# Patient Record
Sex: FEMALE | Race: WHITE | NOT HISPANIC OR LATINO | Employment: OTHER | URBAN - METROPOLITAN AREA
[De-identification: names, ages, dates, MRNs, and addresses within clinical notes are randomized per-mention and may not be internally consistent; named-entity substitution may affect disease eponyms.]

---

## 2017-03-07 ENCOUNTER — HOSPITAL ENCOUNTER (OUTPATIENT)
Dept: PHYSICAL MEDICINE AND REHAB | Facility: CLINIC | Age: 57
Discharge: HOME | End: 2017-03-07
Attending: PHYSICAL MEDICINE & REHABILITATION

## 2017-03-07 DIAGNOSIS — Q79.60 EHLERS-DANLOS DISEASE: ICD-10-CM

## 2017-03-07 DIAGNOSIS — M79.18 MYOFASCIAL MUSCLE PAIN: ICD-10-CM

## 2017-03-07 DIAGNOSIS — M43.10 SPONDYLOLISTHESIS: ICD-10-CM

## 2017-03-07 DIAGNOSIS — M54.2 CERVICALGIA: ICD-10-CM

## 2017-03-07 DIAGNOSIS — M54.50 LUMBAR SPINE PAIN: ICD-10-CM

## 2017-03-07 ASSESSMENT — MIFFLIN-ST. JEOR: SCORE: 1321.11

## 2017-03-15 ENCOUNTER — AMBULATORY - HEALTHEAST (OUTPATIENT)
Dept: PHYSICAL MEDICINE AND REHAB | Facility: CLINIC | Age: 57
End: 2017-03-15

## 2017-03-17 ENCOUNTER — COMMUNICATION - HEALTHEAST (OUTPATIENT)
Dept: PHYSICAL MEDICINE AND REHAB | Facility: CLINIC | Age: 57
End: 2017-03-17

## 2017-03-20 ENCOUNTER — HOSPITAL ENCOUNTER (OUTPATIENT)
Dept: PHYSICAL MEDICINE AND REHAB | Facility: CLINIC | Age: 57
Discharge: HOME | End: 2017-03-20
Attending: PHYSICAL MEDICINE & REHABILITATION

## 2017-03-20 DIAGNOSIS — M53.3 SACROILIAC JOINT PAIN: ICD-10-CM

## 2017-03-20 DIAGNOSIS — M79.18 MYOFASCIAL MUSCLE PAIN: ICD-10-CM

## 2017-03-20 DIAGNOSIS — Q79.60 EHLERS-DANLOS DISEASE: ICD-10-CM

## 2017-03-20 DIAGNOSIS — M54.50 LUMBAR SPINE PAIN: ICD-10-CM

## 2017-03-20 DIAGNOSIS — M43.10 SPONDYLOLISTHESIS: ICD-10-CM

## 2017-03-20 ASSESSMENT — MIFFLIN-ST. JEOR: SCORE: 1321.11

## 2017-06-03 ENCOUNTER — HEALTH MAINTENANCE LETTER (OUTPATIENT)
Age: 57
End: 2017-06-03

## 2017-11-27 ENCOUNTER — TRANSFERRED RECORDS (OUTPATIENT)
Dept: HEALTH INFORMATION MANAGEMENT | Facility: CLINIC | Age: 57
End: 2017-11-27

## 2017-12-19 DIAGNOSIS — R55 SYNCOPE, UNSPECIFIED SYNCOPE TYPE: ICD-10-CM

## 2017-12-19 DIAGNOSIS — L50.1 CHRONIC IDIOPATHIC URTICARIA: Primary | ICD-10-CM

## 2017-12-22 ENCOUNTER — HOSPITAL ENCOUNTER (OUTPATIENT)
Dept: PHYSICAL MEDICINE AND REHAB | Facility: CLINIC | Age: 57
Discharge: HOME | End: 2017-12-22
Attending: PHYSICAL MEDICINE & REHABILITATION

## 2017-12-22 DIAGNOSIS — L50.1 CHRONIC IDIOPATHIC URTICARIA: ICD-10-CM

## 2017-12-22 DIAGNOSIS — M79.18 MYOFASCIAL MUSCLE PAIN: ICD-10-CM

## 2017-12-22 DIAGNOSIS — Q79.60 EHLERS-DANLOS DISEASE: ICD-10-CM

## 2017-12-22 DIAGNOSIS — M54.50 LUMBAR SPINE PAIN: ICD-10-CM

## 2017-12-22 DIAGNOSIS — M53.3 SACROILIAC JOINT PAIN: ICD-10-CM

## 2017-12-22 DIAGNOSIS — M43.10 SPONDYLOLISTHESIS: ICD-10-CM

## 2017-12-22 DIAGNOSIS — R55 SYNCOPE, UNSPECIFIED SYNCOPE TYPE: ICD-10-CM

## 2017-12-22 ASSESSMENT — MIFFLIN-ST. JEOR: SCORE: 1298.43

## 2017-12-24 LAB — TRYPTASE SERPL-MCNC: 4.9 UG/L

## 2017-12-26 LAB — LEUKOTRIENE E4 URINE: <31 PG/MG CR

## 2017-12-27 ENCOUNTER — COMMUNICATION - HEALTHEAST (OUTPATIENT)
Dept: PHYSICAL MEDICINE AND REHAB | Facility: CLINIC | Age: 57
End: 2017-12-27

## 2017-12-27 LAB
2,3 11B PROSTAGLANDIN F2A UR: 1317 PG/MG CR
COLLECT DURATION TIME UR: NORMAL H
CREAT UR-MCNC: 65 MG/DL
HISTAMINE BLD-SCNC: 1665 NMOL/L (ref 180–1800)
ME-HISTAMINE/CREAT 24H UR: 94 MCG/G CR (ref 30–200)
SPECIMEN VOL 24H UR: NORMAL ML

## 2017-12-28 ENCOUNTER — COMMUNICATION - HEALTHEAST (OUTPATIENT)
Dept: SCHEDULING | Facility: CLINIC | Age: 57
End: 2017-12-28

## 2017-12-28 ENCOUNTER — RECORDS - HEALTHEAST (OUTPATIENT)
Dept: ADMINISTRATIVE | Facility: OTHER | Age: 57
End: 2017-12-28

## 2017-12-28 LAB — ANTI IGE ANTIBODY: NORMAL

## 2018-01-04 LAB — CIU ASSOCIATED BASOPHIL ACTIVATION: NORMAL

## 2018-01-19 ENCOUNTER — TRANSFERRED RECORDS (OUTPATIENT)
Dept: HEALTH INFORMATION MANAGEMENT | Facility: CLINIC | Age: 58
End: 2018-01-19

## 2018-02-23 ENCOUNTER — TRANSFERRED RECORDS (OUTPATIENT)
Dept: HEALTH INFORMATION MANAGEMENT | Facility: CLINIC | Age: 58
End: 2018-02-23

## 2018-03-02 ENCOUNTER — TRANSFERRED RECORDS (OUTPATIENT)
Dept: HEALTH INFORMATION MANAGEMENT | Facility: CLINIC | Age: 58
End: 2018-03-02

## 2018-03-06 ENCOUNTER — OFFICE VISIT - HEALTHEAST (OUTPATIENT)
Dept: INTERNAL MEDICINE | Facility: CLINIC | Age: 58
End: 2018-03-06

## 2018-03-06 ENCOUNTER — COMMUNICATION - HEALTHEAST (OUTPATIENT)
Dept: INTERNAL MEDICINE | Facility: CLINIC | Age: 58
End: 2018-03-06

## 2018-03-06 ENCOUNTER — COMMUNICATION - HEALTHEAST (OUTPATIENT)
Dept: TELEHEALTH | Facility: CLINIC | Age: 58
End: 2018-03-06

## 2018-03-06 DIAGNOSIS — Q79.62 EHLERS-DANLOS SYNDROME TYPE III: ICD-10-CM

## 2018-03-06 DIAGNOSIS — G90.522 COMPLEX REGIONAL PAIN SYNDROME TYPE 1 OF LEFT LOWER EXTREMITY: ICD-10-CM

## 2018-03-06 DIAGNOSIS — E03.9 HYPOTHYROIDISM: ICD-10-CM

## 2018-03-16 ENCOUNTER — OFFICE VISIT - HEALTHEAST (OUTPATIENT)
Dept: INTERNAL MEDICINE | Facility: CLINIC | Age: 58
End: 2018-03-16

## 2018-03-16 DIAGNOSIS — Z76.89 ENCOUNTER TO ESTABLISH CARE: ICD-10-CM

## 2018-04-05 ENCOUNTER — TELEPHONE (OUTPATIENT)
Dept: CARDIOLOGY | Facility: CLINIC | Age: 58
End: 2018-04-05

## 2018-04-05 NOTE — TELEPHONE ENCOUNTER
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  She is uncertain of the type.  However, she also reports that her niece has EDS and has a genetic mutation.  Kayla is going to send me a copy of her nieces report for review.  Assuming the genetic variant is informative, She would like to proceed with testing to confirm her diagnosis.      I will review results and notify Kayla.  She will then schedule an appointment to come in if needed.     Reviewed records of Kayla's niece.  Her niece Rell had chromosomal microarray analysis (CMA) due to indication of Asperger Syndrome not EDS.  Testing revealed that Rell's carries a gain in 5q11.2, which was reported as a non-disease associated region.  This test would not be helpful in confirming Kayla's EDS diagnosis.    Kayla also forwarded a copy of her genetic evaluation with Yesi Vo from 2013.  Dr. 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  Explained that testing is still not available for this form of EDS.    Kayla may call back in the future to see if additional information is known about EDS.    Jayde Zelaya MS  Licensed Genetic Counselor  [facility]

## 2018-05-07 ENCOUNTER — RECORDS - HEALTHEAST (OUTPATIENT)
Dept: ADMINISTRATIVE | Facility: OTHER | Age: 58
End: 2018-05-07

## 2018-06-01 ENCOUNTER — OFFICE VISIT - HEALTHEAST (OUTPATIENT)
Dept: INTERNAL MEDICINE | Facility: CLINIC | Age: 58
End: 2018-06-01

## 2018-06-01 DIAGNOSIS — R68.89 SUSPECTED LYME DISEASE: ICD-10-CM

## 2018-06-01 LAB
ALBUMIN SERPL-MCNC: 4.3 G/DL (ref 3.5–5)
ALP SERPL-CCNC: 76 U/L (ref 45–120)
ALT SERPL W P-5'-P-CCNC: 20 U/L (ref 0–45)
ANION GAP SERPL CALCULATED.3IONS-SCNC: 10 MMOL/L (ref 5–18)
AST SERPL W P-5'-P-CCNC: 28 U/L (ref 0–40)
BASOPHILS # BLD AUTO: 0 THOU/UL (ref 0–0.2)
BASOPHILS NFR BLD AUTO: 1 % (ref 0–2)
BILIRUB SERPL-MCNC: 0.3 MG/DL (ref 0–1)
BUN SERPL-MCNC: 24 MG/DL (ref 8–22)
C REACTIVE PROTEIN LHE: <0.1 MG/DL (ref 0–0.8)
CALCIUM SERPL-MCNC: 9.6 MG/DL (ref 8.5–10.5)
CHLORIDE BLD-SCNC: 110 MMOL/L (ref 98–107)
CO2 SERPL-SCNC: 25 MMOL/L (ref 22–31)
CREAT SERPL-MCNC: 0.89 MG/DL (ref 0.6–1.1)
EOSINOPHIL # BLD AUTO: 0.1 THOU/UL (ref 0–0.4)
EOSINOPHIL NFR BLD AUTO: 2 % (ref 0–6)
ERYTHROCYTE [DISTWIDTH] IN BLOOD BY AUTOMATED COUNT: 12.2 % (ref 11–14.5)
ERYTHROCYTE [SEDIMENTATION RATE] IN BLOOD BY WESTERGREN METHOD: 2 MM/HR (ref 0–20)
GFR SERPL CREATININE-BSD FRML MDRD: >60 ML/MIN/1.73M2
GLUCOSE BLD-MCNC: 79 MG/DL (ref 70–125)
HCT VFR BLD AUTO: 40.2 % (ref 35–47)
HGB BLD-MCNC: 13.5 G/DL (ref 12–16)
LYMPHOCYTES # BLD AUTO: 1.6 THOU/UL (ref 0.8–4.4)
LYMPHOCYTES NFR BLD AUTO: 36 % (ref 20–40)
MCH RBC QN AUTO: 30.7 PG (ref 27–34)
MCHC RBC AUTO-ENTMCNC: 33.5 G/DL (ref 32–36)
MCV RBC AUTO: 91 FL (ref 80–100)
MONOCYTES # BLD AUTO: 0.3 THOU/UL (ref 0–0.9)
MONOCYTES NFR BLD AUTO: 7 % (ref 2–10)
NEUTROPHILS # BLD AUTO: 2.3 THOU/UL (ref 2–7.7)
NEUTROPHILS NFR BLD AUTO: 54 % (ref 50–70)
PLATELET # BLD AUTO: 165 THOU/UL (ref 140–440)
PMV BLD AUTO: 8.1 FL (ref 7–10)
POTASSIUM BLD-SCNC: 4.3 MMOL/L (ref 3.5–5)
PROT SERPL-MCNC: 6.7 G/DL (ref 6–8)
RBC # BLD AUTO: 4.39 MILL/UL (ref 3.8–5.4)
SODIUM SERPL-SCNC: 145 MMOL/L (ref 136–145)
WBC: 4.3 THOU/UL (ref 4–11)

## 2018-06-02 LAB — A PHAG+EHRL SP DNA PNL BLD NAA+NON-PROBE: NORMAL

## 2018-06-03 LAB
E CHAFFEENSIS IGG TITR SER IF: NORMAL {TITER}
E CHAFFEENSIS IGM TITR SER IF: NORMAL {TITER}
R RICKETTSI IGG TITR SER IF: NORMAL {TITER}
R RICKETTSI IGM TITR SER IF: NORMAL {TITER}

## 2018-06-04 LAB — B BURGDOR IGG+IGM SER QL: <0.01 INDEX VALUE

## 2018-06-05 ENCOUNTER — OFFICE VISIT - HEALTHEAST (OUTPATIENT)
Dept: INFECTIOUS DISEASES | Facility: CLINIC | Age: 58
End: 2018-06-05

## 2018-06-05 DIAGNOSIS — R51.9 NONINTRACTABLE EPISODIC HEADACHE, UNSPECIFIED HEADACHE TYPE: ICD-10-CM

## 2018-06-05 DIAGNOSIS — R21 SKIN RASH: ICD-10-CM

## 2018-06-05 ASSESSMENT — MIFFLIN-ST. JEOR: SCORE: 1211.8

## 2018-06-06 ENCOUNTER — COMMUNICATION - HEALTHEAST (OUTPATIENT)
Dept: INFECTIOUS DISEASES | Facility: CLINIC | Age: 58
End: 2018-06-06

## 2018-06-07 ENCOUNTER — RECORDS - HEALTHEAST (OUTPATIENT)
Dept: ADMINISTRATIVE | Facility: OTHER | Age: 58
End: 2018-06-07

## 2018-06-12 ENCOUNTER — COMMUNICATION - HEALTHEAST (OUTPATIENT)
Dept: INFECTIOUS DISEASES | Facility: CLINIC | Age: 58
End: 2018-06-12

## 2018-06-22 ENCOUNTER — RECORDS - HEALTHEAST (OUTPATIENT)
Dept: ADMINISTRATIVE | Facility: OTHER | Age: 58
End: 2018-06-22

## 2018-07-27 ENCOUNTER — TRANSFERRED RECORDS (OUTPATIENT)
Dept: HEALTH INFORMATION MANAGEMENT | Facility: CLINIC | Age: 58
End: 2018-07-27

## 2018-07-31 ENCOUNTER — COMMUNICATION - HEALTHEAST (OUTPATIENT)
Dept: INTERNAL MEDICINE | Facility: CLINIC | Age: 58
End: 2018-07-31

## 2018-08-06 ENCOUNTER — MEDICAL CORRESPONDENCE (OUTPATIENT)
Dept: HEALTH INFORMATION MANAGEMENT | Facility: CLINIC | Age: 58
End: 2018-08-06

## 2018-08-07 ENCOUNTER — TELEPHONE (OUTPATIENT)
Dept: OPHTHALMOLOGY | Facility: CLINIC | Age: 58
End: 2018-08-07

## 2018-08-07 NOTE — TELEPHONE ENCOUNTER
[facility] Call Center    Phone Message    May a detailed message be left on voicemail: no    Reason for Call: Other: PT would like a call back regarding questions about the provider and his background with certain specialties.   Please follow up      Action Taken: Message routed to:  [facility] clinic

## 2018-08-07 NOTE — TELEPHONE ENCOUNTER
Pt. Wanted to make sure that Dr. 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 Verified with patient that he does see patients with this syndrome.     Jackelyn Fleming COT 1:48 PM August 7, 2018

## 2018-08-09 ENCOUNTER — COMMUNICATION - HEALTHEAST (OUTPATIENT)
Dept: SCHEDULING | Facility: CLINIC | Age: 58
End: 2018-08-09

## 2018-08-09 ENCOUNTER — OFFICE VISIT - HEALTHEAST (OUTPATIENT)
Dept: INTERNAL MEDICINE | Facility: CLINIC | Age: 58
End: 2018-08-09

## 2018-08-09 DIAGNOSIS — R07.81 RIB PAIN ON LEFT SIDE: ICD-10-CM

## 2018-08-09 DIAGNOSIS — Q79.62 EHLERS-DANLOS SYNDROME TYPE III: ICD-10-CM

## 2018-08-14 ENCOUNTER — OFFICE VISIT (OUTPATIENT)
Dept: OPHTHALMOLOGY | Facility: CLINIC | Age: 58
End: 2018-08-14
Attending: OPHTHALMOLOGY
Payer: COMMERCIAL

## 2018-08-14 DIAGNOSIS — Q79.60 EHLERS-DANLOS DISEASE: Primary | ICD-10-CM

## 2018-08-14 DIAGNOSIS — H25.10 NUCLEAR SENILE CATARACT, UNSPECIFIED LATERALITY: Primary | ICD-10-CM

## 2018-08-14 DIAGNOSIS — H25.10 NUCLEAR SENILE CATARACT, UNSPECIFIED LATERALITY: ICD-10-CM

## 2018-08-14 PROCEDURE — G0463 HOSPITAL OUTPT CLINIC VISIT: HCPCS | Mod: ZF

## 2018-08-14 PROCEDURE — 76519 ECHO EXAM OF EYE: CPT | Mod: ZF | Performed by: OPHTHALMOLOGY

## 2018-08-14 PROCEDURE — 92025 CPTRIZED CORNEAL TOPOGRAPHY: CPT | Mod: ZF | Performed by: OPHTHALMOLOGY

## 2018-08-14 RX ORDER — FEXOFENADINE HCL 60 MG/1
180 TABLET, FILM COATED ORAL EVERY MORNING
COMMUNITY

## 2018-08-14 RX ORDER — BUPROPION HYDROCHLORIDE 150 MG/1
150 TABLET ORAL
Status: ON HOLD | COMMUNITY
Start: 2018-01-08 | End: 2019-11-26

## 2018-08-14 RX ORDER — MULTIVIT WITH MINERALS/LUTEIN
1000 TABLET ORAL
COMMUNITY
End: 2022-07-28

## 2018-08-14 ASSESSMENT — VISUAL ACUITY
OS_SC+: -2
OD_SC+: -1
METHOD: SNELLEN - LINEAR
OS_SC: 20/30
OD_SC: 20/25

## 2018-08-14 ASSESSMENT — EXTERNAL EXAM - RIGHT EYE: OD_EXAM: NORMAL

## 2018-08-14 ASSESSMENT — TONOMETRY
OD_IOP_MMHG: 17
OS_IOP_MMHG: 18
IOP_METHOD: TONOPEN

## 2018-08-14 ASSESSMENT — CUP TO DISC RATIO
OS_RATIO: 0.2
OD_RATIO: 0.2

## 2018-08-14 ASSESSMENT — CONF VISUAL FIELD
OS_INFERIOR_NASAL_RESTRICTION: 3
METHOD: COUNTING FINGERS
OD_NORMAL: 1

## 2018-08-14 ASSESSMENT — EXTERNAL EXAM - LEFT EYE: OS_EXAM: NORMAL

## 2018-08-14 NOTE — MR AVS SNAPSHOT
After Visit Summary   8/14/2018    Kelsy Morley    MRN: [id number]           Patient Information     Date Of Birth          1960        Visit Information        Provider Department      8/14/2018 12:45 PM Pj Miranda MD [facility]        Today's Diagnoses     Omayra-Danlos disease    -  1    Nuclear senile cataract, unspecified laterality - Both Eyes           Follow-ups after your visit        Your next 10 appointments already scheduled     Oct 04, 2018  1:15 PM CDT   Post-Op with Pj Miranda MD   Eye Clinic ([facility])    [address] [address]   [phone]            Oct 11, 2018  1:15 PM CDT   Post-Op with Pj Miranda MD   Eye Clinic ([facility])    [address] [address]   [phone]            Oct 18, 2018  1:00 PM CDT   Post-Op with Pj Miranda MD   Eye Clinic ([facility])    [address] [address]   [phone]            Oct 25, 2018  9:15 AM CDT   Post-Op with Pj Miranda MD   Eye Clinic ([facility])    [address] [address]   [phone]            Nov 20, 2018  1:15 PM CST   Post-Op with Pj Miranda MD   Eye Clinic ([facility])    [address] [address]   [phone]              Who to contact     Please call your clinic at [phone] to:    Ask questions about your health    Make or cancel appointments    Discuss your medicines    Learn about your test results    Speak to your doctor            Additional Information About Your Visit        MyChart Information     MyChart gives you secure access to your electronic health record. If you see a primary  care provider, you can also send messages to your care team and make appointments. If you have questions, please call your primary care clinic.  If you do not have a primary care provider, please call [phone] and they will assist you.      Zuki is an electronic gateway that provides easy, online access to your medical records. With Zuki, you can request a clinic appointment, read your test results, renew a prescription or communicate with your care team.     To access your existing account, please contact your [facility] or call [phone] for assistance.        Care EveryWhere ID     This is your Care EveryWhere ID. This could be used by other organizations to access your [facility] medical records  [id number]         Blood Pressure from Last 3 Encounters:   03/30/15 109/66    Weight from Last 3 Encounters:   03/30/15 77.1 kg (169 lb 14.4 oz)              We Performed the Following     Corneal Topography OU (both eyes)     IOL Biometry w/ IOL calc OU (both eye)     Ana-Operative Worksheet        Primary Care Provider Office Phone # Fax #    Anne MANN Teddy [phone]       [facility] [address] [address]        Equal Access to Services     SANYA ROSS AH: Hadii aad ku hadasho Somark, waaxda luqadaha, qaybta kaalmada adealfredayada, fracisco wiseman. So [facility] [phone].    ATENCIÓN: Si habla español, tiene a jaramillo disposición servicios gratuitos de asistencia lingüística. Jill al [phone].    We comply with applicable federal civil rights laws and [place] laws. We do not discriminate on the basis of race, color, national origin, age, disability, sex, sexual orientation, or gender identity.            Thank you!     Thank you for choosing EYE CLINIC  for your care. Our goal is always to provide you with excellent care. Hearing back from our patients is one way we can continue to improve our  services. Please take a few minutes to complete the written survey that you may receive in the mail after your visit with us. Thank you!             Your Updated Medication List - Protect others around you: Learn how to safely use, store and throw away your medicines at www.disposemymeds.org.          This list is accurate as of 8/14/18 11:59 PM.  Always use your most recent med list.                   Brand Name Dispense Instructions for use Diagnosis    ascorbic acid 1000 MG Tabs    vitamin C     Take 1,000 mg by mouth        Beclomethasone Dipropionate 80 MCG/ACT Aers Nasal Spray      Spray 2 puffs in nostril        buPROPion 150 MG 24 hr tablet    WELLBUTRIN XL     Take 150 mg by mouth        calcium carbonate 500 MG tablet    OS-HALLIE 500 mg [place]. Ca     Take 500 mg by mouth 2 times daily        CELEBREX PO      Take 100 mg by mouth 2 times daily        cetirizine HCl 10 MG Caps           CLONIDINE HCL PO      Take 10 mg by mouth Patch        CYMBALTA PO      Take 60 mg by mouth daily        fexofenadine 60 MG tablet    ALLEGRA          IMITREX PO      Take 100 mg by mouth every 8 hours as needed for migraine        LEVOTHYROXINE SODIUM PO      Take 75 mcg by mouth daily        lidocaine visc 2% 2.5mL/5mL & maalox/mylanta w/ simeth 2.5mL/5mL & diphenhydrAMINE 5mg/5mL Susp suspension    [facility] Mouthwash [facility]     Swish and swallow 10 mLs in mouth every 6 hours as needed for mouth sores        LIPITOR PO      Take 10 mg by mouth daily        MAGNESIUM OXIDE PO      Take 400 mg by mouth daily        OMEGA-3 FISH OIL PO      Take 1 g by mouth 2 times daily (with meals)        ranitidine 150 MG tablet    ZANTAC     Take 150 mg by mouth        saccharomyces boulardii 250 MG capsule    FLORASTOR     Take 250 mg by mouth 2 times daily        TIZANIDINE HCL PO      Take 2 mg by mouth every 6 hours as needed for muscle spasms        TOPAMAX PO      Take 75 mg by mouth daily        TRAZODONE HCL PO      Take 75 mg by  mouth At Bedtime        VITAMIN B-2 PO      Take 100 mg by mouth 2 times daily        VITAMIN D (CHOLECALCIFEROL) PO      Take 1,000 Units by mouth daily        zinc sulfate 220 (50 Zn) MG capsule    ZINCATE     Take 220 mg by mouth daily

## 2018-08-14 NOTE — NURSING NOTE
Chief Complaints and History of Present Illnesses   Patient presents with     Consult For     Cataract evaluation per Dr. Nikolai HITCHCOCK    Affected eye(s):  Both   Symptoms:     No floaters   No flashes   Foreign body sensation   Tearing   Itching         Do you have eye pain now?:  No      Comments:  Pt here for a cataract evaluation BE. With in the last year pt has noticed a huge change in vision. Everything is so blurry Dist and Near with and without glasses.     Lise Marshall, [facility] 12:34 PM August 14, 2018

## 2018-08-15 ENCOUNTER — TELEPHONE (OUTPATIENT)
Dept: OPHTHALMOLOGY | Facility: CLINIC | Age: 58
End: 2018-08-15

## 2018-08-15 NOTE — TELEPHONE ENCOUNTER
Patient is scheduled for surgery with Dr. Miranda      Spoke or left message with: patient    Date of Surgery: 10/3 and 10/17 MITZI - pt preference date    Pre-op with surgeon (if applicable): GALINA    H&P: Scheduled with [facility] See [phone] pt will call to schedule appt    Post op: 1day, 1 week, 1 month    Special Equipment: NA    Additional imaging/appointments: GALIAN    Surgery packet sent: mailed 8/15/18    Additional comments: Advsd need adult surgery day and 1 day post op for each surgery

## 2018-08-16 ENCOUNTER — OFFICE VISIT - HEALTHEAST (OUTPATIENT)
Dept: INTERNAL MEDICINE | Facility: CLINIC | Age: 58
End: 2018-08-16

## 2018-08-16 DIAGNOSIS — Z53.21 PATIENT LEFT WITHOUT BEING SEEN: ICD-10-CM

## 2018-08-24 ENCOUNTER — OFFICE VISIT - HEALTHEAST (OUTPATIENT)
Dept: INTERNAL MEDICINE | Facility: CLINIC | Age: 58
End: 2018-08-24

## 2018-08-24 DIAGNOSIS — M85.89 OSTEOPENIA OF MULTIPLE SITES: ICD-10-CM

## 2018-09-14 NOTE — TELEPHONE ENCOUNTER
[facility] Call Center    Phone Message    May a detailed message be left on voicemail: yes    Reason for Call: Other: Questions for Dr Miranda: Pt saw someone at another clinic yest and other provider said her Cataracts are really small and she wouldn't do surgery at this time - so Pt is wondering if she should proceed with Surgery at this time? or should she wait longer?, or due to her Omayra-Danlos syndrome condition or for any other reason should she do it now? - Please have Dr Miranda return her call to discuss - Thanks      Action Taken: Message routed to:  [facility]

## 2018-09-18 ENCOUNTER — TELEPHONE (OUTPATIENT)
Dept: OPHTHALMOLOGY | Facility: CLINIC | Age: 58
End: 2018-09-18

## 2018-09-18 NOTE — TELEPHONE ENCOUNTER
Health Call Center    Phone Message    May a detailed message be left on voicemail: yes    Reason for Call: Other: Pt reported that she called our clinic on Fri 9/14/18, but there is no documented telephone encouter?  Pt called requesting Dr. Pj Miranda to call her back. Pt had gone after a 2nd opionion from outside our clinic recently regarding the upcoming cataract surgeries with our eye clinic on 10/3 and 10/17/18.  Pt was sorta shocked at what she heard during the 2nd opionion and now has a number of questions for Dr. Miranda. Please have Dr. Miranda call pt on her cell ph#. Thank you.     Action Taken: Message routed to:  [facility]

## 2018-09-19 ENCOUNTER — TELEPHONE (OUTPATIENT)
Dept: OPHTHALMOLOGY | Facility: CLINIC | Age: 58
End: 2018-09-19

## 2018-09-19 NOTE — TELEPHONE ENCOUNTER
Reviewed with pt message sent to cornea fellow to review cataract surgery concerns    Pt wants to review increase risk with her history jameson graham. Visual acuity better than 20/40 and would like to review further before going ahead with cataract surgery  Had second opinion since last visit    Reviewed fellow been in cliinc and surgery and will f/u with pt concerns    Pt's pre-op tomorrow at 2 Pm and would like to review before that appt  Mikey Hugo RN 4:30 PM 09/19/18

## 2018-09-19 NOTE — TELEPHONE ENCOUNTER
H/o jameson graham     Questions on going forth with cataract surgery after second opinion    Previous call last week-- note was attached to a previous encounter in august    Note to fellow for call back to review questions about upcoming cataract surgery  Mikey Hugo RN 7:22 AM 09/19/18

## 2018-09-19 NOTE — TELEPHONE ENCOUNTER
[facility] Call Center    Phone Message    May a detailed message be left on voicemail: yes    Reason for Call: Other: A message was sent over on 09/14 and then again on 09/18 in regards to the same situation. Pt still has not heard back yet. Pt states that she saw a different doctor for a second opinion in regards to her cataract surgery and they told her that she should not proceed with the surgery. The doctor said that they were too small and that there is a one in a thousand chance that Pt would become blind and that she shouldn't bother with them. Pt has a physical tomorrow at 2pm and has questions she would like to discuss with PAGE asap. Please f/u.     Action Taken: Message routed to:  [facility]

## 2018-09-20 NOTE — TELEPHONE ENCOUNTER
Dr. Caal spoke to pt   Pt will hold on cataract surgery at this time and try glasses  Will f/u with local eye MD until interested in cataract surgery in future  Mikey Hugo RN 9:43 AM 09/20/18

## 2018-09-24 ENCOUNTER — RECORDS - HEALTHEAST (OUTPATIENT)
Dept: ADMINISTRATIVE | Facility: OTHER | Age: 58
End: 2018-09-24

## 2018-10-12 ENCOUNTER — COMMUNICATION - HEALTHEAST (OUTPATIENT)
Dept: INTERNAL MEDICINE | Facility: CLINIC | Age: 58
End: 2018-10-12

## 2018-10-12 DIAGNOSIS — E78.2 MIXED HYPERLIPIDEMIA: ICD-10-CM

## 2018-11-01 ENCOUNTER — AMBULATORY - HEALTHEAST (OUTPATIENT)
Dept: LAB | Facility: CLINIC | Age: 58
End: 2018-11-01

## 2018-11-01 DIAGNOSIS — E78.2 MIXED HYPERLIPIDEMIA: ICD-10-CM

## 2018-11-01 LAB
CHOLEST SERPL-MCNC: 193 MG/DL
FASTING STATUS PATIENT QL REPORTED: YES
HDLC SERPL-MCNC: 96 MG/DL
LDLC SERPL CALC-MCNC: 89 MG/DL
TRIGL SERPL-MCNC: 40 MG/DL
TSH SERPL DL<=0.005 MIU/L-ACNC: 1.44 UIU/ML (ref 0.3–5)

## 2018-11-02 ENCOUNTER — RECORDS - HEALTHEAST (OUTPATIENT)
Dept: ADMINISTRATIVE | Facility: OTHER | Age: 58
End: 2018-11-02

## 2018-11-03 ENCOUNTER — RECORDS - HEALTHEAST (OUTPATIENT)
Dept: ADMINISTRATIVE | Facility: OTHER | Age: 58
End: 2018-11-03

## 2018-11-14 ENCOUNTER — HOSPITAL ENCOUNTER (OUTPATIENT)
Dept: PHYSICAL MEDICINE AND REHAB | Facility: CLINIC | Age: 58
Discharge: HOME | End: 2018-11-14
Attending: PHYSICAL MEDICINE & REHABILITATION

## 2018-11-14 DIAGNOSIS — M79.18 MYOFASCIAL MUSCLE PAIN: ICD-10-CM

## 2018-11-14 DIAGNOSIS — M43.16 SPONDYLOLISTHESIS OF LUMBAR REGION: ICD-10-CM

## 2018-11-14 DIAGNOSIS — M53.3 SACROILIAC JOINT PAIN: ICD-10-CM

## 2018-11-14 DIAGNOSIS — M54.50 LUMBAR SPINE PAIN: ICD-10-CM

## 2018-11-14 DIAGNOSIS — Q79.60 EHLERS-DANLOS DISEASE: ICD-10-CM

## 2018-11-14 ASSESSMENT — MIFFLIN-ST. JEOR: SCORE: 1207.71

## 2018-11-30 ENCOUNTER — RECORDS - HEALTHEAST (OUTPATIENT)
Dept: ADMINISTRATIVE | Facility: OTHER | Age: 58
End: 2018-11-30

## 2018-12-05 ENCOUNTER — COMMUNICATION - HEALTHEAST (OUTPATIENT)
Dept: ADMINISTRATIVE | Facility: HOSPITAL | Age: 58
End: 2018-12-05

## 2018-12-05 ENCOUNTER — AMBULATORY - HEALTHEAST (OUTPATIENT)
Dept: PHARMACY | Facility: CLINIC | Age: 58
End: 2018-12-05

## 2018-12-05 DIAGNOSIS — L50.8 IMMUNOLOGIC URTICARIA: ICD-10-CM

## 2018-12-14 ENCOUNTER — INFUSION - HEALTHEAST (OUTPATIENT)
Dept: INFUSION THERAPY | Facility: CLINIC | Age: 58
End: 2018-12-14

## 2018-12-14 DIAGNOSIS — L50.8 IMMUNOLOGIC URTICARIA: ICD-10-CM

## 2019-01-11 ENCOUNTER — INFUSION - HEALTHEAST (OUTPATIENT)
Dept: INFUSION THERAPY | Facility: CLINIC | Age: 59
End: 2019-01-11

## 2019-01-11 DIAGNOSIS — L50.8 IMMUNOLOGIC URTICARIA: ICD-10-CM

## 2019-01-17 ENCOUNTER — MEDICAL CORRESPONDENCE (OUTPATIENT)
Dept: HEALTH INFORMATION MANAGEMENT | Facility: CLINIC | Age: 59
End: 2019-01-17

## 2019-01-17 DIAGNOSIS — F43.12 CHRONIC POST-TRAUMATIC STRESS DISORDER (PTSD): ICD-10-CM

## 2019-01-17 DIAGNOSIS — L50.3 DERMOGRAPHIA: ICD-10-CM

## 2019-01-17 DIAGNOSIS — M53.3 SACROILIAC JOINT PAIN: ICD-10-CM

## 2019-01-17 DIAGNOSIS — R23.3 EASY BRUISING: ICD-10-CM

## 2019-01-17 DIAGNOSIS — I95.1 ORTHOSTATIC HYPOTENSION DYSAUTONOMIC SYNDROME: ICD-10-CM

## 2019-01-17 DIAGNOSIS — R53.83 FATIGUE: ICD-10-CM

## 2019-01-17 DIAGNOSIS — I73.00 RAYNAUD'S SYNDROME: ICD-10-CM

## 2019-01-17 DIAGNOSIS — R20.1: ICD-10-CM

## 2019-01-17 DIAGNOSIS — Q79.60 EHLERS-DANLOS SYNDROME: Primary | ICD-10-CM

## 2019-01-17 DIAGNOSIS — G43.909 MIGRAINE: ICD-10-CM

## 2019-01-17 DIAGNOSIS — K59.09 CHRONIC CONSTIPATION: ICD-10-CM

## 2019-02-06 DIAGNOSIS — R20.1: ICD-10-CM

## 2019-02-06 DIAGNOSIS — Q79.60 EHLERS-DANLOS SYNDROME: ICD-10-CM

## 2019-02-06 DIAGNOSIS — G43.909 MIGRAINE: ICD-10-CM

## 2019-02-06 DIAGNOSIS — M53.3 SACROILIAC JOINT PAIN: ICD-10-CM

## 2019-02-06 DIAGNOSIS — F43.12 CHRONIC POST-TRAUMATIC STRESS DISORDER (PTSD): ICD-10-CM

## 2019-02-06 DIAGNOSIS — R23.3 EASY BRUISING: ICD-10-CM

## 2019-02-06 DIAGNOSIS — I95.1 ORTHOSTATIC HYPOTENSION DYSAUTONOMIC SYNDROME: ICD-10-CM

## 2019-02-06 DIAGNOSIS — I73.00 RAYNAUD'S SYNDROME: ICD-10-CM

## 2019-02-06 DIAGNOSIS — L50.3 DERMOGRAPHIA: ICD-10-CM

## 2019-02-06 DIAGNOSIS — R53.83 FATIGUE: ICD-10-CM

## 2019-02-06 DIAGNOSIS — K59.09 CHRONIC CONSTIPATION: ICD-10-CM

## 2019-02-06 LAB
APTT PPP: 27 SEC (ref 22–37)
CLOSURE TME COLL+EPINEP BLD: 101 SEC
COLLECT DURATION TIME UR: 24 H
INR PPP: 0.94 (ref 0.86–1.14)
MISCELLANEOUS TEST: NORMAL
SPECIMEN VOL UR: 2090 ML

## 2019-02-07 LAB
RESULT: NORMAL
SEND OUTS MISC TEST CODE: NORMAL
SEND OUTS MISC TEST SPECIMEN: NORMAL
TEST NAME: NORMAL

## 2019-02-08 ENCOUNTER — INFUSION - HEALTHEAST (OUTPATIENT)
Dept: INFUSION THERAPY | Facility: CLINIC | Age: 59
End: 2019-02-08

## 2019-02-08 DIAGNOSIS — L50.8 IMMUNOLOGIC URTICARIA: ICD-10-CM

## 2019-02-08 LAB
2,3 11B PROSTAGLANDIN F2A UR: 1684 PG/MG CR
COLLECT DURATION TIME UR: 24 H
CREAT UR-MCNC: 56 MG/DL
FACT VIII ACT/NOR PPP: 147 % (ref 55–200)
ME-HISTAMINE/CREAT 24H UR: 129 MCG/G CR (ref 30–200)
SPECIMEN VOL 24H UR: 2090 ML
VWF CBA/VWF AG PPP IA-RTO: 123 % (ref 50–200)
VWF:AC ACT/NOR PPP IA: 129 % (ref 50–180)

## 2019-02-09 LAB
CGA SERPL-MCNC: 124 NG/ML (ref 0–95)
COLLECT DURATION TIME SPEC: 24 H
COLLECT DURATION TIME SPEC: NORMAL H
CREAT UR-MCNC: 11 MG/DL
CREAT UR-MCNC: 36 MG/DL
HISTAMINE 24H UR-MRATE: 28 UG/D (ref 0–60)
HISTAMINE 24H UR-MRATE: NORMAL UG/D (ref 0–60)
HISTAMINE BLD-SCNC: 1251 NMOL/L (ref 180–1800)
HISTAMINE UR-SCNC: 121 NMOL/L
HISTAMINE UR-SCNC: 37 NMOL/L
HISTAMINE/CREAT 24H UR-SRTO: 336 NMOL/G CRT (ref 0–450)
HISTAMINE/CREAT 24H UR-SRTO: 336 NMOL/G CRT (ref 0–450)
LEUKOTRIENE E4 URINE: <36 PG/MG CR
SPECIMEN VOL ?TM UR: 2090 ML
SPECIMEN VOL ?TM UR: NORMAL ML

## 2019-02-11 LAB — TRYPTASE SERPL-MCNC: 5.1 UG/L

## 2019-02-12 LAB
2,3 11B PROSTAGLANDIN F2A UR: NORMAL PG/MG CR
ANTI IGE ANTIBODY: ABNORMAL
IGE SERPL-ACNC: 135 KIU/L (ref 0–114)
URTICARIA INDUCED BASOPHIL ACTIVATION: 0 %

## 2019-02-13 LAB
COLLECT DURATION TIME UR: NORMAL H
CREAT UR-MCNC: 17 MG/DL
ME-HISTAMINE/CREAT 24H UR: 119 MCG/G CR (ref 30–200)
SPECIMEN VOL 24H UR: NORMAL ML

## 2019-02-14 ENCOUNTER — RECORDS - HEALTHEAST (OUTPATIENT)
Dept: ADMINISTRATIVE | Facility: OTHER | Age: 59
End: 2019-02-14

## 2019-02-20 ENCOUNTER — RECORDS - HEALTHEAST (OUTPATIENT)
Dept: ADMINISTRATIVE | Facility: OTHER | Age: 59
End: 2019-02-20

## 2019-02-21 LAB
PROSTAGLANDIN D2 URINE: NORMAL
PROSTAGLANDIN D2: 18 PG/ML

## 2019-03-08 ENCOUNTER — INFUSION - HEALTHEAST (OUTPATIENT)
Dept: INFUSION THERAPY | Facility: CLINIC | Age: 59
End: 2019-03-08

## 2019-03-08 DIAGNOSIS — L50.8 IMMUNOLOGIC URTICARIA: ICD-10-CM

## 2019-03-11 ENCOUNTER — TRANSFERRED RECORDS (OUTPATIENT)
Dept: HEALTH INFORMATION MANAGEMENT | Facility: CLINIC | Age: 59
End: 2019-03-11

## 2019-04-05 ENCOUNTER — INFUSION - HEALTHEAST (OUTPATIENT)
Dept: INFUSION THERAPY | Facility: CLINIC | Age: 59
End: 2019-04-05

## 2019-04-05 DIAGNOSIS — L50.8 IMMUNOLOGIC URTICARIA: ICD-10-CM

## 2019-05-03 ENCOUNTER — INFUSION - HEALTHEAST (OUTPATIENT)
Dept: INFUSION THERAPY | Facility: CLINIC | Age: 59
End: 2019-05-03

## 2019-05-03 DIAGNOSIS — L50.8 IMMUNOLOGIC URTICARIA: ICD-10-CM

## 2019-05-30 ENCOUNTER — INFUSION - HEALTHEAST (OUTPATIENT)
Dept: INFUSION THERAPY | Facility: CLINIC | Age: 59
End: 2019-05-30

## 2019-05-30 DIAGNOSIS — L50.8 IMMUNOLOGIC URTICARIA: ICD-10-CM

## 2019-06-28 ENCOUNTER — INFUSION - HEALTHEAST (OUTPATIENT)
Dept: INFUSION THERAPY | Facility: CLINIC | Age: 59
End: 2019-06-28

## 2019-06-28 DIAGNOSIS — L50.8 IMMUNOLOGIC URTICARIA: ICD-10-CM

## 2019-07-26 ENCOUNTER — INFUSION - HEALTHEAST (OUTPATIENT)
Dept: INFUSION THERAPY | Facility: CLINIC | Age: 59
End: 2019-07-26

## 2019-07-26 DIAGNOSIS — L50.8 IMMUNOLOGIC URTICARIA: ICD-10-CM

## 2019-08-23 ENCOUNTER — INFUSION - HEALTHEAST (OUTPATIENT)
Dept: INFUSION THERAPY | Facility: CLINIC | Age: 59
End: 2019-08-23

## 2019-08-23 ENCOUNTER — COMMUNICATION - HEALTHEAST (OUTPATIENT)
Dept: INTERNAL MEDICINE | Facility: CLINIC | Age: 59
End: 2019-08-23

## 2019-08-23 DIAGNOSIS — L50.8 IMMUNOLOGIC URTICARIA: ICD-10-CM

## 2019-08-25 ENCOUNTER — COMMUNICATION - HEALTHEAST (OUTPATIENT)
Dept: INTERNAL MEDICINE | Facility: CLINIC | Age: 59
End: 2019-08-25

## 2019-08-25 DIAGNOSIS — E03.9 HYPOTHYROIDISM, UNSPECIFIED TYPE: ICD-10-CM

## 2019-08-28 ENCOUNTER — TRANSFERRED RECORDS (OUTPATIENT)
Dept: HEALTH INFORMATION MANAGEMENT | Facility: CLINIC | Age: 59
End: 2019-08-28

## 2019-09-19 ENCOUNTER — INFUSION - HEALTHEAST (OUTPATIENT)
Dept: INFUSION THERAPY | Facility: CLINIC | Age: 59
End: 2019-09-19

## 2019-09-19 DIAGNOSIS — L50.8 IMMUNOLOGIC URTICARIA: ICD-10-CM

## 2019-09-26 ENCOUNTER — RECORDS - HEALTHEAST (OUTPATIENT)
Dept: ADMINISTRATIVE | Facility: OTHER | Age: 59
End: 2019-09-26

## 2019-09-28 ENCOUNTER — HEALTH MAINTENANCE LETTER (OUTPATIENT)
Age: 59
End: 2019-09-28

## 2019-10-25 ASSESSMENT — ENCOUNTER SYMPTOMS
NAUSEA: 1
MYALGIAS: 1
TASTE DISTURBANCE: 0
POOR WOUND HEALING: 1
MUSCLE WEAKNESS: 1
ORTHOPNEA: 0
RECTAL PAIN: 0
DIZZINESS: 1
SINUS CONGESTION: 1
ARTHRALGIAS: 1
SPEECH CHANGE: 0
DECREASED CONCENTRATION: 1
BLOOD IN STOOL: 0
EYE WATERING: 0
WEIGHT LOSS: 0
SLEEP DISTURBANCES DUE TO BREATHING: 0
SORE THROAT: 0
SINUS PAIN: 1
DECREASED APPETITE: 0
HOARSE VOICE: 1
DOUBLE VISION: 1
NAIL CHANGES: 0
LOSS OF CONSCIOUSNESS: 0
NECK MASS: 0
TINGLING: 1
NIGHT SWEATS: 0
JOINT SWELLING: 1
WEIGHT GAIN: 0
HYPOTENSION: 1
POLYPHAGIA: 0
BRUISES/BLEEDS EASILY: 1
EXERCISE INTOLERANCE: 1
TREMORS: 0
MUSCLE CRAMPS: 1
CONSTIPATION: 0
SKIN CHANGES: 0
HYPERTENSION: 0
LIGHT-HEADEDNESS: 1
FEVER: 0
NECK PAIN: 1
ALTERED TEMPERATURE REGULATION: 1
VOMITING: 0
PALPITATIONS: 1
HEMATURIA: 0
DEPRESSION: 0
EYE IRRITATION: 1
TROUBLE SWALLOWING: 1
DISTURBANCES IN COORDINATION: 1
JAUNDICE: 0
SWOLLEN GLANDS: 0
STIFFNESS: 1
EYE REDNESS: 0
BOWEL INCONTINENCE: 0
EYE PAIN: 0
SYNCOPE: 0
INSOMNIA: 1
ABDOMINAL PAIN: 1
SMELL DISTURBANCE: 0
INCREASED ENERGY: 1
BLOATING: 1
CHILLS: 1
PARALYSIS: 0
SEIZURES: 0
WEAKNESS: 1
MEMORY LOSS: 0
NERVOUS/ANXIOUS: 1
HALLUCINATIONS: 0
FATIGUE: 1
FLANK PAIN: 0
DIARRHEA: 1
BACK PAIN: 1
DIFFICULTY URINATING: 1
POLYDIPSIA: 1
NUMBNESS: 1
LEG PAIN: 0
HEADACHES: 1
DYSURIA: 0
PANIC: 0
HEARTBURN: 0

## 2019-10-28 ENCOUNTER — DOCUMENTATION ONLY (OUTPATIENT)
Dept: CARE COORDINATION | Facility: CLINIC | Age: 59
End: 2019-10-28

## 2019-10-28 ENCOUNTER — OFFICE VISIT (OUTPATIENT)
Dept: CARDIOLOGY | Facility: CLINIC | Age: 59
End: 2019-10-28
Attending: INTERNAL MEDICINE
Payer: COMMERCIAL

## 2019-10-28 ENCOUNTER — RECORDS - HEALTHEAST (OUTPATIENT)
Dept: ADMINISTRATIVE | Facility: OTHER | Age: 59
End: 2019-10-28

## 2019-10-28 ENCOUNTER — PRE VISIT (OUTPATIENT)
Dept: CARDIOLOGY | Facility: CLINIC | Age: 59
End: 2019-10-28

## 2019-10-28 VITALS
DIASTOLIC BLOOD PRESSURE: 81 MMHG | HEIGHT: 66 IN | BODY MASS INDEX: 21.89 KG/M2 | SYSTOLIC BLOOD PRESSURE: 117 MMHG | HEART RATE: 74 BPM | WEIGHT: 136.2 LBS | OXYGEN SATURATION: 97 %

## 2019-10-28 DIAGNOSIS — D89.40 MAST CELL ACTIVATION SYNDROME (H): ICD-10-CM

## 2019-10-28 DIAGNOSIS — R42 DIZZINESS: Primary | ICD-10-CM

## 2019-10-28 PROCEDURE — G0463 HOSPITAL OUTPT CLINIC VISIT: HCPCS | Mod: 25,ZF

## 2019-10-28 PROCEDURE — 93010 ELECTROCARDIOGRAM REPORT: CPT | Mod: ZP | Performed by: INTERNAL MEDICINE

## 2019-10-28 PROCEDURE — 93005 ELECTROCARDIOGRAM TRACING: CPT | Mod: ZF

## 2019-10-28 PROCEDURE — 99203 OFFICE O/P NEW LOW 30 MIN: CPT | Mod: ZP | Performed by: INTERNAL MEDICINE

## 2019-10-28 RX ORDER — TRAMADOL HYDROCHLORIDE 50 MG/1
TABLET ORAL
COMMUNITY
Start: 2019-06-27 | End: 2020-03-05

## 2019-10-28 RX ORDER — FENTANYL CITRATE 0.05 MG/ML
25-400 INJECTION, SOLUTION INTRAMUSCULAR; INTRAVENOUS
Status: ON HOLD | COMMUNITY
Start: 2019-05-08 | End: 2019-11-26

## 2019-10-28 RX ORDER — PREDNISONE 20 MG/1
TABLET ORAL
Refills: 6 | COMMUNITY
Start: 2019-08-30 | End: 2022-07-28

## 2019-10-28 RX ORDER — LISDEXAMFETAMINE DIMESYLATE 40 MG/1
40 CAPSULE ORAL
COMMUNITY
Start: 2018-11-15 | End: 2023-10-04 | Stop reason: DRUGHIGH

## 2019-10-28 RX ORDER — VALACYCLOVIR HYDROCHLORIDE 500 MG/1
TABLET, FILM COATED ORAL
COMMUNITY
Start: 2019-10-21 | End: 2021-12-08

## 2019-10-28 RX ORDER — CROMOLYN SODIUM 100 MG/5ML
200 SOLUTION, CONCENTRATE ORAL 4 TIMES DAILY
COMMUNITY
Start: 2019-08-23 | End: 2020-03-05

## 2019-10-28 RX ORDER — CHOLESTYRAMINE 4 G/9G
POWDER, FOR SUSPENSION ORAL
Refills: 4 | COMMUNITY
Start: 2019-09-18 | End: 2020-03-05

## 2019-10-28 RX ORDER — SODIUM CHLORIDE 1 G/1
2 TABLET ORAL
COMMUNITY
Start: 2018-03-15 | End: 2023-10-04

## 2019-10-28 RX ORDER — FAMOTIDINE 20 MG/1
TABLET, FILM COATED ORAL
Refills: 3 | COMMUNITY
Start: 2019-10-23 | End: 2020-03-05

## 2019-10-28 RX ORDER — ZINC GLUCONATE 50 MG
TABLET ORAL
COMMUNITY
Start: 2017-01-01 | End: 2022-07-28

## 2019-10-28 RX ORDER — PRAZOSIN HYDROCHLORIDE 2 MG/1
5 CAPSULE ORAL
COMMUNITY
End: 2020-03-05

## 2019-10-28 RX ORDER — LIDOCAINE 50 MG/G
PATCH TOPICAL DAILY PRN
COMMUNITY

## 2019-10-28 RX ORDER — MONTELUKAST SODIUM 10 MG/1
10 TABLET ORAL EVERY MORNING
COMMUNITY
Start: 2014-01-01

## 2019-10-28 RX ORDER — MUPIROCIN 20 MG/G
1 OINTMENT TOPICAL DAILY PRN
COMMUNITY
Start: 2011-03-04

## 2019-10-28 RX ORDER — ZINC OXIDE 13 %
1 CREAM (GRAM) TOPICAL
COMMUNITY
Start: 2013-12-27 | End: 2022-07-28

## 2019-10-28 RX ORDER — CROMOLYN SODIUM 40 MG/ML
1 SOLUTION/ DROPS OPHTHALMIC
COMMUNITY
Start: 2019-06-17 | End: 2021-04-26

## 2019-10-28 RX ORDER — PREDNISONE 10 MG/1
30 TABLET ORAL
COMMUNITY
Start: 2018-03-28 | End: 2022-07-28

## 2019-10-28 RX ORDER — MULTIVIT-MIN/IRON FUM/FOLIC AC 7.5 MG-4
1 TABLET ORAL
COMMUNITY
Start: 2019-07-19 | End: 2020-05-21

## 2019-10-28 RX ORDER — VERAPAMIL HYDROCHLORIDE 40 MG/1
TABLET ORAL
Refills: 3 | COMMUNITY
Start: 2019-10-08 | End: 2020-03-05

## 2019-10-28 RX ORDER — IPRATROPIUM BROMIDE 42 UG/1
1 SPRAY, METERED NASAL EVERY MORNING
COMMUNITY
Start: 2019-02-01

## 2019-10-28 ASSESSMENT — MIFFLIN-ST. JEOR: SCORE: 1209.55

## 2019-10-28 ASSESSMENT — PAIN SCALES - GENERAL: PAINLEVEL: NO PAIN (0)

## 2019-10-28 NOTE — PROGRESS NOTES
Referring provider:self referred    Chief complaint: headache, whole bodyache, dizziness    HPI: Ms. Kelsy Morley is a 59 year old  female with PMH significant for history of Omayra Danlos Syndrome, mast cell activation syndrome, dysautonomia, Raynaud's syndrome, chronic lyme, chronic migrane, sleep disturbance who presents to discuss dizziness.    Patient reports dizziness symptoms since June 2017.  In October 2017 she had 6 or 7 episodes of severe dizziness associated with low blood pressure (65 /44 mmHg).  She denies loss of consciousness.  The patient was started on sodium chloride tablet with significant improvement.    During visit today the patient has been mostly complaining about her history of chronic headaches, migraine and worsening headaches lately since she came off Cymbalta. Cymbalta was discontinued due to abnormal kidney function.  She is going through sympathetic nerve blocks (occipital and cervical) through [facility] which helps her  few days but she still has recurrent headaches every day.  The migraine headaches has been bothering her significantly since May of this year.  She has a history of chronic headaches for 8 to 10 years.    In review of the systems she reports significant sleep problems that she uses prazosin and trazodone for.    No prior history of cardiac disease.  She walks her dog every day 3 to 4 miles.  Denies chest pain, dyspnea on exertion, PND, orthopnea, palpitations or syncope.    I reviewed patient's EKG in clinic today which shows normal sinus rhythm, normal MI/QRS and QTc intervals.  No ST-T wave changes.  Normal axis.      The patient had an echocardiogram in 2017 with "[facility] [facility] [employer]" which showed a structurally normal heart.  The patient had an implantable loop recorder on 11/23/2018 which was recently explanted since no arrhythmias was detected.    No history of hypertension, diabetes, hyperlipidemia or family history of premature CAD.    Medications, personal,  family, and social history reviewed with patient and revised.    PAST MEDICAL HISTORY:  Past Medical History:   Diagnosis Date     Dysautonomia      Omayra-Danlos syndrome      Mast cell activation syndrome (H)      Nonsenile cataract      Postural orthostatic tachycardia syndrome        CURRENT MEDICATIONS:  Current Outpatient Medications   Medication Sig Dispense Refill     ascorbic acid (VITAMIN C) 1000 MG TABS Take 1,000 mg by mouth       Atorvastatin Calcium (LIPITOR PO) Take 10 mg by mouth daily       Beclomethasone Dipropionate 80 MCG/ACT AERS Nasal Spray Spray 2 puffs in nostril       buPROPion (WELLBUTRIN XL) 150 MG 24 hr tablet Take 150 mg by mouth       calcium carbonate (OS-HALLIE 500 MG [place]. [place]) 500 MG tablet Take 500 mg by mouth 2 times daily       Celecoxib (CELEBREX PO) Take 100 mg by mouth 2 times daily       cetirizine HCl 10 MG CAPS        CLONIDINE HCL PO Take 10 mg by mouth Patch       DULoxetine HCl (CYMBALTA PO) Take 60 mg by mouth daily       fexofenadine (ALLEGRA) 60 MG tablet        LEVOTHYROXINE SODIUM PO Take 75 mcg by mouth daily       Magic Mouthwash (FV std formula) lidocaine visc 2% 2.5mL/5mL & maalox/mylanta w/ simeth 2.5mL/5mL & diphenhydrAMINE 5mg/5mL Swish and swallow 10 mLs in mouth every 6 hours as needed for mouth sores       MAGNESIUM OXIDE PO Take 400 mg by mouth daily       Omega-3 Fatty Acids (OMEGA-3 FISH OIL PO) Take 1 g by mouth 2 times daily (with meals)       ranitidine (ZANTAC) 150 MG tablet Take 150 mg by mouth       Riboflavin (VITAMIN B-2 PO) Take 100 mg by mouth 2 times daily       saccharomyces boulardii (FLORASTOR) 250 MG capsule Take 250 mg by mouth 2 times daily       SUMAtriptan Succinate (IMITREX PO) Take 100 mg by mouth every 8 hours as needed for migraine       TIZANIDINE HCL PO Take 2 mg by mouth every 6 hours as needed for muscle spasms       Topiramate (TOPAMAX PO) Take 75 mg by mouth daily       TRAZODONE HCL PO Take 75 mg by mouth At Bedtime        "VITAMIN D, CHOLECALCIFEROL, PO Take 1,000 Units by mouth daily       zinc sulfate (ZINCATE) 220 MG capsule Take 220 mg by mouth daily         PAST SURGICAL HISTORY:  No past surgical history on file.    ALLERGIES:     Allergies   Allergen Reactions     Bupivacaine      Clonidine      Other reaction(s): Irritation At Patch Site     Diflucan [Fluconazole]      Epinephrine Other (See Comments)     Other reaction(s): Gastrointestinal, Headache  Allergy Provider has recommended no more than 0.15 mg/ml of epinephrine if it needs to be given.      Ropivacaine      Chlorhexidine Swelling and Rash     Liquid Adhesive Rash     EKG electrodes      No Clinical Screening - See Comments Rash     Gel from ECG electrodes     Sulfa Drugs Hives and Rash       FAMILY HISTORY:  Family History   Problem Relation Age of Onset     Cataracts Mother          SOCIAL HISTORY:  Social History     Tobacco Use     Smoking status: Never Smoker     Smokeless tobacco: Never Used   Substance Use Topics     Alcohol use: Not on file     Drug use: Not on file       ROS:   Constitutional: No fever, chills, or sweats. Weight stable.   ENT: No visual disturbance, ear ache, epistaxis, sore throat.   Cardiovascular: As per HPI.   Respiratory: No cough, hemoptysis.    GI: No nausea, vomiting, hematemesis, melena, or hematochezia.   : No hematuria.   Integument: Negative.   Psychiatric: Negative.   Hematologic:  No easy bruising, no easy bleeding.  Neuro: Negative.   Endocrinology: No significant heat or cold intolerance   Musculoskeletal: No myalgia.    Exam:  /81 (BP Location: Left arm, Patient Position: Chair, Cuff Size: Adult Regular)   Pulse 74   Ht 1.676 m (5' 6\")   Wt 61.8 kg (136 lb 3.2 oz)   SpO2 97%   BMI 21.98 kg/m    GENERAL APPEARANCE: alert and no distress  HEENT: no icterus, no central cyanosis  LYMPH/NECK: no adenopathy, no asymmetry, JVP not elevated, no carotid bruits.  RESPIRATORY: lungs clear to auscultation - no rales, " rhonchi or wheezes, no use of accessory muscles, no retractions, respirations are unlabored, normal respiratory rate  CARDIOVASCULAR: regular rhythm, normal S1, S2, no S3 or S4 and no murmur, click or rub, precordium quiet with normal PMI.  GI: soft, non tender  EXTREMITIES: peripheral pulses normal, no edema  NEURO: alert, normal speech,and affect  VASC: Radial, dorsalis pedis and posterior tibialis pulses are normal in volumes and symmetric bilaterally.   SKIN: no ecchymoses, no rashes     I have reviewed the labs and personally reviewed the imaging below and made my comment in the assessment and plan.    Labs:  CBC RESULTS:   Lab Results   Component Value Date    WBC 5.2 12/01/2009    RBC 4.55 12/01/2009    HGB 13.7 12/01/2009    HCT 41.8 12/01/2009    MCV 92 12/01/2009    MCH 30.1 12/01/2009    MCHC 32.8 12/01/2009    RDW 12.1 12/01/2009     12/01/2009       BMP RESULTS:  Lab Results   Component Value Date    CR 0.80 12/01/2009    GFRESTIMATED 76 12/01/2009    GFRESTBLACK >90 12/01/2009        INR RESULTS:  Lab Results   Component Value Date    INR 0.94 02/06/2019     Echocardiogram 2017  1)  Normal global left ventricular systolic function without regional wall motion abnormality    2)  Trivial mitral, tricuspid, and aortic insufficiencies    EKG 10/28/2019, normal.    Assessment and Plan:   Ms. Kelsy Morley is a 59 year old  female with PMH significant for history of Omayra Danlos Syndrome, mast cell activation syndrome, dysautonomia, Raynaud's syndrome, chronic lyme, chronic migrane, sleep disturbance who presents to discuss dizziness.    1.  Dizziness: I think the patient's symptoms are related by concomitant medications like verapamil and prazosin that she uses for migraine.  She also takes 2 g of salt which has improved her symptoms since 2017.  Recommended salt tablet 1 g 3 times daily rather than only in the morning.  If she continues to feel dizzy she can increase the dose to 2 g 3 times  daily.  In the meantime if she can come off prazosin and verapamil with alternative replacements that would be ideal.  However I did not change her any other medication today.    2.  Chronic migraine with severe headaches: The patient undergoes nerve blocks which might be precipitating dizziness symptoms.      3.  Mast cell activation syndrome: The patient reports aggravated allergic symptoms.  Recommend to follow-up with allergy at [facility].    Return to clinic as needed.    A total of 40 minutes spent face-toface with greater than 50% of the time spent in counseling and coordinating cares of the issues above.     Please donot hesitate to contact me if you have any questions or concerns. Again, thank you for allowing me to participate in the care of your patient.    Lisa SUMMERS MD  [facility] Division of Cardiology  Pager [phone]    Answers for HPI/ROS submitted by the patient on 10/25/2019   General Symptoms: Yes  Skin Symptoms: Yes  HENT Symptoms: Yes  EYE SYMPTOMS: Yes  HEART SYMPTOMS: Yes  LUNG SYMPTOMS: No  INTESTINAL SYMPTOMS: Yes  URINARY SYMPTOMS: Yes  GYNECOLOGIC SYMPTOMS: No  BREAST SYMPTOMS: No  SKELETAL SYMPTOMS: Yes  BLOOD SYMPTOMS: Yes  NERVOUS SYSTEM SYMPTOMS: Yes  MENTAL HEALTH SYMPTOMS: Yes  Fever: No  Loss of appetite: No  Weight loss: No  Weight gain: No  Fatigue: Yes  Night sweats: No  Chills: Yes  Increased stress: No  Excessive hunger: No  Excessive thirst: Yes  Feeling hot or cold when others believe the temperature is normal: Yes  Loss of height: Yes  Post-operative complications: Yes  Surgical site pain: No  Hallucinations: No  Change in or Loss of Energy: Yes  Hyperactivity: No  Confusion: No  Changes in hair: No  Changes in moles/birth marks: No  Itching: Yes  Rashes: Yes  Changes in nails: No  Acne: Yes  Hair in places you don't want it: No  Change in facial hair: No  Warts: No  Non-healing sores: Yes  Scarring: Yes  Flaking of skin: No  Color changes of  hands/feet in cold : Yes  Sun sensitivity: Yes  Skin thickening: No  Ear pain: No  Ear discharge: No  Hearing loss: Yes  Tinnitus: Yes  Nosebleeds: No  Congestion: Yes  Sinus pain: Yes  Trouble swallowing: Yes   Voice hoarseness: Yes  Mouth sores: No  Sore throat: No  Tooth pain: No  Gum tenderness: No  Bleeding gums: No  Change in taste: No  Change in sense of smell: No  Dry mouth: Yes  Hearing aid used: No  Neck lump: No  Eye pain: No  Vision loss: Yes  Dry eyes: Yes  Watery eyes: No  Eye bulging: No  Double vision: Yes  Flashing of lights: No  Spots: No  Floaters: Yes  Redness: No  Crossed eyes: No  Tunnel Vision: No  Yellowing of eyes: No  Eye irritation: Yes  Chest pain or pressure: No  Fast or irregular heartbeat: Yes  Pain in legs with walking: No  Trouble breathing while lying down: No  Fingers or toes appear blue: Yes  High blood pressure: No  Low blood pressure: Yes  Fainting: No  Murmurs: No  Pacemaker: No  Varicose veins: Yes  Edema or swelling: No  Wake up at night with shortness of breath: No  Light-headedness: Yes  Exercise intolerance: Yes  Heart burn or indigestion: No  Nausea: Yes  Vomiting: No  Abdominal pain: Yes  Bloating: Yes  Constipation: No  Diarrhea: Yes  Blood in stool: No  Black stools: No  Rectal or Anal pain: No  Fecal incontinence: No  Yellowing of skin or eyes: No  Vomit with blood: No  Change in stools: Yes  Trouble holding urine or incontinence: Yes  Pain or burning: No  Trouble starting or stopping: No  Increased frequency of urination: Yes  Blood in urine: No  Decreased frequency of urination: No  Frequent nighttime urination: No  Flank pain: No  Difficulty emptying bladder: Yes  Back pain: Yes  Muscle aches: Yes  Neck pain: Yes  Swollen joints: Yes  Joint pain: Yes  Bone pain: Yes  Muscle cramps: Yes  Muscle weakness: Yes  Joint stiffness: Yes  Bone fracture: No  Anemia: No  Swollen glands: No  Easy bleeding or bruising: Yes  Trouble with coordination: Yes  Dizziness or trouble  with balance: Yes  Fainting or black-out spells: No  Memory loss: No  Headache: Yes  Seizures: No  Speech problems: No  Tingling: Yes  Tremor: No  Weakness: Yes  Difficulty walking: No  Paralysis: No  Numbness: Yes  Nervous or Anxious: Yes  Depression: No  Trouble sleeping: Yes  Trouble thinking or concentrating: Yes  Mood changes: No  Panic attacks: No

## 2019-10-28 NOTE — LETTER
10/28/2019      RE: Kelsy Morley  [address]  [place] [address]       Dear Colleague,    Thank you for the opportunity to participate in the care of your patient, Kelsy Morley, at the [facility] HEART [facility]. Please see a copy of my visit note below.    Referring provider:self referred    Chief complaint: headache, whole bodyache, dizziness    HPI: Ms. Kelsy Morley is a 59 year old  female with PMH significant for history of Omayra Danlos Syndrome, mast cell activation syndrome, dysautonomia, Raynaud's syndrome, chronic lyme, chronic migrane, sleep disturbance who presents to discuss dizziness.    Patient reports dizziness symptoms since June 2017.  In October 2017 she had 6 or 7 episodes of severe dizziness associated with low blood pressure (65 /44 mmHg).  She denies loss of consciousness.  The patient was started on sodium chloride tablet with significant improvement.    During visit today the patient has been mostly complaining about her history of chronic headaches, migraine and worsening headaches lately since she came off Cymbalta. Cymbalta was discontinued due to abnormal kidney function.  She is going through sympathetic nerve blocks (occipital and cervical) through [facility] which helps her  few days but she still has recurrent headaches every day.  The migraine headaches has been bothering her significantly since May of this year.  She has a history of chronic headaches for 8 to 10 years.    In review of the systems she reports significant sleep problems that she uses prazosin and trazodone for.    No prior history of cardiac disease.  She walks her dog every day 3 to 4 miles.  Denies chest pain, dyspnea on exertion, PND, orthopnea, palpitations or syncope.    I reviewed patient's EKG in clinic today which shows normal sinus rhythm, normal PA/QRS and QTc intervals.  No ST-T wave changes.  Normal axis.      The patient had an  echocardiogram in 2017 with [facility] which showed a structurally normal heart.  The patient had an implantable loop recorder on 11/23/2018 which was recently explanted since no arrhythmias was detected.    No history of hypertension, diabetes, hyperlipidemia or family history of premature CAD.    Medications, personal, family, and social history reviewed with patient and revised.    PAST MEDICAL HISTORY:  Past Medical History:   Diagnosis Date     Dysautonomia      Omayra-Danlos syndrome      Mast cell activation syndrome (H)      Nonsenile cataract      Postural orthostatic tachycardia syndrome        CURRENT MEDICATIONS:  Current Outpatient Medications   Medication Sig Dispense Refill     ascorbic acid (VITAMIN C) 1000 MG TABS Take 1,000 mg by mouth       Atorvastatin Calcium (LIPITOR PO) Take 10 mg by mouth daily       Beclomethasone Dipropionate 80 MCG/ACT AERS Nasal Spray Spray 2 puffs in nostril       buPROPion (WELLBUTRIN XL) 150 MG 24 hr tablet Take 150 mg by mouth       calcium carbonate (OS-HALLIE 500 MG [place]. [place]) 500 MG tablet Take 500 mg by mouth 2 times daily       Celecoxib (CELEBREX PO) Take 100 mg by mouth 2 times daily       cetirizine HCl 10 MG CAPS        CLONIDINE HCL PO Take 10 mg by mouth Patch       DULoxetine HCl (CYMBALTA PO) Take 60 mg by mouth daily       fexofenadine (ALLEGRA) 60 MG tablet        LEVOTHYROXINE SODIUM PO Take 75 mcg by mouth daily       Magic Mouthwash (FV std formula) lidocaine visc 2% 2.5mL/5mL & maalox/mylanta w/ simeth 2.5mL/5mL & diphenhydrAMINE 5mg/5mL Swish and swallow 10 mLs in mouth every 6 hours as needed for mouth sores       MAGNESIUM OXIDE PO Take 400 mg by mouth daily       Omega-3 Fatty Acids (OMEGA-3 FISH OIL PO) Take 1 g by mouth 2 times daily (with meals)       ranitidine (ZANTAC) 150 MG tablet Take 150 mg by mouth       Riboflavin (VITAMIN B-2 PO) Take 100 mg by mouth 2 times daily       saccharomyces boulardii (FLORASTOR) 250 MG capsule Take 250 mg  by mouth 2 times daily       SUMAtriptan Succinate (IMITREX PO) Take 100 mg by mouth every 8 hours as needed for migraine       TIZANIDINE HCL PO Take 2 mg by mouth every 6 hours as needed for muscle spasms       Topiramate (TOPAMAX PO) Take 75 mg by mouth daily       TRAZODONE HCL PO Take 75 mg by mouth At Bedtime       VITAMIN D, CHOLECALCIFEROL, PO Take 1,000 Units by mouth daily       zinc sulfate (ZINCATE) 220 MG capsule Take 220 mg by mouth daily       PAST SURGICAL HISTORY:  No past surgical history on file.    ALLERGIES:  Allergies   Allergen Reactions     Bupivacaine      Clonidine      Other reaction(s): Irritation At Patch Site     Diflucan [Fluconazole]      Epinephrine Other (See Comments)     Other reaction(s): Gastrointestinal, Headache  Allergy Provider has recommended no more than 0.15 mg/ml of epinephrine if it needs to be given.      Ropivacaine      Chlorhexidine Swelling and Rash     Liquid Adhesive Rash     EKG electrodes      No Clinical Screening - See Comments Rash     Gel from ECG electrodes     Sulfa Drugs Hives and Rash     FAMILY HISTORY:  Family History   Problem Relation Age of Onset     Cataracts Mother      SOCIAL HISTORY:  Social History     Tobacco Use     Smoking status: Never Smoker     Smokeless tobacco: Never Used   Substance Use Topics     Alcohol use: Not on file     Drug use: Not on file       ROS:   Constitutional: No fever, chills, or sweats. Weight stable.   ENT: No visual disturbance, ear ache, epistaxis, sore throat.   Cardiovascular: As per HPI.   Respiratory: No cough, hemoptysis.    GI: No nausea, vomiting, hematemesis, melena, or hematochezia.   : No hematuria.   Integument: Negative.   Psychiatric: Negative.   Hematologic:  No easy bruising, no easy bleeding.  Neuro: Negative.   Endocrinology: No significant heat or cold intolerance   Musculoskeletal: No myalgia.    Exam:  /81 (BP Location: Left arm, Patient Position: Chair, Cuff Size: Adult Regular)    "Pulse 74   Ht 1.676 m (5' 6\")   Wt 61.8 kg (136 lb 3.2 oz)   SpO2 97%   BMI 21.98 kg/m     GENERAL APPEARANCE: alert and no distress  HEENT: no icterus, no central cyanosis  LYMPH/NECK: no adenopathy, no asymmetry, JVP not elevated, no carotid bruits.  RESPIRATORY: lungs clear to auscultation - no rales, rhonchi or wheezes, no use of accessory muscles, no retractions, respirations are unlabored, normal respiratory rate  CARDIOVASCULAR: regular rhythm, normal S1, S2, no S3 or S4 and no murmur, click or rub, precordium quiet with normal PMI.  GI: soft, non tender  EXTREMITIES: peripheral pulses normal, no edema  NEURO: alert, normal speech,and affect  VASC: Radial, dorsalis pedis and posterior tibialis pulses are normal in volumes and symmetric bilaterally.   SKIN: no ecchymoses, no rashes     I have reviewed the labs and personally reviewed the imaging below and made my comment in the assessment and plan.    Labs:  CBC RESULTS:   Lab Results   Component Value Date    WBC 5.2 12/01/2009    RBC 4.55 12/01/2009    HGB 13.7 12/01/2009    HCT 41.8 12/01/2009    MCV 92 12/01/2009    MCH 30.1 12/01/2009    MCHC 32.8 12/01/2009    RDW 12.1 12/01/2009     12/01/2009     BMP RESULTS:  Lab Results   Component Value Date    CR 0.80 12/01/2009    GFRESTIMATED 76 12/01/2009    GFRESTBLACK >90 12/01/2009     INR RESULTS:  Lab Results   Component Value Date    INR 0.94 02/06/2019     Echocardiogram 2017  1)  Normal global left ventricular systolic function without regional wall motion abnormality    2)  Trivial mitral, tricuspid, and aortic insufficiencies    EKG 10/28/2019, normal.    Assessment and Plan:   Ms. Kelsy Morley is a 59 year old  female with PMH significant for history of Omayra Danlos Syndrome, mast cell activation syndrome, dysautonomia, Raynaud's syndrome, chronic lyme, chronic migrane, sleep disturbance who presents to discuss dizziness.    1.  Dizziness: I think the patient's symptoms are related by " concomitant medications like verapamil and prazosin that she uses for migraine.  She also takes 2 g of salt which has improved her symptoms since 2017.  Recommended salt tablet 1 g 3 times daily rather than only in the morning.  If she continues to feel dizzy she can increase the dose to 2 g 3 times daily.  In the meantime if she can come off prazosin and verapamil with alternative replacements that would be ideal.  However I did not change her any other medication today.    2.  Chronic migraine with severe headaches: The patient undergoes nerve blocks which might be precipitating dizziness symptoms.      3.  Mast cell activation syndrome: The patient reports aggravated allergic symptoms.  Recommend to follow-up with allergy at [facility].    Return to clinic as needed.    A total of 40 minutes spent face-toface with greater than 50% of the time spent in counseling and coordinating cares of the issues above.     Please donot hesitate to contact me if you have any questions or concerns. Again, thank you for allowing me to participate in the care of your patient.    Lisa SUMMERS MD  [facility] Division of Cardiology  Pager [phone]

## 2019-10-28 NOTE — PATIENT INSTRUCTIONS
Patient Instructions:  If dizziness continues, ok to increase salt tablets to 2 grams three times daily.  Allergy referral will be placed by the end of today and they will call you within 1 week to offer appointment for consultation regarding mast cell issues.  Follow up with cardiology as needed.       It was a pleasure to see you in the cardiology clinic today.    We are encouraging the use of [employer] to communicate with your Healthcare Provider.  If you have any questions, call  Chaitanya Juarez LPN, at [phone].  Press Option #1 for the [facility], and then press Option #4 for nursing.  Cardiology Fax  : [phone]      If you have an urgent need after hours (8:00 am to 4:30 pm) please call [phone] and ask for the cardiology fellow on call.

## 2019-10-30 LAB — INTERPRETATION ECG - MUSE: NORMAL

## 2019-11-13 ASSESSMENT — ENCOUNTER SYMPTOMS
LEG PAIN: 0
INCREASED ENERGY: 1
ARTHRALGIAS: 1
DISTURBANCES IN COORDINATION: 1
INSOMNIA: 1
EYE PAIN: 0
HYPERTENSION: 0
NECK PAIN: 1
JOINT SWELLING: 1
SMELL DISTURBANCE: 0
WEAKNESS: 1
BOWEL INCONTINENCE: 0
VOMITING: 0
STIFFNESS: 1
HEMATURIA: 0
DIFFICULTY URINATING: 0
EYE WATERING: 0
FLANK PAIN: 0
DYSURIA: 0
NECK MASS: 0
WEIGHT LOSS: 0
HYPOTENSION: 1
NAIL CHANGES: 0
DECREASED APPETITE: 0
DECREASED CONCENTRATION: 1
JAUNDICE: 0
NAUSEA: 1
MEMORY LOSS: 1
CONSTIPATION: 0
SORE THROAT: 0
MUSCLE WEAKNESS: 1
ORTHOPNEA: 0
SWOLLEN GLANDS: 0
BRUISES/BLEEDS EASILY: 1
POLYDIPSIA: 0
NIGHT SWEATS: 0
RECTAL PAIN: 0
SPEECH CHANGE: 0
SINUS PAIN: 0
PALPITATIONS: 0
HEADACHES: 1
SINUS CONGESTION: 1
CHILLS: 1
ABDOMINAL PAIN: 1
HEARTBURN: 0
WEIGHT GAIN: 0
BLOOD IN STOOL: 0
TASTE DISTURBANCE: 0
PARALYSIS: 0
POOR WOUND HEALING: 0
HALLUCINATIONS: 0
LIGHT-HEADEDNESS: 1
DEPRESSION: 0
DIARRHEA: 1
EYE REDNESS: 0
BLOATING: 1
EXERCISE INTOLERANCE: 1
NUMBNESS: 1
TINGLING: 1
BACK PAIN: 1
SEIZURES: 0
SLEEP DISTURBANCES DUE TO BREATHING: 0
LOSS OF CONSCIOUSNESS: 0
NERVOUS/ANXIOUS: 0
MYALGIAS: 1
MUSCLE CRAMPS: 1
FATIGUE: 1
POLYPHAGIA: 0
SKIN CHANGES: 0
DIZZINESS: 1
HOARSE VOICE: 1
DOUBLE VISION: 1
FEVER: 0
ALTERED TEMPERATURE REGULATION: 1
TREMORS: 0
SYNCOPE: 0
EYE IRRITATION: 0
TROUBLE SWALLOWING: 1
PANIC: 0

## 2019-11-14 ENCOUNTER — RECORDS - HEALTHEAST (OUTPATIENT)
Dept: ADMINISTRATIVE | Facility: OTHER | Age: 59
End: 2019-11-14

## 2019-11-14 ENCOUNTER — PRE VISIT (OUTPATIENT)
Dept: CARDIOLOGY | Facility: CLINIC | Age: 59
End: 2019-11-14

## 2019-11-14 ENCOUNTER — OFFICE VISIT (OUTPATIENT)
Dept: CARDIOLOGY | Facility: CLINIC | Age: 59
End: 2019-11-14
Attending: INTERNAL MEDICINE
Payer: COMMERCIAL

## 2019-11-14 VITALS — WEIGHT: 133 LBS | OXYGEN SATURATION: 99 % | HEIGHT: 66 IN | BODY MASS INDEX: 21.38 KG/M2

## 2019-11-14 DIAGNOSIS — G90.9 AUTONOMIC DYSFUNCTION: Primary | ICD-10-CM

## 2019-11-14 PROCEDURE — G0463 HOSPITAL OUTPT CLINIC VISIT: HCPCS | Mod: ZF

## 2019-11-14 PROCEDURE — 99213 OFFICE O/P EST LOW 20 MIN: CPT | Mod: ZP | Performed by: INTERNAL MEDICINE

## 2019-11-14 RX ORDER — CROMOLYN SODIUM 100 MG/5ML
100 SOLUTION, CONCENTRATE ORAL
Status: ON HOLD | COMMUNITY
Start: 2019-08-23 | End: 2019-11-26

## 2019-11-14 RX ORDER — LIDOCAINE 40 MG/G
CREAM TOPICAL
Status: CANCELLED | OUTPATIENT
Start: 2019-11-14

## 2019-11-14 RX ORDER — SODIUM CHLORIDE 9 MG/ML
INJECTION, SOLUTION INTRAVENOUS CONTINUOUS
Status: CANCELLED | OUTPATIENT
Start: 2019-11-14

## 2019-11-14 ASSESSMENT — PAIN SCALES - GENERAL
PAINLEVEL: NO PAIN (0)
PAINLEVEL: NO PAIN (0)

## 2019-11-14 ASSESSMENT — MIFFLIN-ST. JEOR: SCORE: 1195.03

## 2019-11-14 NOTE — LETTER
11/14/2019      RE: Kelsy Morley  [address]  [facility] [address]       Dear Colleague,    Thank you for the opportunity to participate in the care of your patient, Kelsy Morley, at the [facility]. Please see a copy of my visit note below.    HPI:    Ms. Kelsy Morley is a 59 year old  female with PMH significant for history of Omayra Danlos Syndrome, mast cell activation syndrome, dysautonomia, Raynaud's syndrome, chronic lyme, chronic migrane, sleep disturbance who presents   with approximately 2 years of worsening lightheadedness and near syncope.    Mast cell evaluation has been undertaken and the laboratory findings were deemed abnormal.  These will be reviewed.    Patient. reports dizziness symptoms of increased frequency since June 2017.  In October 2017 she had 6 or 7 episodes of severe dizziness associated with low blood pressure (65 /44 mmHg).  She denies loss of consciousness.   Sodium chloride supplement has been associated with significant improvement.    Ms. Morley indicates that she has had symptoms of orthostatic lightheadedness and near syncope for many years.  She has worked as a school nurse and frequently went standing up abruptly or bending over and standing up she would feel quite lightheaded and near faint.  In the recent months she has had particularly severe episodes climbing stairs.  She does enjoy biking but has always noted that heels have caused her considerable problem with lightheadedness.    Patient has well-documented Omayra-Danlos syndrome with associated myalgias.  She is also had considerable problem with degenerative arthritis but feels that the myalgias are somewhat beyond that particular problem.  She does not appear to have fallen and caused any major injury.     Patient has a significant history of chronic headaches, migraine and worsening headaches lately since she came off Cymbalta. Cymbalta was  discontinued due to abnormal kidney function.  She is going through sympathetic nerve blocks (occipital and cervical) through [facility] which helps her  few days but she still has recurrent headaches every day.  The migraine headaches has been bothering her significantly since May of this year.  She has a history of chronic headaches for 8 to 10 years.      there is no prior history of cardiac disease.  She walks her dog every day 3 to 4 miles.  Denies chest pain, dyspnea on exertion, PND, orthopnea, palpitations or syncope.     ECG  clinic shows normal sinus rhythm, normal NE/QRS and QTc intervals.  No ST-T wave changes.  Normal axis.       The patient had an echocardiogram in 2017 with [facility] which showed a structurally normal heart.     Medications are summarized below    Given the complex nature of her symptoms the nature of the autonomic dysfunction is in need of further evaluation.  She has had basic studies at [facility] which we will obtain but in addition further autonomic testing would seem of value and she is agreeable.  PAST MEDICAL HISTORY:  Past Medical History:   Diagnosis Date     Dysautonomia (H)      Omayra-Danlos syndrome      Mast cell activation syndrome (H)      Nonsenile cataract      Postural orthostatic tachycardia syndrome        CURRENT MEDICATIONS:  Current Outpatient Medications   Medication Sig Dispense Refill     ascorbic acid (VITAMIN C) 1000 MG TABS Take 1,000 mg by mouth       Atorvastatin Calcium (LIPITOR PO) Take 10 mg by mouth daily       Beclomethasone Dipropionate 80 MCG/ACT AERS Nasal Spray Spray 2 puffs in nostril       calcium carbonate (OS-HALLIE 500 MG Ruby. CA) 500 MG tablet Take 500 mg by mouth 2 times daily       Celecoxib (CELEBREX PO) Take 100 mg by mouth 2 times daily       cetirizine HCl 10 MG CAPS        cholestyramine (QUESTRAN) 4 g packet TAKE 1 PACKET IN ONE CUP OF WATER 4 TIMES DAILY  4     CLONIDINE HCL PO Take 10 mg by mouth  Patch       cromolyn (GASTROCROM) 100 MG/5ML (HIGH CONC) solution 100 mg Pt taking 2 vials QID       cromolyn (GASTROCROM) 100 MG/5ML (HIGH CONC) solution        cromolyn (OPTICROM) 4 % ophthalmic solution 1 drop       diclofenac (VOLTAREN) 1 % topical gel Apply 3-4 times per day as needed       DULoxetine HCl (CYMBALTA PO) Take 60 mg by mouth daily       famotidine (PEPCID) 20 MG tablet TAKE 1 TABLET BY MOUTH TWICE A DAY  3     fentaNYL, PF, (SUBLIMAZE) 50 MCG/ML injection Inject  mcg into the vein       fexofenadine (ALLEGRA) 60 MG tablet        ipratropium (ATROVENT) 0.06 % nasal spray        LEVOTHYROXINE SODIUM PO Take 75 mcg by mouth daily       lidocaine (LIDODERM) 5 % patch        lisdexamfetamine (VYVANSE) 40 MG capsule Take 40 mg by mouth       Magic Mouthwash (FV std formula) lidocaine visc 2% 2.5mL/5mL & maalox/mylanta w/ simeth 2.5mL/5mL & diphenhydrAMINE 5mg/5mL Swish and swallow 10 mLs in mouth every 6 hours as needed for mouth sores       MAGNESIUM OXIDE PO Take 400 mg by mouth daily       montelukast (SINGULAIR) 10 MG tablet Take 10 mg by mouth       multivitamins w/minerals tablet Take 1 tablet by mouth       mupirocin (BACTROBAN) 2 % external ointment Apply 1 inch topically       NEW MED 1.5 mg Low Dose Naltrexone       Omega-3 Fatty Acids (OMEGA-3 FISH OIL PO) Take 1 g by mouth 2 times daily (with meals)       prazosin (MINIPRESS) 2 MG capsule Take 4 mg by mouth       predniSONE (DELTASONE) 10 MG tablet Take 30 mg by mouth       predniSONE (DELTASONE) 20 MG tablet Take ? to 1 tablet by mouth daily for 3 - 10 per flare.  6     Probiotic Product (PROBIOTIC DAILY) CAPS Take 1 capsule by mouth       Propofol 200 MG/20ML PRSY Inject  mg into the vein       ranitidine (ZANTAC) 150 MG tablet Take 150 mg by mouth       Riboflavin (VITAMIN B-2 PO) Take 100 mg by mouth 2 times daily       saccharomyces boulardii (FLORASTOR) 250 MG capsule Take 250 mg by mouth 2 times daily       sodium chloride  1 GM tablet Take 2 g by mouth       SUMAtriptan Succinate (IMITREX PO) Take 100 mg by mouth every 8 hours as needed for migraine       TIZANIDINE HCL PO        TIZANIDINE HCL PO Take 2 mg by mouth every 6 hours as needed for muscle spasms       Topiramate (TOPAMAX PO) Take 75 mg by mouth 2 times daily        traMADol (ULTRAM) 50 MG tablet        TRAZODONE HCL PO        TRAZODONE HCL PO Take 75 mg by mouth At Bedtime       valACYclovir (VALTREX) 500 MG tablet        verapamil (CALAN) 40 MG tablet TAKE HALF A TAB BY MOUTH TWICE DAILY  3     VITAMIN D, CHOLECALCIFEROL, PO Take 1,000 Units by mouth daily       zinc gluconate 50 MG tablet        zinc sulfate (ZINCATE) 220 MG capsule Take 220 mg by mouth daily       buPROPion (WELLBUTRIN XL) 150 MG 24 hr tablet Take 150 mg by mouth         PAST SURGICAL HISTORY:  No past surgical history on file.    ALLERGIES:     Allergies   Allergen Reactions     Bupivacaine      Clonidine      Other reaction(s): Irritation At Patch Site     Diflucan [Fluconazole]      Epinephrine Other (See Comments)     Other reaction(s): Gastrointestinal, Headache  Allergy Provider has recommended no more than 0.15 mg/ml of epinephrine if it needs to be given.      Ropivacaine      Chlorhexidine Swelling and Rash     Liquid Adhesive Rash     EKG electrodes      No Clinical Screening - See Comments Rash     Gel from ECG electrodes     Sulfa Drugs Hives and Rash       FAMILY HISTORY:  Family History   Problem Relation Age of Onset     Cataracts Mother      SOCIAL HISTORY:  Social History     Tobacco Use     Smoking status: Never Smoker     Smokeless tobacco: Never Used   Substance Use Topics     Alcohol use: None     Drug use: None       ROS:   Constitutional: No fever, chills, or sweats. Weight stable.   ENT: No visual disturbance, ear ache, epistaxis, sore throat.   Cardiovascular: As per HPI.   Respiratory: No cough, hemoptysis.    GI: No nausea, vomiting, hematemesis, melena, or hematochezia.   :  "No hematuria.   Integument: Negative.   Psychiatric: Negative.   Hematologic:  Easy bruising, no easy bleeding.  Neuro: Negative.   Endocrinology: No significant heat or cold intolerance   Musculoskeletal: No myalgia.    Exam:  Ht 1.676 m (5' 6\")   Wt 60.3 kg (133 lb)   BMI 21.47 kg/m     GENERAL APPEARANCE: healthy, alert and no distress  HEENT: no icterus, no xanthelasmas, normal pupil size and reaction, normal palate, mucosa moist, no central cyanosis  NECK: no adenopathy, no asymmetry, masses, or scars, thyroid normal to palpation and no bruits, JVP not elevated  RESPIRATORY: lungs clear to auscultation - no rales, rhonchi or wheezes, no use of accessory muscles, no retractions, respirations are unlabored, normal respiratory rate  CARDIOVASCULAR: regular rhythm, normal S1 with physiologic split S2, no S3 or S4 and no murmur, click or rub, precordium quiet with normal PMI.  ABDOMEN: soft, non tender, without hepatosplenomegaly, no masses palpable, bowel sounds normal, aorta not enlarged by palpation, no abdominal bruits  EXTREMITIES: peripheral pulses normal, no edema, no bruits  NEURO: alert and oriented to person/place/time, normal speech, gait and affect  VASC: Radial, femoral, dorsalis pedis and posterior tibialis pulses are normal in volumes and symmetric bilaterally. No bruits are heard.  SKIN: no ecchymoses, no rashes    Labs:  CBC RESULTS:   Lab Results   Component Value Date    WBC 5.2 12/01/2009    RBC 4.55 12/01/2009    HGB 13.7 12/01/2009    HCT 41.8 12/01/2009    MCV 92 12/01/2009    MCH 30.1 12/01/2009    MCHC 32.8 12/01/2009    RDW 12.1 12/01/2009     12/01/2009       BMP RESULTS:  Lab Results   Component Value Date    CR 0.80 12/01/2009    GFRESTIMATED 76 12/01/2009    GFRESTBLACK >90 12/01/2009        INR RESULTS:  Lab Results   Component Value Date    INR 0.94 02/06/2019       Procedures:  PULMONARY FUNCTION TESTS:   No flowsheet data found.      ECHOCARDIOGRAM:   No results found for " "this or any previous visit (from the past 8760 hour(s)).      Assessment and Plan:    1.  Persistent and  Intractable \"dizziness\" which is described as both a \"swimming\" sensation as well as at other times and orthostatic near syncopal event.  Patient has undergone limited autonomic studies which appear to be abnormal but we have not the full report at the present time.    2.  Positive diagnosis for Omayra-Danlos syndrome of the classic form    3.  Laboratory diagnoses supporting mast cell disorder.  Patient is on appropriate medications for this.    4.  Patient has had some improvement with increased salt intake but this will be held for a couple days prior to completing the autonomic studies here.    Today I spent approximately 40 minutes with Ms. Morley discussing her symptoms and reviewing the potential evaluation as well as some options for future symptomatic treatment.  I pointed out that her condition is not known to be curable per se but that depending on our findings with regard to the autonomic's, we may be able to provide some symptom relief.  Patient did understand the limitations and was agreeable to proceeding.    I very much appreciated the opportunity to see and assess Kelsy Morley in the clinic today. Please do not hesitate to contact my office if you have any questions or concerns.      Fausto Lanier MD  Cardiac Arrhythmia Service  [facility]  [phone]      CC  JUNI SUMMERS  "

## 2019-11-15 NOTE — NURSING NOTE
Chief Complaint   Patient presents with     New Patient     Vitals were taken, medications reconciled and Orthostatic blood pressure done.     Fernando Calderon CMA    6:23 PM

## 2019-11-15 NOTE — PATIENT INSTRUCTIONS
You were seen in the Electrophysiology Clinic today by: Dr. Lanier    Plan:  Tilt Table Study with Autonomic testing (Our EP , Pricilla, will reach out to you next week to schedule a good date and time for you for the procedure. If you do not hear from her, please call her at her number below).  Follow up visit: Pending Tilt Table Test    Your Care Team:  EP Cardiology   Telephone Number     Danni Gomez RN [phone]     For scheduling appts or procedures:    Pricilla Antonio   [phone]   For the Device Clinic (Pacemakers, ICDs, Loop Recorders)    During business hours: [phone]  After business hours:   [phone]- select option 4 and ask for job code [id number].       Cardiovascular Clinic:   [address] [place] [address]. [place] [address]      As always, Thank you for trusting us with your health care needs!           You have been scheduled for a Tilt Table/Autonomic study with Dr Lanier  on _______    Below are instructions, if you have questions please contact our office.     1. You should arrive at the [facility] at   ([address], [place]: [phone]).    2. Report to the GOLD waiting room. Your procedure will be done in the Catheterization Lab.     3. Wear comfortable clothing. (sweat/yoga pants, t-shirt). Please allow 3-4 hours for this procedure to be completed.     4. Do not eat or drink ANYTHING for 6 hours prior to arrival. PLEASE HOLD SALT SUPPLEMENTS 2 DAYS PRIOR.    5. The morning of your procedure, you may take any scheduled medications with a SIP of water- unless you were instructed differently by your physician.   Do not take any vitamins, minerals or herbal supplements.     6. You may drive yourself to and from this procedure.       If you have further questions, please utilize [facility] to contact us.   If your question concerns the above instructions, contact:  Danni Gomez RN   Electrophysiology Nurse Coordinator.  [phone]    If  your question concerns the schedule/appointment times, contact:  [facility] Procedure   [phone]    Patient Education     Tilt Table Testing    Tilt table testing is a simple test that helps the doctor identify the cause of your fainting. It checks how changes in body position can affect your blood pressure and heart rate. To do this, you are placed on a table that is tilted upward. The test tries to recreate fainting symptoms while your blood pressure and heart rate are monitored. The test can be done in a hospital or at your doctor s office.  Before your test  For best results, prepare for the test as directed. Keep in mind:    When you schedule the test, be sure to mention all the medicines you take. Ask if you take them the day of the test.    Arrange for a ride home after the test.    After the midnight before the test, don t eat or drink anything.    On the day of the test, dress for ease and comfort. Wear a two-piece outfit, top and bottoms. You will need to undress from the waist up and put on a short hospital gown.  During your test  Tilt table testing takes about 60 minutes. The testing room is kept quiet and dimly lit. During the test:    Small pads (electrodes) are put on your chest to monitor your heartbeat.    A blood pressure cuff is put on your arm.    An IV (intravenous) line may be placed in your other arm. The IV line delivers fluids and medicine if needed.    You ll be asked to lie flat on the table. Your upper body and thighs will be held in place with straps.    Baseline vital signs are recorded such as blood pressure, breathing rate, and pulse.    The table tilts until you are almost standing upright. Heart rate and blood pressure are continuously monitored. Any changes in these readings or any symptoms that develop are recorded.    You ll remain upright for up to 60 minutes. In most cases, the test is over in 30 to 45 minutes.    Sometimes the technician or healthcare  provider may rub the arteries in your neck to check for a fainting reflex.    Occasionally, people are given certain IV medicines to stimulate heart rate and heart pumping and then are retested. These medications may make you feel shaky or anxious.  After your test  Any medicines used during the test should leave your system within 15 minutes. If you were told to skip daily medicines before the test, ask if you should start taking them again. You re likely to be sent home right away. It s a good idea to have a friend or family member drive. If you fainted during the test, you may want to rest for a few hours once you re home.  Report any symptoms you have during the test  Let the doctor or technician know if you notice:    Feeling like you are going to faint    Overall weakness    Nausea    Dimmed vision    Sweating, dizziness,  or lightheadedness    A rapid heartbeat    Chest pain    Any other symptoms   Date Last Reviewed: 12/1/2016 2000-2018 [employer]. [address] [place] [address]. All rights reserved. This information is not intended as a substitute for professional medical care. Always follow your healthcare professional's instructions.

## 2019-11-15 NOTE — PROGRESS NOTES
HPI:    Ms. Kelsy Morley is a 59 year old  female with PMH significant for history of Omayra Danlos Syndrome, mast cell activation syndrome, dysautonomia, Raynaud's syndrome, chronic lyme, chronic migrane, sleep disturbance who presents  with approximately 2 years of worsening lightheadedness and near syncope.    Mast cell evaluation has been undertaken and the laboratory findings were deemed abnormal.  These will be reviewed.    Patient. reports dizziness symptoms of increased frequency since June 2017.  In October 2017 she had 6 or 7 episodes of severe dizziness associated with low blood pressure (65 /44 mmHg).  She denies loss of consciousness.  Sodium chloride supplement has been associated with significant improvement.    Ms. Morley indicates that she has had symptoms of orthostatic lightheadedness and near syncope for many years.  She has worked as a school nurse and frequently went standing up abruptly or bending over and standing up she would feel quite lightheaded and near faint.  In the recent months she has had particularly severe episodes climbing stairs.  She does enjoy biking but has always noted that heels have caused her considerable problem with lightheadedness.    Patient has well-documented Omayra-Danlos syndrome with associated myalgias.  She is also had considerable problem with degenerative arthritis but feels that the myalgias are somewhat beyond that particular problem.  She does not appear to have fallen and caused any major injury.     Patient has a significant history of chronic headaches, migraine and worsening headaches lately since she came off Cymbalta. Cymbalta was discontinued due to abnormal kidney function.  She is going through sympathetic nerve blocks (occipital and cervical) through [facility] which helps her  few days but she still has recurrent headaches every day.  The migraine headaches has been bothering her significantly since May of this year.  She has a history  of chronic headaches for 8 to 10 years.      there is no prior history of cardiac disease.  She walks her dog every day 3 to 4 miles.  Denies chest pain, dyspnea on exertion, PND, orthopnea, palpitations or syncope.     ECG  clinic shows normal sinus rhythm, normal NC/QRS and QTc intervals.  No ST-T wave changes.  Normal axis.       The patient had an echocardiogram in 2017 with [facility] which showed a structurally normal heart.     Medications are summarized below    Given the complex nature of her symptoms the nature of the autonomic dysfunction is in need of further evaluation.  She has had basic studies at [facility] which we will obtain but in addition further autonomic testing would seem of value and she is agreeable.  PAST MEDICAL HISTORY:  Past Medical History:   Diagnosis Date     Dysautonomia (H)      Omayra-Danlos syndrome      Mast cell activation syndrome (H)      Nonsenile cataract      Postural orthostatic tachycardia syndrome        CURRENT MEDICATIONS:  Current Outpatient Medications   Medication Sig Dispense Refill     ascorbic acid (VITAMIN C) 1000 MG TABS Take 1,000 mg by mouth       Atorvastatin Calcium (LIPITOR PO) Take 10 mg by mouth daily       Beclomethasone Dipropionate 80 MCG/ACT AERS Nasal Spray Spray 2 puffs in nostril       calcium carbonate (OS-HALLIE 500 MG Eek. CA) 500 MG tablet Take 500 mg by mouth 2 times daily       Celecoxib (CELEBREX PO) Take 100 mg by mouth 2 times daily       cetirizine HCl 10 MG CAPS        cholestyramine (QUESTRAN) 4 g packet TAKE 1 PACKET IN ONE CUP OF WATER 4 TIMES DAILY  4     CLONIDINE HCL PO Take 10 mg by mouth Patch       cromolyn (GASTROCROM) 100 MG/5ML (HIGH CONC) solution 100 mg Pt taking 2 vials QID       cromolyn (GASTROCROM) 100 MG/5ML (HIGH CONC) solution        cromolyn (OPTICROM) 4 % ophthalmic solution 1 drop       diclofenac (VOLTAREN) 1 % topical gel Apply 3-4 times per day as needed       DULoxetine HCl (CYMBALTA  PO) Take 60 mg by mouth daily       famotidine (PEPCID) 20 MG tablet TAKE 1 TABLET BY MOUTH TWICE A DAY  3     fentaNYL, PF, (SUBLIMAZE) 50 MCG/ML injection Inject  mcg into the vein       fexofenadine (ALLEGRA) 60 MG tablet        ipratropium (ATROVENT) 0.06 % nasal spray        LEVOTHYROXINE SODIUM PO Take 75 mcg by mouth daily       lidocaine (LIDODERM) 5 % patch        lisdexamfetamine (VYVANSE) 40 MG capsule Take 40 mg by mouth       Magic Mouthwash (FV std formula) lidocaine visc 2% 2.5mL/5mL & maalox/mylanta w/ simeth 2.5mL/5mL & diphenhydrAMINE 5mg/5mL Swish and swallow 10 mLs in mouth every 6 hours as needed for mouth sores       MAGNESIUM OXIDE PO Take 400 mg by mouth daily       montelukast (SINGULAIR) 10 MG tablet Take 10 mg by mouth       multivitamins w/minerals tablet Take 1 tablet by mouth       mupirocin (BACTROBAN) 2 % external ointment Apply 1 inch topically       NEW MED 1.5 mg Low Dose Naltrexone       Omega-3 Fatty Acids (OMEGA-3 FISH OIL PO) Take 1 g by mouth 2 times daily (with meals)       prazosin (MINIPRESS) 2 MG capsule Take 4 mg by mouth       predniSONE (DELTASONE) 10 MG tablet Take 30 mg by mouth       predniSONE (DELTASONE) 20 MG tablet Take ? to 1 tablet by mouth daily for 3 - 10 per flare.  6     Probiotic Product (PROBIOTIC DAILY) CAPS Take 1 capsule by mouth       Propofol 200 MG/20ML PRSY Inject  mg into the vein       ranitidine (ZANTAC) 150 MG tablet Take 150 mg by mouth       Riboflavin (VITAMIN B-2 PO) Take 100 mg by mouth 2 times daily       saccharomyces boulardii (FLORASTOR) 250 MG capsule Take 250 mg by mouth 2 times daily       sodium chloride 1 GM tablet Take 2 g by mouth       SUMAtriptan Succinate (IMITREX PO) Take 100 mg by mouth every 8 hours as needed for migraine       TIZANIDINE HCL PO        TIZANIDINE HCL PO Take 2 mg by mouth every 6 hours as needed for muscle spasms       Topiramate (TOPAMAX PO) Take 75 mg by mouth 2 times daily        traMADol  "(ULTRAM) 50 MG tablet        TRAZODONE HCL PO        TRAZODONE HCL PO Take 75 mg by mouth At Bedtime       valACYclovir (VALTREX) 500 MG tablet        verapamil (CALAN) 40 MG tablet TAKE HALF A TAB BY MOUTH TWICE DAILY  3     VITAMIN D, CHOLECALCIFEROL, PO Take 1,000 Units by mouth daily       zinc gluconate 50 MG tablet        zinc sulfate (ZINCATE) 220 MG capsule Take 220 mg by mouth daily       buPROPion (WELLBUTRIN XL) 150 MG 24 hr tablet Take 150 mg by mouth         PAST SURGICAL HISTORY:  No past surgical history on file.    ALLERGIES:     Allergies   Allergen Reactions     Bupivacaine      Clonidine      Other reaction(s): Irritation At Patch Site     Diflucan [Fluconazole]      Epinephrine Other (See Comments)     Other reaction(s): Gastrointestinal, Headache  Allergy Provider has recommended no more than 0.15 mg/ml of epinephrine if it needs to be given.      Ropivacaine      Chlorhexidine Swelling and Rash     Liquid Adhesive Rash     EKG electrodes      No Clinical Screening - See Comments Rash     Gel from ECG electrodes     Sulfa Drugs Hives and Rash       FAMILY HISTORY:  Family History   Problem Relation Age of Onset     Cataracts Mother      SOCIAL HISTORY:  Social History     Tobacco Use     Smoking status: Never Smoker     Smokeless tobacco: Never Used   Substance Use Topics     Alcohol use: None     Drug use: None       ROS:   Constitutional: No fever, chills, or sweats. Weight stable.   ENT: No visual disturbance, ear ache, epistaxis, sore throat.   Cardiovascular: As per HPI.   Respiratory: No cough, hemoptysis.    GI: No nausea, vomiting, hematemesis, melena, or hematochezia.   : No hematuria.   Integument: Negative.   Psychiatric: Negative.   Hematologic:  Easy bruising, no easy bleeding.  Neuro: Negative.   Endocrinology: No significant heat or cold intolerance   Musculoskeletal: No myalgia.    Exam:  Ht 1.676 m (5' 6\")   Wt 60.3 kg (133 lb)   BMI 21.47 kg/m    GENERAL APPEARANCE: " "healthy, alert and no distress  HEENT: no icterus, no xanthelasmas, normal pupil size and reaction, normal palate, mucosa moist, no central cyanosis  NECK: no adenopathy, no asymmetry, masses, or scars, thyroid normal to palpation and no bruits, JVP not elevated  RESPIRATORY: lungs clear to auscultation - no rales, rhonchi or wheezes, no use of accessory muscles, no retractions, respirations are unlabored, normal respiratory rate  CARDIOVASCULAR: regular rhythm, normal S1 with physiologic split S2, no S3 or S4 and no murmur, click or rub, precordium quiet with normal PMI.  ABDOMEN: soft, non tender, without hepatosplenomegaly, no masses palpable, bowel sounds normal, aorta not enlarged by palpation, no abdominal bruits  EXTREMITIES: peripheral pulses normal, no edema, no bruits  NEURO: alert and oriented to person/place/time, normal speech, gait and affect  VASC: Radial, femoral, dorsalis pedis and posterior tibialis pulses are normal in volumes and symmetric bilaterally. No bruits are heard.  SKIN: no ecchymoses, no rashes    Labs:  CBC RESULTS:   Lab Results   Component Value Date    WBC 5.2 12/01/2009    RBC 4.55 12/01/2009    HGB 13.7 12/01/2009    HCT 41.8 12/01/2009    MCV 92 12/01/2009    MCH 30.1 12/01/2009    MCHC 32.8 12/01/2009    RDW 12.1 12/01/2009     12/01/2009       BMP RESULTS:  Lab Results   Component Value Date    CR 0.80 12/01/2009    GFRESTIMATED 76 12/01/2009    GFRESTBLACK >90 12/01/2009        INR RESULTS:  Lab Results   Component Value Date    INR 0.94 02/06/2019       Procedures:  PULMONARY FUNCTION TESTS:   No flowsheet data found.      ECHOCARDIOGRAM:   No results found for this or any previous visit (from the past 8760 hour(s)).      Assessment and Plan:    1.  Persistent and  Intractable \"dizziness\" which is described as both a \"swimming\" sensation as well as at other times and orthostatic near syncopal event.  Patient has undergone limited autonomic studies which appear to be " abnormal but we have not the full report at the present time.    2.  Positive diagnosis for Omayra-Danlos syndrome of the classic form    3.  Laboratory diagnoses supporting mast cell disorder.  Patient is on appropriate medications for this.    4.  Patient has had some improvement with increased salt intake but this will be held for a couple days prior to completing the autonomic studies here.    Today I spent approximately 40 minutes with Ms. Morley discussing her symptoms and reviewing the potential evaluation as well as some options for future symptomatic treatment.  I pointed out that her condition is not known to be curable per se but that depending on our findings with regard to the autonomic's, we may be able to provide some symptom relief.  Patient did understand the limitations and was agreeable to proceeding.    I very much appreciated the opportunity to see and assess Kelsy Morley in the clinic today. Please do not hesitate to contact my office if you have any questions or concerns.      Fausto Lanier MD  Cardiac Arrhythmia Service  [facility]  [phone]      CC  JUNI SUMMERS

## 2019-11-22 ENCOUNTER — TELEPHONE (OUTPATIENT)
Dept: ALLERGY | Facility: CLINIC | Age: 59
End: 2019-11-22

## 2019-11-26 ENCOUNTER — APPOINTMENT (OUTPATIENT)
Dept: MEDSURG UNIT | Facility: CLINIC | Age: 59
End: 2019-11-26
Attending: INTERNAL MEDICINE
Payer: COMMERCIAL

## 2019-11-26 ENCOUNTER — HOSPITAL ENCOUNTER (OUTPATIENT)
Facility: CLINIC | Age: 59
Discharge: HOME | End: 2019-11-26
Attending: INTERNAL MEDICINE | Admitting: INTERNAL MEDICINE
Payer: COMMERCIAL

## 2019-11-26 VITALS
HEIGHT: 66 IN | OXYGEN SATURATION: 98 % | BODY MASS INDEX: 21.38 KG/M2 | RESPIRATION RATE: 18 BRPM | TEMPERATURE: 98 F | HEART RATE: 82 BPM | WEIGHT: 133 LBS | SYSTOLIC BLOOD PRESSURE: 108 MMHG | DIASTOLIC BLOOD PRESSURE: 68 MMHG

## 2019-11-26 DIAGNOSIS — G90.9 AUTONOMIC DYSFUNCTION: ICD-10-CM

## 2019-11-26 PROCEDURE — 93660 TILT TABLE EVALUATION: CPT | Mod: 26 | Performed by: INTERNAL MEDICINE

## 2019-11-26 PROCEDURE — 95921 AUTONOMIC NRV PARASYM INERVJ: CPT | Mod: 26 | Performed by: INTERNAL MEDICINE

## 2019-11-26 PROCEDURE — 25800030 ZZH RX IP 258 OP 636: Performed by: INTERNAL MEDICINE

## 2019-11-26 PROCEDURE — 27210794 ZZH OR GENERAL SUPPLY STERILE: Performed by: INTERNAL MEDICINE

## 2019-11-26 PROCEDURE — 95921 AUTONOMIC NRV PARASYM INERVJ: CPT | Performed by: INTERNAL MEDICINE

## 2019-11-26 PROCEDURE — 93660 TILT TABLE EVALUATION: CPT | Performed by: INTERNAL MEDICINE

## 2019-11-26 PROCEDURE — 40000166 ZZH STATISTIC PP CARE STAGE 1

## 2019-11-26 RX ORDER — SODIUM CHLORIDE 9 MG/ML
INJECTION, SOLUTION INTRAVENOUS CONTINUOUS
Status: DISCONTINUED | OUTPATIENT
Start: 2019-11-26 | End: 2019-11-26 | Stop reason: HOSPADM

## 2019-11-26 RX ORDER — LIDOCAINE 40 MG/G
CREAM TOPICAL
Status: DISCONTINUED | OUTPATIENT
Start: 2019-11-26 | End: 2019-11-26 | Stop reason: HOSPADM

## 2019-11-26 RX ADMIN — SODIUM CHLORIDE 600 ML: 9 INJECTION, SOLUTION INTRAVENOUS at 07:58

## 2019-11-26 RX ADMIN — SODIUM CHLORIDE: 9 INJECTION, SOLUTION INTRAVENOUS at 08:00

## 2019-11-26 ASSESSMENT — MIFFLIN-ST. JEOR: SCORE: 1194.78

## 2019-11-26 NOTE — DISCHARGE INSTRUCTIONS
Recommendations:   - Use compression shorts (athletic compression shorts)  - Increase hydration with goal to take in 64 oz of water/electrolyte fluids per day. Recommend drinking 8-10oz. Mix 5 oz water with 5 oz pedialyte upon waking prior to rising out of bed each morning or after long naps.   - Eat six small meals per day with focus on foods low in carbohydrates and low in gluten.  - Room humidifier during sleeping.   - Liberalize salt intake  - Change positions carefully and slowly and maintain safe environment.   -Standing training and supine isometric exercises.

## 2019-11-26 NOTE — IP AVS SNAPSHOT
MRN:[id number]                      After Visit Summary   11/26/2019    Kelsy Morley    MRN: [id number]           Visit Information        Department      11/26/2019  6:57 AM [address] [facility]          Review of your medicines      UNREVIEWED medicines. Ask your doctor about these medicines       Dose / Directions   calcium carbonate 500 MG tablet  Commonly known as:  OS-HALLIE      Dose:  500 mg  Take 500 mg by mouth 2 times daily  Refills:  0     cetirizine HCl 10 MG Caps      Refills:  0     cholestyramine 4 g packet  Commonly known as:  QUESTRAN      TAKE 1 PACKET IN ONE CUP OF WATER 4 TIMES DAILY  Refills:  4     cromolyn 100 MG/5ML (HIGH CONC) solution  Commonly known as:  GASTROCROM  Ask about: Which instructions should I use?      Dose:  200 mg  Take 200 mg by mouth 4 times daily  Refills:  0     cromolyn 4 % ophthalmic solution  Commonly known as:  OPTICROM      Dose:  1 drop  1 drop  Refills:  0     diclofenac 1 % topical gel  Commonly known as:  VOLTAREN      Apply 3-4 times per day as needed  Refills:  0     famotidine 20 MG tablet  Commonly known as:  PEPCID      TAKE 1 TABLET BY MOUTH TWICE A DAY  Refills:  3     fexofenadine 60 MG tablet  Commonly known as:  ALLEGRA      Refills:  0     IMITREX PO      Dose:  100 mg  Take 100 mg by mouth every 8 hours as needed for migraine  Refills:  0     ipratropium 0.06 % nasal spray  Commonly known as:  ATROVENT      Refills:  0     LEVOTHYROXINE SODIUM PO      Dose:  75 mcg  Take 75 mcg by mouth daily  Refills:  0     lidocaine 5 % patch  Commonly known as:  LIDODERM      Refills:  0     lidocaine visc 2% 2.5mL/5mL & maalox/mylanta w/ simeth 2.5mL/5mL & diphenhydrAMINE 5mg/5mL Susp suspension  Commonly known as:  [facility] Mouthwash [facility]      Dose:  10 mL  Swish and swallow 10 mLs in mouth every 6 hours as needed for mouth sores  Refills:  0     lisdexamfetamine 40 MG capsule  Commonly known as:  VYVANSE      Dose:  40 mg  Take 40 mg by  mouth  Refills:  0     MAGNESIUM OXIDE PO      Dose:  400 mg  Take 400 mg by mouth daily  Refills:  0     montelukast 10 MG tablet  Commonly known as:  SINGULAIR      Dose:  10 mg  Take 10 mg by mouth  Refills:  0     multivitamins w/minerals tablet      Dose:  1 tablet  Take 1 tablet by mouth  Refills:  0     mupirocin 2 % external ointment  Commonly known as:  BACTROBAN      Dose:  1 inch  Apply 1 inch topically  Refills:  0     NEW MED      Dose:  4.5 mg  4.5 mg Low Dose Naltrexone  Refills:  0     OMEGA-3 FISH OIL PO      Dose:  1 g  Take 1 g by mouth 2 times daily (with meals)  Refills:  0     prazosin 2 MG capsule  Commonly known as:  MINIPRESS      Dose:  5 mg  Take 5 mg by mouth  Refills:  0     * predniSONE 10 MG tablet  Commonly known as:  DELTASONE      Dose:  30 mg  Take 30 mg by mouth  Refills:  0     * predniSONE 20 MG tablet  Commonly known as:  DELTASONE      Take ? to 1 tablet by mouth daily for 3 - 10 per flare.  Refills:  6     PROBIOTIC DAILY Caps      Dose:  1 capsule  Take 1 capsule by mouth  Refills:  0     saccharomyces boulardii 250 MG capsule  Commonly known as:  FLORASTOR      Dose:  250 mg  Take 250 mg by mouth 2 times daily  Refills:  0     sodium chloride 1 GM tablet      Dose:  2 g  Take 2 g by mouth  Refills:  0     TIZANIDINE HCL PO  Ask about: Which instructions should I use?      Dose:  2 mg  Take 2 mg by mouth every 6 hours as needed for muscle spasms  Refills:  0     TOPAMAX PO      Dose:  75 mg  Take 75 mg by mouth 2 times daily  Refills:  0     traMADol 50 MG tablet  Commonly known as:  ULTRAM      Refills:  0     * TRAZODONE HCL PO      Dose:  75 mg  Take 75 mg by mouth At Bedtime  Refills:  0     * TRAZODONE HCL PO      Refills:  0     valACYclovir 500 MG tablet  Commonly known as:  VALTREX      Refills:  0     verapamil 40 MG tablet  Commonly known as:  CALAN      TAKE HALF A TAB BY MOUTH TWICE DAILY  Refills:  3     VITAMIN B-2 PO      Dose:  100 mg  Take 100 mg by mouth 2  times daily  Refills:  0     vitamin C 1000 MG Tabs  Commonly known as:  ASCORBIC ACID      Dose:  1,000 mg  Take 1,000 mg by mouth  Refills:  0     VITAMIN D (CHOLECALCIFEROL) PO      Dose:  1,000 Units  Take 1,000 Units by mouth daily  Refills:  0     zinc gluconate 50 MG tablet      Refills:  0     zinc sulfate 220 (50 Zn) MG capsule  Commonly known as:  ZINCATE      Dose:  220 mg  Take 220 mg by mouth daily  Refills:  0         * This list has 4 medication(s) that are the same as other medications prescribed for you. Read the directions carefully, and ask your doctor or other care provider to review them with you.                  Protect others around you: Learn how to safely use, store and throw away your medicines at www.disposemymeds.org.       Follow-ups after your visit       Care Instructions       Further instructions from your care team       Recommendations:   - Use compression shorts (athletic compression shorts)  - Increase hydration with goal to take in 64 oz of water/electrolyte fluids per day. Recommend drinking 8-10oz. Mix 5 oz water with 5 oz pedialyte upon waking prior to rising out of bed each morning or after long naps.   - Eat six small meals per day with focus on foods low in carbohydrates and low in gluten.  - Room humidifier during sleeping.   - Liberalize salt intake  - Change positions carefully and slowly and maintain safe environment.   -Standing training and supine isometric exercises.       Additional Information About Your Visit       MyChart Information    [employer] gives you secure access to your electronic health record. If you see a primary care provider, you can also send messages to your care team and make appointments. If you have questions, please call your primary care clinic.  If you do not have a primary care provider, please call [phone] and they will assist you.       Care EveryWhere ID    This is your Care EveryWhere ID. This could be used by other organizations to  "access your [facility] medical records  [id number]       Your Vitals Were  Most recent update: 11/26/2019 11:30 AM    Blood Pressure   108/68 (BP Location: Right arm)          Pulse   82          Temperature   98  F (36.7  C) (Oral)          Respirations   18          Height   1.676 m (5' 5.98\")             Weight   60.3 kg (133 lb)    Pulse Oximetry   98%    BMI (Body Mass Index)   21.48 kg/m           Primary Care Provider Office Phone # Fax #    Esa Lynne Lorimor [phone]      Equal Access to Services    [facility]: Hadii reg ku hadasho Soomaali, waaxda luqadaha, qaybta kaalmada adeegyatanvir, fracisco batista . So [facility] [phone].    ATENCIÓN: Si habla español, tiene a jaramillo disposición servicios gratuitos de asistencia lingüística. [facility] [phone].    We comply with applicable federal civil rights laws and [place] laws. We do not discriminate on the basis of race, color, national origin, age, disability, sex, sexual orientation, or gender identity.           Thank you!    Thank you for choosing [facility] for your care. Our goal is always to provide you with excellent care. Hearing back from our patients is one way we can continue to improve our services. Please take a few minutes to complete the written survey that you may receive in the mail after you visit with us. Thank you!            Medication List      ASK your doctor about these medications          Morning Afternoon Evening Bedtime As Needed    calcium carbonate 500 MG tablet  Also known as:  OS-HALLIE  INSTRUCTIONS:  Take 500 mg by mouth 2 times daily                     cetirizine HCl 10 MG Caps                     cholestyramine 4 g packet  Also known as:  QUESTRAN  INSTRUCTIONS:  TAKE 1 PACKET IN ONE CUP OF WATER 4 TIMES DAILY                     cromolyn 100 MG/5ML (HIGH CONC) solution  Also known as:  GASTROCROM  INSTRUCTIONS:  Take 200 mg by mouth 4 times daily  Ask about: Which instructions should " I use?                     cromolyn 4 % ophthalmic solution  Also known as:  OPTICROM  INSTRUCTIONS:  1 drop                     diclofenac 1 % topical gel  Also known as:  VOLTAREN  INSTRUCTIONS:  Apply 3-4 times per day as needed                     famotidine 20 MG tablet  Also known as:  PEPCID  INSTRUCTIONS:  TAKE 1 TABLET BY MOUTH TWICE A DAY                     fexofenadine 60 MG tablet  Also known as:  ALLEGRA                     IMITREX PO  INSTRUCTIONS:  Take 100 mg by mouth every 8 hours as needed for migraine                     ipratropium 0.06 % nasal spray  Also known as:  ATROVENT                     LEVOTHYROXINE SODIUM PO  INSTRUCTIONS:  Take 75 mcg by mouth daily                     lidocaine 5 % patch  Also known as:  LIDODERM                     lidocaine visc 2% 2.5mL/5mL & maalox/mylanta w/ simeth 2.5mL/5mL & diphenhydrAMINE 5mg/5mL Susp suspension  Also known as:  [facility] Mouthwash [facility]  INSTRUCTIONS:  Swish and swallow 10 mLs in mouth every 6 hours as needed for mouth sores                     lisdexamfetamine 40 MG capsule  Also known as:  VYVANSE  INSTRUCTIONS:  Take 40 mg by mouth                     MAGNESIUM OXIDE PO  INSTRUCTIONS:  Take 400 mg by mouth daily                     montelukast 10 MG tablet  Also known as:  SINGULAIR  INSTRUCTIONS:  Take 10 mg by mouth                     multivitamins w/minerals tablet  INSTRUCTIONS:  Take 1 tablet by mouth                     mupirocin 2 % external ointment  Also known as:  BACTROBAN  INSTRUCTIONS:  Apply 1 inch topically                     NEW MED  INSTRUCTIONS:  4.5 mg Low Dose Naltrexone                     OMEGA-3 FISH OIL PO  INSTRUCTIONS:  Take 1 g by mouth 2 times daily (with meals)                     prazosin 2 MG capsule  Also known as:  MINIPRESS  INSTRUCTIONS:  Take 5 mg by mouth                     * predniSONE 10 MG tablet  Also known as:  DELTASONE  INSTRUCTIONS:  Take 30 mg by mouth                     * predniSONE 20  MG tablet  Also known as:  DELTASONE  INSTRUCTIONS:  Take ? to 1 tablet by mouth daily for 3 - 10 per flare.                     PROBIOTIC DAILY Caps  INSTRUCTIONS:  Take 1 capsule by mouth                     saccharomyces boulardii 250 MG capsule  Also known as:  FLORASTOR  INSTRUCTIONS:  Take 250 mg by mouth 2 times daily                     sodium chloride 1 GM tablet  INSTRUCTIONS:  Take 2 g by mouth  LAST TAKEN:  Ask your nurse or doctor                     TIZANIDINE HCL PO  INSTRUCTIONS:  Take 2 mg by mouth every 6 hours as needed for muscle spasms  Ask about: Which instructions should I use?                     TOPAMAX PO  INSTRUCTIONS:  Take 75 mg by mouth 2 times daily                     traMADol 50 MG tablet  Also known as:  ULTRAM                     * TRAZODONE HCL PO  INSTRUCTIONS:  Take 75 mg by mouth At Bedtime                     * TRAZODONE HCL PO                     valACYclovir 500 MG tablet  Also known as:  VALTREX                     verapamil 40 MG tablet  Also known as:  CALAN  INSTRUCTIONS:  TAKE HALF A TAB BY MOUTH TWICE DAILY                     VITAMIN B-2 PO  INSTRUCTIONS:  Take 100 mg by mouth 2 times daily                     vitamin C 1000 MG Tabs  Also known as:  ASCORBIC ACID  INSTRUCTIONS:  Take 1,000 mg by mouth                     VITAMIN D (CHOLECALCIFEROL) PO  INSTRUCTIONS:  Take 1,000 Units by mouth daily                     zinc gluconate 50 MG tablet                     zinc sulfate 220 (50 Zn) MG capsule  Also known as:  ZINCATE  INSTRUCTIONS:  Take 220 mg by mouth daily                        * This list has 4 medication(s) that are the same as other medications prescribed for you. Read the directions carefully, and ask your doctor or other care provider to review them with you.

## 2019-11-26 NOTE — PROGRESS NOTES
Pt returned back from cath lab at @ 1040 via liter accompanied by nurse.VSS.Pt tolerating food and fluids.Denies nausea,dizziness or any pain.

## 2019-11-26 NOTE — IP AVS SNAPSHOT
[address] [address]                                    After Visit Summary   11/26/2019    Kelsy Morley    MRN: [id number]           After Visit Summary Signature Page    I have received my discharge instructions, and my questions have been answered. I have discussed any challenges I see with this plan with the nurse or doctor.    ..........................................................................................................................................  Patient/Patient Representative Signature      ..........................................................................................................................................  Patient Representative Print Name and Relationship to Patient    ..................................................               ................................................  Date                                   Time    ..........................................................................................................................................  Reviewed by Signature/Title    ...................................................              ..............................................  Date                                               Time          22EPIC Rev 08/18

## 2019-11-26 NOTE — PROGRESS NOTES
Patient tolerated recovery stage well. VSS.Patient tolerated PO food and fluids. Teaching was done and discharge instructions were given.Pt also received a copy of the TILT procedure and instructions on care for her decrease in BP. Patient ambulated, voided, and PIV was removed. Patient discharged from the hospital.

## 2019-11-26 NOTE — PROGRESS NOTES
Pt prepped for TILT TABLE.PIV placed and NS bolus started.Pt has no one to take her home because she is not getting any sedation.

## 2019-12-02 NOTE — TELEPHONE ENCOUNTER
First attempt to reach patient to schedule an appointment with . 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

## 2019-12-12 ENCOUNTER — TRANSFERRED RECORDS (OUTPATIENT)
Dept: HEALTH INFORMATION MANAGEMENT | Facility: CLINIC | Age: 59
End: 2019-12-12

## 2020-02-04 ENCOUNTER — INFUSION - HEALTHEAST (OUTPATIENT)
Dept: INFUSION THERAPY | Facility: CLINIC | Age: 60
End: 2020-02-04

## 2020-02-04 DIAGNOSIS — L50.8 IMMUNOLOGIC URTICARIA: ICD-10-CM

## 2020-02-17 ENCOUNTER — HOSPITAL ENCOUNTER (OUTPATIENT)
Dept: PHYSICAL MEDICINE AND REHAB | Facility: CLINIC | Age: 60
Discharge: HOME | End: 2020-02-17
Attending: PHYSICAL MEDICINE & REHABILITATION

## 2020-02-17 DIAGNOSIS — Q79.60 EHLERS-DANLOS DISEASE: ICD-10-CM

## 2020-02-17 DIAGNOSIS — M47.816 ARTHROPATHY OF LUMBAR FACET JOINT: ICD-10-CM

## 2020-02-17 DIAGNOSIS — M53.3 SACROILIAC JOINT PAIN: ICD-10-CM

## 2020-02-17 DIAGNOSIS — M43.16 SPONDYLOLISTHESIS OF LUMBAR REGION: ICD-10-CM

## 2020-02-17 DIAGNOSIS — M79.18 MYOFASCIAL MUSCLE PAIN: ICD-10-CM

## 2020-02-17 DIAGNOSIS — M54.50 LUMBAR SPINE PAIN: ICD-10-CM

## 2020-02-18 ENCOUNTER — RECORDS - HEALTHEAST (OUTPATIENT)
Dept: ADMINISTRATIVE | Facility: OTHER | Age: 60
End: 2020-02-18

## 2020-02-21 ENCOUNTER — RECORDS - HEALTHEAST (OUTPATIENT)
Dept: ADMINISTRATIVE | Facility: OTHER | Age: 60
End: 2020-02-21

## 2020-02-24 ENCOUNTER — AMBULATORY - HEALTHEAST (OUTPATIENT)
Dept: LAB | Facility: CLINIC | Age: 60
End: 2020-02-24

## 2020-02-24 DIAGNOSIS — M81.0 OSTEOPOROSIS: ICD-10-CM

## 2020-02-26 ENCOUNTER — COMMUNICATION - HEALTHEAST (OUTPATIENT)
Dept: PHYSICAL MEDICINE AND REHAB | Facility: CLINIC | Age: 60
End: 2020-02-26

## 2020-02-26 DIAGNOSIS — M43.16 SPONDYLOLISTHESIS OF LUMBAR REGION: ICD-10-CM

## 2020-02-27 ENCOUNTER — TRANSCRIBE ORDERS (OUTPATIENT)
Dept: OTHER | Age: 60
End: 2020-02-27

## 2020-02-27 DIAGNOSIS — M43.16 SPONDYLOLISTHESIS OF LUMBAR REGION: Primary | ICD-10-CM

## 2020-02-28 ENCOUNTER — TELEPHONE (OUTPATIENT)
Dept: ORTHOPEDICS | Facility: CLINIC | Age: 60
End: 2020-02-28

## 2020-02-28 ENCOUNTER — PRE VISIT (OUTPATIENT)
Dept: ORTHOPEDICS | Facility: CLINIC | Age: 60
End: 2020-02-28

## 2020-02-28 ENCOUNTER — RECORDS - HEALTHEAST (OUTPATIENT)
Dept: ADMINISTRATIVE | Facility: OTHER | Age: 60
End: 2020-02-28

## 2020-02-28 NOTE — TELEPHONE ENCOUNTER
Attempted to call [facility] spine. Message left.     Called pt and message left for records.       Kelly

## 2020-02-28 NOTE — TELEPHONE ENCOUNTER
INTAKE QUESTIONS FOR SPINE AND NECK                                                                     REASON FOR VISIT: Spondylolisthesis of lumbar region      APPOINTMENT DATE: 03/05/2020   HAVE YOU HAD PREVIOUS SURGERIES ON YOUR NECK OR SPINE: NO  WHERE? n/a    WHEN? n/a      HAVE YOU HAD RELATED IMAGING?   (BONE SCANS, XRAYS, CAT SCANS, MRIS) YES    WHERE? [facility] pt stated the pushed the images to us    WHEN? Most recent one was 2/25/2020, but she has had many in the past   HAVE YOU HAD INJECTIONS TO THE SPINE? YES L4-L5    WHERE? [facility]    WHEN? 2018   HAVE YOU HAD RELATED PHYSICAL THERAPY IN THE LAST YEAR? YES    [facility] [place], WITH NICOLE ROSAS   DO YOU HAVE ANY RELATED IMPLANTS OR HARDWARE? NO   DO YOU HAVE ANY PATHOLOGY REPORTS RELATED TO YOUR SPINE OR NECK? YES-EACH MRI GETS WORSE     CSS NOTES STATUS DETAILS   OFFICE NOTE from referring provider In process    OFFICE NOTE from other specialist In process    PHYSICAL THERAPY (WITHIN LAST YEAR) In process    DISCHARGE SUMMARY from hospital In process    DISCHARGE REPORT from the ER In process    OPERATIVE REPORT In process    MEDICATION LIST In process    LABS/CBC/DIFF In process    CULTURES In process    IMPLANT RECORD/STICKER In process    IMAGING     INJECTIONS DONE IN RADIOLOGY In process    MRI In process    CT SCAN In process    XRAYS (IMAGES & REPORTS) In process    TUMOR     PATHOLOGY  Slides & report In process

## 2020-02-28 NOTE — TELEPHONE ENCOUNTER
[facility] Call Center    Phone Message    May a detailed message be left on voicemail: yes     Reason for Call: Other: [facility] spine would like Dr. cardona or nurse to call directly to have pt images pushed thru name is aleida in record [phone]     Action Taken: Message routed to:  [facility]o    Travel Screening: Not Applicable

## 2020-03-02 DIAGNOSIS — M54.9 BACK PAIN: Primary | ICD-10-CM

## 2020-03-02 ASSESSMENT — ENCOUNTER SYMPTOMS
ORTHOPNEA: 0
JOINT SWELLING: 1
LOSS OF CONSCIOUSNESS: 0
VOMITING: 0
DIFFICULTY URINATING: 1
MUSCLE CRAMPS: 0
SPEECH CHANGE: 0
EXERCISE INTOLERANCE: 1
DYSURIA: 0
LEG PAIN: 0
PALPITATIONS: 0
NUMBNESS: 0
DIZZINESS: 1
DISTURBANCES IN COORDINATION: 1
TINGLING: 1
SLEEP DISTURBANCES DUE TO BREATHING: 0
HYPOTENSION: 1
JAUNDICE: 0
HEADACHES: 1
DEPRESSION: 1
PANIC: 0
BLOATING: 1
NECK PAIN: 1
MUSCLE WEAKNESS: 1
HEARTBURN: 1
FLANK PAIN: 0
BACK PAIN: 1
NAUSEA: 1
ABDOMINAL PAIN: 1
MEMORY LOSS: 0
TREMORS: 0
NERVOUS/ANXIOUS: 1
ARTHRALGIAS: 1
BOWEL INCONTINENCE: 0
HEMATURIA: 0
STIFFNESS: 1
WEAKNESS: 1
BLOOD IN STOOL: 0
DECREASED CONCENTRATION: 1
SEIZURES: 0
HYPERTENSION: 0
LIGHT-HEADEDNESS: 1
RECTAL PAIN: 0
INSOMNIA: 1
DIARRHEA: 0
MYALGIAS: 1
CONSTIPATION: 1
SYNCOPE: 0
PARALYSIS: 0

## 2020-03-03 ENCOUNTER — INFUSION - HEALTHEAST (OUTPATIENT)
Dept: INFUSION THERAPY | Facility: CLINIC | Age: 60
End: 2020-03-03

## 2020-03-03 ENCOUNTER — TELEPHONE (OUTPATIENT)
Dept: ORTHOPEDICS | Facility: CLINIC | Age: 60
End: 2020-03-03

## 2020-03-03 DIAGNOSIS — L50.8 IMMUNOLOGIC URTICARIA: ICD-10-CM

## 2020-03-03 NOTE — TELEPHONE ENCOUNTER
[facility] Call Center    Phone Message    May a detailed message be left on voicemail: yes     Reason for Call: Patient calling in questioning if we have received all of the records from [facility]. Also, patient questioning why another Xray was scheduled for upcoming appointment on 3/5/2020 prior to seeing Dr. Galvan. Patient stating she had a Xray and MRI 3 weeks ago at [facility] in [place] and would prefer to not do another. Caller questioning if those images have been pushed through or if she needs to work on that process as well? Please advise.    Thank you, Jena Larson on 3/3/2020 at 10:02 AM     Action Taken: Message routed to:  [facility]THO    Travel Screening: Not Applicable

## 2020-03-04 NOTE — TELEPHONE ENCOUNTER
Called pt back. Informed her that XR ordered is different than what we have.     Called [facility] again and requested paper records

## 2020-03-05 ENCOUNTER — ANCILLARY PROCEDURE (OUTPATIENT)
Dept: GENERAL RADIOLOGY | Facility: CLINIC | Age: 60
End: 2020-03-05
Attending: ORTHOPAEDIC SURGERY
Payer: COMMERCIAL

## 2020-03-05 ENCOUNTER — OFFICE VISIT (OUTPATIENT)
Dept: ORTHOPEDICS | Facility: CLINIC | Age: 60
End: 2020-03-05
Attending: PHYSICAL MEDICINE & REHABILITATION
Payer: COMMERCIAL

## 2020-03-05 VITALS — HEIGHT: 66 IN | WEIGHT: 137 LBS | BODY MASS INDEX: 22.02 KG/M2

## 2020-03-05 DIAGNOSIS — M40.37 FLATBACK SYNDROME OF LUMBOSACRAL REGION: ICD-10-CM

## 2020-03-05 DIAGNOSIS — M48.062 LUMBAR STENOSIS WITH NEUROGENIC CLAUDICATION: ICD-10-CM

## 2020-03-05 DIAGNOSIS — M54.9 BACK PAIN: ICD-10-CM

## 2020-03-05 DIAGNOSIS — M43.16 SPONDYLOLISTHESIS OF LUMBAR REGION: Primary | ICD-10-CM

## 2020-03-05 RX ORDER — GALCANEZUMAB 120 MG/ML
120 INJECTION, SOLUTION SUBCUTANEOUS
COMMUNITY
Start: 2020-02-19

## 2020-03-05 RX ORDER — TOPIRAMATE 100 MG/1
TABLET, FILM COATED ORAL
COMMUNITY
Start: 2011-01-01 | End: 2021-04-26

## 2020-03-05 RX ORDER — FAMOTIDINE 40 MG/1
TABLET, FILM COATED ORAL
COMMUNITY
Start: 2020-01-01 | End: 2020-05-21

## 2020-03-05 RX ORDER — TOPIRAMATE SPINKLE 25 MG/1
75 CAPSULE ORAL
COMMUNITY
Start: 2011-01-01 | End: 2021-12-08

## 2020-03-05 RX ORDER — PRAZOSIN HYDROCHLORIDE 5 MG/1
CAPSULE ORAL
COMMUNITY
Start: 2019-06-01 | End: 2023-02-23

## 2020-03-05 RX ORDER — IMIPRAMINE HYDROCHLORIDE 10 MG/1
TABLET, FILM COATED ORAL
COMMUNITY
Start: 2020-02-24 | End: 2020-05-21

## 2020-03-05 RX ORDER — ERGOCALCIFEROL (VITAMIN D2) 50 MCG
CAPSULE ORAL
COMMUNITY
Start: 2020-02-12 | End: 2020-05-21

## 2020-03-05 RX ORDER — TIZANIDINE 2 MG/1
TABLET ORAL
COMMUNITY
Start: 2014-01-01 | End: 2022-07-28

## 2020-03-05 ASSESSMENT — MIFFLIN-ST. JEOR: SCORE: 1205.24
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
## 2020-03-05 NOTE — PROGRESS NOTES
Surgical Spine Hx:  None    REASON FOR CONSULTATION: Consult (spondylolisthesis of lumbar back. 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 She has always had back pain that was exacerbated whenever she pushed her self too physically. She started having more severe pain not brought on by activity in Jan/2017. She started seeing [facility] spine at that time and was diagnosed with Spondylolisthesis at L4/5. Over the last 3-4 months the pain has gotten progressively worse and now severely limits her day to day function. The pain usually gets worse throughout the day and is worse in the evening. The pain is described as sharp in the lower back and a dull ache in the buttock/upper thigh.     Dexa Scan in February/2020 at [facility] with borderline osteopenia/osteoperosis. Now taking 2000mg Vit D. Being managed by PCP      Back  100%, lower back and buttock pain that sometimes extends into the proximal thighs.  No radicular pain.   Worse: with standing or sitting still for extended   Better: Getting up and moving/pacing heat.     Previous treatment:   1. Left lumbar sympathetic nerve block under fluoroscopic guidance at L2/3 on 9/17/2019 and 8/28/2019 with Dr. Bryan Lowe at [facility] for her CRPS  2. Epidural interlaminar Steroid injection at L3/4 by Bryan Lowe at [facility] on 12/19/19- no relief with the back pain  3. Physical therapy 2x/week over the last 2 years- performs manipulations of the back and core strengthening. - Helps for a brief period of time, but the pain always returns and seems to be getting worse  4. Epidural Steroid injection L4/5 on  6/7/2017 with Dr. Lowe  5.  Facet injection 7/6/2016 at b/l L4/5 and L5/S1 with Dr. Lowe  6. Facet injection 2/11/2016 at b/l L4/5 and L5/S1  that gave ~50% relief for 2-3 months.   7.Topimax for CRPS 75mg am and 100mg at bedtime   8. Lidocaine patches help take edge off  9. Cannabis oil at bedtime- helps with sleep   10 No opiates           Oswestry (LISA) Questionnaire    OSWESTRY DISABILITY INDEX 3/2/2020   Count 9   Sum 26   Oswestry Score (%) 57.78   Some recent data might be hidden           PROMIS-10 Scores  Global Mental Health Score: (P) 11  Global Physical Health Score: (P) 11  PROMIS TOTAL - SUBSCORES: (P) 22    ROS:  A 12-point review of systems was completed and is negative except for otherwise noted above in the history of present illness.    Med Hx:  Past Medical History:   Diagnosis Date     Dysautonomia (H)      Omayra-Danlos syndrome      Mast cell activation syndrome (H)      Nonsenile cataract      Postural orthostatic tachycardia syndrome        Surg Hx:  Past Surgical History:   Procedure Laterality Date     EP STUDY TILT TABLE N/A 11/26/2019    Procedure: EP TILT TABLE;  Surgeon: Fausto Lanier MD;  Location:  [facility]       Allergies:  Allergies   Allergen Reactions     Bupivacaine      Clonidine      Other reaction(s): Irritation At Patch Site     Diflucan [Fluconazole]      Epinephrine Other (See Comments)     Other reaction(s): Gastrointestinal, Headache  Allergy Provider has recommended no more than 0.15 mg/ml of epinephrine if it needs to be given.      Ropivacaine      Chlorhexidine Swelling and Rash     Liquid Adhesive Rash     EKG electrodes      No Clinical Screening - See Comments Rash     Gel from ECG electrodes     Sulfa Drugs Hives and Rash       Meds:  Current Outpatient Medications   Medication     ascorbic acid (VITAMIN C) 1000 MG TABS     calcium carbonate (OS-HALLIE 500 MG [place]. CA) 500 MG tablet     cetirizine HCl 10 MG CAPS     cromolyn (OPTICROM) 4 % ophthalmic solution      diclofenac (VOLTAREN) 1 % topical gel     EMGALITY 120 MG/ML injection     famotidine (PEPCID) 40 MG tablet     HEMP OIL OR EXTRACT OR OTHER CBD CANNABINOID, NOT MEDICAL CANNABIS,     imipramine (TOFRANIL) 10 MG tablet     ipratropium (ATROVENT) 0.06 % nasal spray     LEVOTHYROXINE SODIUM PO     lidocaine (LIDODERM) 5 % patch     lisdexamfetamine (VYVANSE) 40 MG capsule     montelukast (SINGULAIR) 10 MG tablet     mupirocin (BACTROBAN) 2 % external ointment     NEW MED     omalizumab (XOLAIR) 150 MG injection     prazosin (MINIPRESS) 5 MG capsule     SUMAtriptan Succinate (IMITREX PO)     tiZANidine (ZANAFLEX) 2 MG tablet     topiramate (TOPAMAX) 100 MG tablet     topiramate (TOPAMAX) 25 MG capsule     TRAZODONE HCL PO     valACYclovir (VALTREX) 500 MG tablet     VITAMIN D, CHOLECALCIFEROL, PO     Vitamin D2, Ergocalciferol, 50 MCG (2000 UT) CAPS     fexofenadine (ALLEGRA) 60 MG tablet     Magic Mouthwash (FV std formula) lidocaine visc 2% 2.5mL/5mL & maalox/mylanta w/ simeth 2.5mL/5mL & diphenhydrAMINE 5mg/5mL     MAGNESIUM OXIDE PO     multivitamins w/minerals tablet     Omega-3 Fatty Acids (OMEGA-3 FISH OIL PO)     predniSONE (DELTASONE) 10 MG tablet     predniSONE (DELTASONE) 20 MG tablet     Probiotic Product (PROBIOTIC DAILY) CAPS     Riboflavin (VITAMIN B-2 PO)     saccharomyces boulardii (FLORASTOR) 250 MG capsule     sodium chloride 1 GM tablet     zinc gluconate 50 MG tablet     zinc sulfate (ZINCATE) 220 MG capsule     No current facility-administered medications for this visit.        Fam Hx:  Family History   Problem Relation Age of Onset     Cataracts Mother        P/S Hx:  Currently lives by herself  Never smoker  1 glass of wine/month  No illicit drugs  Has been on diability since Oct/2017, was a school nurse prior  Social History     Tobacco Use     Smoking status: Never Smoker     Smokeless tobacco: Never Used   Substance Use Topics     Alcohol use: Not on file         Physical Exam:  Very  "pleasant, healthy appearing, alert, oriented x 3, cooperative.  Normal mood and affect.  Not in cardiorespiratory distress.  Ht 1.664 m (5' 5.5\")   Wt 62.1 kg (137 lb)   BMI 22.45 kg/m    Normal upright posture.    Normal gait without assistive device.  No antalgia / imbalance.  Back: no deformity, no skin lesions or surgical scars.  Localizes pain at bilateral perispinal lumbar spine and buttock   Tenderness: (+) midline, (+) paraspinal,   ROM:   Extension increases pain in lower back    Some pain with  flexion, able to reach down to toes.     Neuro Exam:  Motor: 5/5 strength for all muscle groups in both LE's. 4/5 in b/l Knee extension/flexion. 5/5 in hip flexion plantar/dorsal flexion about ankle and EHL b/l   Sensory:  Intact to light touch in both LE's.   Reflexes:  Knee 1+ bilat.  Ankle 1+ bilat.  (-) clonus.    Lower Extremity:  Equal leg lengths, full pulses, (-) atrophy / asymmetry.  Full painless passive knee and ankle motion.  Straight leg raise: (-) right, (-) left.  JOSE CARLOS/Jose's: (-) right, (-) left.      Sacroiliac Joint Tests:      TEST RIGHT LEFT   PSIS tenderness     Jaron finger sign - -   JOSE CARLOS/Jose - -   Thigh thrust             Gapping    Compression -   Sacral thrust    Gaenslen's            Imaging:   EOS full spine AP lateral standing x-rays taken today show grade 1 spondylolisthesis L4-5, advanced spondylosis with disc collapse and loss of segmental lordosis at L5-S1.  Sagittal malalignment mainly in the form of lower lumbar hypo-lordosis (L4-S1 =19 degrees, ideal 36 degrees).  There is also some spondylosis changes in the thoracolumbar junction.  There is some compensatory hyperlordosis at the upper lumbar levels.  Sagittal measurements:  LL 50, ideal 54  L4-S1 19, ideal 36  PI 52  PI-LL mismatch 2  PT 23  SVA +4.0cm  TPA 20  GT 26  T10-L2 lord 2    Lumbar MRI from 2/26/2020 reviewed:  - Grade 1 degenerative spondylolisthesis L4-5.    - Spondylosis at L4-5 and L5-S1, with disc " space narrowing and loss of segmental lordosis at L5-S1.     Lumbar CT done 2 days afterwards (3/7/2020) reviewed.  Showed grade 1 degenerative spondylolisthesis L4-5 with vacuum signal within the disc.  Also with advanced spondylosis L5-S1, with disc space narrowing and subchondral sclerosis.  Also shows low bone density as shown on opportunistic bone density measurement.  Mid sagittal measurements: L4 = 99 HU; L5 = 100 HU.    Impression:   1.  Grade 1 degenerative spondylolisthesis L4-5 with stenosis.  2.  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  3.  Advanced spondylosis L5-S1    Plan:   Had good long discussion with patient and  regarding her ongoing symptoms, spinal condition, its degenerative and non-life-threatening nature, and treatment options.  We discussed treatment ranging from observation/living with symptoms, to further nonoperative treatment, to consideration of surgery.  Given that she has had symptoms for a long time, has tried and exhausted various nonoperative treatment options, including physical therapy and injections, and continues to have significant disabling pain, it would be reasonable at this point to consider surgical treatment.  The goals of surgery would be to #1 decompress the pinched nerve roots; #2 stabilize the unstable or slipped level; and #3 restore normal alignment.  This would entail two-level fusion L4 to sacrum with pelvic fixation.  We discussed various possible approaches to perform this procedure including anterior and posterior, lateral and posterior, and all posterior approaches.  We discussed the pros and cons of each.  I recommend an all posterior approach with two-level posterior interbody fusion and Mora-Galeas osteotomies L4-5 and L5-S1, with bilateral pelvic fixation.  We discussed bone graft options; agreed to off label use of infuse BMP.    - Case request placed: TLIF-SPO L4-5,L5-S1; PISF  L4-pelvis; possible cement augmentation; use of Infuse BMP.  - PAC referral.  - Dr. Ayala or Dr. Rossi referral for sports medicine bone mineral density referral   - Lumbact CT for preop planning (done; see above).    Invited patient to participate in slip to study.  Study explained to patient.  She is amenable.  She was able to meet today with  Concetta Duarte.    All questions and concerns were answered to the patient's apparent satisfaction before leaving the clinic.     Total visit time > 45 mins, > 50% counseling and coordination of care.    Respectfully,  Raoul Gomes MD  Orthopedic Surgery PGY1  [phone]    Attestation:  I (Dr. Gabriel Galvan - Spine Surgeon) have personally evaluated patient with PGY-1 Dr. Gomes, and agree with findings and plan outlined in the note, which I also edited.  I discussed at length with the patient/family, explained the nature of spinal condition, and formulated workup and/or treatment plan together.  All questions were answered to the best of my ability and to patient's apparent satisfaction.      Gabriel Galvan MD    Orthopaedic Spine Surgery  Dept Orthopaedic Surgery, [facility]  [phone] office, [phone] pager  [email or web address]    Scribe Disclosure:  I, Dona Morgan, am serving as a scribe to document services personally performed by Gabriel Galvan MD at this visit, based upon the provider's statements to me. All documentation has been reviewed by the aforementioned provider prior to being entered into the official medical record.

## 2020-03-06 NOTE — TELEPHONE ENCOUNTER
DIAGNOSIS: bone mineral density management before spinal fusion   APPOINTMENT DATE: 4/2   NOTES STATUS DETAILS   OFFICE NOTE from referring provider Internal  Gabriel Galvan MD   OFFICE NOTE from other specialist Internal/care everywhere [facility]   DISCHARGE SUMMARY from hospital N/A    DISCHARGE REPORT from the ER N/A    OPERATIVE REPORT Internal    MEDICATION LIST Internal    MRI received Outside imaging in pacs   CT SCAN Internal internal   DEXA BONE DENSITY recieved [facility] 2/3/20   XRAYS (IMAGES & REPORTS) Internal Outside imaging in pacs/internal imaging

## 2020-03-07 ENCOUNTER — ANCILLARY PROCEDURE (OUTPATIENT)
Dept: CT IMAGING | Facility: CLINIC | Age: 60
End: 2020-03-07
Attending: ORTHOPAEDIC SURGERY
Payer: COMMERCIAL

## 2020-03-07 DIAGNOSIS — M43.16 SPONDYLOLISTHESIS OF LUMBAR REGION: ICD-10-CM

## 2020-03-18 ENCOUNTER — TELEPHONE (OUTPATIENT)
Dept: FAMILY MEDICINE | Facility: CLINIC | Age: 60
End: 2020-03-18

## 2020-03-18 NOTE — TELEPHONE ENCOUNTER
Per Dr. Ayala, can take combo vitamin for 6 weeks then needs to transition back to 2000 international unit(s) of Vitamin D. The patient verbalized understanding.

## 2020-03-18 NOTE — TELEPHONE ENCOUNTER
[facility] Call Center    Phone Message    May a detailed message be left on voicemail: no     Reason for Call: Other: Pt would like to know if Dr. Ayala would want to have her draw labs at like a local [facility] or other lab prior to her appt on 4/2. Pt is wondering if she can do that prior so she's prepped for coming in and avoiding multiple visits. Please call Pt back.     Action Taken: Message routed to:  [facility] SP[facility]TS MEDIC[facility]    Travel Screening: Not Applicable

## 2020-03-18 NOTE — TELEPHONE ENCOUNTER
The patient was wondering if she could bump up her appointment with Dr. Ayala since she wants to be involved with self quarantine with her soon to be grandchild. I explained the current state of the organization and clinic that we are postponing clinics for the next four weeks. The patient understood and we rescheduled her to Friday, 5/8 after she is able to help out with her new grandchild. I told her not stress about getting any labs done now until Dr. Ayala can meet her first and determine what is needed. She was made aware that she can always get a lab done in the same building after her appointment. She inquired the use of a combo 100 Vitamin K and 5,000 Vitamin D instead of 2,000 of Vitamin D recommended by her PCP. I will get back to her with an answer after I ask Dr. Ayala.

## 2020-04-02 ENCOUNTER — PRE VISIT (OUTPATIENT)
Dept: ORTHOPEDICS | Facility: CLINIC | Age: 60
End: 2020-04-02

## 2020-04-06 ENCOUNTER — TELEPHONE (OUTPATIENT)
Dept: ORTHOPEDICS | Facility: CLINIC | Age: 60
End: 2020-04-06

## 2020-04-06 NOTE — TELEPHONE ENCOUNTER
RN covering for Aide ROBLES today.    RN called patient back and patient just wants to know if Aide ROBLES or Imani has received all her PT notes and other Pain clinic provider notes. Patient also stated that she has called Imani twice to make sure she has them.  RN told patient that Aide ROBLES will return to work next week and if she hasn't heard back from Imani, she can call Aide TRAVIS so she can follow up. Patient verbalized understanding and is also aware of nursing rotation. Patient has no further questions at this point.    Yesenia Berry RN      [facility] Call Center    Phone Message    May a detailed message be left on voicemail: yes     Reason for Call: Other: Pt requests call back to discuss scheduling surgery with Dr Galvan prior to July 1st for insurance purposes as it changes then - and to make sure you rec'd all Records - please return Pt call - Thanks    Action Taken: Message routed to:  [facility] Ortho    Travel Screening: Not Applicable

## 2020-04-14 NOTE — TELEPHONE ENCOUNTER
[facility] Call Center    Phone Message    May a detailed message be left on voicemail: yes     Reason for Call: Other: Patient is calling in to speak with Aide to see if she got her PT an pain clinic notes. Please call the patient back when you can. Thank you.     Action Taken: Message routed to:  [facility] ortho    Travel Screening: Not Applicable

## 2020-04-15 NOTE — TELEPHONE ENCOUNTER
See multiple phone messages & replies.  I checked with Imani in PA Dept. & she stated she did receive fax of PT outside notes & outside pain clinics notes & is working on PA approval with Insurance for surgery.  I called pt back & informed her.     I informed Imani & Katie surgery scheduler that pt. Has Insurance change July 1.  Pt. Understands that due to Pandemic, No guarantee she can get surgery done before July 1.  Call back prn. Pt. Agreed.  Aide Chavez RN.

## 2020-04-22 ENCOUNTER — VIRTUAL VISIT (OUTPATIENT)
Dept: ORTHOPEDICS | Facility: CLINIC | Age: 60
End: 2020-04-22
Payer: COMMERCIAL

## 2020-04-22 DIAGNOSIS — M85.80 OSTEOPENIA, UNSPECIFIED LOCATION: Primary | ICD-10-CM
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
## 2020-04-23 RX ORDER — ALENDRONATE SODIUM 70 MG/1
70 TABLET ORAL
Qty: 12 TABLET | Refills: 3 | Status: SHIPPED | OUTPATIENT
Start: 2020-04-23 | End: 2021-04-26

## 2020-05-04 ENCOUNTER — TELEPHONE (OUTPATIENT)
Dept: ORTHOPEDICS | Facility: CLINIC | Age: 60
End: 2020-05-04

## 2020-05-04 ENCOUNTER — INFUSION - HEALTHEAST (OUTPATIENT)
Dept: INFUSION THERAPY | Facility: CLINIC | Age: 60
End: 2020-05-04

## 2020-05-04 DIAGNOSIS — L50.8 IMMUNOLOGIC URTICARIA: ICD-10-CM

## 2020-05-04 NOTE — TELEPHONE ENCOUNTER
[facility] Call Center    Phone Message    May a detailed message be left on voicemail: yes     Reason for Call: Symptoms or Concerns     If patient has red-flag symptoms, warm transfer to triage line    Current symptom or concern: increase in calcium causing pt to not feel well    Symptoms have been present for:  unknown day(s)    Has patient previously been seen for this? No      Are there any new or worsening symptoms? Yes: Pt called and stated the Dr. Ayala wanted the pt to increase her calcium and eating more dairy has caused the pt to not feel well, pt needing to know next steps      Action Taken: Message routed to:  [facility] sports med    Travel Screening: Not Applicable

## 2020-05-04 NOTE — TELEPHONE ENCOUNTER
Returned call to patient and left voicemail informing her that per Dr. Ayala, the Vitamin B12 lab order is not a lab that Dr. Ayala needs. If the patient would like the test, she should contact her PCP and request the order. Callback number was left for questions.

## 2020-05-04 NOTE — TELEPHONE ENCOUNTER
Please call and let her know to increase it by 1 serving per day one week at a time. She can go slower at working towards the goal.   She should strive for 4 servings per day (1200mg total from diet + supplemental calcium).      Also if she is taking a calcium supplement, most folks will tolerate calcium citrate better than calcium carbonate from a side effect profile.      Thanks!  Rasheeda

## 2020-05-04 NOTE — TELEPHONE ENCOUNTER
[facility] Call Center    Phone Message    May a detailed message be left on voicemail: yes     Reason for Call: Order(s): Other:   Reason for requested: Vitamin B12 lab orders  Date needed: 5/5/20  Provider name: Dr. Ayala      Action Taken: Message routed to:  [facility] sports med    Travel Screening: Not Applicable

## 2020-05-04 NOTE — TELEPHONE ENCOUNTER
"Patient mentions that she has had years of \"uncooperative\" bowels (constipation vs diarrhea).  I passed along Dr. Ayala's suggestions and Kayla is happy to try incorporating dairy/calcium in her diet more slowly.  She did mention a lot of concern as to why she is not able to absorb calcium because she was taking supplements since 2010.  I directed her to ask these questions to a GI specialist (she agreed).  She will touch base with us via [employer] in 2 weeks to let us know if her Sx are still persisting.     - Mikey MANCINI ATC  "

## 2020-05-12 ENCOUNTER — AMBULATORY - HEALTHEAST (OUTPATIENT)
Dept: LAB | Facility: CLINIC | Age: 60
End: 2020-05-12

## 2020-05-12 ENCOUNTER — RECORDS - HEALTHEAST (OUTPATIENT)
Dept: ADMINISTRATIVE | Facility: OTHER | Age: 60
End: 2020-05-12

## 2020-05-12 ENCOUNTER — TELEPHONE (OUTPATIENT)
Dept: ORTHOPEDICS | Facility: CLINIC | Age: 60
End: 2020-05-12

## 2020-05-12 DIAGNOSIS — R10.11 ABDOMINAL PAIN, RIGHT UPPER QUADRANT: ICD-10-CM

## 2020-05-12 DIAGNOSIS — M85.80 OSTEOPENIA, UNSPECIFIED LOCATION: ICD-10-CM

## 2020-05-12 DIAGNOSIS — M81.0 OSTEOPOROSIS: ICD-10-CM

## 2020-05-12 LAB
ALBUMIN SERPL-MCNC: 4.1 G/DL (ref 3.5–5)
ALP SERPL-CCNC: 69 U/L (ref 45–120)
ALT SERPL W P-5'-P-CCNC: 14 U/L (ref 0–45)
AMYLASE SERPL-CCNC: 98 U/L (ref 5–120)
ANION GAP SERPL CALCULATED.3IONS-SCNC: 9 MMOL/L (ref 5–18)
AST SERPL W P-5'-P-CCNC: 21 U/L (ref 0–40)
BILIRUB SERPL-MCNC: 0.4 MG/DL (ref 0–1)
BUN SERPL-MCNC: 20 MG/DL (ref 8–22)
CALCIUM SERPL-MCNC: 9.3 MG/DL (ref 8.5–10.5)
CALCIUM SERPL-MCNC: 9.4 MG/DL (ref 8.5–10.5)
CHLORIDE BLD-SCNC: 110 MMOL/L (ref 98–107)
CO2 SERPL-SCNC: 25 MMOL/L (ref 22–31)
CREAT SERPL-MCNC: 0.85 MG/DL (ref 0.6–1.1)
CREAT SERPL-MCNC: 0.86 MG/DL (ref 0.6–1.1)
GFR SERPL CREATININE-BSD FRML MDRD: >60 ML/MIN/1.73M2
GFR SERPL CREATININE-BSD FRML MDRD: >60 ML/MIN/1.73M2
GLUCOSE BLD-MCNC: 82 MG/DL (ref 70–125)
LIPASE SERPL-CCNC: 96 U/L (ref 0–52)
MAGNESIUM SERPL-MCNC: 2.1 MG/DL (ref 1.8–2.6)
PHOSPHATE SERPL-MCNC: 3.2 MG/DL (ref 2.5–4.5)
PHOSPHATE SERPL-MCNC: 3.3 MG/DL (ref 2.5–4.5)
POTASSIUM BLD-SCNC: 3.6 MMOL/L (ref 3.5–5)
PROT SERPL-MCNC: 6.7 G/DL (ref 6–8)
PTH-INTACT SERPL-MCNC: 45 PG/ML (ref 10–86)
SODIUM SERPL-SCNC: 144 MMOL/L (ref 136–145)

## 2020-05-12 NOTE — TELEPHONE ENCOUNTER
Lab orders faxed to [facility], per patient's request.     Called to let patient know, she had no further questions.

## 2020-05-12 NOTE — TELEPHONE ENCOUNTER
[facility] Call Center    Phone Message    May a detailed message be left on voicemail: yes     Reason for Call: Other: Pt requesting labs orders from Dr. Ayala be sent to the [facility]. Pt stated that Dr. Ayala said she would send them after her last appt with her on 4/22 but they were never sent. Their fax is [phone]. Pt would like to go there today, possibly within the next hour for some other labs she needs to have done too     Action Taken: Message routed to:  [facility] sports med

## 2020-05-13 ENCOUNTER — TELEPHONE (OUTPATIENT)
Dept: ORTHOPEDICS | Facility: CLINIC | Age: 60
End: 2020-05-13

## 2020-05-13 NOTE — TELEPHONE ENCOUNTER
----- Message from Mindy Randle PA-C sent at 5/13/2020 10:56 AM CDT -----  Regarding: follow up with [facility]  Hello,    I just spoke with this patient on phone. She is interested in being scheduled for surgery in the next several weeks, but Dr. Ayala just started her on Fosomax for osteoporosis, so she unsure if that will change Dr. Galvan's surgery plans. Could you help her get set up with virtual visit as she would like to discuss what the low bone density will mean in terms of her surgery?    Thanks,  Mindy Randle PA-C

## 2020-05-15 LAB
25(OH)D3 SERPL-MCNC: 50 NG/ML (ref 30–80)
GLIADIN IGA SER-ACNC: 0.6 U/ML
GLIADIN IGG SER-ACNC: 2.3 U/ML
IGA SERPL-MCNC: 127 MG/DL (ref 65–400)
TTG IGA SER-ACNC: 0.2 U/ML
TTG IGA SER-ACNC: 0.2 U/ML
TTG IGG SER-ACNC: <0.6 U/ML
TTG IGG SER-ACNC: <0.6 U/ML

## 2020-05-19 NOTE — PROGRESS NOTES
"  Patient is evaluated today via billable video visit.    The patient has been notified of following:   \"This video visit will be conducted via a call between you and your physician/provider. We have found that certain health care needs can be provided without the need for an in-person physical exam.  This service lets us provide the care you need with a video conversation.  If a prescription is necessary we can send it directly to your pharmacy.  If lab work is needed we can place an order for that and you can then stop by our lab to have the test done at a later time.  If during the course of the call the physician/provider feels a video visit is not appropriate, you will not be charged for this service.\"     Physician has received verbal consent for a Video Visit from the patient.  Video software/dru used:  Sokrati  Video time:  2:58pm to 3:23pm  Originating Location (pt. Location): Home  Distant Location (provider location):  [facility]     Chief Complaint   Patient presents with     RECHECK     Rediscuss surgery        Last Visit Date: 3/5/20  Previous Impression:  1.  Grade 1 degenerative spondylolisthesis L4-5 with stenosis.  2.  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  3.  Advanced spondylosis L5-S1  Previous Plan:  - Case request placed: TLIF-SPO L4-5,L5-S1; PISF L4-pelvis; possible cement augmentation; use of Infuse BMP.  - PAC referral.  - Dr. Ayala or Dr. Rossi referral for sports medicine bone mineral density referral   - Lumbact CT for preop planning (done; see above).  Invited patient to participate in SLIP 2 study.  Study explained to patient.  She is amenable.  She was able to meet today with  Concetta Duarte.      S>  59 year old female, surgery not yet scheduled, likely delayed due to pandemic.  Previously diagnosed with Omayra-Danlos, Mast cell, and dysautonomia.    4/22/20 - saw Dr. Ayala.  Imp:  " Osteopenia with FRAX criteria meeting indications for treatment.  P> Start Fosamax.  Started 4/23/20; so taking fosamax for past month.    Back symptoms pretty much the same as last time.  Doing PT 2x/week.  Still bothersome, still thinking of proceeding with surgery.    Back 95%, legs 5% R=L.    7 wks ago, started having acid reflex.  3 wks ago, started having abdominal pain.  2 wks ago, had bad R-sided pain.  Emailed her GI doc.  Ordered labs.  Lipase levels were high.  That week, stomach pain worsened.  Friday, had endoscopy.  Showed that majority of food from night before was still in her stomach; diagnosed with gastroparesis.  Will undergo gastric emptying study tomorrow.    Has hx of CRPS of left foot.  Sees Dr. Bryan Lowe ([facility]), who gave patient protocol for postop.  Her EDS doctor also gave her a postop protocol.      Oswestry (LISA) Questionnaire    OSWESTRY DISABILITY INDEX 5/20/2020   Count 9   Sum 25   Oswestry Score (%) 55.56   Some recent data might be hidden      Previous LISA  57.78%        Physical Examination:  This was a video visit, so very limited exam could be performed.  On video, patient appeared alert, oriented x 3, cooperative, with coherent speech, and not in cardiorespiratory distress.  Able to respond to questions appropriately and follow instructions.    Imaging:  No new x-rays today.    Assessment:  1.  Grade 1 degenerative spondylolisthesis L4-5 with stenosis.  2.  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  3.  Advanced spondylosis L5-S1    Plan:  Surgery prior auth had been approved ([employer]).  Proceed with surgery as planned.    Insurance switches from  to Medicare on July 1, 2020.  May need rpt prior auth.    Will follow up with research team if patient had signed up for SLIP 2; otherwise, will sign her up for this study once we are allowed to resume enrollment to clinical studies (suspended due to  "COVID19 lockdown).    TT > 25 mins, > 50% CC.      Gabriel Galvan MD    Orthopaedic Spine Surgery  Dept Orthopaedic Surgery, [facility]  [phone] office, [phone] pager  [email or web address]    Addendum 8/14/2020:  Patient was then rolled to slip to study.  I was informed today by research coordinator Concetta Duarte that patient was randomized to \"no panel review\".  I called patient today and relayed this to her, and asked her what ever questions she had regarding study.    Of note, since her last visit with me, patient has also seen Dr. Ford of neurosurgery for second opinion.  Patient has since decided to hold off against surgery and try to maximize nonoperative treatment.  Was seen last month by Dr. Gabriel of pain clinic, planning for radiofrequency ablation.  I told her that she is free to reach back out to us anytime if anything comes out or if her symptoms get worse and she decides to proceed with surgery.  "

## 2020-05-20 ENCOUNTER — TELEPHONE (OUTPATIENT)
Dept: ORTHOPEDICS | Facility: CLINIC | Age: 60
End: 2020-05-20

## 2020-05-20 NOTE — TELEPHONE ENCOUNTER
[facility] Call Center    Phone Message    May a detailed message be left on voicemail: yes     Reason for Call: Other: pt calling to see if there is anyway that she can be checked in early for her appt tomorrow due to an appt that she has before her appt with , that goes up until [facility] appt time. Please call the pt back to discuss as soon as possible. Thank You.      Action Taken: Message routed to:  [facility] orthopedics    Travel Screening: Not Applicable

## 2020-05-21 ENCOUNTER — VIRTUAL VISIT (OUTPATIENT)
Dept: ORTHOPEDICS | Facility: CLINIC | Age: 60
End: 2020-05-21
Payer: COMMERCIAL

## 2020-05-21 DIAGNOSIS — M48.062 LUMBAR STENOSIS WITH NEUROGENIC CLAUDICATION: ICD-10-CM

## 2020-05-21 DIAGNOSIS — M40.37 FLATBACK SYNDROME OF LUMBOSACRAL REGION: ICD-10-CM

## 2020-05-21 DIAGNOSIS — M43.16 SPONDYLOLISTHESIS OF LUMBAR REGION: Primary | ICD-10-CM

## 2020-05-21 RX ORDER — FAMOTIDINE 40 MG/1
TABLET, FILM COATED ORAL
COMMUNITY
Start: 2020-01-30 | End: 2023-06-06

## 2020-05-21 RX ORDER — ASCORBIC ACID 125 MG
TABLET,CHEWABLE ORAL
COMMUNITY
Start: 2020-03-11 | End: 2021-04-26

## 2020-05-21 RX ORDER — TOPIRAMATE 50 MG/1
TABLET, FILM COATED ORAL
COMMUNITY
Start: 2011-01-01 | End: 2021-12-08

## 2020-05-29 DIAGNOSIS — D89.40 MAST CELL ACTIVATION SYNDROME (H): ICD-10-CM

## 2020-05-29 DIAGNOSIS — D89.40 MAST CELL ACTIVATION (H): Primary | ICD-10-CM

## 2020-05-29 DIAGNOSIS — M43.06 LUMBAR SPONDYLOLYSIS: Primary | ICD-10-CM

## 2020-06-02 ENCOUNTER — INFUSION - HEALTHEAST (OUTPATIENT)
Dept: INFUSION THERAPY | Facility: CLINIC | Age: 60
End: 2020-06-02

## 2020-06-02 DIAGNOSIS — L50.8 IMMUNOLOGIC URTICARIA: ICD-10-CM

## 2020-06-03 ENCOUNTER — ANCILLARY PROCEDURE (OUTPATIENT)
Dept: GENERAL RADIOLOGY | Facility: CLINIC | Age: 60
End: 2020-06-03
Attending: NEUROLOGICAL SURGERY
Payer: COMMERCIAL

## 2020-06-03 DIAGNOSIS — M43.06 LUMBAR SPONDYLOLYSIS: ICD-10-CM

## 2020-06-04 ENCOUNTER — RECORDS - HEALTHEAST (OUTPATIENT)
Dept: ADMINISTRATIVE | Facility: OTHER | Age: 60
End: 2020-06-04

## 2020-06-04 ENCOUNTER — VIRTUAL VISIT (OUTPATIENT)
Dept: NEUROSURGERY | Facility: CLINIC | Age: 60
End: 2020-06-04
Payer: COMMERCIAL

## 2020-06-04 DIAGNOSIS — M47.816 LUMBAR FACET ARTHROPATHY: Primary | ICD-10-CM

## 2020-06-04 ASSESSMENT — PAIN SCALES - GENERAL: PAINLEVEL: MODERATE PAIN (5)

## 2020-06-04 NOTE — ADDENDUM NOTE
Addended by: ABIGAIL HRALEY on: 6/4/2020 05:22 PM     Modules accepted: Orders, Level of Service, SmartSet
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
## 2020-06-04 NOTE — PROGRESS NOTES
Date: 6/4/2020    Time of Call: 5:20 PM     Diagnosis:  Lumbar facet arthopathy     [ TORB ] Ordering provider: Dr. BHARAT Ford  Order: Referral to pain clinic, bilateral medial branch nerve block     Order received by: CHERRI HAYES RN       Follow-up/additional notes: Be sure the pt gets an appt in the pain clinic.

## 2020-06-09 ENCOUNTER — TELEPHONE (OUTPATIENT)
Dept: NEUROSURGERY | Facility: CLINIC | Age: 60
End: 2020-06-09

## 2020-06-09 NOTE — TELEPHONE ENCOUNTER
[facility] Call Center    Phone Message    May a detailed message be left on voicemail: yes     Reason for Call: Other: Per call from PT was supposed get a call back to schedule a Radiofrequency Rhizotomy but no one has reached out the PT. Please assist the PT.      Action Taken: Message routed to:  [facility] Neurosurgery    Travel Screening: Not Applicable

## 2020-06-11 NOTE — TELEPHONE ENCOUNTER
[facility] Call Center    Phone Message    May a detailed message be left on voicemail: no     Reason for Call: Other: Pt is calling again about her rhizotomy procedure. Pt described needing two test injections prior and has an order for the XR Genicularl Injection Bilateral. Pt wants this order sent to [facility] in [place] since P/ can't do it or won't right now. Pt also mentioned having her nerve block done prior as well. Please call Pt back to discuss plan of care, Pt has PT from 10:30am-11am.     Action Taken: Message routed to:  [facility]): [facility] NEUROSURG ADULT [facility]    Travel Screening: Not Applicable

## 2020-06-15 NOTE — TELEPHONE ENCOUNTER
[facility] Call Center    Phone Message    May a detailed message be left on voicemail: no     Reason for Call: Other: Radiofrequency Rhizotomy, Prerequisite Injections- Send over to: Pierce Naidu MD PA / [address], [place] [address]/ [phone] phone/ [phone] fax for Kayla at: [phone]     Action Taken: Message routed to:  [facility] Neurosurgery    Travel Screening: Not Applicable

## 2020-06-16 NOTE — TELEPHONE ENCOUNTER
Returned call.    Message left.  MD would like the pt to have the injection done at the [facility].  Pt has been referred to the pain clinic.  In addition to the injection, MD would like the patient evaluated for a radiofrequency ablation.

## 2020-06-17 ENCOUNTER — TELEPHONE (OUTPATIENT)
Dept: NEUROSURGERY | Facility: CLINIC | Age: 60
End: 2020-06-17

## 2020-06-17 NOTE — TELEPHONE ENCOUNTER
[facility] Call Center    Phone Message    May a detailed message be left on voicemail: yes     Reason for Call: Other: Pt caling in returning missed call best time to call is anytime today other the 1pm-2pm, thank you      Action Taken: Message routed to:  [facility] neurosurgery     Travel Screening: Not Applicable

## 2020-06-18 NOTE — TELEPHONE ENCOUNTER
Returned call.    Pt wants her injection done by an outside provider, Dr. Pierce Clinton.  Pt wants order for injection, MD notes, and xrays sent to his office.    [phone]   fax

## 2020-06-30 ENCOUNTER — INFUSION - HEALTHEAST (OUTPATIENT)
Dept: INFUSION THERAPY | Facility: CLINIC | Age: 60
End: 2020-06-30

## 2020-06-30 DIAGNOSIS — L50.8 IMMUNOLOGIC URTICARIA: ICD-10-CM

## 2020-07-19 ASSESSMENT — ENCOUNTER SYMPTOMS
CONSTIPATION: 1
SYNCOPE: 0
EXERCISE INTOLERANCE: 0
DIZZINESS: 1
FATIGUE: 1
BLOATING: 0
POLYPHAGIA: 0
HALLUCINATIONS: 0
NECK PAIN: 1
INCREASED ENERGY: 1
HEADACHES: 0
DIARRHEA: 0
HYPOTENSION: 0
INSOMNIA: 1
PANIC: 0
NIGHT SWEATS: 0
TINGLING: 0
DECREASED APPETITE: 1
ARTHRALGIAS: 1
TREMORS: 0
CHILLS: 0
PALPITATIONS: 0
ALTERED TEMPERATURE REGULATION: 1
LEG PAIN: 0
VOMITING: 0
POLYDIPSIA: 0
WEAKNESS: 1
JOINT SWELLING: 1
FEVER: 0
LIGHT-HEADEDNESS: 1
HEARTBURN: 0
NAUSEA: 1
NERVOUS/ANXIOUS: 1
JAUNDICE: 0
MYALGIAS: 1
RECTAL PAIN: 0
SEIZURES: 0
PARALYSIS: 0
MUSCLE WEAKNESS: 1
ABDOMINAL PAIN: 1
STIFFNESS: 1
ORTHOPNEA: 0
SLEEP DISTURBANCES DUE TO BREATHING: 0
DISTURBANCES IN COORDINATION: 1
DECREASED CONCENTRATION: 0
BLOOD IN STOOL: 0
MUSCLE CRAMPS: 0
BACK PAIN: 1
LOSS OF CONSCIOUSNESS: 0
NUMBNESS: 0
DEPRESSION: 1
BOWEL INCONTINENCE: 0
WEIGHT LOSS: 1
SPEECH CHANGE: 0
HYPERTENSION: 1
MEMORY LOSS: 0
WEIGHT GAIN: 0

## 2020-07-20 ENCOUNTER — VIRTUAL VISIT (OUTPATIENT)
Dept: ANESTHESIOLOGY | Facility: CLINIC | Age: 60
End: 2020-07-20
Attending: NEUROLOGICAL SURGERY
Payer: COMMERCIAL

## 2020-07-20 ENCOUNTER — RECORDS - HEALTHEAST (OUTPATIENT)
Dept: ADMINISTRATIVE | Facility: OTHER | Age: 60
End: 2020-07-20

## 2020-07-20 DIAGNOSIS — M47.816 LUMBAR FACET ARTHROPATHY: ICD-10-CM

## 2020-07-20 ASSESSMENT — PAIN SCALES - GENERAL: PAINLEVEL: MODERATE PAIN (5)

## 2020-07-20 NOTE — PROGRESS NOTES
I spoke to the patient briefly and she is already having long term pain management with Dr. Bryan Lowe at [facility]. She is satisfied with her care and does not want to transfer care. I asked her to discuss Dr. Ford recommendations with Dr. Lowe. I recommend her to have her entire pain care at 1 clinic.

## 2020-07-20 NOTE — LETTER
7/20/2020       RE: Kelsy Morley  [address] [place]  [facility] [address]     Dear Colleague,    Thank you for referring your patient, Kelsy Morley, to the [facility] CLINIC FOR COMPREHENSIVE PAIN MANAGEMENT at [facility]. Please see a copy of my visit note below.    Kelsy Morley is a 60 year old female who is being evaluated via a billable video visit.        Michelle Mac CMA         Answers for HPI/ROS submitted by the patient on 7/19/2020   General Symptoms: Yes  Skin Symptoms: No  HENT Symptoms: No  EYE SYMPTOMS: No  HEART SYMPTOMS: Yes  LUNG SYMPTOMS: No  INTESTINAL SYMPTOMS: Yes  URINARY SYMPTOMS: No  GYNECOLOGIC SYMPTOMS: No  BREAST SYMPTOMS: No  SKELETAL SYMPTOMS: Yes  BLOOD SYMPTOMS: No  NERVOUS SYSTEM SYMPTOMS: Yes  MENTAL HEALTH SYMPTOMS: Yes  Fever: No  Loss of appetite: Yes  Weight loss: Yes  Weight gain: No  Fatigue: Yes  Night sweats: No  Chills: No  Increased stress: Yes  Excessive hunger: No  Excessive thirst: No  Feeling hot or cold when others believe the temperature is normal: Yes  Loss of height: No  Post-operative complications: No  Surgical site pain: No  Hallucinations: No  Change in or Loss of Energy: Yes  Hyperactivity: No  Confusion: No  Chest pain or pressure: No  Fast or irregular heartbeat: No  Pain in legs with walking: No  Trouble breathing while lying down: No  Fingers or toes appear blue: No  High blood pressure: Yes  Low blood pressure: No  Fainting: No  Murmurs: No  Pacemaker: No  Varicose veins: Yes  Edema or swelling: No  Wake up at night with shortness of breath: No  Light-headedness: Yes  Exercise intolerance: No  Heart burn or indigestion: No  Nausea: Yes  Vomiting: No  Abdominal pain: Yes  Bloating: No  Constipation: Yes  Diarrhea: No  Blood in stool: No  Black stools: No  Rectal or Anal pain: No  Fecal incontinence: No  Yellowing of skin or eyes: No  Vomit with blood: No  Change in stools: No  Back pain: Yes  Muscle aches:  Yes  Neck pain: Yes  Swollen joints: Yes  Joint pain: Yes  Bone pain: No  Muscle cramps: No  Muscle weakness: Yes  Joint stiffness: Yes  Bone fracture: No  Trouble with coordination: Yes  Dizziness or trouble with balance: Yes  Fainting or black-out spells: No  Memory loss: No  Headache: No  Seizures: No  Speech problems: No  Tingling: No  Tremor: No  Weakness: Yes  Difficulty walking: No  Paralysis: No  Numbness: No  Nervous or Anxious: Yes  Depression: Yes  Trouble sleeping: Yes  Trouble thinking or concentrating: No  Mood changes: No  Panic attacks: No      I spoke to the patient briefly and she is already having long term pain management with Dr. Bryan Lowe at [facility]. She is satisfied with her care and does not want to transfer care. I asked her to discuss Dr. Ford recommendations with Dr. Lowe. I recommend her to have her entire pain care at 1 clinic.     AVS not indicated per provider, Pt will continue care with their current pain provider.   Kelly Bose LPN      Again, thank you for allowing me to participate in the care of your patient.      Sincerely,    Roseanne Gabriel MD

## 2020-07-20 NOTE — PROGRESS NOTES
"Kelsy Morley is a 60 year old female who is being evaluated via a billable video visit.      The patient has been notified of following:     \"This video visit will be conducted via a call between you and your physician/provider. We have found that certain health care needs can be provided without the need for an in-person physical exam.  This service lets us provide the care you need with a video conversation.  If a prescription is necessary we can send it directly to your pharmacy.  If lab work is needed we can place an order for that and you can then stop by our lab to have the test done at a later time.    Video visits are billed at different rates depending on your insurance coverage.  Please reach out to your insurance provider with any questions.    If during the course of the call the physician/provider feels a video visit is not appropriate, you will not be charged for this service.\"    Patient has given verbal consent for Video visit? Yes  How would you like to obtain your AVS? MyChart  If you are dropped from the video visit, the video invite should be resent to: Send to e-mail at: [email or web address]   Will anyone else be joining your video visit? No        Michelle Mac CMA         Answers for HPI/ROS submitted by the patient on 7/19/2020   General Symptoms: Yes  Skin Symptoms: No  HENT Symptoms: No  EYE SYMPTOMS: No  HEART SYMPTOMS: Yes  LUNG SYMPTOMS: No  INTESTINAL SYMPTOMS: Yes  URINARY SYMPTOMS: No  GYNECOLOGIC SYMPTOMS: No  BREAST SYMPTOMS: No  SKELETAL SYMPTOMS: Yes  BLOOD SYMPTOMS: No  NERVOUS SYSTEM SYMPTOMS: Yes  MENTAL HEALTH SYMPTOMS: Yes  Fever: No  Loss of appetite: Yes  Weight loss: Yes  Weight gain: No  Fatigue: Yes  Night sweats: No  Chills: No  Increased stress: Yes  Excessive hunger: No  Excessive thirst: No  Feeling hot or cold when others believe the temperature is normal: Yes  Loss of height: No  Post-operative complications: No  Surgical site pain: No  Hallucinations: " No  Change in or Loss of Energy: Yes  Hyperactivity: No  Confusion: No  Chest pain or pressure: No  Fast or irregular heartbeat: No  Pain in legs with walking: No  Trouble breathing while lying down: No  Fingers or toes appear blue: No  High blood pressure: Yes  Low blood pressure: No  Fainting: No  Murmurs: No  Pacemaker: No  Varicose veins: Yes  Edema or swelling: No  Wake up at night with shortness of breath: No  Light-headedness: Yes  Exercise intolerance: No  Heart burn or indigestion: No  Nausea: Yes  Vomiting: No  Abdominal pain: Yes  Bloating: No  Constipation: Yes  Diarrhea: No  Blood in stool: No  Black stools: No  Rectal or Anal pain: No  Fecal incontinence: No  Yellowing of skin or eyes: No  Vomit with blood: No  Change in stools: No  Back pain: Yes  Muscle aches: Yes  Neck pain: Yes  Swollen joints: Yes  Joint pain: Yes  Bone pain: No  Muscle cramps: No  Muscle weakness: Yes  Joint stiffness: Yes  Bone fracture: No  Trouble with coordination: Yes  Dizziness or trouble with balance: Yes  Fainting or black-out spells: No  Memory loss: No  Headache: No  Seizures: No  Speech problems: No  Tingling: No  Tremor: No  Weakness: Yes  Difficulty walking: No  Paralysis: No  Numbness: No  Nervous or Anxious: Yes  Depression: Yes  Trouble sleeping: Yes  Trouble thinking or concentrating: No  Mood changes: No  Panic attacks: No

## 2020-07-24 NOTE — PROGRESS NOTES
AVS not indicated per provider, Pt will continue care with their current pain provider.   Kelly Bose LPN

## 2020-07-28 ENCOUNTER — INFUSION - HEALTHEAST (OUTPATIENT)
Dept: INFUSION THERAPY | Facility: CLINIC | Age: 60
End: 2020-07-28

## 2020-07-28 DIAGNOSIS — L50.8 IMMUNOLOGIC URTICARIA: ICD-10-CM

## 2020-08-25 ENCOUNTER — INFUSION - HEALTHEAST (OUTPATIENT)
Dept: INFUSION THERAPY | Facility: CLINIC | Age: 60
End: 2020-08-25

## 2020-08-25 DIAGNOSIS — L50.8 IMMUNOLOGIC URTICARIA: ICD-10-CM

## 2020-08-26 ENCOUNTER — RECORDS - HEALTHEAST (OUTPATIENT)
Dept: ADMINISTRATIVE | Facility: OTHER | Age: 60
End: 2020-08-26

## 2020-09-22 ENCOUNTER — INFUSION - HEALTHEAST (OUTPATIENT)
Dept: INFUSION THERAPY | Facility: CLINIC | Age: 60
End: 2020-09-22

## 2020-09-22 DIAGNOSIS — L50.8 IMMUNOLOGIC URTICARIA: ICD-10-CM

## 2020-10-20 ENCOUNTER — INFUSION - HEALTHEAST (OUTPATIENT)
Dept: INFUSION THERAPY | Facility: CLINIC | Age: 60
End: 2020-10-20

## 2020-10-20 DIAGNOSIS — L50.8 IMMUNOLOGIC URTICARIA: ICD-10-CM

## 2020-12-29 NOTE — TELEPHONE ENCOUNTER
RECORDS RECEIVED FROM: Right knee pain/ Shailesh/Dr Zeeshan Dominguez/ cortisone/ Pt once a week/ MRI 10/2019   DATE RECEIVED: Jan 26, 2021     NOTES STATUS DETAILS   OFFICE NOTE from referring provider Care Everywhere    OFFICE NOTE from other specialist N/A    DISCHARGE SUMMARY from hospital N/A    DISCHARGE REPORT from the ER N/A    OPERATIVE REPORT N/A    MEDICATION LIST Internal    IMPLANT RECORD/STICKER N/A    LABS     CBC/DIFF N/A    CULTURES N/A    INJECTIONS DONE IN RADIOLOGY N/A    MRI N/A    CT SCAN N/A    XRAYS (IMAGES & REPORTS) In process    TUMOR     PATHOLOGY  Slides & report N/A    01/25/21   10:53 AM   Called [facility], the date for their xray is incorrect in [facility] but if you open the encounter it gives the correct date of 10/7/19, they will push it.  Resolved images from [facility].  Marissa Berry CMA    12/29/20   3:49 PM   Called [facility] and asked for knee images  Marissa Berry CMA

## 2021-01-10 ENCOUNTER — HEALTH MAINTENANCE LETTER (OUTPATIENT)
Age: 61
End: 2021-01-10

## 2021-01-19 ENCOUNTER — INFUSION - HEALTHEAST (OUTPATIENT)
Dept: INFUSION THERAPY | Facility: CLINIC | Age: 61
End: 2021-01-19

## 2021-01-19 DIAGNOSIS — L50.8 IMMUNOLOGIC URTICARIA: ICD-10-CM

## 2021-01-25 ASSESSMENT — ENCOUNTER SYMPTOMS
ABDOMINAL PAIN: 1
NAUSEA: 1
MUSCLE WEAKNESS: 1
DEPRESSION: 0
CONSTIPATION: 0
VOMITING: 0
RECTAL PAIN: 0
BOWEL INCONTINENCE: 0
JOINT SWELLING: 1
BLOATING: 1
NERVOUS/ANXIOUS: 1
ARTHRALGIAS: 1
MYALGIAS: 1
HEARTBURN: 0
DECREASED CONCENTRATION: 0
STIFFNESS: 1
DECREASED LIBIDO: 0
PANIC: 0
DIARRHEA: 1
MUSCLE CRAMPS: 0
JAUNDICE: 0
INSOMNIA: 1
BLOOD IN STOOL: 0
NECK PAIN: 1
HOT FLASHES: 0
BACK PAIN: 1

## 2021-01-25 ASSESSMENT — KOOS JR: HOW SEVERE IS YOUR KNEE STIFFNESS AFTER FIRST WAKING IN MORNING: MODERATE

## 2021-01-25 ASSESSMENT — ACTIVITIES OF DAILY LIVING (ADL): FUNCTION,_DAILY_LIVING_SCORE: 50

## 2021-01-26 ENCOUNTER — ANCILLARY PROCEDURE (OUTPATIENT)
Dept: GENERAL RADIOLOGY | Facility: CLINIC | Age: 61
End: 2021-01-26
Attending: ORTHOPAEDIC SURGERY
Payer: MEDICARE

## 2021-01-26 ENCOUNTER — PRE VISIT (OUTPATIENT)
Dept: ORTHOPEDICS | Facility: CLINIC | Age: 61
End: 2021-01-26

## 2021-01-26 ENCOUNTER — OFFICE VISIT (OUTPATIENT)
Dept: ORTHOPEDICS | Facility: CLINIC | Age: 61
End: 2021-01-26
Payer: MEDICARE

## 2021-01-26 VITALS — WEIGHT: 128 LBS | HEIGHT: 66 IN | BODY MASS INDEX: 20.57 KG/M2

## 2021-01-26 DIAGNOSIS — M17.11 PRIMARY OSTEOARTHRITIS OF RIGHT KNEE: Primary | ICD-10-CM

## 2021-01-26 DIAGNOSIS — M25.561 RIGHT KNEE PAIN, UNSPECIFIED CHRONICITY: Primary | ICD-10-CM

## 2021-01-26 DIAGNOSIS — M25.561 RIGHT KNEE PAIN, UNSPECIFIED CHRONICITY: ICD-10-CM

## 2021-01-26 PROCEDURE — 99207 PR DROP WITH A PROCEDURE: CPT | Performed by: ORTHOPAEDIC SURGERY

## 2021-01-26 PROCEDURE — 20610 DRAIN/INJ JOINT/BURSA W/O US: CPT | Mod: RT | Performed by: ORTHOPAEDIC SURGERY

## 2021-01-26 PROCEDURE — 73560 X-RAY EXAM OF KNEE 1 OR 2: CPT | Mod: RT | Performed by: RADIOLOGY

## 2021-01-26 RX ADMIN — LIDOCAINE HYDROCHLORIDE 9 ML: 10 INJECTION, SOLUTION EPIDURAL; INFILTRATION; INTRACAUDAL; PERINEURAL at 15:53

## 2021-01-26 RX ADMIN — TRIAMCINOLONE ACETONIDE 40 MG: 40 INJECTION, SUSPENSION INTRA-ARTICULAR; INTRAMUSCULAR at 15:53

## 2021-01-26 ASSESSMENT — MIFFLIN-ST. JEOR: SCORE: 1165.22
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
## 2021-01-26 NOTE — NURSING NOTE
"Reason For Visit:   Chief Complaint   Patient presents with     Consult     Right knee pain. Christiano Qureshi Ref. Dr. Zeeshan Shirley.       Primary MD: Dr. Jh Dillon [facility]  Ref. MD: Dr. Shirley    ?  No  Occupation Disability    Date of injury: Chronic  Type of injury: Chronic.    Date of surgery: No  Type of surgery: No.    Smoker: No  Request smoking cessation information: No    Ht 1.673 m (5' 5.87\")   Wt 58.1 kg (128 lb)   BMI 20.74 kg/m      Pain Assessment  Patient Currently in Pain: Yes  0-10 Pain Scale: 5  Primary Pain Location: Knee(right)            Jessica Formato, LPN  "
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
## 2021-01-26 NOTE — PROGRESS NOTES
Large Joint Injection/Arthocentesis: R knee joint    Date/Time: 2021 3:53 PM  Performed by: Aubrie Mora MD  Authorized by: Aubrie Mora MD     Indications:  Pain and osteoarthritis  Needle Size:  21 G  Guidance: landmark guided    Approach:  Medial  Location:  Knee      Medications:  40 mg triamcinolone 40 MG/ML; 9 mL lidocaine (PF) 1 %  Outcome:  Tolerated well, no immediate complications  Procedure discussed: discussed risks, benefits, and alternatives    Consent Given by:  Patient  Timeout: timeout called immediately prior to procedure    Prep: patient was prepped and draped in usual sterile fashion     HISTORY OF PRESENT ILLNESS:  Kelsy Morley is a 60 year old female with knee pain.  Diagnosis is knee arthritis supported by history, physical exam, and imaging.    PROCEDURE:  After informed verbal and writtten consent, under sterile conditions, patient's right knee was injected with 9 cc of Lidocaine and 1 cc of Kenalog (40 mg/ml).   The injection was done by Dr. Mora.    Patient had good pain relief upon leaving the clinic, and will follow up with us on an as needed basis.          [facility] [address] [id number] [phone]  Dept: [phone]  ______________________________________________________________________________    Patient: Kelsy Morley   : 1960   MRN: [id number]   2021    INVASIVE PROCEDURE SAFETY CHECKLIST    Date: 2021   Procedure:Right knee steroid injection  Patient Name: Kelsy Morley  MRN: [id number]  YOB: 1960    Action: Complete sections as appropriate. Any discrepancy results in a HARD COPY until resolved.     PRE PROCEDURE:  Patient ID verified with 2 identifiers (name and  or MRN): Yes  Procedure and site verified with patient/designee (when able): Yes  Accurate consent documentation in medical record: Yes  H&P (or appropriate  assessment) documented in medical record: NA  H&P must be up to 20 days prior to procedure and updates within 24 hours of procedure as applicable: NA  Relevant diagnostic and radiology test results appropriately labeled and displayed as applicable: Yes  Procedure site(s) marked with provider initials: NA    TIMEOUT:  Time-Out performed immediately prior to starting procedure, including verbal and active participation of all team members addressing the following:Yes  * Correct patient identify  * Confirmed that the correct side and site are marked  * An accurate procedure consent form  * Agreement on the procedure to be done  * Correct patient position  * Relevant images and results are properly labeled and appropriately displayed  * The need to administer antibiotics or fluids for irrigation purposes during the procedure as applicable   * Safety precautions based on patient history or medication use    DURING PROCEDURE: Verification of correct person, site, and procedures any time the responsibility for care of the patient is transferred to another member of the care team.       The following medications were given:         Prior to injection, verified patient identity using patient's name and date of birth.  Due to injection administration, patient instructed to remain in clinic for 15 minutes  afterwards, and to report any adverse reaction to me immediately.    Joint injection was performed.    Medication Name: lidocaine NDC 82425-106-92  Drug Amount Wasted:  Yes: 11 mg/ml   Vial/Syringe: Single dose vial  Expiration Date:  05/24    Medication Name Kenolog NDC [address]-2    Scribed by Yany Mane ATC for Dr. Mora on January 26, 2021 at 3:55p based on the provider's statements to me.     SAVITA Samuels MD  Professor Orthopedic Surgery  [facility]

## 2021-02-02 RX ORDER — LIDOCAINE HYDROCHLORIDE 10 MG/ML
9 INJECTION, SOLUTION EPIDURAL; INFILTRATION; INTRACAUDAL; PERINEURAL
Status: DISCONTINUED | OUTPATIENT
Start: 2021-01-26 | End: 2024-04-15

## 2021-02-02 RX ORDER — TRIAMCINOLONE ACETONIDE 40 MG/ML
40 INJECTION, SUSPENSION INTRA-ARTICULAR; INTRAMUSCULAR
Status: SHIPPED | OUTPATIENT
Start: 2021-01-26

## 2021-02-17 ENCOUNTER — INFUSION - HEALTHEAST (OUTPATIENT)
Dept: INFUSION THERAPY | Facility: CLINIC | Age: 61
End: 2021-02-17

## 2021-02-17 DIAGNOSIS — L50.8 IMMUNOLOGIC URTICARIA: ICD-10-CM

## 2021-02-25 ENCOUNTER — TELEPHONE (OUTPATIENT)
Dept: NEUROSURGERY | Facility: CLINIC | Age: 61
End: 2021-02-25

## 2021-03-03 ENCOUNTER — DOCUMENTATION ONLY (OUTPATIENT)
Dept: CARE COORDINATION | Facility: CLINIC | Age: 61
End: 2021-03-03

## 2021-03-11 ENCOUNTER — OFFICE VISIT (OUTPATIENT)
Dept: NEUROSURGERY | Facility: CLINIC | Age: 61
End: 2021-03-11
Payer: MEDICARE

## 2021-03-11 ENCOUNTER — PRE VISIT (OUTPATIENT)
Dept: NEUROSURGERY | Facility: CLINIC | Age: 61
End: 2021-03-11

## 2021-03-11 VITALS
WEIGHT: 131 LBS | HEIGHT: 60 IN | SYSTOLIC BLOOD PRESSURE: 111 MMHG | DIASTOLIC BLOOD PRESSURE: 73 MMHG | BODY MASS INDEX: 25.72 KG/M2 | OXYGEN SATURATION: 99 % | HEART RATE: 96 BPM

## 2021-03-11 DIAGNOSIS — G89.29 CHRONIC PAIN OF RIGHT KNEE: Primary | ICD-10-CM

## 2021-03-11 DIAGNOSIS — M25.561 CHRONIC PAIN OF RIGHT KNEE: Primary | ICD-10-CM

## 2021-03-11 PROCEDURE — 99214 OFFICE O/P EST MOD 30 MIN: CPT | Performed by: NEUROLOGICAL SURGERY

## 2021-03-11 RX ORDER — NYSTATIN 100000/ML
5-10 SUSPENSION, ORAL (FINAL DOSE FORM) ORAL PRN
COMMUNITY
Start: 2021-01-20 | End: 2021-04-26

## 2021-03-11 RX ORDER — SUCRALFATE 1 G/1
1 TABLET ORAL DAILY
COMMUNITY
Start: 2021-02-02 | End: 2021-04-26

## 2021-03-11 RX ORDER — CHOLESTYRAMINE 4 G/9G
1-2 POWDER, FOR SUSPENSION ORAL DAILY
COMMUNITY
End: 2021-12-08

## 2021-03-11 RX ORDER — FLUCONAZOLE 150 MG/1
150 TABLET ORAL
COMMUNITY
Start: 2021-02-09 | End: 2021-04-26

## 2021-03-11 ASSESSMENT — MIFFLIN-ST. JEOR: SCORE: 1089.68

## 2021-03-11 ASSESSMENT — PAIN SCALES - GENERAL: PAINLEVEL: MODERATE PAIN (4)
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
## 2021-03-11 NOTE — NURSING NOTE
Chief Complaint   Patient presents with     RECHECK     [facility] RETURN - LUMBAR SPINE NUNU Ponce, EMT

## 2021-03-12 DIAGNOSIS — Z53.9 ERRONEOUS ENCOUNTER--DISREGARD: Primary | ICD-10-CM

## 2021-03-13 ENCOUNTER — HEALTH MAINTENANCE LETTER (OUTPATIENT)
Age: 61
End: 2021-03-13

## 2021-03-17 ENCOUNTER — INFUSION - HEALTHEAST (OUTPATIENT)
Dept: INFUSION THERAPY | Facility: CLINIC | Age: 61
End: 2021-03-17

## 2021-03-17 DIAGNOSIS — L50.8 IMMUNOLOGIC URTICARIA: ICD-10-CM

## 2021-03-19 ENCOUNTER — TELEPHONE (OUTPATIENT)
Dept: ORTHOPEDICS | Facility: CLINIC | Age: 61
End: 2021-03-19

## 2021-03-19 NOTE — TELEPHONE ENCOUNTER
[facility] Call Center    Phone Message    May a detailed message be left on voicemail: yes     Reason for Call: Other: Patient said she just missed a call from Emmanuelle.     Action Taken: Message routed to:  [facility] ortho    Travel Screening: Not Applicable

## 2021-03-22 NOTE — TELEPHONE ENCOUNTER
[facility] Call Center    Phone Message    May a detailed message be left on voicemail: yes     Reason for Call: Other: call back     Action Taken: Message routed to:  [facility] ortho    Travel Screening: Not Applicable     Patient says Emmanuelle left her a VM on Friday 3/19 cyndee so to call back -- patient is ready for Shara call

## 2021-03-24 DIAGNOSIS — M25.561 RIGHT KNEE PAIN, UNSPECIFIED CHRONICITY: Primary | ICD-10-CM

## 2021-03-24 DIAGNOSIS — M17.11 PRIMARY OSTEOARTHRITIS OF RIGHT KNEE: ICD-10-CM

## 2021-03-26 NOTE — TELEPHONE ENCOUNTER
Reached out to patient. Patient declined trying Celebrex and having kidney functions at one month. She will try Naproxen 500 mg BID instead, her primary care provider reached out to her as well with this recommendation. We are waiting for final approval or denial of synvisc injection and will contact her to schedule injection in injection clinic if that is approved. Patient expresses understanding.

## 2021-03-31 ENCOUNTER — TELEPHONE (OUTPATIENT)
Dept: ORTHOPEDICS | Facility: CLINIC | Age: 61
End: 2021-03-31

## 2021-03-31 NOTE — TELEPHONE ENCOUNTER
----- Message from Emmanuelle Hill RN sent at 3/31/2021 11:06 AM CDT -----  Regarding: RE: Prior auth  Just with the injection clinic that is on Fridays; it is done by different Sports Med docs.  ----- Message -----  From: Gracie Mora  Sent: 3/31/2021  10:38 AM CDT  To: Emmanuelle Hill RN  Subject: RE: Prior auth                                   Emmanuelle,    Who am I scheduling her with?    Gracie  ----- Message -----  From: Emmanuelle Hill RN  Sent: 3/31/2021  10:11 AM CDT  To: Clinic Coordinators[facility]  Subject: FW: Prior auth                                   Hello guys!    Would someone reach out to this patient to get her scheduled for a Synvisc injection in the injection clinic.    Thanks!    Emmanuelle  ----- Message -----  From: Dottie Naik  Sent: 3/29/2021  11:34 AM CDT  To: Emmanuelle Hill RN  Subject: RE: Prior auth                                   Danial Handley, this patient is good to go.    Enjoy the rest of your day,    Dottie  ----- Message -----  From: Emmanuelle Hill RN  Sent: 3/26/2021   2:19 PM CDT  To: Dottie Naik  Subject: RE: Prior auth                                   Oops, that was my error. I changed it to primary osteoarthritis of the right knee    Thanks!    Emmanuelle  ----- Message -----  From: Dottie Naik  Sent: 3/25/2021  10:33 AM CDT  To: Emmanuelle Hill RN  Subject: RE: Prior auth                                   Clyde Handley,    DX M25.561 is not covered under Medicare's policy, I've listed a link to the policy criteria below to see if there is an alternative DX:    https://www.cms.gov/medicare-coverage-database/details/article-details.aspx?[email or web address]=34735&[email or web address]&NVVXi=[email or web address]=305&bc=[email or web address]&    Let me know your thoughts,    Dottie  ----- Message -----  From: Emmanuelle Hill RN  Sent: 3/24/2021   4:11 PM CDT  To: Cam   Subject: Prior auth                                       Please check for coverage for Synvisc injection for this  patient.    Thank you!    Emmanuelle MCMAHON RNCC

## 2021-03-31 NOTE — TELEPHONE ENCOUNTER
Reached out to patient to inform her that her Synvisc injection was approved. Also informed patient that a clinic coordinator would be reaching out to her to help her schedule for her injection with the injection clinic. Patient expressed understanding.

## 2021-04-14 ENCOUNTER — INFUSION - HEALTHEAST (OUTPATIENT)
Dept: INFUSION THERAPY | Facility: CLINIC | Age: 61
End: 2021-04-14

## 2021-04-14 DIAGNOSIS — L50.8 IMMUNOLOGIC URTICARIA: ICD-10-CM

## 2021-04-16 ASSESSMENT — ENCOUNTER SYMPTOMS
BLOOD IN STOOL: 0
HYPOTENSION: 0
EYE WATERING: 0
EYE IRRITATION: 1
BLOATING: 1
VOMITING: 0
ORTHOPNEA: 0
HEARTBURN: 0
MUSCLE WEAKNESS: 1
CONSTIPATION: 1
JAUNDICE: 0
SLEEP DISTURBANCES DUE TO BREATHING: 0
NECK PAIN: 1
PALPITATIONS: 1
EYE REDNESS: 0
ABDOMINAL PAIN: 0
RECTAL PAIN: 0
BOWEL INCONTINENCE: 0
EXERCISE INTOLERANCE: 1
LIGHT-HEADEDNESS: 1
JOINT SWELLING: 1
STIFFNESS: 1
LEG PAIN: 0
DOUBLE VISION: 0
HYPERTENSION: 0
BACK PAIN: 1
ARTHRALGIAS: 1
EYE PAIN: 0
MYALGIAS: 1
SYNCOPE: 0
DIARRHEA: 0
MUSCLE CRAMPS: 0
NAUSEA: 0

## 2021-04-26 ENCOUNTER — RECORDS - HEALTHEAST (OUTPATIENT)
Dept: ADMINISTRATIVE | Facility: OTHER | Age: 61
End: 2021-04-26

## 2021-04-26 ENCOUNTER — OFFICE VISIT (OUTPATIENT)
Dept: CARDIOLOGY | Facility: CLINIC | Age: 61
End: 2021-04-26
Attending: INTERNAL MEDICINE
Payer: MEDICARE

## 2021-04-26 VITALS
OXYGEN SATURATION: 100 % | HEART RATE: 93 BPM | WEIGHT: 129.8 LBS | SYSTOLIC BLOOD PRESSURE: 123 MMHG | DIASTOLIC BLOOD PRESSURE: 76 MMHG | HEIGHT: 65 IN | BODY MASS INDEX: 21.63 KG/M2

## 2021-04-26 DIAGNOSIS — G90.9 AUTONOMIC DYSFUNCTION: Primary | ICD-10-CM

## 2021-04-26 DIAGNOSIS — M62.81 GENERALIZED MUSCLE WEAKNESS: ICD-10-CM

## 2021-04-26 DIAGNOSIS — R00.2 PALPITATIONS: ICD-10-CM

## 2021-04-26 PROCEDURE — G0463 HOSPITAL OUTPT CLINIC VISIT: HCPCS

## 2021-04-26 PROCEDURE — 99214 OFFICE O/P EST MOD 30 MIN: CPT | Performed by: INTERNAL MEDICINE

## 2021-04-26 ASSESSMENT — MIFFLIN-ST. JEOR: SCORE: 1163.61

## 2021-04-26 ASSESSMENT — PAIN SCALES - GENERAL: PAINLEVEL: NO PAIN (0)
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
## 2021-04-26 NOTE — NURSING NOTE
Chief Complaint   Patient presents with     Follow Up     2 year follow-up.      Vitals were taken and medication reconciled.    MICHAEL Vieira  2:31 PM

## 2021-04-26 NOTE — PATIENT INSTRUCTIONS
You were seen in the Electrophysiology Clinic today by: Fausto Lanier MD    Plan:     Follow up visit: 6 months    Further Instructions: Increase fluid intake      Your Care Team:  EP Cardiology   Telephone Number     Danni Gomez RN [phone]     For scheduling appts or procedures:    Pricilla Antonio   [phone]   For the Device Clinic (Pacemakers, ICDs, Loop Recorders)    During business hours: [phone]  After business hours:   [phone]- select option 4 and ask for job code [id number].     On-call cardiologist for after hours or on weekends: [phone], option #4, and ask to speak to the on-call cardiologist.     Cardiovascular Clinic:   [address] [place] [address]      As always, Thank you for trusting us with your health care needs!

## 2021-05-26 VITALS
SYSTOLIC BLOOD PRESSURE: 119 MMHG | DIASTOLIC BLOOD PRESSURE: 76 MMHG | HEART RATE: 84 BPM | OXYGEN SATURATION: 95 % | TEMPERATURE: 98.5 F

## 2021-05-26 VITALS
DIASTOLIC BLOOD PRESSURE: 77 MMHG | TEMPERATURE: 98.3 F | SYSTOLIC BLOOD PRESSURE: 118 MMHG | OXYGEN SATURATION: 99 % | HEART RATE: 86 BPM

## 2021-05-26 VITALS
TEMPERATURE: 98.2 F | OXYGEN SATURATION: 98 % | HEART RATE: 81 BPM | SYSTOLIC BLOOD PRESSURE: 111 MMHG | DIASTOLIC BLOOD PRESSURE: 70 MMHG

## 2021-05-27 VITALS
SYSTOLIC BLOOD PRESSURE: 110 MMHG | TEMPERATURE: 97.9 F | DIASTOLIC BLOOD PRESSURE: 66 MMHG | HEART RATE: 83 BPM | OXYGEN SATURATION: 99 %

## 2021-05-27 VITALS
TEMPERATURE: 97.5 F | HEART RATE: 86 BPM | DIASTOLIC BLOOD PRESSURE: 70 MMHG | SYSTOLIC BLOOD PRESSURE: 102 MMHG | OXYGEN SATURATION: 100 %

## 2021-05-27 VITALS
SYSTOLIC BLOOD PRESSURE: 118 MMHG | DIASTOLIC BLOOD PRESSURE: 68 MMHG | HEART RATE: 86 BPM | TEMPERATURE: 98.8 F | OXYGEN SATURATION: 98 %

## 2021-05-27 VITALS
DIASTOLIC BLOOD PRESSURE: 61 MMHG | SYSTOLIC BLOOD PRESSURE: 99 MMHG | HEART RATE: 75 BPM | TEMPERATURE: 97.8 F | OXYGEN SATURATION: 100 %

## 2021-05-27 VITALS
HEART RATE: 80 BPM | TEMPERATURE: 97.6 F | SYSTOLIC BLOOD PRESSURE: 98 MMHG | DIASTOLIC BLOOD PRESSURE: 63 MMHG | OXYGEN SATURATION: 100 %

## 2021-05-27 VITALS
DIASTOLIC BLOOD PRESSURE: 78 MMHG | TEMPERATURE: 98.5 F | HEART RATE: 87 BPM | OXYGEN SATURATION: 97 % | SYSTOLIC BLOOD PRESSURE: 120 MMHG

## 2021-05-27 VITALS
OXYGEN SATURATION: 98 % | DIASTOLIC BLOOD PRESSURE: 72 MMHG | TEMPERATURE: 98.4 F | HEART RATE: 77 BPM | SYSTOLIC BLOOD PRESSURE: 119 MMHG

## 2021-05-27 VITALS
TEMPERATURE: 98 F | HEART RATE: 87 BPM | OXYGEN SATURATION: 100 % | SYSTOLIC BLOOD PRESSURE: 99 MMHG | DIASTOLIC BLOOD PRESSURE: 63 MMHG

## 2021-05-27 VITALS
HEART RATE: 85 BPM | DIASTOLIC BLOOD PRESSURE: 68 MMHG | OXYGEN SATURATION: 96 % | SYSTOLIC BLOOD PRESSURE: 106 MMHG | TEMPERATURE: 98.3 F

## 2021-05-28 NOTE — PROGRESS NOTES
Kelsy presents for monthly Xolair. Injections administered in LLQ and RLQ. No adverse reactions. She was monitored for 30 min following, then left ambulatory per self. Cathy Vo

## 2021-05-29 NOTE — PROGRESS NOTES
Observing patient 30 minutes after her xolair injection. Pt. States she has been tolerating this injection well.

## 2021-05-30 VITALS — HEIGHT: 66 IN | BODY MASS INDEX: 26.2 KG/M2 | WEIGHT: 163 LBS

## 2021-05-30 VITALS — BODY MASS INDEX: 26.2 KG/M2 | WEIGHT: 163 LBS | HEIGHT: 66 IN

## 2021-05-30 NOTE — PROGRESS NOTES
Patient ambulated into [facility] by herself. Patient is alert and oriented and VSS. Patient received Xolair injections in left upper abdomen without any problems. Patient was observed for 30 minutes after her injections. No adverse reaction noted and patient was discharged home.

## 2021-05-31 VITALS — WEIGHT: 158 LBS | HEIGHT: 66 IN | BODY MASS INDEX: 25.39 KG/M2

## 2021-05-31 NOTE — PROGRESS NOTES
Pt arrived ambulatory to clinic for Omalizumab injections.  Pt states that so far she is tolerating injections well.  Administered SQ injections one injection in LLQ and one injection in RLQ of ABD.  Pt tolerated procedure well, no s/s of bleeding or swelling at sites.  Pt states that she has allergy for adhesive, so pt held 2x2 over site with finger for a one minute injection.  VSS prior to and 30 mins post injections.  Pt verbalized understanding of plan of care and return to clinic.

## 2021-05-31 NOTE — TELEPHONE ENCOUNTER
RN cannot approve Refill Request    RN can NOT refill this medication Protocol failed and NO refill given.        Diane Bundy, [facility] Triage/Med Refill 8/26/2019    Requested Prescriptions   Pending Prescriptions Disp Refills     levothyroxine (SYNTHROID, LEVOTHROID) 75 MCG tablet [Pharmacy Med Name: LEVOTHYROXINE SODIUM 75MCG TABS] 90 tablet 3     Sig: TAKE ONE TABLET BY MOUTH EVERY DAY       Thyroid Hormones Protocol Failed - 8/25/2019 11:08 PM        Failed - Provider visit in past 12 months or next 3 months     Last office visit with prescriber/PCP: 8/24/2018 Berry Vo MD OR same dept: Visit date not found OR same specialty: 8/24/2018 Berry Vo MD  Last physical: Visit date not found Last MTM visit: Visit date not found   Next visit within 3 mo: Visit date not found  Next physical within 3 mo: Visit date not found  Prescriber OR PCP: Berry Vo MD  Last diagnosis associated with med order: There are no diagnoses linked to this encounter.  If protocol passes may refill for 12 months if within 3 months of last provider visit (or a total of 15 months).             Passed - TSH on file in past 12 months for patient age 12 & older     TSH   Date Value Ref Range Status   11/01/2018 1.44 0.30 - 5.00 uIU/mL Final

## 2021-06-01 VITALS — WEIGHT: 135.8 LBS | BODY MASS INDEX: 21.92 KG/M2

## 2021-06-01 VITALS — WEIGHT: 156 LBS | BODY MASS INDEX: 25.18 KG/M2

## 2021-06-01 VITALS — BODY MASS INDEX: 22.32 KG/M2 | WEIGHT: 138.9 LBS | HEIGHT: 66 IN

## 2021-06-01 VITALS — BODY MASS INDEX: 21.95 KG/M2 | WEIGHT: 136 LBS

## 2021-06-01 VITALS — WEIGHT: 143 LBS | BODY MASS INDEX: 23.08 KG/M2

## 2021-06-01 NOTE — PROGRESS NOTES
Pt amb into clinic for Xolair injections. Pt openly discussing chronic pain issues; emotional support given. Pt tolerated injections well. Pt observed for 30 min, VSS. Pt amb out of clinic.

## 2021-06-02 VITALS — BODY MASS INDEX: 22.6 KG/M2 | WEIGHT: 140 LBS

## 2021-06-02 VITALS — HEIGHT: 66 IN | WEIGHT: 138 LBS | BODY MASS INDEX: 22.18 KG/M2

## 2021-06-03 VITALS — BODY MASS INDEX: 21.98 KG/M2 | WEIGHT: 136.2 LBS

## 2021-06-04 VITALS — WEIGHT: 138 LBS | BODY MASS INDEX: 22.28 KG/M2

## 2021-06-06 NOTE — PROGRESS NOTES
Assessment/Plan:      Diagnoses and all orders for this visit:    Lumbar spine pain  -     XR Lumbar Spine Flex and Ext 2 or 3 VWS; Future; Expected date: 02/17/2020  -     Ambulatory referral to Neurosurgery    Sacroiliac joint pain    Spondylolisthesis of lumbar region  -     XR Lumbar Spine Flex and Ext 2 or 3 VWS; Future; Expected date: 02/17/2020  -     Ambulatory referral to Neurosurgery    Arthropathy of lumbar facet joint  -     XR Lumbar Spine Flex and Ext 2 or 3 VWS; Future; Expected date: 02/17/2020  -     Ambulatory referral to Neurosurgery    Myofascial muscle pain    Omayra-Danlos disease        Assessment: Pleasant 59 y.o. 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  Chronic lumbar spine pain multifactorial.  She is a grade 1 spondylolisthesis L4 on L5 which is degenerative.  7 mm on plain films flexion and extension with no instability and 5 mm on MRI from 1 year ago at [facility].  Pain has worsened.  Pain is consistent both with facet mediated pain along with sacroiliac pain in the setting of significant myofascial pain.    2.  Widespread cervical thoracic and lumbar pain consistent with myofascial pain.    3.  She carries a diagnosis of Omayra-Danlos with dysautonomia.      Discussion:    1.  I discussed the diagnosis and treatment options.  We reviewed the cervical spine MRI lumbar MRI.  She was concerned that her spondylolisthesis was now grade 3 and I assured her that it was a grade 1 on MRI from February 2019.  We discussed potential for surgical referral new images and injections.    2.  We will obtain new flexion-extension x-rays lumbar spine.  She would like these done at [facility].    3.  She would like an opinion from her neurosurgeon Dr. Rothman and a referral is placed given the spondylolisthesis in her Omayra-Danlos syndrome.    4.  I would recommend sacroiliac joint injections as a trial given her positive  provocative maneuvers today can also consider facet injections L4-5 but steroid needs to be used cautiously given her dysautonomia.    5.  She will contact us by phone if she would like to proceed with SI joint injections and I will follow-up by phone with results of imaging and further recommendations.      It was our pleasure caring for your patient today, if there any questions or concerns please do not hesitate to contact us.    35 minutes were spent with this patient in addition to any procedure with greater than 50% in counseling and coordination of care.    Subjective:   Patient ID: Kelsy Morley is a 59 y.o. female.    History of Present Illness: Patient presents for an evaluation worsening low back pain thoracic pain cervical pain.  She struggles with Omayra-Danlos syndrome.  She has worsening pain at the lumbosacral junction and gluteal region.  This is worse with any prolonged standing lifting and better with rest and heat.  She does continue to see her physical therapist does manual treatments and that seems to help with seeing them twice a week.  She is on numerous medications including Topamax Tylenol cannabis oil lidocaine patches naltrexone.  Did try an epidural which helped with her CRPS symptoms in her left foot however increased the low back pain.  She is quite concerned that there is potential for worsening spondylolisthesis as she remembers this being a grade 3 recently on imaging I am unable to find those images last images I have show a grade 1.  With an extensive discussion today.      Imaging: MRI report and images were personally reviewed and discussed with the patient.  A plastic model was utilized during the discussion.  MRI of the cervical spine spine and lumbar spine personally reviewed from [facility].  Cervical spine shows no significant instability from flexion to extension and no high-grade central stenosis.    Lumbar MRI shows degenerative disc disease with facet arthropathy most  significant degenerative changes L5-S1 moderate with mild to moderate right mild left foraminal stenosis.  L4-5 5 mm spondylolisthesis moderate left mild right facet arthropathy moderate lateral recess stenosis L3-4 far lateral annular tear and disc protrusion on the left mild facet arthropathy.    Review of Systems: *Multiple positive review of systems including   Numbness tingling weaknes in the legs also has numbness tingling pain in the arms.  She has ongoing bladder issues dysautonomia headaches, coordination problems falls.  No fevers or unintentional weight loss.  S        Past Medical History:   Diagnosis Date     Dysautonomia (H)      EDS (Omayra-Danlos syndrome)      Headache      Hypotension        The following portions of the patient's history were reviewed and updated as appropriate: allergies, current medications, past family history, past medical history, past social history, past surgical history and problem list.           Objective:   Physical Exam:    Vitals:    02/17/20 1509   BP: 121/73   Pulse: 81     Body mass index is 22.27 kg/m .      General: Alert and oriented with normal affect. Attention, knowledge, memory, and language are intact. No acute distress.   Eyes: Sclerae are clear.  Respirations: Unlabored. CV: No lower extremity edema.  Skin: No rashes seen.    Gait:  Nonantalgic  Right knee brace in place.  Tenderness to palpation lumbar paraspinals L4-5 L5-S1 SI joints.  Positive Florecita test bilaterally for low back pain.  Positive Gaenslen's bilaterally mild.  Positive Jaron finger test.  Positive SI distraction and thigh thrust bilaterally.  She has generalized tenderness palpation throughout the lumbar paraspinals as well.  Sensation is intact to light touch throughout the   lower extremities.  Reflexes are 2+ and symmetric in the biceps triceps and brachioradialis with negative Hoffmans. 2+ patellar and 0 Achilles     Manual muscle testing reveals:  Right /Left out of 5     5/5 hip  flexors  5/5 knee flexors  5/5 knee extensors  5/5 ankle plantar flexors  5/5 ankle dorsiflexors  5/5  L

## 2021-06-06 NOTE — TELEPHONE ENCOUNTER
Call received from pt. She completed lumbar x-rays at [facility]. She reports she then saw [facility] as referred by Dr. Reed. She then completed an MRI as ordered by them.     Pt inquiring about second opinion referral. She states she already had her imaging (x-ray and MRI) pushed to [facility]. She inquires if Dr. Reed could place referral for her to be seen there. Will send provider request.

## 2021-06-06 NOTE — PATIENT INSTRUCTIONS - HE
1. Consider Bilateral Sacroiliac joint injections to see if it will help your pain. Call if you are interested.

## 2021-06-09 NOTE — PROGRESS NOTES
Assessment/Plan:      Diagnoses and all orders for this visit:    Lumbar spine pain  -     Ambulatory referral to Neurosurgery    Sacroiliac joint pain  -     OPS Joint Injection Sacroiliac Joint Bilateral; Future; Expected date: 3/20/17    Spondylolisthesis  -     Ambulatory referral to Neurosurgery    Myofascial muscle pain    Omayra-Danlos disease        Assessment:    1.  Chronic lumbar spine pain that is multifactorial.  Grade 1 spondylolisthesis L4-L5 that appears degenerative.  There is also significant facet arthropathy L5-S1.    2.  Believe there is a component of bilateral SI joint pain.    3.  Myofascial pain in the lumbar spine.    4.  History of Omayra-Danlos syndrome      Discussion:    1.  We discussed new MRI images along with flexion and extension images of the lumbar spine.  She has been through injections at the [facility] including facet injections twice last year L4-5 and L5-S1 with physical therapy medications.  We discussed further options.    2.  Trial bilateral SI joint injections.  She would like to return to Dr. Lowe for this procedure and the referral will be placed.    3.  We'll provide some gentle SI joint exercises for her.    4.  Given her spondylolisthesis and lack of response to conservative management, I would recommend surgical evaluation.  I will have her see Dr. Ford for this evaluation.  Given her Omayra-Danlos, she may not be an optimal candidate for surgical intervention, but she would like to discuss this with the surgeon.    5.  Follow up with me as needed after injections.       It was our pleasure caring for your patient today, if there any questions or concerns please do not hesitate to contact us.      Subjective:   Patient ID: Kelsy Morley is a 56 y.o. female.    History of Present Illness: Patient presents for evaluation of low back pain and bilateral gluteal pain.  Symptoms have not changed since last visit.  They have flared over the past several  months.  Pain is a 4/10 today/10 at worst.  Start over the PSIS SI joints radiate up along the lumbar paraspinals to the thoracolumbar junction.  No radiation down the legs numbness tingling or weakness.  She has had an MRI since last visit on the flexion-extension films.    She has had extensive physical therapy.  She has had lumbar facet injections which were reviewed on records from [facility].  She has had L4-5 and L5-S1 facet injections in February and July of last year.  She is unsure if they were helpful.    Imaging:MRI report and images were personally reviewed and discussed with the patient.  A plastic model was utilized during the discussion.  MRI of the lumbar spine personally reviewed.  This shows grade 1 spondylolisthesis L4-L5 with facet arthropathy.  Degenerative facet joints of L5-S1.  There is also at least moderate degenerative disc disease L4-5 and L5-S1.    Flexion-extension shows 10% change in disc height anteriorly from flexion to extension at L4-5 with no change in the spondylolisthesis.    Review of systems: She has had worsening left foot pain has a history of CRPS.  Feels she has caught her toe on occasion.  No numbness tingling.  No Bowel or bladder incontinence headache dizziness nausea and vomiting blurred vision or balance changes.    Past Medical History:   Diagnosis Date     Anxiety      Depression      Disease of thyroid gland      EDS (Omayra-Danlos syndrome)      Hyperlipidemia      Lyme disease        The following portions of the patient's history were reviewed and updated as appropriate: allergies, current medications, past family history, past medical history, past social history, past surgical history and problem list.      Objective:   Physical Exam:    Vitals:    03/20/17 0753   BP: 112/59   Pulse: 77       General: Alert and oriented with normal affect. Attention, knowledge, memory, and language are intact. No acute distress.   Eyes: Sclerae are clear.   Respirations: Unlabored. CV: No lower extremity edema.   Gait:  Nonantalgic    Sensation is intact to light touch throughout the  lower extremities.  Reflexes are   2+ patellar and 1+ Achilles with downgoing toes.  Full flexion of the hips bilaterally.  Negative scar maneuver.  Jose's test positive for SI joint pain bilaterally.  Tenderness to palpation lumbar paraspinals PSIS more significantly bilateral SI joint region.    Manual muscle testing reveals:  Right /Left out of 5     5/5 ankle plantar flexors  5/5 ankle dorsiflexors  5/5    ankle evertors

## 2021-06-09 NOTE — PROGRESS NOTES
Assessment/Plan:      Diagnoses and all orders for this visit:    Lumbar spine pain  -     XR Lumbar Spine Flex and Ext 2 or 3 VWS; Future; Expected date: 3/7/17    Spondylolisthesis  -     XR Lumbar Spine Flex and Ext 2 or 3 VWS; Future; Expected date: 3/7/17  -     MR Lumbar Spine Without Contrast; Future; Expected date: 3/7/17    Myofascial muscle pain    Cervicalgia    Omayra-Danlos disease        Assessment:    1. *Chronic lumbar spine pain with flare over the past several months.   She has a grade 1 spondylolisthesis L4 and L5 with significant facet arthropathy.    2.  Myofascial pain    3.  Cervical spine pain at the upper cervical spine with decreased range of motion most consistent with facet arthropathy.  Significant facet arthropathy on the right at C2-3 and 3-4 and old MRI.    4.  Some urinary leakage.  Not likely related to the lumbar spine given old MRI findings of the cervical and lumbar spine.  This seems more positional should see her PCP.  We'll monitor.      5. History of Omayra-Danlos with complicated history of CRPS in the left leg and chronic Lyme's disease.      Discussion:    1. I discussed the diagnosis and treatment options.  Were more interested in the lumbar spine today.  She has had a flare of her pain with this feeling of catching and I suspect that it's related to the spondylolisthesis and facet arthropathy.  We discussed the options of therapy which she has done, medications which are controlled and provided by [facility] as well as surgical options.    2.  We'll obtain new flexion-extension films of the lumbar spine to evaluate for worsening instability as there was approximately 1.5 mm of movement 1 year ago    3.  We'll obtain records from [facility] to see what lumbar injections were done.    4.  We'll monitor urinary symptoms since discussed with her primary care provider.  This is unlikely related to the lumbar spine given no radicular symptoms or other symptoms  of cauda equina syndrome.    5.  Monitor cervical spine as she has recently had cervical radiofrequency ablation.    6.  We'll contact her with results of plain films when available and discuss options such as lumbar facet injections versus surgical referral.  She would like to avoid surgery if possible.    It was our pleasure caring for your patient today, if there any questions or concerns please do not hesitate to contact us.    40 minutes were spent with this patient in addition to any procedure with greater than 30 minutes in counseling and coordination of care.    Addendum: I was able to review records on care everywhere from [facility]:  1.  February 11, 2016: Bilateral L4-5 and L5-S1 facet joint injections  2.  July 6, 2016: Bilateral L4-5 and L5-S1 facet injections  3.  October 21, 2016 right C2, 3, 4, 5 medial branch blocks with the radiofrequency ablation November 20, 2016.    Given failed facet injections and worsening pain with urinary symptoms we will obtain a new MRI of the lumbar spine as well.    Subjective:   Patient ID: Kelsy Morley is a 56 y.o. female.    History of Present Illness: Patient presents for evaluation of multiple pain complaints.  Most significant issue is his severe increase in low back pain across the lumbosacral junction.  This increased over the past few months with no new trauma.  She has had episodes of severe sharp stabbing pain with standing up or sitting down from a chair as well as just with walking.  We'll stop her from moving.  She gets some improvement with lidocaine patch heat and rest.  Pain is a 10/10 at worst 4/10 today and at best.  She gets no radiation of the pain down her legs numbness tingling or weakness.    She does have [facility] she is a poor historian is difficult to know what is been done in the past.  She initially tells me the MRI was just done 2 months ago but in review this was done a year ago January 2016.  She has been seen at a  pain center for her neck which is also increased.  Since June of last her she has had increase in the neck pain right greater than left worse with turning her head.  No radiation down the arms numbness tingling or weakness.  She reports having radiofrequency ablation done and [facility] on November 28.  Since that time she has numbness over the right posterior neck and scalp and is unsure if the radiofrequency has been helpful.    She also has had some hip pain in the past.  She was seen by orthopedics.  Referred to physical therapy since last visit and her hip pain has improved no surgery was done.  She also did not recall the MRI that we did on her hip.    One more issue that is quite concerning is over the past 2-4 weeks she has developed some urinary leakage.  She will use the bathroom and when she stands up she will have some leakage into her underwear.  This is a small amount but does cause some anxiety.  She has not been evaluated for this.  She is wondering with the increase in back pain if it's related to her back.  She does also carry a diagnosis of Omayra-Danlos syndrome.    Imaging: MRI report and images were personally reviewed and discussed with the patient.  A plastic model was utilized during the discussion.  MRI of the lumbar spine from [facility] January 2016 personally reviewed.  Degenerative disc disease with severe facet arthropathy at L4-5 with a grade 1 spondylolisthesis.  There is also degenerative changes at L5-S1.    Cervical spine MRI reveals milder degenerative changes throughout the cervical spine with more significant right facet arthropathy at C2-3 and 3-4.    Flexion extension x-rays of lumbar spine personally reviewed.  This was noted to show no instability from flexion to extension.  On my review there is up to 2 mm of movement from flexion to extension of the L4-5 segment.    Review of systems: Is having some bladder leakage.  No numbness or tingling in the legs weakness headache  dizziness nausea vomiting blurred vision balance deficits.     Past Medical History:   Diagnosis Date     Anxiety      Depression      Disease of thyroid gland      EDS (Omayra-Danlos syndrome)      Hyperlipidemia      Lyme disease        The following portions of the patient's history were reviewed and updated as appropriate: allergies, current medications, past family history, past medical history, past social history, past surgical history and problem list.      Objective:   Physical Exam:    Vitals:    03/07/17 0752   BP: 115/60   Pulse: 75       General: Alert and oriented with normal affect. Attention, knowledge, memory, and language are intact. No acute distress.   Eyes: Sclerae are clear.  Respirations: Unlabored. CV: No lower extremity edema.  Skin: No rashes seen.   Gait:  Nonantalgic.  Romberg negative.  Cervical range of motion: Decreased with rotation mildly.  Lumbar range of motion mildly decreased in extension.  Tenderness palpation throughout the lumbar paraspinals gluteal tissues.  Sensation is intact to light touch throughout the upper and lower extremities.  Reflexes are 2+ and symmetric in the biceps triceps and brachioradialis with negative Hoffmans. 2+ patellar and 1+ Achilles .  Full range of motion of the hips from seated position internal/external rotation along with the knees with no increase in pain.  Manual muscle testing reveals:  Right /Left out of 5   5/5 elbow flexors  5/5 elbow extensors  5/5 wrist extensors  5/5 interosseus  5/5 finger flexors  5/5 hip flexors  5/5 knee flexors  5/5 knee extensors  5/5 ankle plantar flexors  5/5 ankle dorsiflexors  5/5  L

## 2021-06-09 NOTE — PROGRESS NOTES
Pt arrived to infusion clinic. Xolair injections given to R&LLQs of ABD, Band-aids applied. Pt showed no signs of reaction. Pt ambulated out of infusion clinic independently.

## 2021-06-14 NOTE — PROGRESS NOTES
Assessment/Plan:      Diagnoses and all orders for this visit:    Lumbar spine pain  -     XR Lumbar Spine Flex and Ext 2 or 3 VWS; Future; Expected date: 12/22/17  -     Ambulatory referral to Orthopedic Surgery    Spondylolisthesis  -     XR Lumbar Spine Flex and Ext 2 or 3 VWS; Future; Expected date: 12/22/17  -     Ambulatory referral to Orthopedic Surgery    Myofascial muscle pain    Omayra-Danlos disease    Sacroiliac joint pain        Assessment:    1.  Chronic lumbar spine pain that is multifactorial.  Grade 1 spondylolisthesis L4-L5 that appears degenerative.    There appears to be 6 mm of movement from supine MRI imaging to standing flexion x-ray of the spondylolisthesis and at least 3 mm of movement within flexion to extension x-ray from March 2017.      2. There is also significant facet arthropathy L5-S1.     3.  Believe there is a component of bilateral SI joint pain.     4.  Myofascial pain in the lumbar spine.     5.  History of Omayra-Danlos syndrome     6.  History of CRPS left lower extremity.    Discussion:    1.  We discussed the diagnoses and treatment options.  I reviewed the imaging studies from March 2017.  We discussed treatments but she has been seeing a pain clinic and is numerous treatments for her chronic pain condition including therapy, acupuncture, medication seeing a pain clinic and sympathetic nerve blocks or CRPS.  She also has had facet injections.  2.  We will update flexion-extension x-rays to evaluate for worsening instability L4-5.  3.  Patient would like to see her surgeon Dr. Rothman and given the instability of L4-5, I believe this is a good option for her and referral is placed.  4.  We will contact her with results of imaging when available and further recommendations.  5.  If she has worsening pain in the gluteal region SI joint injections could be a good option for her.  This will have to be weighed however the fact that she is getting significant amount of steroids for  her sympathetic nerve block.  6.  Follow-up with me as needed.      It was our pleasure caring for your patient today, if there any questions or concerns please do not hesitate to contact us.      Subjective:   Patient ID: Kelsy Morley is a 57 y.o. female.    History of Present Illness:Patient presents for follow-up evaluation of lumbar spine pain.  This is also musculoskeletal evaluation.  She has chronic low back pain crest lumbosacral junction gluteal region but also has pain up and of the thoracic and cervical spine.  She was last seen her on March 2017.  At that time was sent for surgical evaluation given spondylolisthesis L4 and L5.  Did not proceed with that or with her SI joint injections.  She has been seeing pain clinic and received L4-5 facet injections which were fairly helpful this was in August.  She also has been doing physical therapy for her hand and shoulder along with aqua therapy and acupuncture.  She has been seeing a pain clinic for her CRPS injections for sympathetic nerve blocks.    Her low back pain is a 4/10 today 8/10 at worst.  Worse with any change in the pressure sitting for more than 5 minutes lifting or any activity.  Better with heat and the facet injections were helpful.  She has chronic left lower extremity numbness and tingling related to CRPS and she has right knee pain is worsening.  She is having worsening depression given some of her social issues and her chronic pain.  She is on disability.  Would like to be evaluated by her surgeon at this point for lumbar spine is here for MS evaluation.    Imaging: MRI lumbar spine personally reviewed from March 2017.  This shows spondylolisthesis L4 and L5 3 mm.  Flexion-extension x-rays were also personally reviewed.  Extension x-rays measured 6 mm for me today in flexion 9 mm a spondylolisthesis at L4 on L5.    Review of systems: Has headaches dizziness poor balance with near syncope current workup pending.  She has numbness and  tingling left foot from CRPS.  No bowel or bladder incontinence.  No nausea vomiting or blurred vision.    Past Medical History:   Diagnosis Date     Anxiety      Depression      Disease of thyroid gland      EDS (Omayra-Danlos syndrome)      Hyperlipidemia      Lyme disease        The following portions of the patient's history were reviewed and updated as appropriate: allergies, current medications, past family history, past medical history, past social history, past surgical history and problem list.      Objective:   Physical Exam:    Vitals:    12/22/17 0821   BP: 111/64   Pulse: 67       General: Alert and oriented with normal affect. Attention, knowledge, memory, and language are intact. No acute distress.   Eyes: Sclerae are clear.  Respirations: Unlabored. CV: No lower extremity edema.  Compression socks in place.  Gait:  Nonantalgic  Sensation is  decreased to light touch diffusely left lower extremity below the knee.  Reflexes are 2+ and symmetric in the biceps triceps and brachioradialis with negative Hoffmans. 2+ patellar and 0Achilles   Tenderness to palpation at L4-5 lumbar paraspinals SI joint on the right.    Manual muscle testing reveals:  Right /Left out of 5     5/5 hip flexors  5/5 knee flexors  5/5 knee extensors  5/5 ankle plantar flexors  5/5 ankle dorsiflexors  5/5  EHL

## 2021-06-16 NOTE — PROGRESS NOTES
/72   Pulse 83   Temp 97.5  F (36.4  C) (Oral)   SpO2 100%     Patient presented to infusion for injection. Xolair given subcutaneous to RLQ and LLQ of abdomen. Monitored for 30 minutes post injection. VSS, tolerated well. Patient left infusion center ambulating independently.

## 2021-06-16 NOTE — PROGRESS NOTES
Kelsy is an incredibly complex 57-year-old female who presents today to establish care.  Please see the physical exam forms a and B for further details, including a complete review of systems.  She is an ex-nurse, very competent and educated regarding her medical conditions, and compliant with medical recommendations.    We spent the entirety of her visit going over her past medical history.  This is listed below.  She is going to come back next week to discuss her surgical history, medications, and the litany of physicians she sees for her medical problems.    ILLNESSES, HOSPITALIZATIONS, AND OPERATIONS:    #1.  History of chronic Lyme's disease diagnosed in June 2009.  She saw a physician at [facility] for this.  She states that this has been quiescent over the last 8 years or so.    2.  Possible history of POTS.  She underwent testing at [facility] in January 2018 which apparently was negative.  [facility].  She is considering an evaluation at the [facility].  3.  Omayra-Danlos syndrome, type III.  This was diagnosed at [facility] by a  in 2013.  4.  Chronic regional pain syndrome of the left foot secondary to multiple foot surgeries in 2011.  5.  Hypothyroidism.  6.  Lumbar spinal stenosis.  7.  Chronic migraines, on vitamin B2 and magnesium for prophylaxis along with Imitrex as needed.  8.  Mast cell activation syndrome, currently seen by Dr. Moody, an allergist at [facility].  9.  Depression.    Again we will have her back in 1 week to discuss her surgical history and the rest of her medical history.    45 minutes were spent with the patient, over half of which was in counseling and coordination of care.

## 2021-06-16 NOTE — PROGRESS NOTES
Kayla comes in today for further discussion regarding her past medical history.  The entirety of the visit was spent going through the last of her history.    Surgical history includes:    #1.  Status post deviated septum repair in 1993.  2.  Status post multiple sinus surgeries in the 1990s.  3.  Status post left inguinal hernia repair in 2003.  #4.  Status post right rotator cuff repair in 2008 with revision in 2009.  5.  Status post left rotator cuff repair in 2010.  6.  Status post left plantar fasciotomy in 2011.  7.  Status post left gastrocnemius lengthening in 2012.  8.  Status post left foot fusion in 2013.  9.  Status post right extensor carpi radialis repair in 2015.  10.  Status post left CMC joint replacement in 2017.  11.  Status post septal perforation repair in 2017.    Allergies and medications were reviewed and reconciled today.  She has no family history of breast cancer or colon cancer.  She recently was placed on long-term disability for her medical problems.  She is a lifelong non-smoker.  She is .    Time was spent reviewing the other physicians that care for her as well.  Including me, she sees 10 doctors of different specialties.  She is going to follow-up as needed.    40 minutes were spent with the patient, over half of which was in counseling coordination of care.

## 2021-06-18 NOTE — PROGRESS NOTES
[facility] New Patient Note.    Name: Kelsy Morley  :   1960  MRN:   [id number]  PCP:    Berry Vo MD  DOS:    18      CC: Suspected Lyme disease.    HPI/Interval History:  Klesy Morley is a 58 y.o. female with the history of POTS, hypothyroidism, mast cell activation syndrome, Omayra-Danlos syndrome type III.  She reports having been diagnosed with Lyme disease in  when she initially presented with syndromes of the diffuse joint pain without fever or chills.  Onset of symptoms in 2009.  After several months of symptoms including a visit to a rheumatologist in [place] in 2009 when she was having weird rashes on her body that time and goal associated with diffuse joint pain, inability to walk because of diffuse joint pain, a trial of Medrol pack led to worsening of her joint pain associated with swelling tenderness mostly affecting her fingers.  In 2010, she started having issues with memory loss, vision changes, diffuse pain and was seen by Dr. Mynor Fitzpatrick, rheumatologist at the [facility].  After a bunch of labs, she was told she has simvastatin related side effect.  She was taken off this medication without any significant change.  In May 2010 she saw a natural path provider in [facility] and was diagnosed with Lyme disease.  She was treated with Omnicef, Zithromax and minocycline for 6 month.  This treatment was complicated by severe yeast infection that lasted for about 2 years.  She had burning mouth syndrome at the time as well.  She did move on to see Shreya and Dr. Richards at [facility] for 2 more years.  She was diagnosed with hypo-thyroidism at the same time and has been started on replacement.  After 2 years of taking some tinctures and after having noticed that Dr. Richards was blaming every single symptom that she was having on Lyme disease, she stopped seeing Dr. Richards.  She has not  had any rash since 2009 on till 5/14/2018 when she noted an abdominal wall rash similar to previous rash she had when she had Lyme disease.  This abdominal wall rash lasted for a couple days and disappeared.  On 5/30/2018, her physical therapist noted a rash in her left axillary region.  She saw her primary care provider on 6/1/2018.  Conventional Lyme serology is back negative.  She has not been on any antibiotic.  She [place] in [place] and has seen wood ticks and some of her neighbors have seen deer ticks in the area.  She denies any fevers, chills, night sweats recently.  Over the past 5 days the patient reported that she wakes up with morning headache associated with nausea, visual changes, some sore throat.  She feels exhausted all the time, she feels dizzy/woozy.  She is scheduled to see to have neuropsych testing because of her memory loss, word finding difficulties.  She has a history of hypersomnolence and has forgotten some kids.  She is now on permanent disability since January 2018.    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  She reported knowing my mother-in-law, my kids, my wife.    PMH:  1.  History of chronic Lyme's disease diagnosed in June 2009.  She saw a physician at [facility] for this.  She states that this has been quiescent over the last 8 years or so.  2.  Possible history of POTS.  She underwent testing at [facility] in January 2018 which apparently was negative.  [facility].  She is considering an evaluation at the [facility].  3.  Omayra-Danlos syndrome, type III.  [facility].  4.  Chronic regional pain syndrome of the left foot secondary to multiple foot surgeries in 2011.  5.  Hypothyroidism.  6.  Lumbar spinal stenosis.  7.  Chronic migraines, on vitamin B2 and magnesium for  prophylaxis along with Imitrex as needed.  8.  Mast cell activation syndrome, currently seen by Dr. Moody, an allergist at [facility].  9.  Depression.    PSH:  1.  Status post deviated septum repair in 1993.  2.  Status post multiple sinus surgeries in the 1990s.  3.  Status post left inguinal hernia repair in 2003.  4.  Status post right rotator cuff repair in 2008 with revision in 2009.  5.  Status post left rotator cuff repair in 2010.  6.  Status post left plantar fasciotomy in 2011.  7.  Status post left gastrocnemius lengthening in 2012.  8.  Status post left foot fusion in 2013.  9.  Status post right extensor carpi radialis repair in 2015.  10.  Status post left CMC joint replacement in 2017.  11.  Status post septal perforation repair in 2017.    Social History/Family History:  Mother and kids with Omayra-Danlos syndrome.    Allergies:  Allergies   Allergen Reactions     Sulfa (Sulfonamide Antibiotics) Hives     Adhesive Rash     EKG electrodes      Chlorhexidine Hives, Rash and Swelling     Pt had reaction after surgery  - blistering, redness, etc.  Severe topical reaction following elbow surgery with chlorhexidine scrub.        Diflucan [Fluconazole] Rash     Sulfasalazine Hives and Rash       Medications:  Current Outpatient Prescriptions on File Prior to Visit   Medication Sig Dispense Refill     ascorbic acid 1,000 mg TbER Take 1,000 mg by mouth 2 (two) times a day.        atorvastatin (LIPITOR) 20 MG tablet Take 20 mg by mouth bedtime.       buPROPion (WELLBUTRIN XL) 150 MG 24 hr tablet Take 150 mg by mouth daily.       calcium-mag oxide-vitamin D3 185- mg-mg-unit cap Take by mouth 2 (two) times a day. 500 mg, 500 mag, 400 IU of D3 BID       celecoxib (CELEBREX) 200 MG capsule Take 200 mg by mouth 2 (two) times a day.       DOCOSAHEXANOIC ACID/EPA (FISH OIL ORAL) Take 1,400 mg by mouth 2 (two) times a day.        DULoxetine (CYMBALTA) 60 MG capsule Take 60 mg by mouth daily.        LACTOBACILLUS COMBO NO.6 (PROBIOTIC COMPLEX ORAL) Take by mouth.       levothyroxine (SYNTHROID, LEVOTHROID) 75 MCG tablet Take 75 mcg by mouth daily.       lidocaine (LIDODERM) 5 % Place 1 patch on the skin.       loratadine (CLARITIN) 10 mg tablet Take 10 mg by mouth daily.       methylphenidate HCl (RITALIN) 10 MG tablet Take 10 mg by mouth 2 (two) times a day. 1-2 tab, 1-2 times per day       montelukast (SINGULAIR) 10 mg tablet Take 10 mg by mouth bedtime.       mupirocin (BACTROBAN) 2 % ointment Apply topically as needed.        riboflavin, vitamin B2, 100 mg Tab Take 200 mg by mouth 2 (two) times a day.        sodium chloride 1 gram tablet Take 2 g by mouth daily.       SUMAtriptan (IMITREX) 100 MG tablet Take 100 mg by mouth every 2 (two) hours as needed for migraine.       tiZANidine (ZANAFLEX) 4 MG tablet Take 4 mg by mouth every 6 (six) hours as needed.       traZODone (DESYREL) 50 MG tablet Take 100 mg by mouth daily.       tretinoin (RETIN-A) 0.05 % cream Apply topically as needed.       valACYclovir (VALTREX) 500 MG tablet Take 500 mg by mouth daily.       vitamin B complex 100 #2-herbs 100 mg Tab Take by mouth.       No current facility-administered medications on file prior to visit.          Review of System:  12 points review of system is negative except for findings in the HPI.    Exam  VS:   Vitals:    06/05/18 0931   BP: 128/80   Pulse: 79   Temp: 97.8  F (36.6  C)   SpO2: 98%       Gen: Pleasant in no acute distress.  HEENT: NCAT. EOMI.  Neck: No LAD.  Lungs: Clear to auscultation bilaterally with no crackles or wheezes.   Card: RRR. No RMG. Peripheral pulses present and symmetrical. No edema.   Abd: Soft NT ND. No hepatomegaly or splenomegaly.  Ext: No edema  Lymph: No cervical or supraclavicular adenopathy.  Skin: No rash  Neuro: Alert and oriented to place time and person. Cranial nerves grossly intact.     Labs:  Reviewed    Imaging:  Reviewed    Assessment:  Kelsy Morley is a 58 y.o.  female with Omayra-Danlos syndrome, ADHD, hypersomnolence, in ID clinic with a history of Lyme disease in 2000 and 9/2010 treated with 6 months of Omnicef, Zithromax, and minocycline.  Rash resolved in 2009.  New rash on 5/14/2018 and 5/30/2018.  I am not convinced this is Lyme disease but given that the patient [place] with possible exposure to deer tick and given the season, I think it is reasonable to treat her with doxycycline for 14 days despite the negative Lyme serology on 6/1/2018.  Morning headache associated with nausea, dizzy/woozy feeling.  Patient has a history of migraine headaches and has been taking Imitrex.  I suspect this is migraine headache as the underlying cause of this headache but morning headache with nausea vomiting raises suspicion for increased intracranial pressure.  Will get a CT scan of her head to rule out any intracranial mass.  Memory loss, word finding difficulties,out of body experience.  I doubt there is any organic etiology for this.  She probably will need to see a psychiatrist especially after her CT scan of the head and her neuropsych testing that is scheduled for 6/14/2018.   With how good her labs look, I doubt she has any underlying infectious disease but is treating her for possible recent Lyme as above.    Recommendations:  Doxycycline 100 mg twice daily for 14 days.  CT scan of the head without contrast looking for any mass.  Keep neuropsych testing as scheduled on 6/14/2018.  After above labs/tests/treatment are completed, the patient may need to be seen by psychiatry.        FEI Mejia  [facility]  [facility]  Office Telephone [phone].  Fax [phone]

## 2021-06-18 NOTE — PROGRESS NOTES
Clinic Note    Assessment:     Assessment and Plan:  1. Suspected Lyme disease  We are going to start with the lab work listed below.  I am unsure of her Lyme's disease status as of now.  I do not have records of any positive titers in our system.  I told her that we would elect to hold off on any antibiotic treatment until she sees somebody from infectious disease.  At that time they can review the lab work and determine if she needs any further workup.  Normal physical exam today.  She is a very complex patient with a medical history which has many similar symptoms of chronic Lyme disease and so I told her that I am not comfortable managing her for this issue and that she should be seen by a specialist.    - Anaplasma Smears, Blood  - Ehrlichia chaffeensis Antibodies, IgG & IgM by IFA  - Lyme Antibody Cascade  - Rickettsia rickettsii ([place] Spotted Fever) Antibodies, IgG & IgM by IFA  - Ambulatory referral to Infectious Disease  - HM1(CBC and Differential)  - Comprehensive Metabolic Panel  - C-Reactive Protein  - Erythrocyte Sedimentation Rate  - HM1 (CBC with Diff)     Patient Instructions   We will let you know about the results of your lab work on [facility]. If there is anything more urgent, we will call.     We will have you meet with specialty  to get arranged with infectious disease before you leave today.             Subjective:      Kelsy Morley is a 58 y.o. female     She was diagnosed in July of 2010 with Lymes disease after having issues and symptoms for over one year. She got treatment at a natural medicine specialist at [facility] who gave her some antibiotics along with some herbal supplements. This resulted with some yeast infections but she otherwise had successful treatment. She stopped going to the specialist; she was diagnosed with chronic disseminated lymes disease. She never got erythema migrans, but she would get strange rashes from time to time. Three weeks ago she had a red  rash on her stomach. She went to PT and she had a rash under her left arm. She has a sore throat.     The following portions of the patient's history were reviewed and updated as appropriate: Allergies, medications, problems, prior note.    Review of Systems:    Review is otherwise negative except for what is mentioned above.     Social Hx:    History   Smoking Status     Never Smoker   Smokeless Tobacco     Never Used         Objective:     Vitals:    06/01/18 0942   BP: 110/62   Pulse: 68   Weight: 143 lb (64.9 kg)       Exam:    General: No apparent distress. Calm. Alert and Oriented X3. Pt behavior is appropriate.  Head:Atraumatic. Normocephalic, non-tender to palpation  Neck: Supple. No JVD. Full ROM. No adenopathy  Eyes: PERRL, No discharge. No strabismus. No nystagmus.  Ears: TMs pearly gray with landmarks visible.   Nose/Mouth/Throat: Patent nares, no oral lesions, pharynx clear and without exudate. Uvula mid-line. Nasal septum mid-line. Clear turbinates.   Lymph: No axillar or cervical adenopathy.   Chest/Lungs: Normal chest wall, clear to auscultation, normal respiratory effort and rate.   Heart/Pulses: Regular rate and rhythm, strong and equal radial pulses, no murmurs. Capillary refill <2 seconds. No edema.   Abdomen: Soft, no palpable masses. No hepatosplenomegaly, no tenderness with palpation noted. Bowel sounds active in all quadrants. No increased tympany.   Genitalia: Not examined.   Musculoskeletal: No CVA tenderness with palpation. Good ROM with extremities.   Neurologic: Interactive, alert, no focal findings, CNs intact.   Skin: She has small erythematous patches in her right axilla.  There is a biopsy site in the middle of her back.  Otherwise no concerning skin lesions.      Patient Active Problem List   Diagnosis     Autonomic dysfunction     Chronic fatigue and immune dysfunction syndrome     Chronic pain     Complex regional pain syndrome type 1 of left lower extremity     Depression      Omayra-Danlos syndrome type III     Hypothyroidism     Hyperlipidemia     Osteopenia     Current Outpatient Prescriptions   Medication Sig Dispense Refill     ascorbic acid 1,000 mg TbER Take 5,000 mg by mouth.        atorvastatin (LIPITOR) 20 MG tablet Take 20 mg by mouth bedtime.       buPROPion (WELLBUTRIN XL) 150 MG 24 hr tablet Take 150 mg by mouth daily.       calcium-mag oxide-vitamin D3 185- mg-mg-unit cap Take by mouth 2 (two) times a day. 500 mg, 500 mag, 400 IU of D3 BID       celecoxib (CELEBREX) 200 MG capsule Take 200 mg by mouth 2 (two) times a day.       DOCOSAHEXANOIC ACID/EPA (FISH OIL ORAL) Take 1,400 mg by mouth 2 (two) times a day.        DULoxetine (CYMBALTA) 60 MG capsule Take 60 mg by mouth daily.       LACTOBACILLUS COMBO NO.6 (PROBIOTIC COMPLEX ORAL) Take by mouth.       levothyroxine (SYNTHROID, LEVOTHROID) 75 MCG tablet Take 75 mcg by mouth daily.       lidocaine (LIDODERM) 5 % Place 1 patch on the skin.       loratadine (CLARITIN) 10 mg tablet Take 10 mg by mouth daily.       methylphenidate HCl (RITALIN) 10 MG tablet Take 10 mg by mouth 2 (two) times a day. 1-2 tab, 1-2 times per day       montelukast (SINGULAIR) 10 mg tablet Take 10 mg by mouth bedtime.       mupirocin (BACTROBAN) 2 % ointment Apply topically as needed.        riboflavin, vitamin B2, 100 mg Tab Take 200 mg by mouth 2 (two) times a day.        sodium chloride 1 gram tablet Take 2 g by mouth daily.       SUMAtriptan (IMITREX) 100 MG tablet Take 100 mg by mouth every 2 (two) hours as needed for migraine.       tiZANidine (ZANAFLEX) 4 MG tablet Take 4 mg by mouth every 6 (six) hours as needed.       traZODone (DESYREL) 50 MG tablet Take 100 mg by mouth daily.       tretinoin (RETIN-A) 0.05 % cream Apply topically as needed.       valACYclovir (VALTREX) 500 MG tablet Take 500 mg by mouth daily.       vitamin B complex 100 #2-herbs 100 mg Tab Take by mouth.       No current facility-administered medications for this  visit.        I spent 30 minutes with patient face to face, of which >50% was counseling regarding the above plan       Jeff Keyes CNP (Rob)    6/1/2018

## 2021-06-19 NOTE — LETTER
Letter by Berry Vo MD at      Author: Berry Vo MD Service: -- Author Type: --    Filed:  Encounter Date: 8/23/2019 Status: (Other)         Kelsy Morley  [address] [place] [address]             August 23, 2019         Dear MsClaus Peyman,    I just wanted to send a friendly reminder that you are due for your annual depression follow up.  These appointments need to be done yearly and make sure there are not interruptions with medication refills as well.  Please use my chart or call the clinic at [phone] and schedule an appointment with your provider.    Please call with questions or contact us using [employer].    Sincerely,        Electronically signed by Berry Vo MD

## 2021-06-19 NOTE — PROGRESS NOTES
1. Rib pain on left side  XR Ribs Left W PA Chest   2. Omayra-Danlos syndrome type III        Plan: X-ray was read by me as negative for fracture.  Reassured her on this, and said that she can use ibuprofen or Tylenol as needed for pain.  She also has her tramadol that she takes chronically, and I said that she could use 1 or 2 extra of those but I would not want her to run out early.  She can follow-up here as needed if she is getting worse instead of better.  She will watch for any shortness of breath or worsening of pain or fevers or chills.    Subjective: 58-year-old female with a history of Omayra-Danlos syndrome who comes in with left-sided rib pain.  She states that a few days ago she was doing some work around the house and she was pushing something quite hard down into the ground and felt sudden pain at the lower margin of her ribs on the left side.  She did not hear a pop or snap, but the pain was quite intense right away.  She put ice on it and rested and seemed a little bit better, but now still has the pain and it is bothering her enough and with her history of E-D, she wanted to be seen.  She has not had a cough no fevers or chills, no pain on the right side.    Objective: Well-appearing female in no acute distress.  Vital signs as noted.  The chest is clear to auscultation all fields.  Heart regular rate and rhythm.  She does not have tenderness to squeezing of the rib cage where her injury was, but she does have some point tenderness over a couple of the ribs near the midaxillary line on the left side.  There is no step-off deformity noted.    Xr Ribs Left W Pa Chest    Result Date: 8/9/2018  XR RIBS LEFT W PA CHEST 8/9/2018 4:26 PM INDICATION: Rib pain x 2 days, left side COMPARISON: None. FINDINGS: The visualized heart and lungs are negative. Left rib detail images are negative for fracture. No pneumothorax, infiltrate or pleural effusion.

## 2021-06-20 NOTE — PROGRESS NOTES
Kayla comes in today for discussion regarding her osteopenia.  She had a DEXA scan performed in the [facility] system in January 2018, and she wanted me to compare it to one that she had in 2013.  I T score at that time was -0.4, and her T-scores in 2018 progressed to a level of -1.7 in the spine.  T-scores in the hip were -1.0 in 2013 and now -1.6.  She was concerned that this was contributing to her back pain and she will came in to discuss whether she needed to be treated for this or not.  We spent the majority of the visit discussing the difference between osteoporosis, osteopenia, and osteoarthritis.  I explained to her that osteoporosis was a disease of bone structure and that osteoarthritis was a disease of the cartilage of the joints.  She understood this after the visit and I recommended that she not pursue treatment at this time, as her FRAX score was only 7.8%.    Objective: Vital signs are as per the EMR.  In general the patient is alert pleasant and in no acute distress.  She appears healthy.    Assessment and plan: Osteopenia.  I recommended repeating a DEXA scan in 2-3 years.  Otherwise she will follow-up in 10 days for a preop for cataract surgery.    25 minutes were spent with the patient, over half of which is in counseling coordination of care.

## 2021-06-21 NOTE — PROGRESS NOTES
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  Chronic lumbar spine pain multifactorial.  Grade 1 spondylolisthesis L4 and L5 that is degenerative.  7 mm of spondylolisthesis on plain films from [facility] 1 year ago with no instability from flexion to extension.  Previously there was 3 mm movement from flexion to extension on prior imaging.    2.  Widespread thoracic and lumbar pain consistent with myofascial pain.    3.  May be in SI component to her pain.    4.  She does struggle with Omayra-Danlos and dysautonomia.      Discussion:    1.  I discussed the diagnosis and treatment options.  She has been through and continues to attend physical therapy for her numerous complaints and feels that it is very helpful and encouraged to continue with physical therapy.    2.  She would like another opinion regarding her Omayra-Danlos syndrome and will refer her at her request to Dr. Celaya in rheumatology.    3.  She does have a neurosurgeon that she is been seeing, Dr. Rothman.  She would like to see him again to check in regarding her spondylolisthesis.  Given her history, it is appropriate for her to see Dr. Rothman and a referral was placed.    4.  May try turmeric over-the-counter as an anti-inflammatory.  She has tolerated this in the past.  5.  Follow-up with me as needed.      It was our pleasure caring for your patient today, if there any questions or concerns please do not hesitate to contact us.    25 minutes were spent with this  patient in addition to any procedure with greater than 50% in counseling and coordination of care.    Subjective:   Patient ID: Kelsy Morley is a 58 y.o. female.    History of Present Illness: Patient presents for evaluation of multiple pain complaints.  Nursing includes low back pain along with thoracic pain from cervical pain gluteal pain.  She has numbness and tingling in her left foot.  Since her last visit her pain has worsened.  She has been seeing Dr. Rothman [facility].  Monitoring her spondylolisthesis.  I attempted to review the note in June of last year but was unable to do so under care everywhere.  Her low back feels unstable.  She has been working with a physical therapist at [facility] in [place] 1-2 times per week.  Wearing a belt which helps stabilize her back and she feels that is very helpful however whenever she takes it off her back feels loose and unstable.  She has pain with any activity.  Pain is a 4/10 today 9/10 at worst.    She feels her Omayra-Danlos complaints and CRPS are worsening.  She has dysautonomia now which is an issue and has developed numerous allergies to medications.  She has been receiving steroid injections into the left foot for CRPS and now is breaking out secondary to the local anesthetic.  She also has been diagnosed with a mast cell activation syndrome.  She is wondering if another opinion would be doable for the Omayra-Danlos and she has had a difficult time getting into any rheumatologist.  She also is wondering about seeing her surgeon again for a yearly checkup in the lumbar spine.    Continues on numerous medications and I believe the increase her Topamax for headaches/migraines.  She has taken to laureano in the past but is currently not taking it.  She is having numerous life stressors and is on social secured.    Imaging: Plain films from [facility] December 2017 personally reviewed.  Flexion-extension x-rays show a 7 mm spondylolisthesis L4-L5 with no instability  from flexion to extension.    Review of Systems: She does have ongoing issues with weakness and foot drop in the left lower extremity.  She has headaches dizziness nausea some vomiting and poor balance.  No blurred vision.  No new symptoms.    Past Medical History:   Diagnosis Date     Dysautonomia (H)      EDS (Omayra-Danlos syndrome)      Headache      Hypotension        The following portions of the patient's history were reviewed and updated as appropriate: allergies, current medications, past family history, past medical history, past social history, past surgical history and problem list.      Objective:   Physical Exam:    Vitals:    11/14/18 1023   BP: 121/71   Pulse: 88       General: Alert and oriented with normal affect. Attention, knowledge, memory, and language are intact. No acute distress.   Eyes: Sclerae are clear.  Respirations: Unlabored. CV: No lower extremity edema.   Gait:  Nonantalgic  Tenderness palpation lumbar spine and SI joints.    Sensation is slightly reduced to light touch throughout the left foot distal to the ankle..  Reflexes are negative Hoffmans. 2+ patellar and 0 Achilles      Manual muscle testing reveals:  Right /Left out of 5     5/5 hip flexors  5/5 knee flexors  5/5 knee extensors  5/5 ankle plantar flexors  5/5 ankle dorsiflexors  5/5  EHL

## 2021-06-22 NOTE — PROGRESS NOTES
Pt came into infusion clinic for her Xolair as ordered. Pt tolerated injection well. Pt was monitored post injection with no complications. Pt left infusion clinic via ambulatory and will RTC as sched.

## 2021-06-23 NOTE — PROGRESS NOTES
Patient ambulated into Infusion Care by herself. Patient is alert and oriented and VSS. Patient received Xolair injections in right upper abdomen. Patient tolerated injections without any problems.All questions answered and patient was discharged home.

## 2021-06-25 ENCOUNTER — INFUSION - HEALTHEAST (OUTPATIENT)
Dept: INFUSION THERAPY | Facility: CLINIC | Age: 61
End: 2021-06-25

## 2021-06-25 DIAGNOSIS — L50.8 IMMUNOLOGIC URTICARIA: ICD-10-CM

## 2021-06-29 DIAGNOSIS — L50.8 CHRONIC URTICARIA: Primary | ICD-10-CM

## 2021-06-29 RX ORDER — EPINEPHRINE 1 MG/ML
0.3 INJECTION, SOLUTION, CONCENTRATE INTRAVENOUS EVERY 5 MIN PRN
Status: CANCELLED | OUTPATIENT
Start: 2021-07-23

## 2021-06-29 RX ORDER — ALBUTEROL SULFATE 90 UG/1
1-2 AEROSOL, METERED RESPIRATORY (INHALATION)
Status: CANCELLED
Start: 2021-07-23

## 2021-06-29 RX ORDER — ALBUTEROL SULFATE 0.83 MG/ML
2.5 SOLUTION RESPIRATORY (INHALATION)
Status: CANCELLED | OUTPATIENT
Start: 2021-07-23

## 2021-06-29 RX ORDER — DIPHENHYDRAMINE HYDROCHLORIDE 50 MG/ML
50 INJECTION INTRAMUSCULAR; INTRAVENOUS
Status: CANCELLED
Start: 2021-07-23

## 2021-06-29 RX ORDER — NALOXONE HYDROCHLORIDE 0.4 MG/ML
0.2 INJECTION, SOLUTION INTRAMUSCULAR; INTRAVENOUS; SUBCUTANEOUS
Status: CANCELLED | OUTPATIENT
Start: 2021-07-23

## 2021-06-29 RX ORDER — METHYLPREDNISOLONE SODIUM SUCCINATE 125 MG/2ML
125 INJECTION, POWDER, LYOPHILIZED, FOR SOLUTION INTRAMUSCULAR; INTRAVENOUS
Status: CANCELLED
Start: 2021-07-23

## 2021-06-29 RX ORDER — MEPERIDINE HYDROCHLORIDE 25 MG/ML
25 INJECTION INTRAMUSCULAR; INTRAVENOUS; SUBCUTANEOUS EVERY 30 MIN PRN
Status: CANCELLED | OUTPATIENT
Start: 2021-07-23

## 2021-07-03 NOTE — ADDENDUM NOTE
Addendum Note by Berry Vo MD at 10/26/2018  9:37 AM     Author: Berry Vo MD Service: -- Author Type: Physician    Filed: 10/26/2018  9:37 AM Encounter Date: 10/12/2018 Status: Signed    : Berry Vo MD (Physician)    Addended by: BERRY VO on: 10/26/2018 09:37 AM        Modules accepted: Orders

## 2021-07-04 NOTE — ADDENDUM NOTE
Addendum Note by Amparo Owen PharmD at 4/14/2021  9:30 AM     Author: Amparo Owen PharmD Service: -- Author Type: Pharmacist    Filed: 5/6/2021  2:46 PM Encounter Date: 4/14/2021 Status: Signed    : Amparo Owen PharmD (Pharmacist)    Addended by: AMPARO OWEN on: 5/6/2021 02:46 PM        Modules accepted: Orders

## 2021-07-06 VITALS
SYSTOLIC BLOOD PRESSURE: 134 MMHG | DIASTOLIC BLOOD PRESSURE: 83 MMHG | HEART RATE: 84 BPM | OXYGEN SATURATION: 98 % | TEMPERATURE: 98.9 F

## 2021-09-14 ENCOUNTER — MYC MEDICAL ADVICE (OUTPATIENT)
Dept: NEUROSURGERY | Facility: CLINIC | Age: 61
End: 2021-09-14

## 2021-09-22 ENCOUNTER — TELEPHONE (OUTPATIENT)
Dept: CARDIOLOGY | Facility: CLINIC | Age: 61
End: 2021-09-22

## 2021-09-22 NOTE — TELEPHONE ENCOUNTER
Called, LVM pt is need of rescheduling Dr. Lanier appt on 10/27. Offer virtual option of the 28th with Yolande

## 2021-10-04 DIAGNOSIS — G90.529 CRPS (COMPLEX REGIONAL PAIN SYNDROME), LOWER LIMB: Primary | ICD-10-CM

## 2021-10-05 ENCOUNTER — TELEPHONE (OUTPATIENT)
Dept: PHYSICAL MEDICINE AND REHAB | Facility: CLINIC | Age: 61
End: 2021-10-05

## 2021-10-05 NOTE — TELEPHONE ENCOUNTER
[facility] Call Center    Phone Message    May a detailed message be left on voicemail: yes     Reason for Call: Appointment Intake    Referring Provider Name: Apryl Ayon APRN CNP in [facility]  Diagnosis and/or Symptoms: CRPS (complex regional pain syndrome), lower limb     Please review and contact the patient to schedule. Diagnosis is not listed in our protocol. Patient is being referred to Dr. Maxwell.    Action Taken: Other: PMR    Travel Screening: Not Applicable

## 2021-10-19 ENCOUNTER — TRANSFERRED RECORDS (OUTPATIENT)
Dept: HEALTH INFORMATION MANAGEMENT | Facility: CLINIC | Age: 61
End: 2021-10-19
Payer: MEDICARE

## 2021-10-20 ENCOUNTER — TRANSFERRED RECORDS (OUTPATIENT)
Dept: HEALTH INFORMATION MANAGEMENT | Facility: CLINIC | Age: 61
End: 2021-10-20
Payer: MEDICARE

## 2021-10-22 ENCOUNTER — TELEPHONE (OUTPATIENT)
Dept: NEUROSURGERY | Facility: CLINIC | Age: 61
End: 2021-10-22

## 2021-10-22 ENCOUNTER — CARE COORDINATION (OUTPATIENT)
Dept: NEUROSURGERY | Facility: CLINIC | Age: 61
End: 2021-10-22

## 2021-10-22 DIAGNOSIS — G90.529 COMPLEX REGIONAL PAIN SYNDROME TYPE 1 OF LOWER EXTREMITY, UNSPECIFIED LATERALITY: Primary | ICD-10-CM

## 2021-10-22 NOTE — TELEPHONE ENCOUNTER
Writer contacted [facility] to be pushed to Pacs.     MR Lumbar Spine 10/14/21   XR Lumbar Spine 1014/21     Writer had imaging resolved to MRN.   Confirmed reports in Care Everywhere.     IRAIDA Ayon DNP notified imaging received for review.     Bri Shirley LPN  Neurosurgery.

## 2021-10-22 NOTE — PROGRESS NOTES
Writer returned call to patient.     Referral to Neurosurgery:   Jay Jay Cerna MD  [facility] Neurology     (Primary Dx); Complex regional pain syndrome type 1 of left lower extremity    Lumbosacral spondylosis with radiculopathy     Bri Shirley LPN  Neurosurgery

## 2021-10-22 NOTE — TELEPHONE ENCOUNTER
[facility] Call Center    Phone Message    May a detailed message be left on voicemail: yes     Reason for Call: Other: Patient is requesting a call back to schedule an appointment, patient had knee surgery back in 6/21 and have had numbness since in toes and leg, please call patient at [phone] to assist.     Action Taken: Message routed to:  [facility]    Travel Screening: Not Applicable

## 2021-10-25 ENCOUNTER — TELEPHONE (OUTPATIENT)
Dept: NEUROSURGERY | Facility: CLINIC | Age: 61
End: 2021-10-25

## 2021-10-25 NOTE — TELEPHONE ENCOUNTER
[facility] Call Center    Phone Message    May a detailed message be left on voicemail: yes     Reason for Call: Other: Patient is waiting to hear back  from Dr. Ford to discuss health concerns, please call patient at [phone] to discuss and advise.     Action Taken: Message routed to:  [facility]    Travel Screening: Not Applicable

## 2021-10-25 NOTE — TELEPHONE ENCOUNTER
Attn: Apryl Ayon DNP   Please review and follow up with patient regarding appointment with Dr. Ford.     Bri Shirley LPN  Neurosurgery

## 2021-10-26 ENCOUNTER — DOCUMENTATION ONLY (OUTPATIENT)
Dept: NEUROSURGERY | Facility: CLINIC | Age: 61
End: 2021-10-26

## 2021-10-26 NOTE — PROGRESS NOTES
Dr Ford reviwed her MRI lumbar from 10/20/21   Dr Ford feels that she is nonsurgical at this time.   Okay for evaluation with ANGELY.  She has seen benefit from RFAs that helped her back pain   Will review and determine further treatment plan.     Apryl Ayon DNP  Neurosurgery Nurse Practitioner  [facility]  [phone]

## 2021-10-27 ENCOUNTER — OFFICE VISIT (OUTPATIENT)
Dept: NEUROSURGERY | Facility: CLINIC | Age: 61
End: 2021-10-27
Payer: MEDICARE

## 2021-10-27 VITALS
HEART RATE: 92 BPM | RESPIRATION RATE: 16 BRPM | SYSTOLIC BLOOD PRESSURE: 121 MMHG | WEIGHT: 131 LBS | BODY MASS INDEX: 21.63 KG/M2 | DIASTOLIC BLOOD PRESSURE: 74 MMHG | OXYGEN SATURATION: 100 %

## 2021-10-27 DIAGNOSIS — M79.2 NERVE PAIN: ICD-10-CM

## 2021-10-27 DIAGNOSIS — M47.816 LUMBAR FACET ARTHROPATHY: ICD-10-CM

## 2021-10-27 DIAGNOSIS — M43.06 LUMBAR SPONDYLOLYSIS: Primary | ICD-10-CM

## 2021-10-27 PROCEDURE — 99214 OFFICE O/P EST MOD 30 MIN: CPT | Performed by: NURSE PRACTITIONER

## 2021-10-27 RX ORDER — ACETAMINOPHEN 500 MG
1000 TABLET ORAL
COMMUNITY
End: 2022-02-10

## 2021-10-27 RX ORDER — GABAPENTIN 250 MG/5ML
SOLUTION ORAL
COMMUNITY
Start: 2021-09-23 | End: 2022-07-28

## 2021-10-27 RX ORDER — NALOXONE HYDROCHLORIDE 4 MG/.1ML
SPRAY NASAL
COMMUNITY
Start: 2021-07-30 | End: 2022-02-10

## 2021-10-27 RX ORDER — TRAMADOL HYDROCHLORIDE 50 MG/1
50 TABLET ORAL
COMMUNITY
Start: 2021-05-24 | End: 2021-12-08

## 2021-10-27 RX ORDER — METRONIDAZOLE 500 MG/1
TABLET ORAL
COMMUNITY
Start: 2021-10-16 | End: 2023-02-23

## 2021-10-27 RX ORDER — CROMOLYN SODIUM 100 MG/5ML
SOLUTION, CONCENTRATE ORAL
COMMUNITY
Start: 2021-06-14 | End: 2022-02-10

## 2021-10-27 RX ORDER — ESTRADIOL 0.1 MG/G
1 CREAM VAGINAL
COMMUNITY
Start: 2021-08-11

## 2021-10-27 RX ORDER — CLONIDINE HYDROCHLORIDE 0.1 MG/1
TABLET ORAL
COMMUNITY
Start: 2021-07-30 | End: 2021-12-08

## 2021-10-27 RX ORDER — LEVOCARNITINE 330 MG/1
330 TABLET ORAL
COMMUNITY
Start: 2021-06-08 | End: 2022-07-28

## 2021-10-27 RX ORDER — ZOLPIDEM TARTRATE 10 MG/1
5 TABLET ORAL AT BEDTIME
COMMUNITY
Start: 2021-10-05

## 2021-10-27 NOTE — LETTER
10/27/2021       RE: Kelsy Morley  [address]  [facility] [address]     Dear Colleague,    Thank you for referring your patient, Kelsy Morley, to the [facility] NEUROSURGERY CLINIC [place] at [facility]. Please see a copy of my visit note below.    NEUROSURGERY CLINIC NOTE     Reason for Visit:            Right lower back pain and Right leg numbness and pain     History of Presenting Illness:   Kelsy Morley is a 60 year old female with Omayra-Danlos, mitochondrial metabolic dysfunction disease, history of Lyme disease, mast cell disorder, dysautonomia, low back pain now acceptable after L4-S1 RFA, being seen for evaluation of right leg pain/symptoms.    Patient underwent Right TKA June w/Dr. Doherty   She had a quite a bit of difficulty w/pain management while inpatient post-op  She was on oxycodone and Dilaudid   Dx'd w/CRPS right knee.   Dr. Doherty didn't feel that she has CRPS  She follows with pain clinic w/Dr. Lowe.   She was c/o right leg pain, and numbness down her right leg.   She started to see relief of pain at 7-8 weeks out, but the numbness continued,   And numbness into the groin   She did undergo sympathetic nerve block (x 9) with the [facility]   She also developed lymphedema - and doing wrapping.   She is most concerned about Right Leg numbness that starts in her lower back   And right buttock and then down right lateral thigh and down to foot/toes.   She states that her labia is numb.   When she urinates she is not able to feel urine, and has had some incontinence.   She is scheduled to see a CRPS specialist in November.   She has benefited greatly from RFA in the past.   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 Cerna at [facility] who referred her to Dr. Ryan  (neurosurgery)   But unfortunately Dr. Ryan was not able to see her.      EMG nerve study of Right leg   with [facility], Dr. Fausto Balbuena 10/19/2021:  Conclusion:  1. This is an abnormal study.   2. There is electrodiagnostic evidence of acute denervation in the lumbar paraspinal muscles and the right   vastus lateralis-these findings are consistent with acute denervation of the right L4 or possibly L3 nerve roots,  which could be due to compressive pathology, or possibly r/t spinal block for anesthesia for her right TKA.  3. There is no electrodiagnostic evidence of lumbosacral plexopathy, focal sciatic, femoral, peroneal, tibial or  Sural neuropathy, or generalized polyneuropathy affecting the right lower extremity.        Current Outpatient Medications   Medication     acetaminophen (TYLENOL) 500 MG tablet     ascorbic acid (VITAMIN C) 1000 MG TABS     calcium-magnesium (CALMAG) 500-250 MG TABS per tablet     cetirizine HCl 10 MG CAPS     cloNIDine (CATAPRES) 0.1 MG tablet     cromolyn (GASTROCROM) 100 MG/5ML (HIGH CONC) solution     diclofenac (VOLTAREN) 1 % topical gel        Not Helpful      EMGALITY 120 MG/ML injection     estradiol (ESTRACE) 0.1 MG/GM vaginal cream     famotidine (PEPCID) 40 MG tablet     fexofenadine (ALLEGRA) 60 MG tablet     gabapentin (NEURONTIN) 250 MG/5ML solution      At Night      ipratropium (ATROVENT) 0.06 % nasal spray     levOCARNitine (CARNITOR) 330 MG tablet     LEVOTHYROXINE SODIUM PO     lidocaine (LIDODERM) 5 % patch             For Back      lisdexamfetamine (VYVANSE) 40 MG capsule     Magic Mouthwash (FV std formula) lidocaine visc 2% 2.5mL/5mL & maalox/mylanta w/ simeth 2.5mL/5mL & diphenhydrAMINE 5mg/5mL     MAGNESIUM OXIDE PO     metroNIDAZOLE (FLAGYL) 500 MG tablet     montelukast (SINGULAIR) 10 MG tablet     mupirocin (BACTROBAN) 2 % external ointment     prazosin (MINIPRESS) 5 MG capsule     Probiotic Product (PROBIOTIC DAILY) CAPS     Riboflavin (VITAMIN  B-2 PO)     sodium chloride 1 GM tablet     SUMAtriptan Succinate (IMITREX PO)     tiZANidine (ZANAFLEX) 2 MG tablet            2 x /day initially, now At bedtime -     traMADol-acetaminophen (ULTRACETTE) 37.5-325 MG tablet         2 at bedtime      TRAZODONE HCL PO     valACYclovir (VALTREX) 500 MG tablet     VITAMIN D, CHOLECALCIFEROL, PO     zinc gluconate 50 MG tablet     zolpidem (AMBIEN) 10 MG tablet     cholestyramine (QUESTRAN) 4 g packet     omalizumab (XOLAIR) 150 MG injection       Examination:   There were no vitals taken for this visit.    Neurological Assessment:      General    Alert, cooperative.  No acute distress  Pulmonary:   Breathing comfortably on room air. No cough, or shortness of breath  Skin:    Visible skin without lesions or obvious rash  Speech is fluent  Maintains eye contact  Musculoskeletal:    Moving extremities freely with good strength  Lower extremity strength testing intact at 5/5  Including dorsiflexion, plantarflexion, and EHL  Negative pelvic compression test  Negative thigh thrust   No tenderness to palpation of Sciatic notch  Able to walk on heels /toes  Good lumbar flexion/extension   Negative straight leg raise bilaterally   Negative JOSE CARLOS bilaterally      IMAGING:  EXAM: MR LUMBAR SPINE W/WO IV CONT   LOCATION: [facility]   DATE/TIME: 10/20/2021 7:35 AM     FINDINGS:   Nomenclature is based on 5 lumbar type vertebral bodies. Unchanged lumbar alignment including 5 mm anterolisthesis at L4-L5 and minimal anterolisthesis at L3-L4. Preserved vertebral body heights. Minor Modic type one endplate edema at L4-L5 and mixed Modic type one and Modic type II endplate signal changes at L5-S1. Normal distal spinal cord and cauda equina with conus medullaris at L1-L2. No extraspinal abnormality. Unremarkable visualized bony pelvis.     T12-L1: Normal disc height and signal. No herniation. Normal facets. No spinal canal or neural foraminal stenosis.     L1-L2: Normal disc height  and signal. No herniation. Normal facets. No spinal canal or neural foraminal stenosis.     L2-L3: Preserved disc height and signal. Slight annular bulge. Mild facet arthropathy. No spinal canal or neural foraminal stenosis.     L3-L4: Mild loss of disc at and signal. Circumferential disc bulge with asymmetric left foraminal and lateral disc osteophyte complex. Moderate facet arthropathy and ligamentum flavum thickening. Mild trefoil type spinal canal stenosis with asymmetric narrowing of the left lateral recess. There is effacement inferior neural foramina. Minimal right neural foraminal stenosis. Mild left neural foraminal stenosis.     L4-L5: Moderate loss of disc height and signal. Grade 1 anterolisthesis with unroofing the disc. Circumferential disc bulge. Moderate to advanced facet arthropathy and ligamentum flavum thickening. Moderate trefoil type spinal canal stenosis. Mild bilateral neural foraminal stenosis.     L5-S1: Advanced loss of disc height and signal. Circumferential disc osteophyte complex. Moderate bilateral facet arthropathy. Mild narrowing of both lateral recesses without central spinal canal stenosis. Mild bilateral neural foraminal stenosis.     IMPRESSION:   1.  At L4-L5, moderate trefoil type spinal canal stenosis and mild bilateral neural foraminal stenosis.   2.  At L5-S1, mild narrowing of both lateral recesses and mild bilateral neural foraminal stenosis.   3.  At L3-L4, mild trefoil type spinal canal stenosis and mild left neural foraminal stenosis.     Date: 10/20/21     EXAM: XR LUMBAR SPINE W FLEXION /EXTENSION   LOCATION: [facility]   DATE/TIME: 10/20/2021 8:07 AM     IMPRESSION: 5 lumbar type vertebra. Grade 1 anterolisthesis at L4-L5 and minor anterolisthesis at L3-L4. Preserved vertebral body heights without evidence for fracture. 5 mm dynamic subluxation at L3-L4 and 7 mm dynamic subluxation at L4-L5 between flexion and extension positioning suggesting dynamic instability  at these levels. Lower lumbar spondylosis with loss of disc height, endplate spurring, and facet arthropathy from L3 through S1.    Assessment:  ~Right leg pain and numbness  ~Right L4 active denervation       Plan:   ~Dr. Patricia Ford reviewed patient's MR of Lumbar spine and case history   and does not recommend neurosurgical intervention   ~She has upcoming w/Dr. Lomax in Neurology to follow up on EMG study findings  ~Will review case history w/Dr. Perales for continued RFA and pain management.   ~Will check w/Dr. Radha Bo regarding Mitochondrial myopathy management   ~Has upcoming appointment w/Jessica, for diagnosis of CRPS     At the end of the visit, all the patient's questions and concerns had been addressed and the patient was agreeable with the plan of care as outlined above. The patient has our office contact information at [phone], and knows to call with any questions, concerns, or changes in condition.       Apryl Ayon DNP  Neurosurgery Nurse Practitioner  [facility] [place]  [phone]        Again, thank you for allowing me to participate in the care of your patient.      Sincerely,    Apryl Ayon, JOHN CNP

## 2021-10-27 NOTE — PROGRESS NOTES
NEUROSURGERY CLINIC NOTE     Reason for Visit:            Right lower back pain and Right leg numbness and pain     History of Presenting Illness:   Kelsy Morley is a 60 year old female with Omayra-Danlos, mitochondrial metabolic dysfunction disease, history of Lyme disease, mast cell disorder, dysautonomia, low back pain now acceptable after L4-S1 RFA, being seen for evaluation of right leg pain/symptoms.    Patient underwent Right TKA June w/Dr. Doherty   She had a quite a bit of difficulty w/pain management while inpatient post-op  She was on oxycodone and Dilaudid   Dx'd w/CRPS right knee.   Dr. Doherty didn't feel that she has CRPS  She follows with pain clinic w/Dr. Lowe.   She was c/o right leg pain, and numbness down her right leg.   She started to see relief of pain at 7-8 weeks out, but the numbness continued,   And numbness into the groin   She did undergo sympathetic nerve block (x 9) with the [facility]   She also developed lymphedema - and doing wrapping.   She is most concerned about Right Leg numbness that starts in her lower back   And right buttock and then down right lateral thigh and down to foot/toes.   She states that her labia is numb.   When she urinates she is not able to feel urine, and has had some incontinence.   She is scheduled to see a CRPS specialist in November.   She has benefited greatly from RFA in the past.   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 Cerna at [facility] who referred her to Dr. Ryan (neurosurgery)   But unfortunately Dr. Ryan was not able to see her.      EMG nerve study of Right leg   with [facility], Dr. Fausto Balbuena 10/19/2021:  Conclusion:  1. This is an abnormal study.   2. There is electrodiagnostic evidence of acute denervation in the lumbar paraspinal muscles and the right    vastus lateralis-these findings are consistent with acute denervation of the right L4 or possibly L3 nerve roots,  which could be due to compressive pathology, or possibly r/t spinal block for anesthesia for her right TKA.  3. There is no electrodiagnostic evidence of lumbosacral plexopathy, focal sciatic, femoral, peroneal, tibial or  Sural neuropathy, or generalized polyneuropathy affecting the right lower extremity.        Current Outpatient Medications   Medication     acetaminophen (TYLENOL) 500 MG tablet     ascorbic acid (VITAMIN C) 1000 MG TABS     calcium-magnesium (CALMAG) 500-250 MG TABS per tablet     cetirizine HCl 10 MG CAPS     cloNIDine (CATAPRES) 0.1 MG tablet     cromolyn (GASTROCROM) 100 MG/5ML (HIGH CONC) solution     diclofenac (VOLTAREN) 1 % topical gel        Not Helpful      EMGALITY 120 MG/ML injection     estradiol (ESTRACE) 0.1 MG/GM vaginal cream     famotidine (PEPCID) 40 MG tablet     fexofenadine (ALLEGRA) 60 MG tablet     gabapentin (NEURONTIN) 250 MG/5ML solution      At Night      ipratropium (ATROVENT) 0.06 % nasal spray     levOCARNitine (CARNITOR) 330 MG tablet     LEVOTHYROXINE SODIUM PO     lidocaine (LIDODERM) 5 % patch             For Back      lisdexamfetamine (VYVANSE) 40 MG capsule     Magic Mouthwash (FV std formula) lidocaine visc 2% 2.5mL/5mL & maalox/mylanta w/ simeth 2.5mL/5mL & diphenhydrAMINE 5mg/5mL     MAGNESIUM OXIDE PO     metroNIDAZOLE (FLAGYL) 500 MG tablet     montelukast (SINGULAIR) 10 MG tablet     mupirocin (BACTROBAN) 2 % external ointment     prazosin (MINIPRESS) 5 MG capsule     Probiotic Product (PROBIOTIC DAILY) CAPS     Riboflavin (VITAMIN B-2 PO)     sodium chloride 1 GM tablet     SUMAtriptan Succinate (IMITREX PO)     tiZANidine (ZANAFLEX) 2 MG tablet            2 x /day initially, now At bedtime -     traMADol-acetaminophen (ULTRACETTE) 37.5-325 MG tablet         2 at bedtime      TRAZODONE HCL PO     valACYclovir (VALTREX) 500 MG tablet      VITAMIN D, CHOLECALCIFEROL, PO     zinc gluconate 50 MG tablet     zolpidem (AMBIEN) 10 MG tablet     cholestyramine (QUESTRAN) 4 g packet     omalizumab (XOLAIR) 150 MG injection       Examination:   There were no vitals taken for this visit.    Neurological Assessment:      General    Alert, cooperative.  No acute distress  Pulmonary:   Breathing comfortably on room air. No cough, or shortness of breath  Skin:    Visible skin without lesions or obvious rash  Speech is fluent  Maintains eye contact  Musculoskeletal:    Moving extremities freely with good strength  Lower extremity strength testing intact at 5/5  Including dorsiflexion, plantarflexion, and EHL  Negative pelvic compression test  Negative thigh thrust   No tenderness to palpation of Sciatic notch  Able to walk on heels /toes  Good lumbar flexion/extension   Negative straight leg raise bilaterally   Negative JOSE CARLOS bilaterally      IMAGING:  EXAM: MR LUMBAR SPINE W/WO IV CONT   LOCATION: [facility]   DATE/TIME: 10/20/2021 7:35 AM     FINDINGS:   Nomenclature is based on 5 lumbar type vertebral bodies. Unchanged lumbar alignment including 5 mm anterolisthesis at L4-L5 and minimal anterolisthesis at L3-L4. Preserved vertebral body heights. Minor Modic type one endplate edema at L4-L5 and mixed Modic type one and Modic type II endplate signal changes at L5-S1. Normal distal spinal cord and cauda equina with conus medullaris at L1-L2. No extraspinal abnormality. Unremarkable visualized bony pelvis.     T12-L1: Normal disc height and signal. No herniation. Normal facets. No spinal canal or neural foraminal stenosis.     L1-L2: Normal disc height and signal. No herniation. Normal facets. No spinal canal or neural foraminal stenosis.     L2-L3: Preserved disc height and signal. Slight annular bulge. Mild facet arthropathy. No spinal canal or neural foraminal stenosis.     L3-L4: Mild loss of disc at and signal. Circumferential disc bulge with asymmetric  left foraminal and lateral disc osteophyte complex. Moderate facet arthropathy and ligamentum flavum thickening. Mild trefoil type spinal canal stenosis with asymmetric narrowing of the left lateral recess. There is effacement inferior neural foramina. Minimal right neural foraminal stenosis. Mild left neural foraminal stenosis.     L4-L5: Moderate loss of disc height and signal. Grade 1 anterolisthesis with unroofing the disc. Circumferential disc bulge. Moderate to advanced facet arthropathy and ligamentum flavum thickening. Moderate trefoil type spinal canal stenosis. Mild bilateral neural foraminal stenosis.     L5-S1: Advanced loss of disc height and signal. Circumferential disc osteophyte complex. Moderate bilateral facet arthropathy. Mild narrowing of both lateral recesses without central spinal canal stenosis. Mild bilateral neural foraminal stenosis.     IMPRESSION:   1.  At L4-L5, moderate trefoil type spinal canal stenosis and mild bilateral neural foraminal stenosis.   2.  At L5-S1, mild narrowing of both lateral recesses and mild bilateral neural foraminal stenosis.   3.  At L3-L4, mild trefoil type spinal canal stenosis and mild left neural foraminal stenosis.     Date: 10/20/21     EXAM: XR LUMBAR SPINE W FLEXION /EXTENSION   LOCATION: [facility]   DATE/TIME: 10/20/2021 8:07 AM     IMPRESSION: 5 lumbar type vertebra. Grade 1 anterolisthesis at L4-L5 and minor anterolisthesis at L3-L4. Preserved vertebral body heights without evidence for fracture. 5 mm dynamic subluxation at L3-L4 and 7 mm dynamic subluxation at L4-L5 between flexion and extension positioning suggesting dynamic instability at these levels. Lower lumbar spondylosis with loss of disc height, endplate spurring, and facet arthropathy from L3 through S1.    Assessment:  ~Right leg pain and numbness  ~Right L4 active denervation       Plan:   ~Dr. Patricia Ford reviewed patient's MR of Lumbar spine and case history   and does not  recommend neurosurgical intervention   ~She has upcoming w/Dr. Lomax in Neurology to follow up on EMG study findings  ~Will review case history w/Dr. Perales for continued RFA and pain management.   ~Will check w/Dr. Radha Bo regarding Mitochondrial myopathy management   ~Has upcoming appointment w/Jessica, for diagnosis of CRPS     At the end of the visit, all the patient's questions and concerns had been addressed and the patient was agreeable with the plan of care as outlined above. The patient has our office contact information at [phone], and knows to call with any questions, concerns, or changes in condition.       Apryl Ayon DNP  Neurosurgery Nurse Practitioner  [facility]  [phone]

## 2021-10-27 NOTE — PATIENT INSTRUCTIONS
~Dr. Patricia Ford reviewed patient's MR of Lumbar spine and case history   ~No neurosurgical intervention recommended   ~She has upcoming w/Dr. Lomax in Neurology to follow up on EMG study findings  ~Will review case history w/Dr. Perales for continued RFA and pain management.   ~Will check w/Dr. Radha Bo regarding Mitochondrial myopathy management   ~Has upcoming appointment w/Jessica, for diagnosis of CRPS       At the end of the visit, all the patient's questions and concerns had been addressed and the patient was agreeable with the plan of care as outlined above. The patient has our office contact information at [phone], and knows to call with any questions, concerns, or changes in condition.

## 2021-10-28 ENCOUNTER — TELEPHONE (OUTPATIENT)
Dept: NEUROSURGERY | Facility: CLINIC | Age: 61
End: 2021-10-28

## 2021-10-28 NOTE — TELEPHONE ENCOUNTER
Health Call Center    Phone Message    May a detailed message be left on voicemail: yes     Reason for Call: Other: Kayla calling to request a call back to discuss more numbness that she forgot to state yesterday at her visit. She stated that her whole underside of her butt is numb, not just her labia. She would like to talk to Apryl before talking to Dr. Pascual and Dr. Perales. Please call Kayla at your earliest convenience to discuss.     Action Taken: Message routed to:  [facility] ([facility] [place]    Travel Screening: Not Applicable

## 2021-10-28 NOTE — TELEPHONE ENCOUNTER
Writer returned call to patient. Patient aware Apryl Ayon in out of clinic until Monday. Patient will send Cava Grillhart message to Apryl Ayon regarding article she read regarding spinal chord injury.     Bri Shirley LPN  Neurosurgery

## 2021-12-07 ENCOUNTER — OFFICE VISIT (OUTPATIENT)
Dept: PHYSICAL MEDICINE AND REHAB | Facility: CLINIC | Age: 61
End: 2021-12-07
Payer: MEDICARE

## 2021-12-07 VITALS
SYSTOLIC BLOOD PRESSURE: 136 MMHG | RESPIRATION RATE: 16 BRPM | OXYGEN SATURATION: 100 % | DIASTOLIC BLOOD PRESSURE: 81 MMHG | HEART RATE: 99 BPM

## 2021-12-07 DIAGNOSIS — M79.18 MYALGIA, OTHER SITE: ICD-10-CM

## 2021-12-07 DIAGNOSIS — G89.29 OTHER CHRONIC PAIN: ICD-10-CM

## 2021-12-07 DIAGNOSIS — M47.817 LUMBOSACRAL SPONDYLOSIS WITHOUT MYELOPATHY: Primary | ICD-10-CM

## 2021-12-07 PROCEDURE — 99205 OFFICE O/P NEW HI 60 MIN: CPT | Performed by: PHYSICAL MEDICINE & REHABILITATION

## 2021-12-07 RX ORDER — CHOLECALCIFEROL (VITAMIN D3) 50 MCG
1 TABLET ORAL DAILY
COMMUNITY
Start: 2020-09-15

## 2021-12-07 ASSESSMENT — PAIN SCALES - GENERAL: PAINLEVEL: SEVERE PAIN (6)
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
## 2021-12-07 NOTE — NURSING NOTE
Chief Complaint   Patient presents with     Consult     Complex regional pain syndrome, lower limb     Navjot Bueno
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
## 2021-12-08 ENCOUNTER — OFFICE VISIT (OUTPATIENT)
Dept: CARDIOLOGY | Facility: CLINIC | Age: 61
End: 2021-12-08
Attending: INTERNAL MEDICINE
Payer: MEDICARE

## 2021-12-08 VITALS
SYSTOLIC BLOOD PRESSURE: 124 MMHG | WEIGHT: 133 LBS | BODY MASS INDEX: 21.96 KG/M2 | DIASTOLIC BLOOD PRESSURE: 75 MMHG | OXYGEN SATURATION: 99 % | HEART RATE: 77 BPM

## 2021-12-08 DIAGNOSIS — G90.1 DYSAUTONOMIA (H): Primary | ICD-10-CM

## 2021-12-08 DIAGNOSIS — G90.9 AUTONOMIC DYSFUNCTION: ICD-10-CM

## 2021-12-08 PROCEDURE — G0463 HOSPITAL OUTPT CLINIC VISIT: HCPCS | Mod: 25

## 2021-12-08 PROCEDURE — 93005 ELECTROCARDIOGRAM TRACING: CPT

## 2021-12-08 PROCEDURE — 99215 OFFICE O/P EST HI 40 MIN: CPT | Performed by: INTERNAL MEDICINE

## 2021-12-08 ASSESSMENT — PAIN SCALES - GENERAL: PAINLEVEL: NO PAIN (0)

## 2021-12-08 NOTE — PATIENT INSTRUCTIONS
You were seen in the Electrophysiology Clinic today by: Dr Lanier    Plan:     Medication Changes:       Labs/Tests Needed:      Follow up visit:    1 year with Dr Lanier      Further Instructions:      Your Care Team:  EP Cardiology   Telephone Number     Nurse Line  Mindy Santacruz RN  [phone]     For scheduling appts or procedures:    Pricilla Antonio   [phone]   For the Device Clinic (Pacemakers, ICDs, Loop Recorders)    During business hours: [phone]  After business hours:   [phone]- select option 4 and ask for job code [id number].     On-call cardiologist for after hours or on weekends: [phone], option #4, and ask to speak to the on-call cardiologist.     Cardiovascular Clinic:   [address] [place] [address]. [place] [address]      As always, Thank you for trusting us with your health care needs!
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
## 2021-12-08 NOTE — NURSING NOTE
Chief Complaint   Patient presents with     Follow Up     6 month f/u per pt     Vitals were taken, medications reconciled, and EKG was performed.    Scooter Ruiz, EMT  2:33 PM
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
## 2021-12-09 LAB
ATRIAL RATE - MUSE: 76 BPM
DIASTOLIC BLOOD PRESSURE - MUSE: NORMAL MMHG
INTERPRETATION ECG - MUSE: NORMAL
P AXIS - MUSE: 71 DEGREES
PR INTERVAL - MUSE: 130 MS
QRS DURATION - MUSE: 80 MS
QT - MUSE: 392 MS
QTC - MUSE: 441 MS
R AXIS - MUSE: 47 DEGREES
SYSTOLIC BLOOD PRESSURE - MUSE: NORMAL MMHG
T AXIS - MUSE: 61 DEGREES
VENTRICULAR RATE- MUSE: 76 BPM

## 2022-01-31 ENCOUNTER — MYC MEDICAL ADVICE (OUTPATIENT)
Dept: CARDIOLOGY | Facility: CLINIC | Age: 62
End: 2022-01-31
Payer: MEDICARE

## 2022-02-10 ENCOUNTER — VIRTUAL VISIT (OUTPATIENT)
Dept: CARDIOLOGY | Facility: CLINIC | Age: 62
End: 2022-02-10
Attending: INTERNAL MEDICINE
Payer: MEDICARE

## 2022-02-10 DIAGNOSIS — G90.9 AUTONOMIC DYSFUNCTION: ICD-10-CM

## 2022-02-10 DIAGNOSIS — G90.1 DYSAUTONOMIA (H): ICD-10-CM

## 2022-02-10 PROCEDURE — 99215 OFFICE O/P EST HI 40 MIN: CPT | Mod: 95 | Performed by: INTERNAL MEDICINE
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
## 2022-02-10 NOTE — NURSING NOTE
Chief Complaint   Patient presents with     Follow Up     2 month, Pt states she has not been feeling well, she fell on Dec 31 but does not know if its related to that, She has been experiencing nausea and dizziness, Her BP and HR have been all over the place, as low as 50 and as high as 158 for HR, She has had little energy and headaches, she has moments where she feels as though she could faint. She gets hot flashes/cold many times a day. She states she has increased gabapentin at night (starting around Jan 6). She states all other medications the same.      Patient denies any changes since echeck-in regarding medication and allergies and states all information entered during echeck-in remains accurate.    Pt requested a few medications be removed during echeck in.    Little Johnson, FLORINA/CMA
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
## 2022-02-10 NOTE — PATIENT INSTRUCTIONS
You were seen in the Electrophysiology Clinic today by: Dr Lanier    Plan:     Medication Changes:     HOLD- Prazosin until blood pressure issue has been stabilized      Follow up visit:    2-3 months with Dr Lanier (virtual appointment)      Further Instructions:    Increase daily fluid intake to at least 2 Liters a day, most of which should be electrolyte solutions.       Your Care Team:  EP Cardiology   Telephone Number     Nurse Line  Mindy Santacruz RN  [phone]     For scheduling appts or procedures:    Pricilla Antonio   [phone]   For the Device Clinic (Pacemakers, ICDs, Loop Recorders)    During business hours: [phone]  After business hours:   [phone]- select option 4 and ask for job code [id number].     On-call cardiologist for after hours or on weekends: [phone], option #4, and ask to speak to the on-call cardiologist.     Cardiovascular Clinic:   [address] [place] [address]. [place] [address]      As always, Thank you for trusting us with your health care needs!

## 2022-04-05 ENCOUNTER — TELEPHONE (OUTPATIENT)
Dept: INTERNAL MEDICINE | Facility: CLINIC | Age: 62
End: 2022-04-05

## 2022-04-05 NOTE — CONFIDENTIAL NOTE
"Patient was inadvertently scheduled with me despite my panel being closed. I will not be accepting new hyper-complex patients (EDS, autonomic, etc) for the foreseeable future.     Wrote and will send this letter to her.      Patient was inadvertently scheduled with me despite my panel being closed. I will not be accepting new hyper-complex patients (EDS, autonomic, etc) for the foreseeable future.     Wrote and will send this letter to her.        Kelsy JAN Morley  [address]  [facility] [address]     4/5/2022      Dear Ms. Morley,     I was looking through my upcoming schedule, and I see that we have an \"establish care\" appointment on April 20.  Unfortunately, I have found it necessary to cancel that appointment. I will not be able to accept you into my primary care panel.     My practice was supposed to be closed to new patients in early January, so the person who scheduled you on 2/4/22 did so in error.     Please accept my heartfelt apologies for this entire situation.      In the past year I have been honored to accept many patients with Omayra-Danlos syndrome, autonomic dysfunction, and mast cell problems. I also see other types of complex patients. However, my availability is so limited that it is not realistic for me to continue accepting new complex patients. Our clinic also has a serious labor shortfall, which further lessens my availability. I'm not sure when this situation will turn around.     Again, I regret needing to cancel your appointment. I hope that your current care providers will be able to continue with you.     Sincerely,              Michael Munoz MD  Internal Medicine & Pediatrics  Complex primary care  [facility] [place]      Kelsy Morley  [address]  [facility] [address]     4/5/2022      Dear Ms. Morley,     I was looking through my upcoming schedule, and I see that we have an \"establish care\" appointment on April 20.  Unfortunately, I have " found it necessary to cancel that appointment. I will not be able to accept you into my primary care panel.     My practice was supposed to be closed to new patients in early January, so the person who scheduled you on 2/4/22 did so in error.     Please accept my heartfelt apologies for this entire situation.      In the past year I have been honored to accept many patients with Omayra-Danlos syndrome, autonomic dysfunction, and mast cell problems. I also see other types of complex patients. However, my availability is so limited that it is not realistic for me to continue accepting new complex patients. Our clinic also has a serious labor shortfall, which further lessens my availability. I'm not sure when this situation will turn around.     Again, I regret needing to cancel your appointment. I hope that your current care providers will be able to continue with you.     Sincerely,              Michael Munoz MD  Internal Medicine & Pediatrics  Complex primary care  [facility] [place]

## 2022-04-05 NOTE — LETTER
"Kelsy Morley  [address] [place] [address]       4/5/2022      Dear Ms. Morley,     I was looking through my upcoming schedule, and I see that we have an \"establish care\" appointment on April 20.  Unfortunately, I have found it necessary to cancel that appointment. I will not be able to accept you into my primary care panel.     My practice was supposed to be closed to new patients in early January, so the person who scheduled you on 2/4/22 did so in error.     Please accept my heartfelt apologies for this entire situation.      In the past year I have been honored to accept many patients with Omayra-Danlos syndrome, autonomic dysfunction, and mast cell problems. I also see other types of complex patients. However, my availability is so limited that it is not realistic for me to continue accepting new complex patients. Our clinic also has a serious labor shortfall, which further lessens my availability. I'm not sure when this situation will turn around.     Again, I regret needing to cancel your appointment. I hope that your current care providers will be able to continue with you.     Sincerely,              Michael Munoz MD  Internal Medicine & Pediatrics  Complex primary care  [facility]   "

## 2022-04-09 ENCOUNTER — HEALTH MAINTENANCE LETTER (OUTPATIENT)
Age: 62
End: 2022-04-09

## 2022-05-06 NOTE — TELEPHONE ENCOUNTER
RECORDS RECEIVED FROM:Internal    DATE RECEIVED:10/28/19   NOTES STATUS DETAILS   OFFICE NOTE from referring provider    Internal 10/8/19 Self referred, Records in epic    OFFICE NOTE from other cardiologist    N/A    DISCHARGE SUMMARY from hospital    N/A    DISCHARGE REPORT from the ER   N/A    OPERATIVE REPORT    N/A    MEDICATION LIST   In process    LABS     BMP   Internal 6/20/19   CBC   Internal 6/20/19   CMP   Internal 6/26/19   Lipids   Internal 2/12/19   TSH   N/A    DIAGNOSTIC PROCEDURES     EKG   Care Everywhere 9/4/19, 2/23/19 requested    Monitor Reports   Care everywhere 11/27/18 pacer, Requested    IMAGING (DISC & REPORT)      Echo   N/A    Stress Tests   Care Everywhere 2/24/18 requested    Cath   N/A    MRI/MRA   N/A    CT/CTA   Care Everywhere 2/23/18 requested      Action 10/9/19   [facility]   -EKG 9/4/19, 2/23/18   -stress echo 2/24/18  -CTA  Chest 2/23/18   10-24: Resent request for EKGs and Act monitor to [facility]  10-24: Requested echo and stress echo from [facility]  10-24: Sent Request for CTA chest to [facility]  10-24: CTA and both echos resolved in PACS  10-26: sent ekg and act monitor strips to urgent scanning          Opioid Pregnancy And Lactation Text: These medications can lead to premature delivery and should be avoided during pregnancy. These medications are also present in breast milk in small amounts.

## 2022-07-28 ENCOUNTER — VIRTUAL VISIT (OUTPATIENT)
Dept: CARDIOLOGY | Facility: CLINIC | Age: 62
End: 2022-07-28
Attending: INTERNAL MEDICINE
Payer: MEDICARE

## 2022-07-28 DIAGNOSIS — G90.1 DYSAUTONOMIA (H): ICD-10-CM

## 2022-07-28 DIAGNOSIS — M47.816 LUMBAR FACET ARTHROPATHY: Primary | ICD-10-CM

## 2022-07-28 DIAGNOSIS — G90.9 AUTONOMIC DYSFUNCTION: ICD-10-CM

## 2022-07-28 PROCEDURE — 99215 OFFICE O/P EST HI 40 MIN: CPT | Mod: 95 | Performed by: INTERNAL MEDICINE
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
## 2022-07-28 NOTE — PROGRESS NOTES
Kayla is a 62 year old who is being evaluated via a billable video visit.      How would you like to obtain your AVS? MyChart  If the video visit is dropped, the invitation should be resent by: Text to cell phone: [phone]   Will anyone else be joining your video visit? Madina Johnson, FLORINA/CMA

## 2022-07-28 NOTE — NURSING NOTE
Chief Complaint   Patient presents with     Follow Up     6 month, pt states she had an appt with Dr. Munoz for Omayra Qureshi in April but received a call saying saying he was no longer taking new pts, she is wondering if Dr. Lanier can help her get an appt with him she does not know who else to see, pt flagged medications for removal during e check that she states she is holding mostly due to gi issues, vitals were taken at another appt recently and in chart     Patient declined individual allergy and medication review by support staff because patient denies any changes since echeck-in completion and states all information entered during echeck-in remains accurate.      Little Johnson, FLORINA/CMA

## 2022-07-28 NOTE — PATIENT INSTRUCTIONS
You were seen in the Electrophysiology Clinic today by: Dr Lanier    Plan:     Follow up visit:  6 months with Dr Lanier    Further Instructions:  Referral to Chronic Pain Management Clinic  Referral to Dr Munoz Internal Medicine      Your Care Team:  EP Cardiology   Telephone Number     Nurse Line  Mindy Santacruz RN  [phone]     For scheduling appts:   Joan    For procedures:    Pricilla Antonio   [phone]   For the Device Clinic (Pacemakers, ICDs, Loop Recorders)    During business hours: [phone]  After business hours:   [phone]- select option 4 and ask for job code [id number].     On-call cardiologist for after hours or on weekends: [phone], option #4, and ask to speak to the on-call cardiologist.     Cardiovascular Clinic:   [address] [place] [address]. [place] [address]      As always, Thank you for trusting us with your health care needs!
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
## 2022-07-29 ENCOUNTER — MYC MEDICAL ADVICE (OUTPATIENT)
Dept: CARDIOLOGY | Facility: CLINIC | Age: 62
End: 2022-07-29

## 2022-07-29 DIAGNOSIS — G71.3 MITOCHONDRIAL MYOPATHY: ICD-10-CM

## 2022-08-01 PROBLEM — G71.3 MITOCHONDRIAL MYOPATHY: Status: ACTIVE | Noted: 2022-08-01

## 2022-08-22 ASSESSMENT — ENCOUNTER SYMPTOMS
POLYPHAGIA: 0
ALTERED TEMPERATURE REGULATION: 1
NAUSEA: 1
FATIGUE: 1
DIARRHEA: 0
POOR WOUND HEALING: 1
NECK PAIN: 1
MEMORY LOSS: 0
MUSCLE CRAMPS: 1
BLOATING: 1
TINGLING: 1
PARALYSIS: 0
WEIGHT LOSS: 0
DYSURIA: 0
SKIN CHANGES: 0
POLYDIPSIA: 0
DIFFICULTY URINATING: 1
SPEECH CHANGE: 0
HEADACHES: 0
JAUNDICE: 0
BOWEL INCONTINENCE: 0
ABDOMINAL PAIN: 1
BACK PAIN: 1
ARTHRALGIAS: 1
NAIL CHANGES: 0
STIFFNESS: 1
FEVER: 0
NUMBNESS: 1
EXERCISE INTOLERANCE: 0
NIGHT SWEATS: 0
ORTHOPNEA: 0
SEIZURES: 0
TREMORS: 0
DISTURBANCES IN COORDINATION: 1
HALLUCINATIONS: 0
LOSS OF CONSCIOUSNESS: 0
WEIGHT GAIN: 0
MYALGIAS: 1
JOINT SWELLING: 1
SYNCOPE: 0
DECREASED APPETITE: 0
VOMITING: 0
INCREASED ENERGY: 0
HYPERTENSION: 0
SLEEP DISTURBANCES DUE TO BREATHING: 0
RECTAL PAIN: 0
CHILLS: 0
CONSTIPATION: 1
HEMATURIA: 0
BLOOD IN STOOL: 0
PALPITATIONS: 0
LEG PAIN: 1
HYPOTENSION: 1
DIZZINESS: 1
LIGHT-HEADEDNESS: 1
HEARTBURN: 0
FLANK PAIN: 0
WEAKNESS: 1
MUSCLE WEAKNESS: 1

## 2022-08-22 ASSESSMENT — ANXIETY QUESTIONNAIRES
7. FEELING AFRAID AS IF SOMETHING AWFUL MIGHT HAPPEN: NOT AT ALL
GAD7 TOTAL SCORE: 8
5. BEING SO RESTLESS THAT IT IS HARD TO SIT STILL: NEARLY EVERY DAY
8. IF YOU CHECKED OFF ANY PROBLEMS, HOW DIFFICULT HAVE THESE MADE IT FOR YOU TO DO YOUR WORK, TAKE CARE OF THINGS AT HOME, OR GET ALONG WITH OTHER PEOPLE?: NOT DIFFICULT AT ALL
3. WORRYING TOO MUCH ABOUT DIFFERENT THINGS: SEVERAL DAYS
4. TROUBLE RELAXING: NEARLY EVERY DAY
6. BECOMING EASILY ANNOYED OR IRRITABLE: NOT AT ALL
2. NOT BEING ABLE TO STOP OR CONTROL WORRYING: NOT AT ALL
GAD7 TOTAL SCORE: 8
1. FEELING NERVOUS, ANXIOUS, OR ON EDGE: SEVERAL DAYS
7. FEELING AFRAID AS IF SOMETHING AWFUL MIGHT HAPPEN: NOT AT ALL
IF YOU CHECKED OFF ANY PROBLEMS ON THIS QUESTIONNAIRE, HOW DIFFICULT HAVE THESE PROBLEMS MADE IT FOR YOU TO DO YOUR WORK, TAKE CARE OF THINGS AT HOME, OR GET ALONG WITH OTHER PEOPLE: NOT DIFFICULT AT ALL

## 2022-08-22 ASSESSMENT — PAIN SCALES - PAIN ENJOYMENT GENERAL ACTIVITY SCALE (PEG)
AVG_PAIN_PASTWEEK: 7
INTERFERED_GENERAL_ACTIVITY: 6
INTERFERED_ENJOYMENT_LIFE: 5
AVG_PAIN_PASTWEEK: 7
INTERFERED_ENJOYMENT_LIFE: 5
INTERFERED_GENERAL_ACTIVITY: 6
PEG_TOTALSCORE: 6
PEG_TOTALSCORE: 6

## 2022-08-26 ENCOUNTER — TELEPHONE (OUTPATIENT)
Dept: NEUROSURGERY | Facility: CLINIC | Age: 62
End: 2022-08-26

## 2022-08-26 DIAGNOSIS — M43.06 LUMBAR SPONDYLOLYSIS: Primary | ICD-10-CM

## 2022-08-29 ENCOUNTER — OFFICE VISIT (OUTPATIENT)
Dept: ANESTHESIOLOGY | Facility: CLINIC | Age: 62
End: 2022-08-29
Attending: INTERNAL MEDICINE
Payer: MEDICARE

## 2022-08-29 VITALS — SYSTOLIC BLOOD PRESSURE: 123 MMHG | OXYGEN SATURATION: 100 % | DIASTOLIC BLOOD PRESSURE: 81 MMHG | HEART RATE: 74 BPM

## 2022-08-29 DIAGNOSIS — M47.816 LUMBAR FACET ARTHROPATHY: ICD-10-CM

## 2022-08-29 PROCEDURE — 99203 OFFICE O/P NEW LOW 30 MIN: CPT | Mod: GC | Performed by: ANESTHESIOLOGY

## 2022-08-29 ASSESSMENT — PAIN SCALES - GENERAL: PAINLEVEL: MODERATE PAIN (4)

## 2022-08-29 NOTE — PROGRESS NOTES
ATTENDING ATTESTATION  I saw the patient along with the pain medicine fellow Dr. Gabriel Faith. Please see his note above for full details. I was involved in gathering history, physical examination and development of the plan of care.     ASSESSMENT AND PLAN:     Encounter Diagnosis:    Lumbago  Lumbar spondylosis  Chronic pain syndrome  ED syndrome    Kelsy Morley is a 62 year old y.o. old female who presents to the pain clinic with chronic low back pain that is worsening    I have summarized the patient s past medical history, discussed their clinical findings and the potential differential diagnosis with the patient. Significant past medical history pertinent to the patient s current condition includes multiple chronic pain disorders. She receives interventional pain management at [facility]. The differential diagnosis discussed with the patient are listed above. I have discussed anatomy and possible sources of the pain using models and/or pictures (diagrams). I have discussed multi- disciplinary pain management options withthe patient as pertaining to their case as detailed above. The pain management options we discussed included, but were not limited to the recommendations below.  I also discussed with patient the risks, benefits and alternatives to each pain management option.  All of the patient s questions and concerns were answered to the best of my ability.    RECOMMENDATIONS:     1. Recommend the patient to follow up with neurosurgery as scheduled.   2. I personally spoke with Dr. Camargo. The patient cancelled her last lumbar RFA appointment at [facility]. She is meeting with Dr. Camargo for clinic visit in September and can discuss repeat RFA at her long term pain management center.      Ofnote, the patient recently renewed her opioid agreement at the [facility] as well.         Answers for HPI/ROS submitted by the patient on 8/22/2022  DEMETRI 7 TOTAL SCORE: 8  General  Symptoms: Yes  Skin Symptoms: Yes  HENT Symptoms: No  EYE SYMPTOMS: No  HEART SYMPTOMS: Yes  LUNG SYMPTOMS: No  INTESTINAL SYMPTOMS: Yes  URINARY SYMPTOMS: Yes  GYNECOLOGIC SYMPTOMS: No  BREAST SYMPTOMS: No  SKELETAL SYMPTOMS: Yes  BLOOD SYMPTOMS: No  NERVOUS SYSTEM SYMPTOMS: Yes  MENTAL HEALTH SYMPTOMS: No  Fever: No  Loss of appetite: No  Weight loss: No  Weight gain: No  Fatigue: Yes  Night sweats: No  Chills: No  Increased stress: No  Excessive hunger: No  Excessive thirst: No  Feeling hot or cold when others believe the temperature is normal: Yes  Loss of height: No  Post-operative complications: Yes  Surgical site pain: No  Hallucinations: No  Change in or Loss of Energy: No  Hyperactivity: No  Confusion: No  Changes in hair: No  Changes in moles/birth marks: No  Itching: Yes  Rashes: Yes  Changes in nails: No  Acne: Yes  Hair in places you don't want it: No  Change in facial hair: No  Warts: No  Non-healing sores: Yes  Scarring: No  Flaking of skin: No  Color changes of hands/feet in cold : Yes  Sun sensitivity: No  Skin thickening: No  Chest pain or pressure: No  Fast or irregular heartbeat: No  Pain in legs with walking: Yes  Trouble breathing while lying down: No  Fingers or toes appear blue: Yes  High blood pressure: No  Low blood pressure: Yes  Fainting: No  Murmurs: No  Pacemaker: No  Varicose veins: Yes  Edema or swelling: Yes  Wake up at night with shortness of breath: No  Light-headedness: Yes  Exercise intolerance: No  Heart burn or indigestion: No  Nausea: Yes  Vomiting: No  Abdominal pain: Yes  Bloating: Yes  Constipation: Yes  Diarrhea: No  Blood in stool: No  Black stools: No  Rectal or Anal pain: No  Fecal incontinence: No  Yellowing of skin or eyes: No  Vomit with blood: No  Change in stools: No  Trouble holding urine or incontinence: Yes  Pain or burning: No  Trouble starting or stopping: Yes  Increased frequency of urination: No  Blood in urine: No  Decreased frequency of urination:  No  Frequent nighttime urination: No  Flank pain: No  Difficulty emptying bladder: Yes  Back pain: Yes  Muscle aches: Yes  Neck pain: Yes  Swollen joints: Yes  Joint pain: Yes  Bone pain: No  Muscle cramps: Yes  Muscle weakness: Yes  Joint stiffness: Yes  Bone fracture: No  Trouble with coordination: Yes  Dizziness or trouble with balance: Yes  Fainting or black-out spells: No  Memory loss: No  Headache: No  Seizures: No  Speech problems: No  Tingling: Yes  Tremor: No  Weakness: Yes  Difficulty walking: Yes  Paralysis: No  Numbness: Yes
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
## 2022-08-29 NOTE — PATIENT INSTRUCTIONS
Medications:     Recommendations will be written in the providers note for your Primary Care provider (OR other providers in your care team) to review and make changes to your therapies based on their discretion.         Recommended Follow up:    As Needed    To speak with a nurse, schedule/reschedule/cancel a clinic appointment, or request a medication refill call: [phone].    You can also reach us by [facility]: [email or web address]

## 2022-08-29 NOTE — NURSING NOTE
Patient presents with:  Consult: New Consult RM 10 Lower Back, both Shoulder and rt knee pain level 4/10      Moderate Pain (4)     Pain Medications     Opioid Combinations Refills Start End     traMADol-acetaminophen (ULTRACET) 37.5-325 MG tablet     9/1/2021     Sig - Route: Take 1 tablet by mouth - Oral    Class: Historical          What medications are you using for pain? Gabapentin, Tramadol, Cannibas oil    (New patients only) Have you been seen by another pain clinic/ provider? yes    (Return Patients only) What refills are you needing today? No    Expectations Need a new pain provider - not sure

## 2022-09-09 ENCOUNTER — MYC MEDICAL ADVICE (OUTPATIENT)
Dept: CARDIOLOGY | Facility: CLINIC | Age: 62
End: 2022-09-09

## 2022-09-09 DIAGNOSIS — Q79.60 EDS (EHLERS-DANLOS SYNDROME): ICD-10-CM

## 2022-09-09 DIAGNOSIS — G90.9 AUTONOMIC DYSFUNCTION: Primary | ICD-10-CM

## 2022-09-16 ENCOUNTER — ANCILLARY PROCEDURE (OUTPATIENT)
Dept: MRI IMAGING | Facility: CLINIC | Age: 62
End: 2022-09-16
Attending: NEUROLOGICAL SURGERY
Payer: MEDICARE

## 2022-09-16 DIAGNOSIS — M43.06 LUMBAR SPONDYLOLYSIS: ICD-10-CM

## 2022-09-16 PROCEDURE — G1010 CDSM STANSON: HCPCS | Performed by: RADIOLOGY

## 2022-09-16 PROCEDURE — 72148 MRI LUMBAR SPINE W/O DYE: CPT | Mod: ME | Performed by: RADIOLOGY

## 2022-09-22 ENCOUNTER — OFFICE VISIT (OUTPATIENT)
Dept: NEUROSURGERY | Facility: CLINIC | Age: 62
End: 2022-09-22
Payer: MEDICARE

## 2022-09-22 VITALS
HEART RATE: 73 BPM | BODY MASS INDEX: 22.49 KG/M2 | HEIGHT: 65 IN | WEIGHT: 135 LBS | OXYGEN SATURATION: 100 % | SYSTOLIC BLOOD PRESSURE: 127 MMHG | DIASTOLIC BLOOD PRESSURE: 80 MMHG

## 2022-09-22 DIAGNOSIS — M47.816 LUMBAR FACET ARTHROPATHY: Primary | ICD-10-CM

## 2022-09-22 PROCEDURE — 99213 OFFICE O/P EST LOW 20 MIN: CPT | Performed by: NEUROLOGICAL SURGERY

## 2022-09-22 RX ORDER — CALCIUM CARBONATE/VITAMIN D3 500-10/5ML
LIQUID (ML) ORAL PRN
COMMUNITY

## 2022-09-22 ASSESSMENT — PAIN SCALES - GENERAL: PAINLEVEL: SEVERE PAIN (6)
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
## 2022-09-22 NOTE — PATIENT INSTRUCTIONS
Please follow-up with your pain physician for lumbar radiofrequency ablations for your low back pain. Dr. Ford does not recommend surgery at this time.
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
## 2022-09-28 ENCOUNTER — TRANSFERRED RECORDS (OUTPATIENT)
Dept: HEALTH INFORMATION MANAGEMENT | Facility: CLINIC | Age: 62
End: 2022-09-28

## 2022-10-10 ENCOUNTER — HEALTH MAINTENANCE LETTER (OUTPATIENT)
Age: 62
End: 2022-10-10

## 2022-10-14 DIAGNOSIS — L50.8 CHRONIC URTICARIA: ICD-10-CM

## 2022-10-14 DIAGNOSIS — L50.1 IDIOPATHIC URTICARIA: Primary | ICD-10-CM

## 2022-10-14 RX ORDER — ALBUTEROL SULFATE 0.83 MG/ML
2.5 SOLUTION RESPIRATORY (INHALATION)
Status: CANCELLED | OUTPATIENT
Start: 2022-10-17

## 2022-10-14 RX ORDER — EPINEPHRINE 1 MG/ML
0.3 INJECTION, SOLUTION INTRAMUSCULAR; SUBCUTANEOUS EVERY 5 MIN PRN
Status: CANCELLED | OUTPATIENT
Start: 2022-10-17

## 2022-10-14 RX ORDER — METHYLPREDNISOLONE SODIUM SUCCINATE 125 MG/2ML
125 INJECTION, POWDER, LYOPHILIZED, FOR SOLUTION INTRAMUSCULAR; INTRAVENOUS
Status: CANCELLED
Start: 2022-10-17

## 2022-10-14 RX ORDER — DIPHENHYDRAMINE HYDROCHLORIDE 50 MG/ML
50 INJECTION INTRAMUSCULAR; INTRAVENOUS
Status: CANCELLED
Start: 2022-10-17

## 2022-10-14 RX ORDER — ALBUTEROL SULFATE 90 UG/1
1-2 AEROSOL, METERED RESPIRATORY (INHALATION)
Status: CANCELLED
Start: 2022-10-17

## 2022-10-14 RX ORDER — MEPERIDINE HYDROCHLORIDE 25 MG/ML
25 INJECTION INTRAMUSCULAR; INTRAVENOUS; SUBCUTANEOUS EVERY 30 MIN PRN
Status: CANCELLED | OUTPATIENT
Start: 2022-10-17

## 2022-11-15 ENCOUNTER — INFUSION THERAPY VISIT (OUTPATIENT)
Dept: INFUSION THERAPY | Facility: CLINIC | Age: 62
End: 2022-11-15
Payer: MEDICARE

## 2022-11-15 VITALS
OXYGEN SATURATION: 99 % | DIASTOLIC BLOOD PRESSURE: 70 MMHG | HEART RATE: 80 BPM | SYSTOLIC BLOOD PRESSURE: 102 MMHG | RESPIRATION RATE: 16 BRPM | TEMPERATURE: 98.1 F

## 2022-11-15 DIAGNOSIS — L50.1 IDIOPATHIC URTICARIA: Primary | ICD-10-CM

## 2022-11-15 DIAGNOSIS — L50.8 CHRONIC URTICARIA: ICD-10-CM

## 2022-11-15 PROCEDURE — 250N000011 HC RX IP 250 OP 636: Performed by: ALLERGY & IMMUNOLOGY

## 2022-11-15 PROCEDURE — 96372 THER/PROPH/DIAG INJ SC/IM: CPT | Performed by: ALLERGY & IMMUNOLOGY

## 2022-11-15 RX ORDER — MEPERIDINE HYDROCHLORIDE 50 MG/ML
25 INJECTION INTRAMUSCULAR; INTRAVENOUS; SUBCUTANEOUS EVERY 30 MIN PRN
Status: CANCELLED | OUTPATIENT
Start: 2022-12-13

## 2022-11-15 RX ORDER — ALBUTEROL SULFATE 90 UG/1
1-2 AEROSOL, METERED RESPIRATORY (INHALATION)
Status: CANCELLED
Start: 2022-12-13

## 2022-11-15 RX ORDER — DIPHENHYDRAMINE HYDROCHLORIDE 50 MG/ML
50 INJECTION INTRAMUSCULAR; INTRAVENOUS
Status: CANCELLED
Start: 2022-12-13

## 2022-11-15 RX ORDER — EPINEPHRINE 1 MG/ML
0.3 INJECTION, SOLUTION INTRAMUSCULAR; SUBCUTANEOUS EVERY 5 MIN PRN
Status: DISCONTINUED | OUTPATIENT
Start: 2022-11-15 | End: 2022-11-15 | Stop reason: HOSPADM

## 2022-11-15 RX ORDER — GABAPENTIN 300 MG/1
300 CAPSULE ORAL DAILY
COMMUNITY
Start: 2022-06-14 | End: 2024-08-20

## 2022-11-15 RX ORDER — MEPERIDINE HYDROCHLORIDE 50 MG/ML
25 INJECTION INTRAMUSCULAR; INTRAVENOUS; SUBCUTANEOUS EVERY 30 MIN PRN
Status: DISCONTINUED | OUTPATIENT
Start: 2022-11-15 | End: 2022-11-15 | Stop reason: HOSPADM

## 2022-11-15 RX ORDER — ALBUTEROL SULFATE 90 UG/1
1-2 AEROSOL, METERED RESPIRATORY (INHALATION)
Status: DISCONTINUED | OUTPATIENT
Start: 2022-11-15 | End: 2022-11-15 | Stop reason: HOSPADM

## 2022-11-15 RX ORDER — EPINEPHRINE 1 MG/ML
0.3 INJECTION, SOLUTION INTRAMUSCULAR; SUBCUTANEOUS EVERY 5 MIN PRN
Status: CANCELLED | OUTPATIENT
Start: 2022-12-13

## 2022-11-15 RX ORDER — METHYLPREDNISOLONE SODIUM SUCCINATE 125 MG/2ML
125 INJECTION, POWDER, LYOPHILIZED, FOR SOLUTION INTRAMUSCULAR; INTRAVENOUS
Status: DISCONTINUED | OUTPATIENT
Start: 2022-11-15 | End: 2022-11-15 | Stop reason: HOSPADM

## 2022-11-15 RX ORDER — ALBUTEROL SULFATE 0.83 MG/ML
2.5 SOLUTION RESPIRATORY (INHALATION)
Status: DISCONTINUED | OUTPATIENT
Start: 2022-11-15 | End: 2022-11-15 | Stop reason: HOSPADM

## 2022-11-15 RX ORDER — ALBUTEROL SULFATE 0.83 MG/ML
2.5 SOLUTION RESPIRATORY (INHALATION)
Status: CANCELLED | OUTPATIENT
Start: 2022-12-13

## 2022-11-15 RX ORDER — DIPHENHYDRAMINE HYDROCHLORIDE 50 MG/ML
50 INJECTION INTRAMUSCULAR; INTRAVENOUS
Status: DISCONTINUED | OUTPATIENT
Start: 2022-11-15 | End: 2022-11-15 | Stop reason: HOSPADM

## 2022-11-15 RX ORDER — METHYLPREDNISOLONE SODIUM SUCCINATE 125 MG/2ML
125 INJECTION, POWDER, LYOPHILIZED, FOR SOLUTION INTRAMUSCULAR; INTRAVENOUS
Status: CANCELLED
Start: 2022-12-13

## 2022-11-15 RX ADMIN — OMALIZUMAB 300 MG: 150 INJECTION, SOLUTION SUBCUTANEOUS at 12:40

## 2022-11-15 NOTE — PROGRESS NOTES
Infusion Nursing Note:  Kelsy Morley presents today for Xolair injection.    Patient seen by provider today: No   present during visit today: Not Applicable.    Note: Kayla presents to infusion center today for Xolair injection and 2 hour observation after injection since last injection was over a year ago.  PIV placed in case it was needed for adverse reaction.  Two Xolair injections given subcutaneous in lower abdomen per patient request.  She tolerated well.  No s/sx of adverse reaction after 2 hours. PIV removed and patient discharged.  She will return in 4 weeks for next injection.    Intravenous Access:  Peripheral IV placed.    Treatment Conditions:  Not Applicable.    Post Infusion Assessment:  Patient tolerated injection Xolair  Patient observed for 2 hours post Xolair per protocol.  Site patent and intact, free from redness, edema or discomfort.  No evidence of extravasations.  Access discontinued per protocol.     Discharge Plan:   Patient and/or family verbalized understanding of discharge instructions and all questions answered.  AVS to patient via [employer].  Patient will return in 4 weeks for next appointment.   Patient discharged in stable condition accompanied by: self.  Departure Mode: Ambulatory.      Billie Zepeda RN

## 2022-12-13 ENCOUNTER — INFUSION THERAPY VISIT (OUTPATIENT)
Dept: INFUSION THERAPY | Facility: CLINIC | Age: 62
End: 2022-12-13
Attending: FAMILY MEDICINE
Payer: MEDICARE

## 2022-12-13 VITALS — RESPIRATION RATE: 18 BRPM | SYSTOLIC BLOOD PRESSURE: 124 MMHG | DIASTOLIC BLOOD PRESSURE: 82 MMHG | TEMPERATURE: 97.9 F

## 2022-12-13 DIAGNOSIS — L50.8 CHRONIC URTICARIA: ICD-10-CM

## 2022-12-13 DIAGNOSIS — L50.1 IDIOPATHIC URTICARIA: Primary | ICD-10-CM

## 2022-12-13 PROCEDURE — 250N000011 HC RX IP 250 OP 636: Performed by: ALLERGY & IMMUNOLOGY

## 2022-12-13 PROCEDURE — 96372 THER/PROPH/DIAG INJ SC/IM: CPT | Performed by: ALLERGY & IMMUNOLOGY

## 2022-12-13 RX ORDER — METHYLPREDNISOLONE SODIUM SUCCINATE 125 MG/2ML
125 INJECTION, POWDER, LYOPHILIZED, FOR SOLUTION INTRAMUSCULAR; INTRAVENOUS
Status: CANCELLED
Start: 2023-01-10

## 2022-12-13 RX ORDER — MEPERIDINE HYDROCHLORIDE 50 MG/ML
25 INJECTION INTRAMUSCULAR; INTRAVENOUS; SUBCUTANEOUS EVERY 30 MIN PRN
Status: CANCELLED | OUTPATIENT
Start: 2023-01-10

## 2022-12-13 RX ORDER — DIPHENHYDRAMINE HYDROCHLORIDE 50 MG/ML
50 INJECTION INTRAMUSCULAR; INTRAVENOUS
Status: CANCELLED
Start: 2023-01-10

## 2022-12-13 RX ORDER — ALBUTEROL SULFATE 90 UG/1
1-2 AEROSOL, METERED RESPIRATORY (INHALATION)
Status: CANCELLED
Start: 2023-01-10

## 2022-12-13 RX ORDER — EPINEPHRINE 1 MG/ML
0.3 INJECTION, SOLUTION INTRAMUSCULAR; SUBCUTANEOUS EVERY 5 MIN PRN
Status: CANCELLED | OUTPATIENT
Start: 2023-01-10

## 2022-12-13 RX ORDER — ALBUTEROL SULFATE 0.83 MG/ML
2.5 SOLUTION RESPIRATORY (INHALATION)
Status: CANCELLED | OUTPATIENT
Start: 2023-01-10

## 2022-12-13 RX ADMIN — OMALIZUMAB 300 MG: 150 INJECTION, SOLUTION SUBCUTANEOUS at 10:04

## 2022-12-13 NOTE — PROGRESS NOTES
Kayla was here today for injection x 2 to left and right lower abdomen. Pt tolerated injections well. Pt stayed for 30 minute post injection observation.      Danni Vo CMA

## 2023-01-06 ENCOUNTER — TELEPHONE (OUTPATIENT)
Dept: CARDIOLOGY | Facility: CLINIC | Age: 63
End: 2023-01-06

## 2023-01-06 NOTE — TELEPHONE ENCOUNTER
[facility] Call Center    Phone Message    May a detailed message be left on voicemail: no     Reason for Call: Other: Kayla called to advise she will be having shoulder surgery 3 days prior to her appointment with Dr. Lanier and she would like to request a video visit for the 2/7/23 appointment at 11 am. Please reach out to Kayla at [phone].     Action Taken: Message routed to:  [facility] ([facility] Cardiology    Travel Screening: Not Applicable

## 2023-01-13 ENCOUNTER — INFUSION THERAPY VISIT (OUTPATIENT)
Dept: INFUSION THERAPY | Facility: CLINIC | Age: 63
End: 2023-01-13
Payer: MEDICARE

## 2023-01-13 VITALS
SYSTOLIC BLOOD PRESSURE: 109 MMHG | RESPIRATION RATE: 18 BRPM | HEART RATE: 77 BPM | OXYGEN SATURATION: 100 % | DIASTOLIC BLOOD PRESSURE: 69 MMHG

## 2023-01-13 DIAGNOSIS — L50.8 CHRONIC URTICARIA: ICD-10-CM

## 2023-01-13 DIAGNOSIS — L50.1 IDIOPATHIC URTICARIA: Primary | ICD-10-CM

## 2023-01-13 PROCEDURE — 250N000011 HC RX IP 250 OP 636: Performed by: ALLERGY & IMMUNOLOGY

## 2023-01-13 PROCEDURE — 96372 THER/PROPH/DIAG INJ SC/IM: CPT | Performed by: ALLERGY & IMMUNOLOGY

## 2023-01-13 RX ORDER — DIPHENHYDRAMINE HYDROCHLORIDE 50 MG/ML
50 INJECTION INTRAMUSCULAR; INTRAVENOUS
Status: CANCELLED
Start: 2023-02-07

## 2023-01-13 RX ORDER — ALBUTEROL SULFATE 0.83 MG/ML
2.5 SOLUTION RESPIRATORY (INHALATION)
Status: CANCELLED | OUTPATIENT
Start: 2023-02-07

## 2023-01-13 RX ORDER — ALBUTEROL SULFATE 90 UG/1
1-2 AEROSOL, METERED RESPIRATORY (INHALATION)
Status: CANCELLED
Start: 2023-02-07

## 2023-01-13 RX ORDER — EPINEPHRINE 1 MG/ML
0.3 INJECTION, SOLUTION INTRAMUSCULAR; SUBCUTANEOUS EVERY 5 MIN PRN
Status: CANCELLED | OUTPATIENT
Start: 2023-02-07

## 2023-01-13 RX ORDER — MEPERIDINE HYDROCHLORIDE 50 MG/ML
25 INJECTION INTRAMUSCULAR; INTRAVENOUS; SUBCUTANEOUS EVERY 30 MIN PRN
Status: CANCELLED | OUTPATIENT
Start: 2023-02-07

## 2023-01-13 RX ORDER — METHYLPREDNISOLONE SODIUM SUCCINATE 125 MG/2ML
125 INJECTION, POWDER, LYOPHILIZED, FOR SOLUTION INTRAMUSCULAR; INTRAVENOUS
Status: CANCELLED
Start: 2023-02-07

## 2023-01-13 RX ADMIN — OMALIZUMAB 300 MG: 150 INJECTION, SOLUTION SUBCUTANEOUS at 09:18

## 2023-01-13 NOTE — PROGRESS NOTES
Kayla was here today for injections x 2 to lower right and left abdomen.  Pt tolerated injections well and stayed 30 minutes for observation.      Danni Vo CMA

## 2023-02-13 ENCOUNTER — INFUSION THERAPY VISIT (OUTPATIENT)
Dept: INFUSION THERAPY | Facility: CLINIC | Age: 63
End: 2023-02-13
Payer: MEDICARE

## 2023-02-13 VITALS
DIASTOLIC BLOOD PRESSURE: 86 MMHG | SYSTOLIC BLOOD PRESSURE: 127 MMHG | HEART RATE: 94 BPM | RESPIRATION RATE: 16 BRPM | TEMPERATURE: 98.1 F | OXYGEN SATURATION: 100 %

## 2023-02-13 DIAGNOSIS — L50.8 CHRONIC URTICARIA: ICD-10-CM

## 2023-02-13 DIAGNOSIS — L50.1 IDIOPATHIC URTICARIA: Primary | ICD-10-CM

## 2023-02-13 PROCEDURE — 96372 THER/PROPH/DIAG INJ SC/IM: CPT | Performed by: ALLERGY & IMMUNOLOGY

## 2023-02-13 PROCEDURE — 250N000011 HC RX IP 250 OP 636: Performed by: ALLERGY & IMMUNOLOGY

## 2023-02-13 RX ORDER — DIPHENHYDRAMINE HYDROCHLORIDE 50 MG/ML
50 INJECTION INTRAMUSCULAR; INTRAVENOUS
Status: CANCELLED
Start: 2023-03-07

## 2023-02-13 RX ORDER — METHYLPREDNISOLONE SODIUM SUCCINATE 125 MG/2ML
125 INJECTION, POWDER, LYOPHILIZED, FOR SOLUTION INTRAMUSCULAR; INTRAVENOUS
Status: CANCELLED
Start: 2023-03-07

## 2023-02-13 RX ORDER — EPINEPHRINE 1 MG/ML
0.3 INJECTION, SOLUTION INTRAMUSCULAR; SUBCUTANEOUS EVERY 5 MIN PRN
Status: CANCELLED | OUTPATIENT
Start: 2023-03-07

## 2023-02-13 RX ORDER — CROMOLYN SODIUM 5.2 MG
AEROSOL, SPRAY WITH PUMP (ML) NASAL 2 TIMES DAILY
Status: ON HOLD | COMMUNITY
End: 2024-04-15

## 2023-02-13 RX ORDER — ALBUTEROL SULFATE 90 UG/1
1-2 AEROSOL, METERED RESPIRATORY (INHALATION)
Status: CANCELLED
Start: 2023-03-07

## 2023-02-13 RX ORDER — MEPERIDINE HYDROCHLORIDE 50 MG/ML
25 INJECTION INTRAMUSCULAR; INTRAVENOUS; SUBCUTANEOUS EVERY 30 MIN PRN
Status: CANCELLED | OUTPATIENT
Start: 2023-03-07

## 2023-02-13 RX ORDER — ALBUTEROL SULFATE 0.83 MG/ML
2.5 SOLUTION RESPIRATORY (INHALATION)
Status: CANCELLED | OUTPATIENT
Start: 2023-03-07

## 2023-02-13 RX ADMIN — OMALIZUMAB 300 MG: 150 INJECTION, SOLUTION SUBCUTANEOUS at 11:58

## 2023-02-13 NOTE — PROGRESS NOTES
Infusion Nursing Note:  Kelsy Morley presents today for Xolair.    Patient seen by provider today: No   present during visit today: Not Applicable.    Note: Pt arrives to infusion today ambulatory. She recently had shoulder surgery on 01/30. She states she has been having more pain recently and has been feeling more fatigued. Denies any fever, chills, cough, sob, or feeling like she is going to pass out. Encouraged her to follow up with her doctor if her symptoms persist.     Intravenous Access:  No Intravenous access/labs at this visit.    Treatment Conditions:  Not Applicable.    Post Infusion Assessment:  Patient tolerated 2 injections without incident. One to each side of abdomen.   Patient observed for 30 minutes post Xolair per protocol.     Discharge Plan:   Patient will return 3/13 for next appointment.   Patient discharged in stable condition accompanied by: self.  Departure Mode: Ambulatory.      Sandi Sheridan RN

## 2023-02-21 ENCOUNTER — TELEPHONE (OUTPATIENT)
Dept: CARDIOLOGY | Facility: CLINIC | Age: 63
End: 2023-02-21
Payer: MEDICARE

## 2023-02-21 DIAGNOSIS — R42 DIZZINESS: ICD-10-CM

## 2023-02-21 DIAGNOSIS — R00.2 PALPITATIONS: Primary | ICD-10-CM

## 2023-02-21 NOTE — TELEPHONE ENCOUNTER
[facility] Call Center    Phone Message    May a detailed message be left on voicemail: yes     Reason for Call: Other: Pt called in and asked that kimberly Mojica follow up with her because over the past month she has been having dizziness and lighthead spells and unsure what is wrong.Please follow up with pt     Action Taken: Other: cardio    Travel Screening: Not Applicable     Thank you!  [facility]

## 2023-02-23 NOTE — TELEPHONE ENCOUNTER
Called pt, pt states about a month or so ago she started feeling 'funky' or shaky at times so she started taking her sodium tablets again. 2g in the morning. She states she doesn't monitor her bp at home but when shes in the office and checks it, its normal 110-120/70-80s.  Pt states she drinks 50-60 ounces of water and maybe 1 cup of tea or coffee a day. No new medications. Pt states she has also noticed on her apple watch that her heart rate goes into the 40s 1-2 times a day during daytime hours, she feels fine when it does this but states she will look at her watch and the rate will vary from 90 to 70 to 40 then back up to 80. She also notes that her headaches have increased some the last few months.    Advised pt to monitor her BP more often at home, also to increase her fluid intake especially with electrolyte solution, will order a 10 day biotel to be mailed to the patient and will contact her with the results unless she reaches out sooner with other questions or concerns.   Mindy Santacruz RN on 2/23/2023 at 3:43 PM

## 2023-02-28 ENCOUNTER — TRANSFERRED RECORDS (OUTPATIENT)
Dept: HEALTH INFORMATION MANAGEMENT | Facility: CLINIC | Age: 63
End: 2023-02-28
Payer: MEDICARE

## 2023-03-06 ENCOUNTER — TELEPHONE (OUTPATIENT)
Dept: CARDIOLOGY | Facility: CLINIC | Age: 63
End: 2023-03-06
Payer: MEDICARE

## 2023-03-06 NOTE — TELEPHONE ENCOUNTER
[facility] Call Center    Phone Message    May a detailed message be left on voicemail: yes     Reason for Call: Other:     Pt is calling to report that the monitor has not had good contact all day, since charging last night.  Please call back to discuss options.    Action Taken: Other: cardio    Travel Screening: Not Applicable     Thank you!  [facility]

## 2023-03-07 NOTE — TELEPHONE ENCOUNTER
Called pt back in regards to her monitor per pt she stated that she got 5 days of reading on the monitor and on the 6th day she was not able to because of error with [employer].  Per Mindy pt can return the monitor to the company so it can be downloaded.    Pt verbalized understanding

## 2023-03-20 ENCOUNTER — INFUSION THERAPY VISIT (OUTPATIENT)
Dept: INFUSION THERAPY | Facility: CLINIC | Age: 63
End: 2023-03-20
Attending: FAMILY MEDICINE
Payer: MEDICARE

## 2023-03-20 VITALS
DIASTOLIC BLOOD PRESSURE: 74 MMHG | HEART RATE: 70 BPM | TEMPERATURE: 99.6 F | RESPIRATION RATE: 16 BRPM | OXYGEN SATURATION: 97 % | SYSTOLIC BLOOD PRESSURE: 113 MMHG

## 2023-03-20 DIAGNOSIS — L50.1 IDIOPATHIC URTICARIA: Primary | ICD-10-CM

## 2023-03-20 DIAGNOSIS — L50.8 CHRONIC URTICARIA: ICD-10-CM

## 2023-03-20 PROCEDURE — 96372 THER/PROPH/DIAG INJ SC/IM: CPT | Performed by: ALLERGY & IMMUNOLOGY

## 2023-03-20 PROCEDURE — 250N000011 HC RX IP 250 OP 636: Performed by: ALLERGY & IMMUNOLOGY

## 2023-03-20 RX ORDER — MEPERIDINE HYDROCHLORIDE 50 MG/ML
25 INJECTION INTRAMUSCULAR; INTRAVENOUS; SUBCUTANEOUS EVERY 30 MIN PRN
Status: CANCELLED | OUTPATIENT
Start: 2023-04-04

## 2023-03-20 RX ORDER — EPINEPHRINE 1 MG/ML
0.3 INJECTION, SOLUTION INTRAMUSCULAR; SUBCUTANEOUS EVERY 5 MIN PRN
Status: CANCELLED | OUTPATIENT
Start: 2023-04-04

## 2023-03-20 RX ORDER — DIPHENHYDRAMINE HYDROCHLORIDE 50 MG/ML
50 INJECTION INTRAMUSCULAR; INTRAVENOUS
Status: CANCELLED
Start: 2023-04-04

## 2023-03-20 RX ORDER — METHYLPREDNISOLONE SODIUM SUCCINATE 125 MG/2ML
125 INJECTION, POWDER, LYOPHILIZED, FOR SOLUTION INTRAMUSCULAR; INTRAVENOUS
Status: CANCELLED
Start: 2023-04-04

## 2023-03-20 RX ORDER — ALBUTEROL SULFATE 90 UG/1
1-2 AEROSOL, METERED RESPIRATORY (INHALATION)
Status: CANCELLED
Start: 2023-04-04

## 2023-03-20 RX ORDER — ALBUTEROL SULFATE 0.83 MG/ML
2.5 SOLUTION RESPIRATORY (INHALATION)
Status: CANCELLED | OUTPATIENT
Start: 2023-04-04

## 2023-03-20 RX ADMIN — OMALIZUMAB 300 MG: 150 INJECTION, SOLUTION SUBCUTANEOUS at 13:54

## 2023-03-20 NOTE — PROGRESS NOTES
Infusion Nursing Note:  Kelsy Morley presents today for Xolair injections.    Patient seen by provider today: No   present during visit today: Not Applicable.    Note: N/A.    Intravenous Access:  No Intravenous access/labs at this visit.    Treatment Conditions:  Not Applicable.    Post Infusion Assessment:  Patient tolerated injections without incident.  Patient observed for 30 minutes post injections per protocol.     Discharge Plan:   Patient and/or family verbalized understanding of discharge instructions and all questions answered.  Patient discharged in stable condition accompanied by: self.  Departure Mode: Ambulatory.      Jayde Fang RN

## 2023-03-30 ENCOUNTER — OFFICE VISIT (OUTPATIENT)
Dept: CARDIOLOGY | Facility: CLINIC | Age: 63
End: 2023-03-30
Payer: MEDICARE

## 2023-03-30 VITALS
BODY MASS INDEX: 22.69 KG/M2 | OXYGEN SATURATION: 100 % | HEIGHT: 65 IN | HEART RATE: 78 BPM | WEIGHT: 136.2 LBS | DIASTOLIC BLOOD PRESSURE: 81 MMHG | SYSTOLIC BLOOD PRESSURE: 117 MMHG

## 2023-03-30 DIAGNOSIS — G90.9 AUTONOMIC DYSFUNCTION: Primary | ICD-10-CM

## 2023-03-30 DIAGNOSIS — R00.2 PALPITATIONS: ICD-10-CM

## 2023-03-30 DIAGNOSIS — Q79.60 EDS (EHLERS-DANLOS SYNDROME): ICD-10-CM

## 2023-03-30 DIAGNOSIS — R42 DIZZINESS: ICD-10-CM

## 2023-03-30 PROCEDURE — 99215 OFFICE O/P EST HI 40 MIN: CPT

## 2023-03-30 PROCEDURE — G0463 HOSPITAL OUTPT CLINIC VISIT: HCPCS

## 2023-03-30 RX ORDER — FAMOTIDINE 40 MG/1
40 TABLET, FILM COATED ORAL PRN
COMMUNITY
Start: 2021-09-28

## 2023-03-30 ASSESSMENT — PAIN SCALES - GENERAL: PAINLEVEL: NO PAIN (0)

## 2023-03-30 NOTE — NURSING NOTE
Chief Complaint   Patient presents with     Follow Up     8 mo f/u for hypotensions/palpitations  review biotbere in chart, see telephone encounter from 2/21/23         Vitals were taken, medications reconciled.    Sabrina Chacko, EMT   1:54 PM

## 2023-03-30 NOTE — LETTER
3/30/2023      RE: Kelsy Morley  [address]  [facility] [address]       Dear Colleague,    Thank you for the opportunity to participate in the care of your patient, Kelsy Morley, at the [facility] [place] [facility]. Please see a copy of my visit note below.        ELECTROPHYSIOLOGY CLINIC VISIT    Assessment/Recommendations   Assessment/Plan:    Kelsy Morley is 62 year old female with past medical history significant for Omayra danlos syndrome, mast cell activation syndrome, dysautonomia, Raynauds syndrome, chronic lyme, chronic migraine, complex regional pain syndrome of left leg and sleep disturbance.      Autonomic dysfunction  Primarily manifests as temperature dysregulation with hot and cold sensations. She reports she always has had some degree but fluctuations have become more frequent, worse in the afternoon, nighttime. Cannot find any associated triggers or things that aggravate it. Recent TSH in November within normal limits. She has not started any new medications, went through med list, no recent culprit identified. She does state in years past, the cold has triggered her symptoms. She had been reporting bradycardia and some non-specific dizziness, monitor completed prior to visit showing symptoms associated with sinus rhythm and sinus tachycardia. She has been going for two walks a day. Discussed medications likely will not improve her temperature dysregulation, midodrine or florinef could also aggravate her headaches. Encouraged continued increasing fluids and electrolyte intake and regular walks/exercise. She will plan to be seen at scheduled apt with Dr Lanier in June to reassess symptom improvement and discuss possible medication intervention, if needed, at that time.     Follow up at apt scheduled with Dr Lanier in June.      History of Present Illness/Subjective    Ms. Kelsy Morley is a 62 year old female who  "comes in today for EP follow up of autonomic dysfunction.    She originally presented with 2 years of worsening lightheadedness and near syncope symptoms. She had been having dizziness symptoms that were increasing in frequency since June 2017. In October 2017, she had 6-7 episodes of severe dizziness associated with low blood pressure (64/45). She was started on Sodium tablets with significant improvement. She does have history of Omayra Danlos and associated discomfort. She has severe nerve pain related to previous right knee surgery that is worse at night. She also has complex pain following a surgery to her left leg. Neurology has her on gabapentin for nerve pain.     Patient called in on 2/21 stating a month ago she started feeling \"shaky\" at times so she restarted her 2g sodium tablets in the morning. She has been having frequent temperature fluctuations, cycling from hot to cold. She had been drinking about 50-60 ounces of water a day. She had noticed her apple watch showed her HR in the 40's 1-2x a day during daytime hours. She didn't have any symptoms when this was occurring. Generally her HR runs from 70-90 bpm. Advised pt via [employer] to increase fluid intake with electrolyte solution, monitor her BP and complete a biotel monitor. Monitor was completed and demonstrated symptoms associated with sinus rhythm, sinus tach and short run of PSVT.     She presents today for follow up after completing the monitor. She reports since she wrote in her main concern is worsening of her temperature regulation. She has always had some capacity of cold/hot dysregulation but notes an increase in frequency recently. She has not had any more episodes of bradycardia, her BP s have been good without any episodes of hypotension. Her previous orthostatic symptoms have resolved with taking sodium tablets and increased fluids. She describes some non-specific dizziness, described it as feeling \"off\" and \"out of body\". She cannot " "identify any correlation. She denies chest discomfort, abdominal fullness/bloating or peripheral edema, shortness of breath, paroxysmal nocturnal dyspnea, orthopnea, pre-syncope, or syncope. Current cardiac medications include: None.    I have reviewed and updated the patient's Past Medical History, Social History, Family History and Medication List.     Cardiographics (Personally Reviewed) :   Biotel Monitor:   2/28-3/9/23: 6 symptom activations, 4 associated with sinus rhythm, 1 with a PVC and 1 with PSVT       Tilt table study:   11/26/2019: Findings consistent with mild autonomic dysfunction.        Physical Examination   There were no vitals taken for this visit.  Wt Readings from Last 3 Encounters:   09/22/22 61.2 kg (135 lb)   12/08/21 60.3 kg (133 lb)   10/27/21 59.4 kg (131 lb)   /81 (BP Location: Right arm, Patient Position: Sitting, Cuff Size: Adult Large)   Pulse 78   Ht 1.66 m (5' 5.35\")   Wt 61.8 kg (136 lb 3.2 oz)   SpO2 100%   BMI 22.42 kg/m      General Appearance:   Alert, well-appearing and in no acute distress.   HEENT: Atraumatic, normocephalic.  MMM.   Chest/Lungs:   Respirations unlabored.  Lungs are clear to auscultation.   Cardiovascular:   Regular rate and rhythm.  S1/S2. No murmur.    Abdomen:  Soft, nontender, nondistended.   Extremities: No cyanosis or clubbing. No edema.     Musculoskeletal: Moves all extremities.  Gait appropriate.    Skin: Warm, dry, intact.    Neurologic: Mood and affect are appropriate.  Alert and oriented to person, place, time, and situation.          Medications  Allergies   Cetirizine   Estrace   Emgality   Pepcid   Allegra   Gabapentin  Ipratropium   Vyvanse   Singulair  NaCl tablets   Trazodone  Ambien   Vitamin B12   Vitamin D  Vitamin C  Allergies   Allergen Reactions     Clonidine      Other reaction(s): Irritation At Patch Site     Diflucan [Fluconazole] Hives     Duloxetine Dizziness     Diarrhea and severe HA     Epinephrine Other (See " Comments)     Other reaction(s): Gastrointestinal, Headache  Allergy Provider has recommended no more than 0.15 mg/ml of epinephrine if it needs to be given.      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     Chlorhexidine Swelling and Rash     Liquid Adhesive Rash     EKG electrodes      No Clinical Screening - See Comments Rash     Gel from ECG electrodes     Sulfa Drugs Hives and Rash         Lab Results (Personally Reviewed)    Chemistry/lipid CBC Cardiac Enzymes/BNP/TSH/INR   Lab Results   Component Value Date    BUN 20 05/12/2020     05/12/2020    CO2 25 05/12/2020     Creatinine   Date Value Ref Range Status   05/12/2020 0.85 0.60 - 1.10 mg/dL Final   12/01/2009 0.80 0.52 - 1.04 mg/dL Final     Comment:     New IDMS-traceable calibration  beginning 5/1/08       Lab Results   Component Value Date    CHOL 193 11/01/2018    HDL 96 11/01/2018    LDL 89 11/01/2018      Lab Results   Component Value Date    WBC 4.3 06/01/2018    HGB 13.5 06/01/2018    HCT 40.2 06/01/2018    MCV 91 06/01/2018     06/01/2018    Lab Results   Component Value Date    TSH 1.44 11/01/2018    INR 0.94 02/06/2019        The patient states understanding and is agreeable with the plan.     Jackie Donahue PA-C  [facility]  Electrophysiology Consult Service  Pager: [phone]    I spent a total of 25 minutes face to face with  Kelsy Morley during today's office visit. I have spent an additional 30 minutes today on chart review and documentation.

## 2023-03-30 NOTE — PROGRESS NOTES
ELECTROPHYSIOLOGY CLINIC VISIT    Assessment/Recommendations   Assessment/Plan:    Kelsy Morley is 62 year old female with past medical history significant for Omayra danlos syndrome, mast cell activation syndrome, dysautonomia, Raynauds syndrome, chronic lyme, chronic migraine, complex regional pain syndrome of left leg and sleep disturbance.      Autonomic dysfunction  Primarily manifests as temperature dysregulation with hot and cold sensations. She reports she always has had some degree but fluctuations have become more frequent, worse in the afternoon, nighttime. Cannot find any associated triggers or things that aggravate it. Recent TSH in November within normal limits. She has not started any new medications, went through med list, no recent culprit identified. She does state in years past, the cold has triggered her symptoms. She had been reporting bradycardia and some non-specific dizziness, monitor completed prior to visit showing symptoms associated with sinus rhythm and sinus tachycardia. She has been going for two walks a day. Discussed medications likely will not improve her temperature dysregulation, midodrine or florinef could also aggravate her headaches. Encouraged continued increasing fluids and electrolyte intake and regular walks/exercise. She will plan to be seen at scheduled apt with Dr Lanier in June to reassess symptom improvement and discuss possible medication intervention, if needed, at that time.     Follow up at apt scheduled with Dr Lanier in June.      History of Present Illness/Subjective    MsClaus Morley is a 62 year old female who comes in today for EP follow up of autonomic dysfunction.    She originally presented with 2 years of worsening lightheadedness and near syncope symptoms. She had been having dizziness symptoms that were increasing in frequency since June 2017. In October 2017, she had 6-7 episodes of severe dizziness associated with low blood pressure  "(64/45). She was started on Sodium tablets with significant improvement. She does have history of Omayra Danlos and associated discomfort. She has severe nerve pain related to previous right knee surgery that is worse at night. She also has complex pain following a surgery to her left leg. Neurology has her on gabapentin for nerve pain.     Patient called in on 2/21 stating a month ago she started feeling \"shaky\" at times so she restarted her 2g sodium tablets in the morning. She has been having frequent temperature fluctuations, cycling from hot to cold. She had been drinking about 50-60 ounces of water a day. She had noticed her apple watch showed her HR in the 40's 1-2x a day during daytime hours. She didn't have any symptoms when this was occurring. Generally her HR runs from 70-90 bpm. Advised pt via [employer] to increase fluid intake with electrolyte solution, monitor her BP and complete a biotel monitor. Monitor was completed and demonstrated symptoms associated with sinus rhythm, sinus tach and short run of PSVT.     She presents today for follow up after completing the monitor. She reports since she wrote in her main concern is worsening of her temperature regulation. She has always had some capacity of cold/hot dysregulation but notes an increase in frequency recently. She has not had any more episodes of bradycardia, her BP s have been good without any episodes of hypotension. Her previous orthostatic symptoms have resolved with taking sodium tablets and increased fluids. She describes some non-specific dizziness, described it as feeling \"off\" and \"out of body\". She cannot identify any correlation. She denies chest discomfort, abdominal fullness/bloating or peripheral edema, shortness of breath, paroxysmal nocturnal dyspnea, orthopnea, pre-syncope, or syncope. Current cardiac medications include: None.    I have reviewed and updated the patient's Past Medical History, Social History, Family History and " "Medication List.     Cardiographics (Personally Reviewed) :   Sarsysel Monitor:   2/28-3/9/23: 6 symptom activations, 4 associated with sinus rhythm, 1 with a PVC and 1 with PSVT       Tilt table study:   11/26/2019: Findings consistent with mild autonomic dysfunction.        Physical Examination   There were no vitals taken for this visit.  Wt Readings from Last 3 Encounters:   09/22/22 61.2 kg (135 lb)   12/08/21 60.3 kg (133 lb)   10/27/21 59.4 kg (131 lb)   /81 (BP Location: Right arm, Patient Position: Sitting, Cuff Size: Adult Large)   Pulse 78   Ht 1.66 m (5' 5.35\")   Wt 61.8 kg (136 lb 3.2 oz)   SpO2 100%   BMI 22.42 kg/m      General Appearance:   Alert, well-appearing and in no acute distress.   HEENT: Atraumatic, normocephalic.  MMM.   Chest/Lungs:   Respirations unlabored.  Lungs are clear to auscultation.   Cardiovascular:   Regular rate and rhythm.  S1/S2. No murmur.    Abdomen:  Soft, nontender, nondistended.   Extremities: No cyanosis or clubbing. No edema.     Musculoskeletal: Moves all extremities.  Gait appropriate.    Skin: Warm, dry, intact.    Neurologic: Mood and affect are appropriate.  Alert and oriented to person, place, time, and situation.          Medications  Allergies   Cetirizine   Estrace   Emgality   Pepcid   Allegra   Gabapentin  Ipratropium   Vyvanse   Singulair  NaCl tablets   Trazodone  Ambien   Vitamin B12   Vitamin D  Vitamin C  Allergies   Allergen Reactions     Clonidine      Other reaction(s): Irritation At Patch Site     Diflucan [Fluconazole] Hives     Duloxetine Dizziness     Diarrhea and severe HA     Epinephrine Other (See Comments)     Other reaction(s): Gastrointestinal, Headache  Allergy Provider has recommended no more than 0.15 mg/ml of epinephrine if it needs to be given.      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     Chlorhexidine Swelling and Rash     Liquid Adhesive Rash     EKG electrodes      No Clinical Screening - See Comments Rash     Gel from ECG electrodes     Sulfa Drugs Hives and Rash         Lab Results (Personally Reviewed)    Chemistry/lipid CBC Cardiac Enzymes/BNP/TSH/INR   Lab Results   Component Value Date    BUN 20 05/12/2020     05/12/2020    CO2 25 05/12/2020     Creatinine   Date Value Ref Range Status   05/12/2020 0.85 0.60 - 1.10 mg/dL Final   12/01/2009 0.80 0.52 - 1.04 mg/dL Final     Comment:     New IDMS-traceable calibration  beginning 5/1/08       Lab Results   Component Value Date    CHOL 193 11/01/2018    HDL 96 11/01/2018    LDL 89 11/01/2018      Lab Results   Component Value Date    WBC 4.3 06/01/2018    HGB 13.5 06/01/2018    HCT 40.2 06/01/2018    MCV 91 06/01/2018     06/01/2018    Lab Results   Component Value Date    TSH 1.44 11/01/2018    INR 0.94 02/06/2019        The patient states understanding and is agreeable with the plan.     Jackie Donahue PA-C  [facility]  Electrophysiology Consult Service  Pager: [phone]    I spent a total of 25 minutes face to face with  Kelsy Morley during today's office visit. I have spent an additional 30 minutes today on chart review and documentation.

## 2023-03-30 NOTE — PATIENT INSTRUCTIONS
You were seen in the Electrophysiology Clinic today by: Jackie ERIC    Plan:     Follow up Visit: as previously scheduled        If you have further questions, please utilize [employer] to contact us.     Your Care Team:    EP Cardiology   Telephone Number     Nurse Line  Mindy Santacruz, RN   Paulette Henry, RN   [phone]     For scheduling appointments:   Joan   [phone]   For procedure scheduling:    Pricilla Antonio     [phone]   For the Device Clinic (Pacemakers, ICDs, Loop Recorders)    During business hours: [phone]  After business hours:   [phone]- select option 4 and ask for job code [id number].       On-call cardiologist for after hours or on weekends:   [phone], option #4, and ask to speak to the on-call cardiologist.     Cardiovascular Clinic:   [address] [place] [address]. [place] [address]      As always, Thank you for trusting us with your health care needs!

## 2023-04-17 ENCOUNTER — INFUSION THERAPY VISIT (OUTPATIENT)
Dept: INFUSION THERAPY | Facility: CLINIC | Age: 63
End: 2023-04-17
Attending: ALLERGY & IMMUNOLOGY
Payer: MEDICARE

## 2023-04-17 VITALS
TEMPERATURE: 98.1 F | OXYGEN SATURATION: 99 % | DIASTOLIC BLOOD PRESSURE: 73 MMHG | RESPIRATION RATE: 16 BRPM | SYSTOLIC BLOOD PRESSURE: 112 MMHG | HEART RATE: 75 BPM

## 2023-04-17 DIAGNOSIS — L50.1 IDIOPATHIC URTICARIA: Primary | ICD-10-CM

## 2023-04-17 DIAGNOSIS — L50.8 CHRONIC URTICARIA: ICD-10-CM

## 2023-04-17 PROCEDURE — 250N000011 HC RX IP 250 OP 636: Performed by: ALLERGY & IMMUNOLOGY

## 2023-04-17 PROCEDURE — 96372 THER/PROPH/DIAG INJ SC/IM: CPT | Performed by: ALLERGY & IMMUNOLOGY

## 2023-04-17 RX ORDER — DIPHENHYDRAMINE HYDROCHLORIDE 50 MG/ML
50 INJECTION INTRAMUSCULAR; INTRAVENOUS
Status: CANCELLED
Start: 2023-05-02

## 2023-04-17 RX ORDER — ALBUTEROL SULFATE 0.83 MG/ML
2.5 SOLUTION RESPIRATORY (INHALATION)
Status: DISCONTINUED | OUTPATIENT
Start: 2023-04-17 | End: 2023-04-17 | Stop reason: HOSPADM

## 2023-04-17 RX ORDER — MEPERIDINE HYDROCHLORIDE 50 MG/ML
25 INJECTION INTRAMUSCULAR; INTRAVENOUS; SUBCUTANEOUS EVERY 30 MIN PRN
Status: DISCONTINUED | OUTPATIENT
Start: 2023-04-17 | End: 2023-04-17 | Stop reason: HOSPADM

## 2023-04-17 RX ORDER — DIPHENHYDRAMINE HYDROCHLORIDE 50 MG/ML
50 INJECTION INTRAMUSCULAR; INTRAVENOUS
Status: DISCONTINUED | OUTPATIENT
Start: 2023-04-17 | End: 2023-04-17 | Stop reason: HOSPADM

## 2023-04-17 RX ORDER — ALBUTEROL SULFATE 90 UG/1
1-2 AEROSOL, METERED RESPIRATORY (INHALATION)
Status: CANCELLED
Start: 2023-05-02

## 2023-04-17 RX ORDER — EPINEPHRINE 1 MG/ML
0.3 INJECTION, SOLUTION INTRAMUSCULAR; SUBCUTANEOUS EVERY 5 MIN PRN
Status: DISCONTINUED | OUTPATIENT
Start: 2023-04-17 | End: 2023-04-17 | Stop reason: HOSPADM

## 2023-04-17 RX ORDER — ALBUTEROL SULFATE 0.83 MG/ML
2.5 SOLUTION RESPIRATORY (INHALATION)
Status: CANCELLED | OUTPATIENT
Start: 2023-05-02

## 2023-04-17 RX ORDER — EPINEPHRINE 1 MG/ML
0.3 INJECTION, SOLUTION INTRAMUSCULAR; SUBCUTANEOUS EVERY 5 MIN PRN
Status: CANCELLED | OUTPATIENT
Start: 2023-05-02

## 2023-04-17 RX ORDER — MEPERIDINE HYDROCHLORIDE 50 MG/ML
25 INJECTION INTRAMUSCULAR; INTRAVENOUS; SUBCUTANEOUS EVERY 30 MIN PRN
Status: CANCELLED | OUTPATIENT
Start: 2023-05-02

## 2023-04-17 RX ORDER — METHYLPREDNISOLONE SODIUM SUCCINATE 125 MG/2ML
125 INJECTION, POWDER, LYOPHILIZED, FOR SOLUTION INTRAMUSCULAR; INTRAVENOUS
Status: DISCONTINUED | OUTPATIENT
Start: 2023-04-17 | End: 2023-04-17 | Stop reason: HOSPADM

## 2023-04-17 RX ORDER — ALBUTEROL SULFATE 90 UG/1
1-2 AEROSOL, METERED RESPIRATORY (INHALATION)
Status: DISCONTINUED | OUTPATIENT
Start: 2023-04-17 | End: 2023-04-17 | Stop reason: HOSPADM

## 2023-04-17 RX ORDER — METHYLPREDNISOLONE SODIUM SUCCINATE 125 MG/2ML
125 INJECTION, POWDER, LYOPHILIZED, FOR SOLUTION INTRAMUSCULAR; INTRAVENOUS
Status: CANCELLED
Start: 2023-05-02

## 2023-04-17 RX ADMIN — OMALIZUMAB 300 MG: 150 INJECTION, SOLUTION SUBCUTANEOUS at 09:24

## 2023-04-17 NOTE — PROGRESS NOTES
Infusion Nursing Note:  Kelsy Morley presents today for Xolair injections.    Patient seen by provider today: No   present during visit today: Not Applicable.    Note: No new concerns.Patient observed for 30 minutes after injection.      Intravenous Access:  No Intravenous access/labs at this visit.    Treatment Conditions:  Not Applicable.      Post Infusion Assessment:  Patient tolerated injection without incident.  Site patent and intact, free from redness, edema or discomfort.       Discharge Plan:   Patient and/or family verbalized understanding of discharge instructions and all questions answered.      Tran Parish RN

## 2023-05-27 ENCOUNTER — HEALTH MAINTENANCE LETTER (OUTPATIENT)
Age: 63
End: 2023-05-27

## 2023-06-06 ENCOUNTER — OFFICE VISIT (OUTPATIENT)
Dept: CARDIOLOGY | Facility: CLINIC | Age: 63
End: 2023-06-06
Attending: INTERNAL MEDICINE
Payer: MEDICARE

## 2023-06-06 VITALS
HEART RATE: 84 BPM | DIASTOLIC BLOOD PRESSURE: 81 MMHG | WEIGHT: 134.3 LBS | BODY MASS INDEX: 22.11 KG/M2 | OXYGEN SATURATION: 97 % | SYSTOLIC BLOOD PRESSURE: 123 MMHG

## 2023-06-06 DIAGNOSIS — G90.1 DYSAUTONOMIA (H): ICD-10-CM

## 2023-06-06 DIAGNOSIS — G90.9 AUTONOMIC DYSFUNCTION: ICD-10-CM

## 2023-06-06 PROCEDURE — 99213 OFFICE O/P EST LOW 20 MIN: CPT | Performed by: INTERNAL MEDICINE

## 2023-06-06 PROCEDURE — G0463 HOSPITAL OUTPT CLINIC VISIT: HCPCS | Performed by: INTERNAL MEDICINE

## 2023-06-06 ASSESSMENT — PAIN SCALES - GENERAL: PAINLEVEL: NO PAIN (0)
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
## 2023-06-06 NOTE — PATIENT INSTRUCTIONS
You were seen in the Electrophysiology Clinic today by: Dr Lanier    Plan:     Follow up Visit: 1 year       If you have further questions, please utilize [employer] to contact us.     Your Care Team:    EP Cardiology   Telephone Number     Nurse Line  Mindy Santacruz, RN   Paulette Henry RN   [phone]     For scheduling appointments:   Joan   [phone]   For procedure scheduling:    Pricilla Antonio     [phone]   For the Device Clinic (Pacemakers, ICDs, Loop Recorders)    During business hours: [phone]  After business hours:   [phone]- select option 4 and ask for job code [id number].       On-call cardiologist for after hours or on weekends:   [phone], option #4, and ask to speak to the on-call cardiologist.     Cardiovascular Clinic:   [address] [place] [address]. [place] [address]      As always, Thank you for trusting us with your health care needs!

## 2023-06-06 NOTE — NURSING NOTE
Chief Complaint   Patient presents with     Follow Up     Return EP- 2 mo f/u for OH/palps     Vitals were taken and medications reconciled.    KATHERINE Parada  4:26 PM
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
## 2023-08-10 ENCOUNTER — TRANSFERRED RECORDS (OUTPATIENT)
Dept: HEALTH INFORMATION MANAGEMENT | Facility: CLINIC | Age: 63
End: 2023-08-10
Payer: MEDICARE

## 2023-08-10 ENCOUNTER — MEDICAL CORRESPONDENCE (OUTPATIENT)
Dept: HEALTH INFORMATION MANAGEMENT | Facility: CLINIC | Age: 63
End: 2023-08-10
Payer: MEDICARE

## 2023-08-11 ENCOUNTER — TELEPHONE (OUTPATIENT)
Dept: ANESTHESIOLOGY | Facility: CLINIC | Age: 63
End: 2023-08-11
Payer: MEDICARE

## 2023-08-11 NOTE — TELEPHONE ENCOUNTER
[facility] Call Center    Phone Message    May a detailed message be left on voicemail: yes     Reason for Call: Other: Patient is calling requesting to schedule an appointment. Patient was previously seen by Dr. Gabriel but is requesting to switch and was referred again by her [facility] provider to see Dr. Ortega in the [facility]. It has been just under 1 year since she was seen with us last. Please review and call patient back to let her know if another referral is needed or if she would be approved to switch to Dr. Ortega.      Action Taken: Message routed to:  [facility]    Travel Screening: Not Applicable

## 2023-08-14 ENCOUNTER — TRANSCRIBE ORDERS (OUTPATIENT)
Dept: OTHER | Age: 63
End: 2023-08-14

## 2023-08-14 DIAGNOSIS — R09.89 THROAT CLEARING: Primary | ICD-10-CM

## 2023-08-14 DIAGNOSIS — K52.81 EOSINOPHILIC ENTERITIS: ICD-10-CM

## 2023-08-14 NOTE — TELEPHONE ENCOUNTER
RN left voicemail informing patient to return call to discuss switching providers. Left call back number [phone].    Betina Fountain RNCC

## 2023-08-17 NOTE — TELEPHONE ENCOUNTER
[facility] Call Center    Phone Message    May a detailed message be left on voicemail: yes     Reason for Call: Other: Patient was returning a call regarding switching providers. Please call back when available.      Action Taken: Other: Pain    Travel Screening: Not Applicable

## 2023-08-17 NOTE — TELEPHONE ENCOUNTER
"RN spoke with patient regarding her request to change providers. Patient reports a provider at [facility] recommended Dr. Ortega due to his dual background with pain management and psychology. Patient reports her visit with Dr. Gabriel on 8/29/22 was not a good fit. She reports the visit started well with the resident who she reports was great. Once Dr. Gabriel came into the visit she said it all went downhill. Patient reports the provider told her \"It's all in your head\" and \"You just need to relax\". Patient expressed she felt like the visit was a waste of her time as provider had nothing to offer her. Writer informed of our clinic policy of being able to see one other provider within our clinic at the [facility]. Patient understood, but expressed her desire to try and establish with Dr. Ortega if possible. Writer informed a message would be sent to the leadership team to review her request and advise. Patient understood and agreed.    Betina Fountain RNCC    "

## 2023-08-21 ENCOUNTER — TELEPHONE (OUTPATIENT)
Dept: ANESTHESIOLOGY | Facility: CLINIC | Age: 63
End: 2023-08-21
Payer: MEDICARE

## 2023-08-21 ENCOUNTER — TELEPHONE (OUTPATIENT)
Dept: CARDIOLOGY | Facility: CLINIC | Age: 63
End: 2023-08-21
Payer: MEDICARE

## 2023-08-21 DIAGNOSIS — G89.29 CHRONIC PAIN: Primary | ICD-10-CM

## 2023-08-21 NOTE — TELEPHONE ENCOUNTER
RN spoke with patient to inform of approval from leadership to have a one time consult with Dr. Ortega due to having an external PCP. Patient expressed she has attempted to obtain a PCP within [place], but was rejected due to the provider not taking on anymore complex patients. Patient expressed her current PCP understands her condition and she does not want to obtain a new PCP within [place] that doesn't understand her care. Patient understood and agreed to the one time consult. Patient reports she will notify her provider within [place] to place a new referral. Writer informed a message would be sent to leadership informing of conversation. Patient appreciated the call back.    Betina Foutnain RNCC

## 2023-08-21 NOTE — TELEPHONE ENCOUNTER
[facility] Call Center    Phone Message    May a detailed message be left on voicemail: yes     Reason for Call: Other: Pt would like a call back as she needs a referral for the pain clinic for a Dr in [place], they told  her it had to be a referral for them and come from a [address] and she has seen a pain Dr but wants to take care else where and wants the referral to come from Dr. Lanier and wants to discuss before written so it can be worded correctly      Action Taken: Other: Cardio    Travel Screening: Not Applicable

## 2023-08-22 NOTE — TELEPHONE ENCOUNTER
Called pt, left voicemail for pt to return call or send mychart message in regard to pain medicine referral.   Mindy Santacruz RN on 8/22/2023 at 12:47 PM

## 2023-08-23 NOTE — TELEPHONE ENCOUNTER
[facility] Call Center    Phone Message    May a detailed message be left on voicemail: yes     Reason for Call: Other: Pt would like a call back as she missed a call from Mindy to discuss pain referral     Action Taken: Other: Cardio    Travel Screening: Not Applicable

## 2023-08-24 NOTE — TELEPHONE ENCOUNTER
Called pt, pt states she needs a referral to [facility] pain clinic at [facility], since her pcp is in [place] she is asking that Dr Lanier place the referral since he is within [facility]. Advised pt that yes we can place the referral and to let us know if she needs anything else. Patient verbalized understanding, agreed with plan and denied any further questions. Mindy Santacruz RN on 8/24/2023 at 2:18 PM

## 2023-08-25 NOTE — TELEPHONE ENCOUNTER
Suman, I noted One Time Consult in the appt notes, do you want it noted ok per you or management?

## 2023-09-28 ASSESSMENT — ANXIETY QUESTIONNAIRES
7. FEELING AFRAID AS IF SOMETHING AWFUL MIGHT HAPPEN: SEVERAL DAYS
IF YOU CHECKED OFF ANY PROBLEMS ON THIS QUESTIONNAIRE, HOW DIFFICULT HAVE THESE PROBLEMS MADE IT FOR YOU TO DO YOUR WORK, TAKE CARE OF THINGS AT HOME, OR GET ALONG WITH OTHER PEOPLE: SOMEWHAT DIFFICULT
6. BECOMING EASILY ANNOYED OR IRRITABLE: MORE THAN HALF THE DAYS
2. NOT BEING ABLE TO STOP OR CONTROL WORRYING: SEVERAL DAYS
GAD7 TOTAL SCORE: 14
GAD7 TOTAL SCORE: 14
4. TROUBLE RELAXING: NEARLY EVERY DAY
5. BEING SO RESTLESS THAT IT IS HARD TO SIT STILL: NEARLY EVERY DAY
3. WORRYING TOO MUCH ABOUT DIFFERENT THINGS: MORE THAN HALF THE DAYS
1. FEELING NERVOUS, ANXIOUS, OR ON EDGE: MORE THAN HALF THE DAYS

## 2023-09-28 ASSESSMENT — PAIN SCALES - PAIN ENJOYMENT GENERAL ACTIVITY SCALE (PEG)
INTERFERED_GENERAL_ACTIVITY: 8
INTERFERED_ENJOYMENT_LIFE: 9
PEG_TOTALSCORE: 8
AVG_PAIN_PASTWEEK: 7

## 2023-09-29 ENCOUNTER — INFUSION THERAPY VISIT (OUTPATIENT)
Dept: INFUSION THERAPY | Facility: CLINIC | Age: 63
End: 2023-09-29
Attending: ORTHOPAEDIC SURGERY
Payer: MEDICARE

## 2023-09-29 VITALS
SYSTOLIC BLOOD PRESSURE: 115 MMHG | DIASTOLIC BLOOD PRESSURE: 86 MMHG | RESPIRATION RATE: 16 BRPM | OXYGEN SATURATION: 96 % | HEART RATE: 69 BPM | TEMPERATURE: 99.2 F

## 2023-09-29 DIAGNOSIS — L50.8 CHRONIC URTICARIA: Primary | ICD-10-CM

## 2023-09-29 DIAGNOSIS — L50.1 IDIOPATHIC URTICARIA: ICD-10-CM

## 2023-09-29 PROCEDURE — 96372 THER/PROPH/DIAG INJ SC/IM: CPT | Performed by: ALLERGY & IMMUNOLOGY

## 2023-09-29 PROCEDURE — 250N000011 HC RX IP 250 OP 636: Mod: JZ | Performed by: ALLERGY & IMMUNOLOGY

## 2023-09-29 RX ORDER — METHYLPREDNISOLONE SODIUM SUCCINATE 125 MG/2ML
125 INJECTION, POWDER, LYOPHILIZED, FOR SOLUTION INTRAMUSCULAR; INTRAVENOUS
Status: DISCONTINUED | OUTPATIENT
Start: 2023-09-29 | End: 2023-09-29 | Stop reason: HOSPADM

## 2023-09-29 RX ORDER — MEPERIDINE HYDROCHLORIDE 50 MG/ML
25 INJECTION INTRAMUSCULAR; INTRAVENOUS; SUBCUTANEOUS EVERY 30 MIN PRN
Status: CANCELLED | OUTPATIENT
Start: 2023-10-02

## 2023-09-29 RX ORDER — METHYLPREDNISOLONE SODIUM SUCCINATE 125 MG/2ML
125 INJECTION, POWDER, LYOPHILIZED, FOR SOLUTION INTRAMUSCULAR; INTRAVENOUS
Status: CANCELLED
Start: 2023-10-02

## 2023-09-29 RX ORDER — ALBUTEROL SULFATE 90 UG/1
1-2 AEROSOL, METERED RESPIRATORY (INHALATION)
Status: DISCONTINUED | OUTPATIENT
Start: 2023-09-29 | End: 2023-09-29 | Stop reason: HOSPADM

## 2023-09-29 RX ORDER — ALBUTEROL SULFATE 0.83 MG/ML
2.5 SOLUTION RESPIRATORY (INHALATION)
Status: CANCELLED | OUTPATIENT
Start: 2023-10-02

## 2023-09-29 RX ORDER — DIPHENHYDRAMINE HYDROCHLORIDE 50 MG/ML
50 INJECTION INTRAMUSCULAR; INTRAVENOUS
Status: CANCELLED
Start: 2023-10-02

## 2023-09-29 RX ORDER — ALBUTEROL SULFATE 90 UG/1
1-2 AEROSOL, METERED RESPIRATORY (INHALATION)
Status: CANCELLED
Start: 2023-10-02

## 2023-09-29 RX ORDER — EPINEPHRINE 1 MG/ML
0.3 INJECTION, SOLUTION INTRAMUSCULAR; SUBCUTANEOUS EVERY 5 MIN PRN
Status: DISCONTINUED | OUTPATIENT
Start: 2023-09-29 | End: 2023-09-29 | Stop reason: HOSPADM

## 2023-09-29 RX ORDER — DIPHENHYDRAMINE HYDROCHLORIDE 50 MG/ML
50 INJECTION INTRAMUSCULAR; INTRAVENOUS
Status: DISCONTINUED | OUTPATIENT
Start: 2023-09-29 | End: 2023-09-29 | Stop reason: HOSPADM

## 2023-09-29 RX ORDER — EPINEPHRINE 1 MG/ML
0.3 INJECTION, SOLUTION INTRAMUSCULAR; SUBCUTANEOUS EVERY 5 MIN PRN
Status: CANCELLED | OUTPATIENT
Start: 2023-10-02

## 2023-09-29 RX ORDER — ALBUTEROL SULFATE 0.83 MG/ML
2.5 SOLUTION RESPIRATORY (INHALATION)
Status: DISCONTINUED | OUTPATIENT
Start: 2023-09-29 | End: 2023-09-29 | Stop reason: HOSPADM

## 2023-09-29 RX ORDER — MEPERIDINE HYDROCHLORIDE 50 MG/ML
25 INJECTION INTRAMUSCULAR; INTRAVENOUS; SUBCUTANEOUS EVERY 30 MIN PRN
Status: DISCONTINUED | OUTPATIENT
Start: 2023-09-29 | End: 2023-09-29 | Stop reason: HOSPADM

## 2023-09-29 RX ADMIN — OMALIZUMAB 300 MG: 150 INJECTION, SOLUTION SUBCUTANEOUS at 10:04

## 2023-09-29 NOTE — PROGRESS NOTES
Infusion Nursing Note:  Kelsy Morley presents today for Injection. Xolair.    Patient seen by provider today: No   present during visit today: Not Applicable.    Intravenous Access:  No Intravenous access/labs at this visit.      Post Infusion Assessment:  Patient tolerated infusion without incident.       Discharge Plan:   Patient discharged in stable condition accompanied by: self.      Marce Olsen LPN

## 2023-10-04 ENCOUNTER — OFFICE VISIT (OUTPATIENT)
Dept: PALLIATIVE MEDICINE | Facility: OTHER | Age: 63
End: 2023-10-04
Attending: INTERNAL MEDICINE
Payer: MEDICARE

## 2023-10-04 VITALS
WEIGHT: 136 LBS | OXYGEN SATURATION: 100 % | SYSTOLIC BLOOD PRESSURE: 136 MMHG | DIASTOLIC BLOOD PRESSURE: 75 MMHG | BODY MASS INDEX: 22.39 KG/M2 | HEART RATE: 84 BPM

## 2023-10-04 DIAGNOSIS — M47.816 LUMBAR FACET ARTHROPATHY: ICD-10-CM

## 2023-10-04 DIAGNOSIS — Z79.891 LONG TERM (CURRENT) USE OF OPIATE ANALGESIC: ICD-10-CM

## 2023-10-04 DIAGNOSIS — F43.10 PTSD (POST-TRAUMATIC STRESS DISORDER): ICD-10-CM

## 2023-10-04 DIAGNOSIS — Z79.899 ENCOUNTER FOR LONG-TERM (CURRENT) USE OF MEDICATIONS: Primary | ICD-10-CM

## 2023-10-04 LAB
CANNABINOIDS UR QL SCN: NORMAL
CREAT UR-MCNC: 58 MG/DL
ETHANOL UR QL SCN: NORMAL

## 2023-10-04 PROCEDURE — 80307 DRUG TEST PRSMV CHEM ANLYZR: CPT | Performed by: ANESTHESIOLOGY

## 2023-10-04 PROCEDURE — G0463 HOSPITAL OUTPT CLINIC VISIT: HCPCS | Performed by: ANESTHESIOLOGY

## 2023-10-04 PROCEDURE — 80360 METHYLPHENIDATE: CPT | Mod: 59 | Performed by: ANESTHESIOLOGY

## 2023-10-04 PROCEDURE — 80348 DRUG SCREENING BUPRENORPHINE: CPT | Performed by: ANESTHESIOLOGY

## 2023-10-04 PROCEDURE — 99205 OFFICE O/P NEW HI 60 MIN: CPT | Performed by: ANESTHESIOLOGY

## 2023-10-04 ASSESSMENT — PAIN SCALES - GENERAL: PAINLEVEL: EXTREME PAIN (8)

## 2023-10-04 NOTE — LETTER
Opioid / Opioid Plus Controlled Substance Agreement    This is an agreement between you and your provider about the safe and appropriate use of controlled substance/opioids prescribed by your care team. Controlled substances are medicines that can cause physical and mental dependence (abuse).    There are strict laws about having and using these medicines. We here at [facility] are committing to working with you in your efforts to get better. To support you in this work, we ll help you schedule regular office appointments for medicine refills. If we must cancel or change your appointment for any reason, we ll make sure you have enough medicine to last until your next appointment.     As a Provider, I will:  Listen carefully to your concerns and treat you with respect.   Recommend a treatment plan that I believe is in your best interest. This plan may involve therapies other than opioid pain medication.   Talk with you often about the possible benefits, and the risk of harm of any medicine that we prescribe for you.   Provide a plan on how to taper (discontinue or go off) using this medicine if the decision is made to stop its use.    As a Patient, I understand that opioid(s):   Are a controlled substance prescribed by my care team to help me function or work and manage my condition(s).   Are strong medicines and can cause serious side effects such as:  Drowsiness, which can seriously affect my driving ability  A lower breathing rate, enough to cause death  Harm to my thinking ability   Depression   Abuse of and addiction to this medicine  Need to be taken exactly as prescribed. Combining opioids with certain medicines or chemicals (such as illegal drugs, sedatives, sleeping pills, and benzodiazepines) can be dangerous or even fatal. If I stop opioids suddenly, I may have severe withdrawal symptoms.  Do not work for all types of pain nor for all patients. If they re not helpful, I may be asked to stop  them.    {Benzo / Stimulant (Optional):896051}    The risks, benefits and side effects of these medicine(s) were explained to me. I agree that:  I will take part in other treatments as advised by my care team. This may be psychiatry or counseling, physical therapy, behavioral therapy, group treatment or a referral to a specialist.     I will keep all my appointments. I understand that this is part of the monitoring of opioids. My care team may require an office visit for EVERY opioid/controlled substance refill. If I miss appointments or don t follow instructions, my care team may stop my medicine.    I will take my medicines as prescribed. I will not change the dose or schedule unless my care team tells me to. There will be no refills if I run out early.     I may be asked to come to the clinic and complete a urine drug test or complete a pill count at any time. If I don t give a urine sample or participate in a pill count, the care team may stop my medicine.    I will only receive prescriptions from this clinic for chronic pain. If I am treated by another provider for acute pain issues, I will tell them that I am taking opioid pain medication for chronic pain and that I have a treatment agreement with this provider. I will inform my [facility] care team within one business day if I am given a prescription for any pain medication by another healthcare provider. My [facility] care team can contact other providers and pharmacists about my use of any medicines.    It is up to me to make sure that I don t run out of my medicines on weekends or holidays. If my care team is willing to refill my opioid prescription without a visit, I must request refills only during office hours. Refills may take up to 3 business days to process. I will use one pharmacy to fill all my opioid and other controlled substance prescriptions. I will notify the clinic about any changes to my insurance or medication  availability.    I am responsible for my prescriptions. If the medicine/prescription is lost, stolen or destroyed, it will not be replaced. I also agree not to share controlled substance medicines with anyone.    I am aware I should not use any illegal or recreational drugs. I agree not to drink alcohol unless my care team says I can.       If I enroll in the [place] [facility] program, I will tell my care team prior to my next refill.     I will tell my care team right away if I become pregnant, have a new medical problem treated outside of my regular clinic, or have a change in my medications.    I understand that this medicine can affect my thinking, judgment and reaction time. Alcohol and drugs affect the brain and body, which can affect the safety of my driving. Being under the influence of alcohol or drugs can affect my decision-making, behaviors, personal safety, and the safety of others. Driving while impaired (DWI) can occur if a person is driving, operating, or in physical control of a car, motorcycle, boat, snowmobile, ATV, motorbike, off-road vehicle, or any other motor vehicle ([place] Statute 169A.20). I understand the risk if I choose to drive or operate any vehicle or machinery.    I understand that if I do not follow any of the conditions above, my prescriptions or treatment may be stopped or changed.          Opioids  What You Need to Know    What are opioids?   Opioids are pain medicines that must be prescribed by a doctor. They are also known as narcotics.     Examples are:   morphine (MS Contin, Lay)  oxycodone (Oxycontin)  oxycodone and acetaminophen (Percocet)  hydrocodone and acetaminophen (Vicodin, Norco)   fentanyl patch (Duragesic)   hydromorphone (Dilaudid)   methadone  codeine (Tylenol #3)     What do opioids do well?   Opioids are best for severe short-term pain such as after a surgery or injury. They may work well for cancer pain. They may help some people with long-lasting  (chronic) pain.     What do opioids NOT do well?   Opioids never get rid of pain entirely, and they don t work well for most patients with chronic pain. Opioids don t reduce swelling, one of the causes of pain.                                    Other ways to manage chronic pain and improve function include:     Treat the health problem that may be causing pain  Anti-inflammation medicines, which reduce swelling and tenderness, such as ibuprofen (Advil, Motrin) or naproxen (Aleve)  Acetaminophen (Tylenol)  Antidepressants and anti-seizure medicines, especially for nerve pain  Topical treatments such as patches or creams  Injections or nerve blocks  Chiropractic or osteopathic treatment  Acupuncture, massage, deep breathing, meditation, visual imagery, aromatherapy  Use heat or ice at the pain site  Physical therapy   Exercise  Stop smoking  Take part in therapy       Risks and side effects     Talk to your doctor before you start or decide to keep taking opioids. Possible side effects include:    Lowering your breathing rate enough to cause death  Overdose, including death, especially if taking higher than prescribed doses  Worse depression symptoms; less pleasure in things you usually enjoy  Feeling tired or sluggish  Slower thoughts or cloudy thinking  Being more sensitive to pain over time; pain is harder to control  Trouble sleeping or restless sleep  Changes in hormone levels (for example, less testosterone)  Changes in sex drive or ability to have sex  Constipation  Unsafe driving  Itching and sweating  Dizziness  Nausea, throwing up and dry mouth    What else should I know about opioids?    Opioids may lead to dependence, tolerance, or addiction.    Dependence means that if you stop or reduce the medicine too quickly, you will have withdrawal symptoms. These include loose poop (diarrhea), jitters, flu-like symptoms, nervousness and tremors. Dependence is not the same as addiction.                      Tolerance means needing higher doses over time to get the same effect. This may increase the chance of serious side effects.    Addiction is when people improperly use a substance that harms their body, their mind or their relations with others. Use of opiates can cause a relapse of addiction if you have a history of drug or alcohol abuse.    People who have used opioids for a long time may have a lower quality of life, worse depression, higher levels of pain and more visits to doctors.    You can overdose on opioids. Take these steps to lower your risk of overdose:    Recognize the signs:  Signs of overdose include decrease or loss of consciousness (blackout), slowed breathing, trouble waking up and blue lips. If someone is worried about overdose, they should call 911.    Talk to your doctor about Narcan (naloxone).   If you are at risk for overdose, you may be given a prescription for Narcan. This medicine very quickly reverses the effects of opioids.   If you overdose, a friend or family member can give you Narcan while waiting for the ambulance. They need to know the signs of overdose and how to give Narcan.     Don't use alcohol or street drugs.   Taking them with opioids can cause death.    Do not take any of these medicines unless your doctor says it s OK. Taking these with opioids can cause death:  Benzodiazepines, such as lorazepam (Ativan), alprazolam (Xanax) or diazepam (Valium)  Muscle relaxers, such as cyclobenzaprine (Flexeril)  Sleeping pills like zolpidem (Ambien)   Other opioids      How to keep you and other people safe while taking opioids:    Never share your opioids with others.  Opioid medicines are regulated by the Drug Enforcement Agency ([place]). Selling or sharing medications is a criminal act.    2. Be sure to store opioids in a secure place, locked up if possible. Young children can easily swallow them and overdose.    3. When you are traveling with your medicines, keep them in the  original bottles. If you use a pill box, be sure you also carry a copy of your medicine list from your clinic or pharmacy.    4. Safe disposal of opioids    Most pharmacies have places to get rid of medicine, called disposal kiosks. Medicine disposal options are also available in every [facility]. Search your county and  medication disposal  to find more options. You can find more details at:  https://[email or web address]./living-green/managing-unwanted-medications     I agree that my provider, clinic care team, and pharmacy may work with any city, state or federal law enforcement agency that investigates the misuse, sale, or other diversion of my controlled medicine. I will allow my provider to discuss my care with, or share a copy of, this agreement with any other treating provider, pharmacy or emergency room where I receive care.    I have read this agreement and have asked questions about anything I did not understand.    _______________________________________________________  Patient Signature - Kelsy Morley _____________________                   Date     _______________________________________________________  Provider Signature - NICOLE TUTTLE MD   _____________________                   Date     _______________________________________________________  Witness Signature (required if provider not present while patient signing)   _____________________                   Date

## 2023-10-04 NOTE — PROGRESS NOTES
[facility] - Office Visit    ASSESSMENT & PLAN     Kelsy was seen today for pain.    Diagnoses and all orders for this visit:    Encounter for long-term (current) use of medications  -     Drug Confirmation Panel Urine with Creatinine; Future  -     Ethanol urine; Future  -     Ethanol urine  -     Drug Confirmation Panel Urine with Creatinine    Chronic pain  -     Pain Management  Referral    Long term (current) use of opiate analgesic  -     Cancel: Drug Confirmation Panel Urine with Creatinine; Future  -     Cancel: Ethanol urine; Future        Patient Instructions     [facility]    Clinic Number:  [phone]  Call with any questions about your care and for scheduling assistance.   Calls are returned Monday through Friday between 8 AM and 4:30 PM. We usually get back to you within 2 business days depending on the issue/request.    If we are prescribing your medications:  For opioid medication refills, call the clinic or send a [employer] message 7 days in advance.  Please include:  Name of requested medication  Name of the pharmacy.  For non-opioid medications, call your pharmacy directly to request a refill. Please allow 3-4 days to be processed.   Per [place] State Law:  All controlled substance prescriptions must be filled within 30 days of being written.    For those controlled substances allowing refills, pickup must occur within 30 days of last fill.      We believe regular attendance is key to your success in our program!    Any time you are unable to keep your appointment we ask that you call us at least 24 hours in advance to cancel.This will allow us to offer the appointment time to another patient.   Multiple missed appointments may lead to dismissal from the clinic.     PLAN:    Discussed tizanidine for sleep.  May use your 2 mg tablets taking 2 at bedtime for 4 nights, if needed increase to 6 mg at bedtime.  If you wake during the  night after 4 hours may repeat experimenting with 2 to 6 hours to see if you get back to sleep without a hangover feeling.    You will contact the [facility] pharmacy to see if your ketamine prescription is still good.  Discussed ketamine the mechanism of action helpful for johny. if so we will begin with 10 mg lozenge 1/2 lozenge 4 times a day for 5 days, if tolerating may increase to 1 lozenge 4 times a day.  Will advance the dose approximately every 8 weeks for target symptoms.  Contact Dr. Tuttle when due for refill and will use the [facility] pharmacy to which is less expensive    Discussed the role of frequency specific microcurrent  May contact these providers to see if they take your insurance or you want to work with transitions [phone], [facility] [phone], [facility] [phone], [facility] [phone].    You may look up on YouTube information on frequency specific microcurrent particularly for chronic regional pain syndrome, Lyme disease by Margi Ron DC    Follow-up with Dr. Tuttle in the clinic in 6 weeks    discussed if ketamine is not helpful or tolerated, the use of agents such as desvenlafaxine is a dual action antidepressant to help with pain, and lamotrigine as a anticonvulsant to help with central sensitization and neuropathic pain          -----  NICOLE TUTTLE MD  [facility]       SUBJECTIVE      Kelsy Morley is a 63 year old year old female who presents to clinic today for the following:     Referred from her cardiologist to have a pain evaluation.    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  Accompanied by her friend Layne    Chart reflects a visit yesterday at the [place] pain clinic, outlining history of chronic left foot pain from chronic regional pain syndrome with a cast that was too tight  after foot surgery.  Developed back pain with spondylolysis.  Had radiofrequency ablation with benefit.  Had a right total knee replacement 6/21 waking up in the recovery room with severe pain numbness weakness of the right leg having had spinal anesthetic for the procedure.  EMG testing showed acute right L3 or 4 radiculopathy.  MRIs did not show compression.  Stated was to have neurosurgical consultation today though I do not see that scheduled in [facility].  Seen at a [place] pain clinic they recommended repeating the radiofrequency ablation.    Patient taking Ultracet and trazodone for sleep had a recent flareup when a 120 pound dog slammed her right knee hyperextending it.  Using gabapentin and Ultracet.  Had a second opinion with Dr. Hammonds spinal cord stimulator.  She is active in physical therapy.  Patient is an evaluation to neurosurgery told to avoid further surgery.  Discussion for buprenorphine products.  Urine drug tests are consistent.  Sympathetic lumbar blocks on the right did not help.  Plan is to repeat lumbar MRI and having flexion-extension x-rays.  She may have a second surgical opinion.  Consider right geniculate nerve block    Patient is seen here in pain psychology Vicki Marquez through [facility], having cognitive behavioral therapy, no sees include pain disorder, posttraumatic stress disorder, major depressive disorder, aware having evaluation with undersigned    Patient seen by Judith Mendez through [facility] in family practice, reviewing a variety of functional medicine elements, followed for EDS, mast cell activation, notes 3/4/2020 had recommend seeing the undersigned.    Care everywhere includes [facility], with diagnoses for binge eating disorder, chronic PTSD, major depressive disorder, being treated with Vyvanse 30 mg daily which appears to be helpful with her eating disorder.  He is aware having evaluation with undersigned.    Primary care provider  "Dr. 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    On interview reviews initially encouraged to come here through Judith Mendez MD for functional medicine approaches to her pain situation.    Describes was healthy and active until developing Lyme disease, with arthralgias.  During this evaluation found to have a either Danlos syndrome as she was prone to \"rip and tear\".  She had an injury to her left foot, had a cast that was placed too tight, developed chronic regional pain syndrome of her left foot.  Changing her gait, the low back pain.  She started working with Dr. Lowe at the [facility], having sympathetic blocks which helped the CRPS now just has numbness in that foot.    Last June had a right knee replacement.  However during that procedure with anesthesia had a injury to her L3-4 roots with significant increase in pain.  Had opioids, ketamine, without benefit.  Focused on meditation trying to bring the pain down somewhat.  Continues with significant pain in her right back around her right leg, the knee is very painful, foot is very painful.  She has no strength in her quadriceps, difficult to keep the patella in place.  She is actively involved in physical therapy, has physio tape on today.  Has had evaluations to consider spinal cord stimulator for the back pain.    Yesterday saw her provider and will order an MRI and flexion-extension x-rays.    Has been using some Ultracet.  There is buprenorphine discussed though caution.    Doing physical therapy at [facility] which has been helpful.    Notes sleep is poor over the last several years particularly, wakes up with pain using trazodone 50 to 100 mg at night, requires Ambien 2.5 mg at " bedtime.    Has been working with psychologist Glendy Marquez since 2013 seen every 2 to 4 weeks.  Has struggled more with depression relating to the pain.  With COVID to have more difficulty with isolation as she is immune compromised.  Depression would usually resolve over the summer but this year did not in part with injury to from her dog to her knee flaring up the knee pain.    Has worked with Bryan Glass psychiatric provider.  Was on Cymbalta at 1 time 60 mg may have helped the depression did not help with pain.  Had some weight gain.  On tapering off of that had a discontinuation syndrome.  Was transition to Wellbutrin did not tolerate that dose.  Now using Vyvanse which helps to some extent her eating disorder with bulimia has been activated.    Reviews trying gabapentin and Lyrica which she gained 30 pounds in 3 months when optically helpful.    Has a phototherapy lamp does not help with mood symptoms.  Previously tried SSRIs.  BuSpar might have been tried.  Tried venlafaxine.  Lithium has not been used.    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  Has been followed as noted through functional medicine approaches using sertraline, Singulair, emotive Alex and others.  He has been found to have eosinophilic enteritis as a component.    Recalls trying ketamine with Dilaudid when her pain was worse, significant nausea during that time.  Has not tried lamotrigine.    Did try low-dose naltrexone without benefit.    Sleep is been a problem her whole life, use tizanidine 2 to 4 mg for a while possible with benefit.    Appetite is difficult to assess with her history of eating disorder tended to binge eat though with the eosinophilia arthritis has loss of appetite.  Energy is poor.  Denies thoughts of suicide.    Acknowledges reference to posttraumatic stress disorder having a chaotic developmental history, having had episodes of flashbacks and  nightmares, hypervigilance.    She notes complications with the family and that 2 of her 3 children may also have Omayra-Danlos syndrome and may avoid her as she previously worked 2 jobs and was very active as a single parent until developed a Lyme disease.    Does not use cigarettes.  Does alcohol rarely.  Considered medical cannabis but worried about the cost.    Regarding Lyme disease had antibiotics over 6 months with some improvement.  Did not recall if there are Herxheimer reactions.  Subsequently used herbal supplements, biofilm disruptors.    Has not heard of frequency specific microcurrent.      Current Outpatient Medications:     ascorbic acid 1000 MG TABS tablet, Take 1,000 mg by mouth, Disp: , Rfl:     cetirizine HCl 10 MG CAPS, , Disp: , Rfl:     cromolyn sodium (NASALCROM) 5.2 MG/ACT nasal aerosol, Spray in nostril 2 times daily, Disp: , Rfl:     EMGALITY 120 MG/ML injection, , Disp: , Rfl:     estradiol (ESTRACE) 0.1 MG/GM vaginal cream, Place 1 g vaginally three times a week , Disp: , Rfl:     famotidine (PEPCID) 40 MG tablet, Take 40 mg by mouth, Disp: , Rfl:     fexofenadine (ALLEGRA) 60 MG tablet, , Disp: , Rfl:     gabapentin (NEURONTIN) 300 MG capsule, Take 300 mg by mouth, Disp: , Rfl:     ipratropium (ATROVENT) 0.06 % nasal spray, , Disp: , Rfl:     LEVOTHYROXINE SODIUM PO, Take 75 mcg by mouth daily, Disp: , Rfl:     lidocaine (LIDODERM) 5 % patch, , Disp: , Rfl:     Magic Mouthwash (FV std formula) lidocaine visc 2% 2.5mL/5mL & maalox/mylanta w/ simeth 2.5mL/5mL & diphenhydrAMINE 5mg/5mL, Swish and swallow 10 mLs in mouth every 6 hours as needed for mouth sores, Disp: , Rfl:     magnesium oxide 400 MG CAPS, , Disp: , Rfl:     montelukast (SINGULAIR) 10 MG tablet, Take 10 mg by mouth, Disp: , Rfl:     mupirocin (BACTROBAN) 2 % external ointment, Apply 1 inch topically, Disp: , Rfl:     SUMAtriptan Succinate (IMITREX PO), Take 100 mg by mouth every 8 hours as needed for migraine, Disp: , Rfl:      traMADol-acetaminophen (ULTRACET) 37.5-325 MG tablet, Take 1 tablet by mouth, Disp: , Rfl:     TRAZODONE HCL PO, , Disp: , Rfl:     vitamin B-12 (CYANOCOBALAMIN) 1000 MCG tablet, , Disp: , Rfl:     vitamin D3 (CHOLECALCIFEROL) 50 mcg (2000 units) tablet, , Disp: , Rfl:     zolpidem (AMBIEN) 10 MG tablet, , Disp: , Rfl:     Current Facility-Administered Medications:     lidocaine (PF) (XYLOCAINE) 1 % injection 9 mL, 9 mL, , , Aubrie Mora MD, 9 mL at 01/26/21 1553    triamcinolone (KENALOG-40) injection 40 mg, 40 mg, , , Aubrie Mora MD, 40 mg at 01/26/21 1553          Review of Systems   General, psych, musculoskeletal, bowels and bladder otherwise normal other than above.    Substance abuse history: Kelsy  reports that she has never smoked. She has never used smokeless tobacco. She reports current alcohol use. She reports that she does not use drugs.    Past Medical History: Kelsy  has a past medical history of Degenerative joint disease (08/13/2009), Depressive disorder, Dysautonomia (H), Dysautonomia (H), EDS (Omayra-Danlos syndrome), Omayra-Danlos syndrome, Headache, Hyperlipidemia (1990), Hypotension, Immune disorder (H24) (01/17/2011), Mast cell activation syndrome (H24), Neck injuries (01/28/05), Nonsenile cataract, Postural orthostatic tachycardia syndrome, Scoliosis, and Thyroid disease (1/17/2010).    Past Surgical history: Kelsy  has a past surgical history that includes Tilt Table Study (N/A, 11/26/2019); SHOULDER SURG PROC UNLISTED (2007, 2009, 2010, 2011); STOMACH SURGERY PROCEDURE UNLISTED (2019); and HAND/FINGER SURGERY UNLISTED (2015).     Developmental history: Raised in [place], third of 4 children.  Earned a masters in nursing.  Hobbies including gardening, biking.  Not involved in Protestant.        OBJECTIVE   /75   Pulse 84   Wt 61.7 kg (136 lb)   SpO2 100%   BMI 22.39 kg/m           Physical Exam  General: Alert, clear sensorium.  Appropriately groomed.  No  respiratory distress, no pain behavior.  Physio tape right leg.  Left lower extremity without changes in color skin texture swelling.  Right knee surgery are noted  Cardiovascular: Normal rate  Lungs: Pulmonary effort is normal, speaking in full sentences  MSK:   Skin:  No concerning rashes or lesions.  Neurologic:  alert and oriented x3  Psychiatric: Affect congruent.    Assessment: High impact chronic pain, overlapping pain syndromes with history of Lyme disease developing arthralgias.  Found to have ligamentous laxity with either Danlos syndrome, mast cell activation, POTS syndrome.    Developed complex regional pain syndrome left lower extremity responding well to sympathetic blocks.    Describes after anesthesia for right knee surgery developing a lumbar radiculopathy, with continues with neuropathic pain in the knee and foot, weakness of the muscle, problems with patella tracking.    Comorbid problems include central sensitization reviewing history of developmental trauma.    Has developed comorbid mood disorder with losses in job functioning, changes in activity roles.  Described with COVID and isolation exacerbation and mood disorder, then this summer additional knee injury from her dog with hyperextension.    Has been sensitive to number of medications.  Sideration evaluation for spinal cord stimulator for the radiculopathy to have updated imaging.    Has been sensitive to opioids.    Reviewed some possible approaches.  Discussed the off label use of ketamine, with several mechanism of actions which may be helpful with aspects of pain.  Discussed previous trial may have been confounded with high-dose opioids and she will contact her pharmacy to see if her lalita she has at home may still be potent for trial.  Within advance slowly.    Discussed the importance of sleep, particularly non-REM sleep growth hormone secretion.  Reviewed increasing dose of tizanidine and repeating during the night.    If the  ketamine is not helpful or tolerated reviewed other dual action medications for pain such as desvenlafaxine.    Reviewed the use of lamotrigine for central sensitization and glutamate affect.    In the future could consider the use of medical cannabis as another modality which may be helpful.    Discussed the role of frequency specific microcurrent and resources.    Reviewed with her primary care provider other than [facility] conservative consultative role follow-up for initiation of therapy, then referred back to other providers as above.    Reviewed with central sensitization the role of eye-movement desensitization and reprocessing therapy.  Discussed particularly using providers trained in pretrauma therapy and she will discuss this with her providers    She is concerned about ongoing functional medicine providers as her present resource may be changing treatment settings.    Reviewed she has also been diagnosed with mitochondrial dysfunction of a genetic basis.  Discussed the modalities of infrared sauna and  intermittent fasting porting mitochondrial autophagy to help in this regard    Total time more than 65 minutes          Answers submitted by the patient for this visit:  DEMETRI-7 (Submitted on 9/28/2023)  DEMETRI 7 TOTAL SCORE: 14

## 2023-10-04 NOTE — LETTER
[facility]  10/04/23  Patient: Kelsy Morley  YOB: 1960  Medical Record Number: [id number]                                                                                  Non-Opioid Controlled Substance Agreement    This is an agreement between you and your provider regarding safe and appropriate use of controlled substances prescribed by your care team. Controlled substances are?medicines that can cause physical and mental dependence (abuse).     There are strict laws about having and using these medicines. We here at [facility] are  committed to working with you in your efforts to get better. To support you in this work, we'll help you schedule regular office appointments for medicine refills. If we must cancel or change your appointment for any reason, we'll make sure you have enough medicine to last until your next appointment.     As a Provider, I will:   Listen carefully to your concerns while treating you with respect.   Recommend a treatment plan that I believe is in your best interest and may involve therapies other than medicine.    Talk with you often about the possible benefits and the risk of harm of any medicine that we prescribe for you.  Assess the safety of this medicine and check how well it works.    Provide a plan on how to taper (discontinue or go off) using this medicine if the decision is made to stop its use.      ::  As a Patient, I understand controlled substances:     Are prescribed by my care provider to help me function or work and manage my condition(s).?  Are strong medicines and can cause serious side effects.     Need to be taken exactly as prescribed.?Combining controlled substances with certain medicines or chemicals (such as illegal drugs, alcohol, sedatives, sleeping pills, and benzodiazepines) can be dangerous or even fatal.? If I stop taking my medicines suddenly, I may have severe withdrawal symptoms.     The risks, benefits,  and side effects of these medicine(s) were explained to me. I agree that:    I will take part in other treatments as advised by my care team. This may be psychiatry or counseling, physical therapy, behavioral therapy, group treatment or a referral to specialist.    I will keep all my appointments and understand this is part of the monitoring of controlled substances.?My care team may require an office visit for EVERY controlled substance refill. If I miss appointments or don t follow instructions, my care team may stop my medicine    I will take my medicines as prescribed. I will not change the dose or schedule unless my care team tells me to. There will be no refills if I run out early.      I may be asked to come to the clinic and complete a urine drug test or complete a pill count. If I don t give a urine sample or participate in a pill count, the care team may stop my medicine.    I will only receive controlled substance prescriptions from this clinic. If I am treated by another provider, I will tell them that I am taking controlled substances and that I have a treatment agreement with this provider. I will inform my [facility] care team within one business day if I am given a prescription for any controlled substance by another healthcare provider. My [facility] care team can contact other providers and pharmacists about my use of any medicines.    It is up to me to make sure that I don't run out of my medicines on weekends or holidays.?If my care team is willing to refill my prescription without a visit, I must request refills only during office hours. Refills may take up to 3 business days to process. I will use one pharmacy to fill all my controlled substance prescriptions. I will notify the clinic about any changes to my insurance or medicine availability.    I am responsible for my prescriptions. If the medicine/prescription is lost, stolen or destroyed, it will not be replaced.?I also agree  not to share controlled substance medicines with anyone.     I am aware I should not use any illegal or recreational drugs. I agree not to drink alcohol unless my care team says I can.     If I enroll in the [place] Medical Cannabis program, I will tell my care team before my next refill.    I will tell my care team right away if I become pregnant, have a new medical problem treated outside of my regular clinic, or have a change in my medicines.     I understand that this medicine can affect my thinking, judgment and reaction time.? Alcohol and drugs affect the brain and body, which can affect the safety of my driving. Being under the influence of alcohol or drugs can affect my decision-making, behaviors, personal safety and the safety of others. Driving while impaired (DWI) can occur if a person is driving, operating or in physical control of a car, motorcycle, boat, snowmobile, ATV, motorbike, off-road vehicle or any other motor vehicle ([place] Statute 169A.20). I understand the risk if I choose to drive or operate any vehicle or machinery.    I understand that if I do not follow any of the conditions above, my prescriptions or treatment may be stopped or changed.   I agree that my provider, clinic care team and pharmacy may work with any city, state or federal law enforcement agency that investigates the misuse, sale or other diversion of my controlled medicine. I will allow my provider to discuss my care with, or share a copy of, this agreement with any other treating provider, pharmacy or emergency room where I receive care.     I have read this agreement and have asked questions about anything I did not understand.    ________________________________________________________  Patient Signature - Kelsy Morley     ___________________                   Date     ________________________________________________________  Provider Signature - NICOLE TUTTLE MD       ___________________                   Date      ________________________________________________________  Witness Signature (required if provider not present while patient signing)          ___________________                   Date

## 2023-10-04 NOTE — PROGRESS NOTES
Patient presents to the clinic today for a visit with NICOLE TUTTLE MD regarding Pain Management.    UDS/CSA- 06.22.2023    Medications- na    QUESTIONS:    Aide Valenzuela  [facility] Visit Facilitator

## 2023-10-04 NOTE — PATIENT INSTRUCTIONS
[facility] Number:  [phone]  Call with any questions about your care and for scheduling assistance.   Calls are returned Monday through Friday between 8 AM and 4:30 PM. We usually get back to you within 2 business days depending on the issue/request.    If we are prescribing your medications:  For opioid medication refills, call the clinic or send a PacerProhart message 7 days in advance.  Please include:  Name of requested medication  Name of the pharmacy.  For non-opioid medications, call your pharmacy directly to request a refill. Please allow 3-4 days to be processed.   Per [place] State Law:  All controlled substance prescriptions must be filled within 30 days of being written.    For those controlled substances allowing refills, pickup must occur within 30 days of last fill.      We believe regular attendance is key to your success in our program!    Any time you are unable to keep your appointment we ask that you call us at least 24 hours in advance to cancel.This will allow us to offer the appointment time to another patient.   Multiple missed appointments may lead to dismissal from the clinic.     PLAN:    Discussed tizanidine for sleep.  May use your 2 mg tablets taking 2 at bedtime for 4 nights, if needed increase to 6 mg at bedtime.  If you wake during the night after 4 hours may repeat experimenting with 2 to 6 hours to see if you get back to sleep without a hangover feeling.    You will contact the [facility] pharmacy to see if your ketamine prescription is still good.  Discussed ketamine the mechanism of action helpful for johny. if so we will begin with 10 mg lozenge 1/2 lozenge 4 times a day for 5 days, if tolerating may increase to 1 lozenge 4 times a day.  Will advance the dose approximately every 8 weeks for target symptoms.  Contact Dr. Ortega when due for refill and will use the [facility] pharmacy to which is less expensive    Discussed  the role of frequency specific microcurrent  May contact these providers to see if they take your insurance or you want to work with transitions [phone], [facility] [phone], [facility] [phone], [facility] [phone].    You may look up on YouTube information on frequency specific microcurrent particularly for chronic regional pain syndrome, Lyme disease by Margi Ron DC    Follow-up with Dr. Ortega in the clinic in 6 weeks    discussed if ketamine is not helpful or tolerated, the use of agents such as desvenlafaxine is a dual action antidepressant to help with pain, and lamotrigine as a anticonvulsant to help with central sensitization and neuropathic pain

## 2023-10-10 LAB
AMPHET UR CFM-MCNC: 1102 NG/ML
AMPHET/CREAT UR: 1900 NG/MG {CREAT}
GABAPENTIN UR QL CFM: PRESENT
N-NORTRAMADOL/CREAT UR CFM: 9966 NG/MG {CREAT}
O-NORTRAMADOL UR CFM-MCNC: 5780 NG/ML
TRAMADOL CTO UR CFM-MCNC: 1120 NG/ML
TRAMADOL/CREAT UR: 1931 NG/MG {CREAT}

## 2023-10-24 ENCOUNTER — MEDICAL CORRESPONDENCE (OUTPATIENT)
Dept: HEALTH INFORMATION MANAGEMENT | Facility: CLINIC | Age: 63
End: 2023-10-24
Payer: MEDICARE

## 2023-10-24 ENCOUNTER — MYC MEDICAL ADVICE (OUTPATIENT)
Dept: PALLIATIVE MEDICINE | Facility: OTHER | Age: 63
End: 2023-10-24
Payer: MEDICARE

## 2023-10-24 DIAGNOSIS — M47.816 LUMBAR FACET ARTHROPATHY: Primary | ICD-10-CM

## 2023-10-24 RX ORDER — LAMOTRIGINE 25 MG/1
TABLET ORAL
Qty: 60 TABLET | Refills: 3 | Status: SHIPPED | OUTPATIENT
Start: 2023-10-24 | End: 2024-02-13

## 2023-10-24 NOTE — TELEPHONE ENCOUNTER
Pending Prescriptions:                       Disp   Refills    lamoTRIgine (LAMICTAL) 25 MG tablet                           Sig: Take 1 tablet (25 mg) by mouth daily    [id number] [place], [place] - [address]

## 2023-10-26 ENCOUNTER — TRANSCRIBE ORDERS (OUTPATIENT)
Dept: OTHER | Age: 63
End: 2023-10-26

## 2023-10-26 DIAGNOSIS — M54.16 LUMBAR RADICULOPATHY: ICD-10-CM

## 2023-10-26 DIAGNOSIS — Q79.62 EHLERS-DANLOS SYNDROME TYPE III: Primary | ICD-10-CM

## 2023-10-27 DIAGNOSIS — L50.8 CHRONIC URTICARIA: Primary | ICD-10-CM

## 2023-10-27 DIAGNOSIS — L50.1 IDIOPATHIC URTICARIA: ICD-10-CM

## 2023-10-27 RX ORDER — METHYLPREDNISOLONE SODIUM SUCCINATE 125 MG/2ML
125 INJECTION, POWDER, LYOPHILIZED, FOR SOLUTION INTRAMUSCULAR; INTRAVENOUS
Status: CANCELLED
Start: 2023-10-27

## 2023-10-27 RX ORDER — DIPHENHYDRAMINE HYDROCHLORIDE 50 MG/ML
50 INJECTION INTRAMUSCULAR; INTRAVENOUS
Status: CANCELLED
Start: 2023-10-27

## 2023-10-27 RX ORDER — EPINEPHRINE 1 MG/ML
0.3 INJECTION, SOLUTION INTRAMUSCULAR; SUBCUTANEOUS EVERY 5 MIN PRN
Status: CANCELLED | OUTPATIENT
Start: 2023-10-27

## 2023-10-27 RX ORDER — ALBUTEROL SULFATE 90 UG/1
1-2 AEROSOL, METERED RESPIRATORY (INHALATION)
Status: CANCELLED
Start: 2023-10-27

## 2023-10-27 RX ORDER — ALBUTEROL SULFATE 0.83 MG/ML
2.5 SOLUTION RESPIRATORY (INHALATION)
Status: CANCELLED | OUTPATIENT
Start: 2023-10-27

## 2023-10-27 RX ORDER — MEPERIDINE HYDROCHLORIDE 25 MG/ML
25 INJECTION INTRAMUSCULAR; INTRAVENOUS; SUBCUTANEOUS EVERY 30 MIN PRN
Status: CANCELLED | OUTPATIENT
Start: 2023-10-27

## 2023-10-30 ENCOUNTER — INFUSION THERAPY VISIT (OUTPATIENT)
Dept: INFUSION THERAPY | Facility: CLINIC | Age: 63
End: 2023-10-30
Attending: FAMILY MEDICINE
Payer: MEDICARE

## 2023-10-30 VITALS
DIASTOLIC BLOOD PRESSURE: 78 MMHG | RESPIRATION RATE: 16 BRPM | OXYGEN SATURATION: 99 % | SYSTOLIC BLOOD PRESSURE: 123 MMHG | HEART RATE: 66 BPM | TEMPERATURE: 99.4 F

## 2023-10-30 DIAGNOSIS — F43.10 PTSD (POST-TRAUMATIC STRESS DISORDER): ICD-10-CM

## 2023-10-30 DIAGNOSIS — L50.1 IDIOPATHIC URTICARIA: ICD-10-CM

## 2023-10-30 DIAGNOSIS — L50.8 CHRONIC URTICARIA: Primary | ICD-10-CM

## 2023-10-30 PROCEDURE — 250N000011 HC RX IP 250 OP 636: Mod: JZ | Performed by: REGISTERED NURSE

## 2023-10-30 PROCEDURE — 96372 THER/PROPH/DIAG INJ SC/IM: CPT | Performed by: REGISTERED NURSE

## 2023-10-30 RX ORDER — ALBUTEROL SULFATE 90 UG/1
1-2 AEROSOL, METERED RESPIRATORY (INHALATION)
Status: CANCELLED
Start: 2023-11-14

## 2023-10-30 RX ORDER — EPINEPHRINE 1 MG/ML
0.3 INJECTION, SOLUTION INTRAMUSCULAR; SUBCUTANEOUS EVERY 5 MIN PRN
Status: DISCONTINUED | OUTPATIENT
Start: 2023-10-30 | End: 2023-10-30 | Stop reason: HOSPADM

## 2023-10-30 RX ORDER — DIPHENHYDRAMINE HYDROCHLORIDE 50 MG/ML
50 INJECTION INTRAMUSCULAR; INTRAVENOUS
Status: CANCELLED
Start: 2023-11-14

## 2023-10-30 RX ORDER — METHYLPREDNISOLONE SODIUM SUCCINATE 125 MG/2ML
125 INJECTION, POWDER, LYOPHILIZED, FOR SOLUTION INTRAMUSCULAR; INTRAVENOUS
Status: CANCELLED
Start: 2023-11-14

## 2023-10-30 RX ORDER — DIPHENHYDRAMINE HYDROCHLORIDE 50 MG/ML
50 INJECTION INTRAMUSCULAR; INTRAVENOUS
Status: DISCONTINUED | OUTPATIENT
Start: 2023-10-30 | End: 2023-10-30 | Stop reason: HOSPADM

## 2023-10-30 RX ORDER — MEPERIDINE HYDROCHLORIDE 50 MG/ML
25 INJECTION INTRAMUSCULAR; INTRAVENOUS; SUBCUTANEOUS EVERY 30 MIN PRN
Status: CANCELLED | OUTPATIENT
Start: 2023-11-14

## 2023-10-30 RX ORDER — ALBUTEROL SULFATE 0.83 MG/ML
2.5 SOLUTION RESPIRATORY (INHALATION)
Status: CANCELLED | OUTPATIENT
Start: 2023-11-14

## 2023-10-30 RX ORDER — ALBUTEROL SULFATE 90 UG/1
1-2 AEROSOL, METERED RESPIRATORY (INHALATION)
Status: DISCONTINUED | OUTPATIENT
Start: 2023-10-30 | End: 2023-10-30 | Stop reason: HOSPADM

## 2023-10-30 RX ORDER — ALBUTEROL SULFATE 0.83 MG/ML
2.5 SOLUTION RESPIRATORY (INHALATION)
Status: DISCONTINUED | OUTPATIENT
Start: 2023-10-30 | End: 2023-10-30 | Stop reason: HOSPADM

## 2023-10-30 RX ORDER — MEPERIDINE HYDROCHLORIDE 50 MG/ML
25 INJECTION INTRAMUSCULAR; INTRAVENOUS; SUBCUTANEOUS EVERY 30 MIN PRN
Status: DISCONTINUED | OUTPATIENT
Start: 2023-10-30 | End: 2023-10-30 | Stop reason: HOSPADM

## 2023-10-30 RX ORDER — METHYLPREDNISOLONE SODIUM SUCCINATE 125 MG/2ML
125 INJECTION, POWDER, LYOPHILIZED, FOR SOLUTION INTRAMUSCULAR; INTRAVENOUS
Status: DISCONTINUED | OUTPATIENT
Start: 2023-10-30 | End: 2023-10-30 | Stop reason: HOSPADM

## 2023-10-30 RX ORDER — EPINEPHRINE 1 MG/ML
0.3 INJECTION, SOLUTION INTRAMUSCULAR; SUBCUTANEOUS EVERY 5 MIN PRN
Status: CANCELLED | OUTPATIENT
Start: 2023-11-14

## 2023-10-30 RX ADMIN — OMALIZUMAB 300 MG: 150 INJECTION, SOLUTION SUBCUTANEOUS at 15:13

## 2023-10-30 NOTE — PROGRESS NOTES
Infusion Nursing Note:  Kelsy Morley presents today for Injection. Xolair.    Patient seen by provider today: No   present during visit today: Not Applicable.          Intravenous Access:  No Intravenous access/labs at this visit.        Post Infusion Assessment:  Patient tolerated injection without incident.       Discharge Plan:   Patient discharged in stable condition accompanied by: self.      Marce Olsen LPN

## 2023-11-09 ASSESSMENT — PAIN SCALES - PAIN ENJOYMENT GENERAL ACTIVITY SCALE (PEG)
INTERFERED_ENJOYMENT_LIFE: 7
AVG_PAIN_PASTWEEK: 8
PEG_TOTALSCORE: 7.33
INTERFERED_GENERAL_ACTIVITY: 7

## 2023-11-15 ENCOUNTER — OFFICE VISIT (OUTPATIENT)
Dept: PALLIATIVE MEDICINE | Facility: OTHER | Age: 63
End: 2023-11-15
Attending: ANESTHESIOLOGY
Payer: MEDICARE

## 2023-11-15 VITALS
WEIGHT: 137.5 LBS | DIASTOLIC BLOOD PRESSURE: 75 MMHG | BODY MASS INDEX: 22.63 KG/M2 | HEART RATE: 78 BPM | OXYGEN SATURATION: 100 % | SYSTOLIC BLOOD PRESSURE: 119 MMHG

## 2023-11-15 DIAGNOSIS — G71.3 MITOCHONDRIAL MYOPATHY: Primary | ICD-10-CM

## 2023-11-15 PROCEDURE — G0463 HOSPITAL OUTPT CLINIC VISIT: HCPCS | Performed by: ANESTHESIOLOGY

## 2023-11-15 PROCEDURE — 99215 OFFICE O/P EST HI 40 MIN: CPT | Performed by: ANESTHESIOLOGY

## 2023-11-15 RX ORDER — METHYLENE BLUE
POWDER (GRAM) MISCELLANEOUS
Qty: 90 G | Refills: 1 | Status: SHIPPED | OUTPATIENT
Start: 2023-11-15 | End: 2024-03-27

## 2023-11-15 ASSESSMENT — PAIN SCALES - GENERAL: PAINLEVEL: SEVERE PAIN (6)

## 2023-11-15 ASSESSMENT — PATIENT HEALTH QUESTIONNAIRE - PHQ9: SUM OF ALL RESPONSES TO PHQ QUESTIONS 1-9: 19

## 2023-11-15 NOTE — PROGRESS NOTES
[facility] - Office Visit    ASSESSMENT & PLAN     Kayla was seen today for pain.    Diagnoses and all orders for this visit:    Mitochondrial myopathy  -     Methylene Blue POWD; 10 mg capsule one twice a day 1 week, two twice a day one week, then 3 twice a day, #90        Patient Instructions     [facility] Number:  [phone]  Call with any questions about your care and for scheduling assistance.   Calls are returned Monday through Friday between 8 AM and 4:30 PM. We usually get back to you within 2 business days depending on the issue/request.    If we are prescribing your medications:  For opioid medication refills, call the clinic or send a Bouf message 7 days in advance.  Please include:  Name of requested medication  Name of the pharmacy.  For non-opioid medications, call your pharmacy directly to request a refill. Please allow 3-4 days to be processed.   Per [place] State Law:  All controlled substance prescriptions must be filled within 30 days of being written.    For those controlled substances allowing refills, pickup must occur within 30 days of last fill.      We believe regular attendance is key to your success in our program!    Any time you are unable to keep your appointment we ask that you call us at least 24 hours in advance to cancel.This will allow us to offer the appointment time to another patient.   Multiple missed appointments may lead to dismissal from the clinic.     PLAN:    You are meeting with a surgeon to review your MRI findings options for surgery.    You are starting with a new psychotherapist.    May continue with the lamotrigine 25 mg 1 at bedtime for 2 weeks, then 1 twice a day.    Discussed the use of methylene blue, will begin trial, 10 mg capsule twice a day for 1 week, 20 mg twice a day for 1 week, then 30 mg twice a day sent to [place] pharmacy.    Follow-up with Dr. Ortega in 5 to 6  "weeks      -----  NICOLE TUTTLE MD  [facility]       SUBJECTIVE      Kelsy Morley is a 63 year old year old female who presents to clinic today for the following:     Follow-up for overlapping pain syndromes including complex regional pain syndrome, Lyme disease and arthralgias, mast cell activation, mitochondrial dysfunction.    Describes trying ketamine again very low doses and 2.5 mg had lightheadedness, trying half that dose did not help pain.    She tried tizanidine to help with sleep.  Describes feeling \"out of it\" the next morning.    She has been using Vyvanse a different regimen to help in part her eating disorder bulimia.  We did discuss trauma, she is going to be start working with a Dennis Shelton, trained in ART and brain spotting.  Takes her insurance.  She wonders if by addressing the trauma that is starting to increase some of her dynamics around eating disorder.    She did try a friend's infrared sauna once.  Seem to help with pain and relax.    Has been having more migraines, uses Imitrex at times bit on a CGRP agent.    She is asking a friend about frequency specific microcurrent who thought it did not help.  Mention ozone injections which she has seen somebody do and was concerned about that.    Having some challenges with migraines.    Did take lamotrigine 1/2 tablet slept about 6 hours which is awesome.  However the next night no benefit.  Tried a whole 1 and then tossing and turning.  Reviewed challenges in that regard.      She did get her MRI showing some changes in her lumbar area and wants to meet with the surgeon who is familiar with EDS to see if that is appropriate candidate.      Current Outpatient Medications:     ascorbic acid 1000 MG TABS tablet, Take 1,000 mg by mouth, Disp: , Rfl:     cetirizine HCl 10 MG CAPS, , Disp: , Rfl:     cromolyn sodium (NASALCROM) 5.2 MG/ACT nasal aerosol, Spray in nostril 2 times daily, Disp: , Rfl:     EMGALITY 120 MG/ML injection, " , Disp: , Rfl:     estradiol (ESTRACE) 0.1 MG/GM vaginal cream, Place 1 g vaginally three times a week , Disp: , Rfl:     famotidine (PEPCID) 40 MG tablet, Take 40 mg by mouth, Disp: , Rfl:     fexofenadine (ALLEGRA) 60 MG tablet, , Disp: , Rfl:     gabapentin (NEURONTIN) 300 MG capsule, Take 300 mg by mouth, Disp: , Rfl:     ipratropium (ATROVENT) 0.06 % nasal spray, , Disp: , Rfl:     lamoTRIgine (LAMICTAL) 25 MG tablet, One daily for 2 weeks, one twice a day, Disp: 60 tablet, Rfl: 3    LEVOTHYROXINE SODIUM PO, Take 75 mcg by mouth daily, Disp: , Rfl:     lidocaine (LIDODERM) 5 % patch, , Disp: , Rfl:     Magic Mouthwash (FV std formula) lidocaine visc 2% 2.5mL/5mL & maalox/mylanta w/ simeth 2.5mL/5mL & diphenhydrAMINE 5mg/5mL, Swish and swallow 10 mLs in mouth every 6 hours as needed for mouth sores, Disp: , Rfl:     magnesium oxide 400 MG CAPS, , Disp: , Rfl:     Methylene Blue POWD, 10 mg capsule one twice a day 1 week, two twice a day one week, then 3 twice a day, #90, Disp: 90 g, Rfl: 1    montelukast (SINGULAIR) 10 MG tablet, Take 10 mg by mouth, Disp: , Rfl:     mupirocin (BACTROBAN) 2 % external ointment, Apply 1 inch topically, Disp: , Rfl:     SUMAtriptan Succinate (IMITREX PO), Take 100 mg by mouth every 8 hours as needed for migraine, Disp: , Rfl:     traMADol-acetaminophen (ULTRACET) 37.5-325 MG tablet, Take 1 tablet by mouth, Disp: , Rfl:     TRAZODONE HCL PO, , Disp: , Rfl:     vitamin B-12 (CYANOCOBALAMIN) 1000 MCG tablet, , Disp: , Rfl:     vitamin D3 (CHOLECALCIFEROL) 50 mcg (2000 units) tablet, , Disp: , Rfl:     zolpidem (AMBIEN) 10 MG tablet, , Disp: , Rfl:     Current Facility-Administered Medications:     lidocaine (PF) (XYLOCAINE) 1 % injection 9 mL, 9 mL, , , Aubrie Mora MD, 9 mL at 01/26/21 1553    triamcinolone (KENALOG-40) injection 40 mg, 40 mg, , , Aubrie Mora MD, 40 mg at 01/26/21 1553        Review of Systems   General, psych, musculoskeletal, bowels and bladder  otherwise normal other than above.          OBJECTIVE   /75   Pulse 78   Wt 62.4 kg (137 lb 8 oz)   SpO2 100%   BMI 22.63 kg/m        Physical Exam  General: Alert, clear sensorium.  No respiratory distress, no pain behavior  Cardiovascular: Normal rate  Lungs: Pulmonary effort is normal, speaking in full sentences  MSK:   Skin:  No concerning rashes or lesions.  Neurologic: alert and oriented x3  Psychiatric: Affect congruent.    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    History of chronic regional pain syndrome relatively better with sympathetic blocks.    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    Discussed some other interventions I have reviewed that may help with some of her concerns with mitochondrial dysfunction, history of 3 of Lyme disease.  Reviewed off label use of methylene blue.  She is interested in pursuing that.    Note after the session she also asked if I knew of any physical therapist to deal with Omayra-Danlos syndrome as the therapist she has worked with for several years, Chaitanya Portillo, is retiring.  I will look into some possible resources.    Total time 45 minutes      Total time spent: minutes

## 2023-11-15 NOTE — PATIENT INSTRUCTIONS
[facility] Number:  [phone]  Call with any questions about your care and for scheduling assistance.   Calls are returned Monday through Friday between 8 AM and 4:30 PM. We usually get back to you within 2 business days depending on the issue/request.    If we are prescribing your medications:  For opioid medication refills, call the clinic or send a cisimplehart message 7 days in advance.  Please include:  Name of requested medication  Name of the pharmacy.  For non-opioid medications, call your pharmacy directly to request a refill. Please allow 3-4 days to be processed.   Per [place] State Law:  All controlled substance prescriptions must be filled within 30 days of being written.    For those controlled substances allowing refills, pickup must occur within 30 days of last fill.      We believe regular attendance is key to your success in our program!    Any time you are unable to keep your appointment we ask that you call us at least 24 hours in advance to cancel.This will allow us to offer the appointment time to another patient.   Multiple missed appointments may lead to dismissal from the clinic.     PLAN:    You are meeting with a surgeon to review your MRI findings options for surgery.    You are starting with a new psychotherapist.    May continue with the lamotrigine 25 mg 1 at bedtime for 2 weeks, then 1 twice a day.    Discussed the use of methylene blue, will begin trial, 10 mg capsule twice a day for 1 week, 20 mg twice a day for 1 week, then 30 mg twice a day sent to [place] pharmacy.    Follow-up with Dr. Ortega in 5 to 6 weeks

## 2023-11-15 NOTE — NURSING NOTE
Patient presents to the clinic today for a follow up with NICOLE TUTTLE MD  regarding Pain Management.           11/15/2023     9:14 AM   PEG Score   PEG Total Score 7.33      CSA/UDT 10/4/23    Betina Veronica MA  [facility] [place]

## 2023-11-29 ENCOUNTER — INFUSION THERAPY VISIT (OUTPATIENT)
Dept: INFUSION THERAPY | Facility: CLINIC | Age: 63
End: 2023-11-29
Attending: FAMILY MEDICINE
Payer: MEDICARE

## 2023-11-29 VITALS
OXYGEN SATURATION: 98 % | TEMPERATURE: 98.5 F | SYSTOLIC BLOOD PRESSURE: 123 MMHG | RESPIRATION RATE: 16 BRPM | HEART RATE: 74 BPM | DIASTOLIC BLOOD PRESSURE: 86 MMHG

## 2023-11-29 DIAGNOSIS — L50.1 IDIOPATHIC URTICARIA: ICD-10-CM

## 2023-11-29 DIAGNOSIS — L50.8 CHRONIC URTICARIA: Primary | ICD-10-CM

## 2023-11-29 PROCEDURE — 250N000011 HC RX IP 250 OP 636: Mod: JZ | Performed by: REGISTERED NURSE

## 2023-11-29 PROCEDURE — 96372 THER/PROPH/DIAG INJ SC/IM: CPT | Performed by: REGISTERED NURSE

## 2023-11-29 RX ORDER — METHYLPREDNISOLONE SODIUM SUCCINATE 125 MG/2ML
125 INJECTION, POWDER, LYOPHILIZED, FOR SOLUTION INTRAMUSCULAR; INTRAVENOUS
Status: DISCONTINUED | OUTPATIENT
Start: 2023-11-29 | End: 2023-11-29 | Stop reason: HOSPADM

## 2023-11-29 RX ORDER — METHYLPREDNISOLONE SODIUM SUCCINATE 125 MG/2ML
125 INJECTION, POWDER, LYOPHILIZED, FOR SOLUTION INTRAMUSCULAR; INTRAVENOUS
Status: CANCELLED
Start: 2023-12-12

## 2023-11-29 RX ORDER — ALBUTEROL SULFATE 90 UG/1
1-2 AEROSOL, METERED RESPIRATORY (INHALATION)
Status: CANCELLED
Start: 2023-12-12

## 2023-11-29 RX ORDER — MEPERIDINE HYDROCHLORIDE 50 MG/ML
25 INJECTION INTRAMUSCULAR; INTRAVENOUS; SUBCUTANEOUS EVERY 30 MIN PRN
Status: DISCONTINUED | OUTPATIENT
Start: 2023-11-29 | End: 2023-11-29 | Stop reason: HOSPADM

## 2023-11-29 RX ORDER — ALBUTEROL SULFATE 0.83 MG/ML
2.5 SOLUTION RESPIRATORY (INHALATION)
Status: CANCELLED | OUTPATIENT
Start: 2023-12-12

## 2023-11-29 RX ORDER — MEPERIDINE HYDROCHLORIDE 50 MG/ML
25 INJECTION INTRAMUSCULAR; INTRAVENOUS; SUBCUTANEOUS EVERY 30 MIN PRN
Status: CANCELLED | OUTPATIENT
Start: 2023-12-12

## 2023-11-29 RX ORDER — DIPHENHYDRAMINE HYDROCHLORIDE 50 MG/ML
50 INJECTION INTRAMUSCULAR; INTRAVENOUS
Status: CANCELLED
Start: 2023-12-12

## 2023-11-29 RX ORDER — EPINEPHRINE 1 MG/ML
0.3 INJECTION, SOLUTION INTRAMUSCULAR; SUBCUTANEOUS EVERY 5 MIN PRN
Status: CANCELLED | OUTPATIENT
Start: 2023-12-12

## 2023-11-29 RX ORDER — ALBUTEROL SULFATE 90 UG/1
1-2 AEROSOL, METERED RESPIRATORY (INHALATION)
Status: DISCONTINUED | OUTPATIENT
Start: 2023-11-29 | End: 2023-11-29 | Stop reason: HOSPADM

## 2023-11-29 RX ORDER — DIPHENHYDRAMINE HYDROCHLORIDE 50 MG/ML
50 INJECTION INTRAMUSCULAR; INTRAVENOUS
Status: DISCONTINUED | OUTPATIENT
Start: 2023-11-29 | End: 2023-11-29 | Stop reason: HOSPADM

## 2023-11-29 RX ORDER — ALBUTEROL SULFATE 0.83 MG/ML
2.5 SOLUTION RESPIRATORY (INHALATION)
Status: DISCONTINUED | OUTPATIENT
Start: 2023-11-29 | End: 2023-11-29 | Stop reason: HOSPADM

## 2023-11-29 RX ORDER — EPINEPHRINE 1 MG/ML
0.3 INJECTION, SOLUTION INTRAMUSCULAR; SUBCUTANEOUS EVERY 5 MIN PRN
Status: DISCONTINUED | OUTPATIENT
Start: 2023-11-29 | End: 2023-11-29 | Stop reason: HOSPADM

## 2023-11-29 RX ADMIN — OMALIZUMAB 300 MG: 150 INJECTION, SOLUTION SUBCUTANEOUS at 11:03

## 2023-11-29 NOTE — PROGRESS NOTES
Infusion Nursing Note:  Kelsy Morley presents today for Injection. Xolair  Patient seen by provider today: No   present during visit today: Not Applicable.          Post Infusion Assessment:  Patient tolerated injection without incident.       Discharge Plan:   Patient discharged in stable condition accompanied by: self.      Marce Olsen LPN

## 2023-12-19 ASSESSMENT — PAIN SCALES - PAIN ENJOYMENT GENERAL ACTIVITY SCALE (PEG)
PEG_TOTALSCORE: 5
INTERFERED_ENJOYMENT_LIFE: 5
AVG_PAIN_PASTWEEK: 5
INTERFERED_GENERAL_ACTIVITY: 5

## 2023-12-26 ENCOUNTER — OFFICE VISIT (OUTPATIENT)
Dept: PALLIATIVE MEDICINE | Facility: OTHER | Age: 63
End: 2023-12-26
Payer: MEDICARE

## 2023-12-26 VITALS
WEIGHT: 141 LBS | HEART RATE: 89 BPM | SYSTOLIC BLOOD PRESSURE: 119 MMHG | BODY MASS INDEX: 23.21 KG/M2 | OXYGEN SATURATION: 99 % | DIASTOLIC BLOOD PRESSURE: 75 MMHG

## 2023-12-26 DIAGNOSIS — T84.018D FAILURE OF TOTAL KNEE REPLACEMENT, SUBSEQUENT ENCOUNTER: Primary | ICD-10-CM

## 2023-12-26 DIAGNOSIS — Z96.659 FAILURE OF TOTAL KNEE REPLACEMENT, SUBSEQUENT ENCOUNTER: Primary | ICD-10-CM

## 2023-12-26 PROCEDURE — G0463 HOSPITAL OUTPT CLINIC VISIT: HCPCS | Performed by: ANESTHESIOLOGY

## 2023-12-26 PROCEDURE — 99215 OFFICE O/P EST HI 40 MIN: CPT | Performed by: ANESTHESIOLOGY

## 2023-12-26 RX ORDER — LISDEXAMFETAMINE DIMESYLATE 30 MG/1
30 CAPSULE ORAL EVERY MORNING
COMMUNITY

## 2023-12-26 ASSESSMENT — PAIN SCALES - GENERAL: PAINLEVEL: MODERATE PAIN (5)

## 2023-12-26 NOTE — PROGRESS NOTES
Patient presents to the clinic today for a visit with NICOLE TUTTLE MD regarding Pain Management.          UDS/CSA- 10.04.2023    Medications- na    QUESTIONS:Energy so much better since starting the methylene blue. Pain also better. Increased the dose, pharmacist said he takes 2.5 mg and has benefited, so she has stayed at 30 mg. Over the past month she has had more headaches. Neck and back has increased. Neck could be caused by sewing. More wired in general from the Methylene Blue. Depression is much better. Friends have noticed. Has restarted Vyvanse because her eating disorder has been out of control.    Started therapy but it has been hit or miss lately.     Aide Valenzuela  [facility] Clinical Assistant

## 2023-12-26 NOTE — PATIENT INSTRUCTIONS
[facility] Number:  [phone]  Call with any questions about your care and for scheduling assistance.   Calls are returned Monday through Friday between 8 AM and 4:30 PM. We usually get back to you within 2 business days depending on the issue/request.    If we are prescribing your medications:  For opioid medication refills, call the clinic or send a WSI Onlinebizhart message 7 days in advance.  Please include:  Name of requested medication  Name of the pharmacy.  For non-opioid medications, call your pharmacy directly to request a refill. Please allow 3-4 days to be processed.   Per [place] State Law:  All controlled substance prescriptions must be filled within 30 days of being written.    For those controlled substances allowing refills, pickup must occur within 30 days of last fill.      We believe regular attendance is key to your success in our program!    Any time you are unable to keep your appointment we ask that you call us at least 24 hours in advance to cancel.This will allow us to offer the appointment time to another patient.   Multiple missed appointments may lead to dismissal from the clinic.       PLAN:    Methylene blue 20 mg daily.  May change to using the liquid drops 40 drops daily.    Resume the omega-3 fatty acid supplements, B vitamin complex.  Discussed other forms of magnesium may be better absorbed for migraines including magnesium glycinate, magnesium orotate, magnesium malic acid.    Referral to Dr. Campuzano in sports medicine regarding your right knee.    You are scheduled to see Dr. Borges next month for your back.    Continue working with your new physical therapist.    You are working with a new psychotherapist.    Lamotrigine increased to 25 mg 2 in the morning and 1 at bedtime for 2 weeks then if still pain 25 mg 2 tablets twice a day.    Continue the tizanidine 2 mg at bedtime max repeat after 4 hours    Follow-up with Dr. Ortega in 6  to 8 weeks

## 2023-12-26 NOTE — PROGRESS NOTES
"                          [facility] Follow-up    Date of visit: 12/26/2023    Chief complaint:   Chief Complaint   Patient presents with    Pain     Follow-up for Lyme disease, chronic regional pain syndrome.  Interval history:  Reviews trying the methylene blue.  Initially took 10 mg twice a day, found her pain much improved but her sleep was quite disrupted.  She spoke with the pharmacist who suggested she could take 10 mg in the morning repeated soon after.  She finds significant decrease in her overall pain, sometimes notes her x 3 does not have pain at all, for example taking her dog for a walk realizing at the end of it had a twinge of pain and had not noticed.  This is not always consistent.    Her mood is better does have more overall energy.    Described in some ways having sense of feeling hot and cold.  This reason similar to when she had Lyme before.  Also sometimes having nausea.  We discussed the possibility of Herxheimer reactions.    Has had more problems with migraines at times.  Initially when she started the Emgality did not have more than 1 headache a month.  Now over the last few months beginning to have 3-4 a month.  Also noting cycles and having constipation which can be severe for 3 to 4 weeks and improving.  Has been speaking to her primary care provider adjusting her magnesium citrate.  Also drinking some herbal supplements and MiraLAX.  May be starting to work more recently.    Has been taking agents that may have been helpful in the migraines such as omega-3 fatty acids and B vitamins, not taking them as much regularly and review they could be helpful.    Still sometimes has episodes of pain uses some Tylenol or lidocaine patches to take the edge off.    Using lamotrigine 25 mg 2 in the morning and 1 at bedtime.    Beginning to work with a new physical therapist.  Her previous physical therapist said that sometimes her \"C1 was sticking out\".  May correlate " with headaches.     Been working with a new psychotherapist.  Had done some in person visits and virtual.  More recently virtual as she got COVID.  Feels it is not quite as effective presently but hoping to give her brain spotting chance when they meet again in clinic.    Reviews has been on Vyvanse for 3 to 6 years, seems to help her eating disorder.  Challenges with the provider who worked with this recently.  Review she was at the [facility] program twice in the last year.  Hoping to have her primary care provider take over.    Use her right knee been having some problems.  Ever since the surgery she woke up with a foot drop, pain down her right leg and told that sure L3-L4 nerves were involved.  Feels the orthopedist  Concerns.    Last fall large dog hit her right leg flaring up the knee pain.  Had some swelling since then.  Went to a different orthopedics did not feel they were listening seriously to that.  Feels the whole leg needs to be evaluated.    On 1/4 she is seeing Dr. Lewis review her back surgery options having several discs concerned involved and thinks it relates to her EDS    Sleep has been more impaired recently.  She did try tizanidine 4 mg which was too much.  Has not been taking 2 mg with some trazodone may be doing a bit better.  Has not tried repeating it during the night            Medications:  Current Outpatient Medications   Medication Sig Dispense Refill    ascorbic acid 1000 MG TABS tablet Take 1,000 mg by mouth      cetirizine HCl 10 MG CAPS       cromolyn sodium (NASALCROM) 5.2 MG/ACT nasal aerosol Spray in nostril 2 times daily      EMGALITY 120 MG/ML injection       estradiol (ESTRACE) 0.1 MG/GM vaginal cream Place 1 g vaginally three times a week       famotidine (PEPCID) 40 MG tablet Take 40 mg by mouth      fexofenadine (ALLEGRA) 60 MG tablet       gabapentin (NEURONTIN) 300 MG capsule Take 300 mg by mouth      ipratropium (ATROVENT) 0.06 % nasal spray       lamoTRIgine (LAMICTAL)  25 MG tablet One daily for 2 weeks, one twice a day 60 tablet 3    LEVOTHYROXINE SODIUM PO Take 75 mcg by mouth daily      lidocaine (LIDODERM) 5 % patch       lisdexamfetamine (VYVANSE) 30 MG capsule Take 30 mg by mouth every morning      Magic Mouthwash (FV std formula) lidocaine visc 2% 2.5mL/5mL & maalox/mylanta w/ simeth 2.5mL/5mL & diphenhydrAMINE 5mg/5mL Swish and swallow 10 mLs in mouth every 6 hours as needed for mouth sores      magnesium oxide 400 MG CAPS       Methylene Blue POWD 10 mg capsule one twice a day 1 week, two twice a day one week, then 3 twice a day, #90 90 g 1    montelukast (SINGULAIR) 10 MG tablet Take 10 mg by mouth      mupirocin (BACTROBAN) 2 % external ointment Apply 1 inch topically      SUMAtriptan Succinate (IMITREX PO) Take 100 mg by mouth every 8 hours as needed for migraine      traMADol-acetaminophen (ULTRACET) 37.5-325 MG tablet Take 1 tablet by mouth      TRAZODONE HCL PO       vitamin B-12 (CYANOCOBALAMIN) 1000 MCG tablet       vitamin D3 (CHOLECALCIFEROL) 50 mcg (2000 units) tablet       zolpidem (AMBIEN) 10 MG tablet              Physical Exam:  Blood pressure 119/75, pulse 89, weight 64 kg (141 lb), SpO2 99%.    Alert, clear sensorium.  No respiratory distress, no pain behavior.    Organized with a notebook for questions    Right knee tender to palpation, negative drawer exam uses Kinesiotape to support.        Assessment:   Chronic overlapping pain syndromes.  Concerned with back pain, he either Danlos syndrome to see surgeon.    History of right knee surgery describes concern with radiculopathy afterwards that does not feel was followed closely, subsequently exacerbated when hit by a large dog in the knee.  She would like to have a different opinion and will refer to sports medicine for an assessment.    Continue to advance the methylene blue off label for use for inflammation and arthritis.    Adjusting tizanidine to help with sleep.    Advanced lamotrigine for central  sensitization        54 minutes spent on the date of encounter doing chart review, history, and exam documentation and further activities as noted above.     Chaitanya Ortega MD  [facility]

## 2023-12-27 ENCOUNTER — THERAPY VISIT (OUTPATIENT)
Dept: PHYSICAL THERAPY | Facility: REHABILITATION | Age: 63
End: 2023-12-27
Attending: ANESTHESIOLOGY
Payer: MEDICARE

## 2023-12-27 ENCOUNTER — INFUSION THERAPY VISIT (OUTPATIENT)
Dept: INFUSION THERAPY | Facility: HOSPITAL | Age: 63
End: 2023-12-27
Attending: FAMILY MEDICINE
Payer: MEDICARE

## 2023-12-27 VITALS
OXYGEN SATURATION: 92 % | HEART RATE: 97 BPM | DIASTOLIC BLOOD PRESSURE: 82 MMHG | RESPIRATION RATE: 16 BRPM | SYSTOLIC BLOOD PRESSURE: 125 MMHG | TEMPERATURE: 98.9 F

## 2023-12-27 DIAGNOSIS — G89.29 CHRONIC PAIN OF BOTH SHOULDERS: ICD-10-CM

## 2023-12-27 DIAGNOSIS — M54.9 BACK PAIN: Primary | ICD-10-CM

## 2023-12-27 DIAGNOSIS — G44.209 TENSION HEADACHE: ICD-10-CM

## 2023-12-27 DIAGNOSIS — L50.1 IDIOPATHIC URTICARIA: ICD-10-CM

## 2023-12-27 DIAGNOSIS — M25.561 CHRONIC KNEE PAIN AFTER TOTAL REPLACEMENT OF RIGHT KNEE JOINT: ICD-10-CM

## 2023-12-27 DIAGNOSIS — Q79.60 EHLERS-DANLOS SYNDROME: Primary | ICD-10-CM

## 2023-12-27 DIAGNOSIS — G89.29 CHRONIC BILATERAL LOW BACK PAIN WITHOUT SCIATICA: ICD-10-CM

## 2023-12-27 DIAGNOSIS — G89.29 CHRONIC BILATERAL THORACIC BACK PAIN: ICD-10-CM

## 2023-12-27 DIAGNOSIS — M25.512 CHRONIC PAIN OF BOTH SHOULDERS: ICD-10-CM

## 2023-12-27 DIAGNOSIS — M54.6 CHRONIC BILATERAL THORACIC BACK PAIN: ICD-10-CM

## 2023-12-27 DIAGNOSIS — M25.511 CHRONIC PAIN OF BOTH SHOULDERS: ICD-10-CM

## 2023-12-27 DIAGNOSIS — L50.8 CHRONIC URTICARIA: Primary | ICD-10-CM

## 2023-12-27 DIAGNOSIS — M54.50 CHRONIC BILATERAL LOW BACK PAIN WITHOUT SCIATICA: ICD-10-CM

## 2023-12-27 DIAGNOSIS — M54.2 NECK PAIN: ICD-10-CM

## 2023-12-27 DIAGNOSIS — G89.29 CHRONIC KNEE PAIN AFTER TOTAL REPLACEMENT OF RIGHT KNEE JOINT: ICD-10-CM

## 2023-12-27 DIAGNOSIS — Z96.651 CHRONIC KNEE PAIN AFTER TOTAL REPLACEMENT OF RIGHT KNEE JOINT: ICD-10-CM

## 2023-12-27 PROCEDURE — 97110 THERAPEUTIC EXERCISES: CPT | Mod: KX | Performed by: PHYSICAL THERAPIST

## 2023-12-27 PROCEDURE — 96372 THER/PROPH/DIAG INJ SC/IM: CPT | Performed by: REGISTERED NURSE

## 2023-12-27 PROCEDURE — 250N000011 HC RX IP 250 OP 636: Mod: JZ | Performed by: REGISTERED NURSE

## 2023-12-27 PROCEDURE — 97161 PT EVAL LOW COMPLEX 20 MIN: CPT | Mod: KX | Performed by: PHYSICAL THERAPIST

## 2023-12-27 RX ORDER — ALBUTEROL SULFATE 90 UG/1
1-2 AEROSOL, METERED RESPIRATORY (INHALATION)
Status: CANCELLED
Start: 2024-01-09

## 2023-12-27 RX ORDER — METHYLPREDNISOLONE SODIUM SUCCINATE 125 MG/2ML
125 INJECTION, POWDER, LYOPHILIZED, FOR SOLUTION INTRAMUSCULAR; INTRAVENOUS
Status: DISCONTINUED | OUTPATIENT
Start: 2023-12-27 | End: 2023-12-27 | Stop reason: HOSPADM

## 2023-12-27 RX ORDER — ALBUTEROL SULFATE 90 UG/1
1-2 AEROSOL, METERED RESPIRATORY (INHALATION)
Status: DISCONTINUED | OUTPATIENT
Start: 2023-12-27 | End: 2023-12-27 | Stop reason: HOSPADM

## 2023-12-27 RX ORDER — METHYLPREDNISOLONE SODIUM SUCCINATE 125 MG/2ML
125 INJECTION, POWDER, LYOPHILIZED, FOR SOLUTION INTRAMUSCULAR; INTRAVENOUS
Status: CANCELLED
Start: 2024-01-09

## 2023-12-27 RX ORDER — DIPHENHYDRAMINE HYDROCHLORIDE 50 MG/ML
50 INJECTION INTRAMUSCULAR; INTRAVENOUS
Status: CANCELLED
Start: 2024-01-09

## 2023-12-27 RX ORDER — ALBUTEROL SULFATE 0.83 MG/ML
2.5 SOLUTION RESPIRATORY (INHALATION)
Status: CANCELLED | OUTPATIENT
Start: 2024-01-09

## 2023-12-27 RX ORDER — ALBUTEROL SULFATE 0.83 MG/ML
2.5 SOLUTION RESPIRATORY (INHALATION)
Status: DISCONTINUED | OUTPATIENT
Start: 2023-12-27 | End: 2023-12-27 | Stop reason: HOSPADM

## 2023-12-27 RX ORDER — MEPERIDINE HYDROCHLORIDE 50 MG/ML
25 INJECTION INTRAMUSCULAR; INTRAVENOUS; SUBCUTANEOUS EVERY 30 MIN PRN
Status: DISCONTINUED | OUTPATIENT
Start: 2023-12-27 | End: 2023-12-27 | Stop reason: HOSPADM

## 2023-12-27 RX ORDER — MEPERIDINE HYDROCHLORIDE 50 MG/ML
25 INJECTION INTRAMUSCULAR; INTRAVENOUS; SUBCUTANEOUS EVERY 30 MIN PRN
Status: CANCELLED | OUTPATIENT
Start: 2024-01-09

## 2023-12-27 RX ORDER — EPINEPHRINE 1 MG/ML
0.3 INJECTION, SOLUTION INTRAMUSCULAR; SUBCUTANEOUS EVERY 5 MIN PRN
Status: DISCONTINUED | OUTPATIENT
Start: 2023-12-27 | End: 2023-12-27 | Stop reason: HOSPADM

## 2023-12-27 RX ORDER — EPINEPHRINE 1 MG/ML
0.3 INJECTION, SOLUTION INTRAMUSCULAR; SUBCUTANEOUS EVERY 5 MIN PRN
Status: CANCELLED | OUTPATIENT
Start: 2024-01-09

## 2023-12-27 RX ORDER — DIPHENHYDRAMINE HYDROCHLORIDE 50 MG/ML
50 INJECTION INTRAMUSCULAR; INTRAVENOUS
Status: DISCONTINUED | OUTPATIENT
Start: 2023-12-27 | End: 2023-12-27 | Stop reason: HOSPADM

## 2023-12-27 RX ADMIN — OMALIZUMAB 300 MG: 150 INJECTION, SOLUTION SUBCUTANEOUS at 10:01

## 2023-12-27 NOTE — PROGRESS NOTES
PHYSICAL THERAPY EVALUATION  Type of Visit: Evaluation    See electronic medical record for Abuse and Falls Screening details.    Subjective       Presenting condition or subjective complaint: EDS, Chronic pain, TKA with complications, spine deterioration, chronic Lyme Disease, neck and spine pain, shoulder deterioration, CRPS L foot/leg, L3/L4 nerve root damage  Date of onset: 11/16/23 (Order date)    Relevant medical history: Arthritis; Bladder or bowel problems; Cold or hot arm or leg; Concussions; Depression; Dizziness; Fibromyalgia; Foot drop; Hearing problems; History of fractures; Incontinence; Menopause; Migraines or headaches; Neck injury; Non-healing wounds; Osteoarthritis; Osteoporosis; Overweight; Pain at night or rest; Progressive neurological deficits; Severe headaches; Sleep disorder like apnea; Thyroid problems   Dates & types of surgery: I'll bring a list!    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  Another MD did some additional genetic testing.  She has a collagen disorder of unknown.  She has done PT for lower spine and R knee, shoulders, neck.  She was doing well with her migraines and then in the last month has 10 migraines.  Her prior PT no longer sees Medicare, so she needs a new provider.  Patient walks 2-3x/day with her dog.  She had been able to walk 2 miles at a time, but the knee is bothering her more so she is only walking 1 mile at a time.  Knee has dislocated a few times so she ended up with the TKA.  It has been hurting since she had the surgery.  They damaged her nerve roots with the anesthesia when she had the knee surgery.  Sleep is very poor because of pain.  Pain is generally more right sided.  She  has neuropathy in the feet.    Prior Level of Function  Transfers: Independent  Ambulation: Independent  ADL: Independent  IADL: Driving, Finances, Housekeeping, Laundry, Meal preparation, Medication management, Work, Yard work    Living Environment  Social support: Alone   Type of home: [facility]   Stairs to enter the home: Yes 3 Is there a railing: Yes   Ramp: No   Stairs inside the home: Yes 14 Is there a railing: Yes   Help at home: None  Equipment owned: Straight Cane; Walker; Crutches; Bath bench     Employment: No    Hobbies/Interests: sewing, grandkids, my dog, walking, nature, gardening, bicycling, kids and friends    Patient goals for therapy: Walk further, maintain home better, sew longer, socialize longer, play with grandkids, maintain my garden better, bicycle more    Pain assessment: Pain present  Location: R lower back, R knee, headache, neck/Ratin/10     Objective   KNEE EVALUATION  PAIN: Pain Level at Rest: 4/10  Pain Level with Use: 8/10  Pain Location: knee and right  Pain Quality: Burning and swollen, achy, sometimes sharper  Pain Frequency: constant  Pain is Worst: daytime or wakes her up at night  Pain is Exacerbated By: Walking, stairs, as the day progresses  Pain is Relieved By: Elevation, icing, rest  Pain Progression: Worsened  INTEGUMENTARY (edema, incisions):  Mild swelling R knee  GAIT:  Weightbearing Status: FWB  Assistive Device(s): None  Gait Deviations:  R pelvis higher  WEIGHTBEARING ALIGNMENT:  R iliac crest higher in standing  ROM:   (Degrees) Left AROM Left PROM  Right AROM Right PROM   Knee Flexion WNL  121    Knee Extension WNL  -7      LUMBAR SPINE EVALUATION  PAIN: Pain Level at Rest: 10  Pain Level with Use: 9/10  Pain Location: thoracic spine and lumbar spine  Pain Quality: Aching and Sharp  Pain Frequency: constant  Pain is Worst: daytime or wakes her up at night  Pain is Exacerbated By: Vacuuming, biking, walking longer distances  Pain is Relieved By: heat and lying  down  Pain Progression: Worsened  POSTURE: Standing Posture: Rounded shoulders, Forward head, Lordosis increased, Thoracic kyphosis increased  ROM:   (Degrees) Left AROM Left PROM  Right AROM Right PROM   Hip Flexion       Hip Extension       Hip Abduction       Hip Adduction       Hip Internal Rotation       Hip External Rotation       Knee Flexion       Knee Extension       Lumbar Side glide     Lumbar Flexion Mild pain   Lumbar Extension Pain across lower back     CERVICAL SPINE EVALUATION  PAIN: Pain Level at Rest: 4/10  Pain Level with Use: 8/10  Pain Location: cervical spine, shoulder, and bilaterally  Pain Quality: Dull and Sharp  Pain Frequency: intermittent or constant when she has a migraine  Pain is Worst: daytime or Does wake her up at night  Pain is Exacerbated By: Lifting arms above shoulder height increases shoulder pain, being tense increases neck pain, sewing  Pain is Relieved By: heat  Pain Progression: Worsened  POSTURE: Sitting Posture: Rounded shoulders, Forward head, Lordosis increased, Thoracic kyphosis increased  ROM:   (Degrees) Left AROM Right AROM    Cervical Flexion     Cervical Extension     Cervical Side bend Tight, WNL Tighter on the L, WFL    Cervical Rotation Tight, WNL Tighter on the L, WFL    Cervical Protrusion     Cervical Retraction     Thoracic Flexion WNL    Thoracic Extension Pain in R posterior shoulder    Thoracic Rotation Tight R Tight R     Left AROM Left PROM Right AROM Right PROM   Shoulder Flexion Min limited at end range with pain  WNL with pain    Shoulder Extension       Shoulder Abduction Min limited at end range with pain  WNL with pain    Shoulder Adduction       Shoulder IR WNL with pain  Pain with good ROM    Shoulder ER WNL with pain  WNL with pain    Shoulder Horiz Abduction       Shoulder Horiz Adduction         Assessment & Plan   CLINICAL IMPRESSIONS  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  She has hx of EDS and 29 surgeries.  The following significant findings have been identified: Pain, Decreased strength, Decreased proprioception, Impaired sensation, Impaired muscle performance, Decreased activity tolerance, and Impaired posture. These impairments interfere with their ability to perform self care tasks, recreational activities, household chores, household mobility, and community mobility as compared to previous level of function.  Patient is appropriate for skilled 1:1 PT services to improve strength, functional mobility, and pain.    Clinical Decision Making (Complexity):  Clinical Presentation: Stable/Uncomplicated  Clinical Presentation Rationale: based on medical and personal factors listed in PT evaluation  Clinical Decision Making (Complexity): Low complexity    PLAN OF CARE  Treatment Interventions:  Interventions: Gait Training, Manual Therapy, Neuromuscular Re-education, Therapeutic Activity, Therapeutic Exercise, Self-Care/Home Management    Long Term Goals     PT Goal 1  Goal Identifier: Self-management/HEP  Goal Description: Patient will be independent in self-management of condition and HEP to reach functional goals.  Target Date: 04/17/24  PT Goal 2  Goal Identifier: Walking  Goal Description: Patient will be able to walk 2 miles 2-3x/day with her dog with <4/10 pain in the knee and lower back in order to return to PLOF.  Target Date: 04/17/24  PT Goal 3  Goal Identifier: Putting dishes away  Goal Description: Patient will be able put dishes away in cupboard with <3/10 pain in the shoulders in order to perform daily house work.  Target Date: 03/20/24  PT Goal 4  Goal Identifier: Stairs  Goal Description: Patient will be able to  maneuver stairs with <4/10 pain in the knee and greater ease in order to improve functional mobility.  Target Date: 04/17/24  PT Goal 5  Goal Identifier: Headaches  Goal Description: Patient will reduce HA frequency to <1x/week in order to improve QOL.  Target Date: 02/21/24      Frequency of Treatment: 1x/week to every other week  Duration of Treatment: 24 weeks    Recommended Referrals to Other Professionals: None  Education Assessment:   Learner/Method: Patient;Listening;Demonstration    Risks and benefits of evaluation/treatment have been explained.   Patient/Family/caregiver agrees with Plan of Care.     Evaluation Time:     PT Eval, Low Complexity Minutes (04687): 45     Signing Clinician: Anna Durán, PT, DPT, MHA      [facility]                                                                                   OUTPATIENT PHYSICAL THERAPY      PLAN OF TREATMENT FOR OUTPATIENT REHABILITATION   Patient's Last Name, First Name, ISIAH LoboanKelsy  A YOB: 1960   Provider's Name   [facility]   Medical Record No.  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              Referring Provider:  Chaitanya Ortega    Initial Assessment  See Epic Evaluation-  Start of Care Date: 12/27/23

## 2023-12-27 NOTE — PROGRESS NOTES
Infusion Nursing Note:  Kelsy Morley presents today for Injection. Xolair  Patient seen by provider today: No   present during visit today: Not Applicable.        Intravenous Access:  No Intravenous access/labs at this visit.      Post Infusion Assessment:  Patient tolerated injection without incident.       Discharge Plan:   Patient discharged in stable condition accompanied by: self.  Departure Mode: Ambulatory.      Marce Olsen LPN

## 2023-12-29 NOTE — TELEPHONE ENCOUNTER
Action December 30, 2023 5:17 PM MT   Action Taken Sent a request to  and [facility].  Sent a request for records from [facility].        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   OFFICE NOTE from other specialist Care Everywhere  Internal  Media Tab 08/02/2023 - Sona Cruz MD - [facility]    [facility]    09/22/2022 - Patricia Ford MD - [facility]:    More..     OPERATIVE REPORT Care Everywhere 10/26/2022, 05/19/2021 - Bilateral Lumbar RFA   MEDICATION LIST Internal  Care Everywhere  Media Tab    EMG (for Spine) Media Tab 07/11/2022, 10/19/2021 - [facility]   LABS     DEXA PACS [facility]:  10/26/2021, 02/03/2020 - Hips/Spine   MRI PACS Internal    [facility]:  10/20/2021- L Spine    [facility]:  02/26/2020, 02/14/2019 - L Spine  10/01/2019, 02/20/2019 - C Spine   CT SCAN PACS [facility]:  02/23/2018 - Chest   XRAYS (IMAGES & REPORTS) PACS Internal    [facility]:  10/20/2021 - L Spine    [facility]:  02/18/2020 - L Spine  More..

## 2024-01-02 ENCOUNTER — THERAPY VISIT (OUTPATIENT)
Dept: PHYSICAL THERAPY | Facility: REHABILITATION | Age: 64
End: 2024-01-02
Payer: MEDICARE

## 2024-01-02 DIAGNOSIS — G89.29 CHRONIC PAIN OF BOTH SHOULDERS: ICD-10-CM

## 2024-01-02 DIAGNOSIS — M54.6 CHRONIC BILATERAL THORACIC BACK PAIN: ICD-10-CM

## 2024-01-02 DIAGNOSIS — M25.512 CHRONIC PAIN OF BOTH SHOULDERS: ICD-10-CM

## 2024-01-02 DIAGNOSIS — G89.29 CHRONIC KNEE PAIN AFTER TOTAL REPLACEMENT OF RIGHT KNEE JOINT: ICD-10-CM

## 2024-01-02 DIAGNOSIS — M25.511 CHRONIC PAIN OF BOTH SHOULDERS: ICD-10-CM

## 2024-01-02 DIAGNOSIS — G89.29 CHRONIC BILATERAL LOW BACK PAIN WITHOUT SCIATICA: ICD-10-CM

## 2024-01-02 DIAGNOSIS — M25.561 CHRONIC KNEE PAIN AFTER TOTAL REPLACEMENT OF RIGHT KNEE JOINT: ICD-10-CM

## 2024-01-02 DIAGNOSIS — M54.50 CHRONIC BILATERAL LOW BACK PAIN WITHOUT SCIATICA: ICD-10-CM

## 2024-01-02 DIAGNOSIS — Q79.60 EHLERS-DANLOS SYNDROME: Primary | ICD-10-CM

## 2024-01-02 DIAGNOSIS — M54.2 NECK PAIN: ICD-10-CM

## 2024-01-02 DIAGNOSIS — G89.29 CHRONIC BILATERAL THORACIC BACK PAIN: ICD-10-CM

## 2024-01-02 DIAGNOSIS — Z96.651 CHRONIC KNEE PAIN AFTER TOTAL REPLACEMENT OF RIGHT KNEE JOINT: ICD-10-CM

## 2024-01-02 DIAGNOSIS — G44.209 TENSION HEADACHE: ICD-10-CM

## 2024-01-02 PROCEDURE — 97140 MANUAL THERAPY 1/> REGIONS: CPT | Mod: GP | Performed by: PHYSICAL THERAPIST

## 2024-01-02 PROCEDURE — 97110 THERAPEUTIC EXERCISES: CPT | Mod: GP | Performed by: PHYSICAL THERAPIST

## 2024-01-04 ENCOUNTER — OFFICE VISIT (OUTPATIENT)
Dept: ORTHOPEDICS | Facility: CLINIC | Age: 64
End: 2024-01-04
Payer: MEDICARE

## 2024-01-04 ENCOUNTER — PRE VISIT (OUTPATIENT)
Dept: ORTHOPEDICS | Facility: CLINIC | Age: 64
End: 2024-01-04

## 2024-01-04 ENCOUNTER — ANCILLARY PROCEDURE (OUTPATIENT)
Dept: GENERAL RADIOLOGY | Facility: CLINIC | Age: 64
End: 2024-01-04
Attending: ORTHOPAEDIC SURGERY
Payer: MEDICARE

## 2024-01-04 VITALS — WEIGHT: 141 LBS | HEIGHT: 65 IN | BODY MASS INDEX: 23.49 KG/M2

## 2024-01-04 DIAGNOSIS — D89.40 MAST CELL ACTIVATION SYNDROME (H): ICD-10-CM

## 2024-01-04 DIAGNOSIS — Q79.62 EHLERS-DANLOS SYNDROME TYPE III: ICD-10-CM

## 2024-01-04 DIAGNOSIS — M54.9 BACK PAIN: ICD-10-CM

## 2024-01-04 DIAGNOSIS — M43.10 DEGENERATIVE SPONDYLOLISTHESIS: Primary | ICD-10-CM

## 2024-01-04 DIAGNOSIS — M54.16 LUMBAR RADICULOPATHY: ICD-10-CM

## 2024-01-04 DIAGNOSIS — G90.521 COMPLEX REGIONAL PAIN SYNDROME TYPE 1 OF RIGHT LOWER EXTREMITY: ICD-10-CM

## 2024-01-04 PROCEDURE — 77073 BONE LENGTH STUDIES: CPT | Performed by: STUDENT IN AN ORGANIZED HEALTH CARE EDUCATION/TRAINING PROGRAM

## 2024-01-04 PROCEDURE — 99204 OFFICE O/P NEW MOD 45 MIN: CPT | Mod: GC | Performed by: ORTHOPAEDIC SURGERY

## 2024-01-04 PROCEDURE — 72082 X-RAY EXAM ENTIRE SPI 2/3 VW: CPT | Performed by: STUDENT IN AN ORGANIZED HEALTH CARE EDUCATION/TRAINING PROGRAM

## 2024-01-04 PROCEDURE — 72100 X-RAY EXAM L-S SPINE 2/3 VWS: CPT | Mod: XS | Performed by: STUDENT IN AN ORGANIZED HEALTH CARE EDUCATION/TRAINING PROGRAM
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
## 2024-01-04 NOTE — NURSING NOTE
"Reason For Visit:   Chief Complaint   Patient presents with    Consult     lumbar radiculopathy, Ehler Danlos syndrome / Dr. Pj Dillon       Primary MD: Pj Dillon  Ref. MD: Dr. Dillon    ?  No  Occupation Disability.    Date of injury: No  Type of injury: No.    Date of surgery: No  Type of surgery: No.    Smoker: No  Request smoking cessation information: No    Ht 1.651 m (5' 5\")   Wt 64 kg (141 lb)   BMI 23.46 kg/m      Pain Assessment  Patient Currently in Pain: Yes  0-10 Pain Scale: 7  Primary Pain Location: Back    Oswestry (LISA) Questionnaire        12/30/2023    11:00 AM   OSWESTRY DISABILITY INDEX   Count 10   Sum 28   Oswestry Score (%) 56 %        Visual Analog Pain Scale  Back Pain Scale 0-10: 6.5  Right leg pain: 6.5  Left leg pain: 0  Neck Pain Scale 0-10: 0  Right arm pain: 0  Left arm pain: 0    Promis 10 Assessment        12/30/2023    11:02 AM   PROMIS 10   In general, would you say your health is: Good   In general, would you say your quality of life is: Very good   In general, how would you rate your physical health? Fair   In general, how would you rate your mental health, including your mood and your ability to think? Good   In general, how would you rate your satisfaction with your social activities and relationships? Good   In general, please rate how well you carry out your usual social activities and roles Good   To what extent are you able to carry out your everyday physical activities such as walking, climbing stairs, carrying groceries, or moving a chair? A little   In the past 7 days, how often have you been bothered by emotional problems such as feeling anxious, depressed, or irritable? Rarely   In the past 7 days, how would you rate your fatigue on average? Moderate   In the past 7 days, how would you rate your pain on average, where 0 means no pain, and 10 means worst imaginable pain? 7   In general, would you say your health is: 3   In general, would you say " your quality of life is: 4   In general, how would you rate your physical health? 2   In general, how would you rate your mental health, including your mood and your ability to think? 3   In general, how would you rate your satisfaction with your social activities and relationships? 3   In general, please rate how well you carry out your usual social activities and roles. (This includes activities at home, at work and in your community, and responsibilities as a parent, child, spouse, employee, friend, etc.) 3   To what extent are you able to carry out your everyday physical activities such as walking, climbing stairs, carrying groceries, or moving a chair? 2   In the past 7 days, how often have you been bothered by emotional problems such as feeling anxious, depressed, or irritable? 2   In the past 7 days, how would you rate your fatigue on average? 3   In the past 7 days, how would you rate your pain on average, where 0 means no pain, and 10 means worst imaginable pain? 7   Global Mental Health Score 14   Global Physical Health Score 9   PROMIS TOTAL - SUBSCORES 23                Jessica Abraham LPN
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
## 2024-01-05 NOTE — PROGRESS NOTES
Teaching Flowsheet   Relevant Diagnosis: spine  Teaching Topic: preop     Person(s) involved in teaching:   Patient     Motivation Level:  Asks Questions: Yes  Eager to Learn: Yes  Cooperative: Yes  Receptive (willing/able to accept information): Yes  Any cultural factors/Temple beliefs that may influence understanding or compliance? No       Patient demonstrates understanding of the following:  Reason for the appointment, diagnosis and treatment plan: Yes  Knowledge of proper use of medications and conditions for which they are ordered (with special attention to potential side effects or drug interactions): Yes  Which situations necessitate calling provider and whom to contact: Yes       Teaching Concerns Addressed:        Proper use and care of meds (medical equip, care aids, etc.): Yes  Nutritional needs and diet plan: Yes  Pain management techniques: Yes  Wound Care: Yes  How and/when to access community resources: Yes     Instructional Materials Used/Given: preop pkt     Time spent with patient: 15 minutes.

## 2024-01-08 ENCOUNTER — THERAPY VISIT (OUTPATIENT)
Dept: PHYSICAL THERAPY | Facility: REHABILITATION | Age: 64
End: 2024-01-08
Payer: MEDICARE

## 2024-01-08 DIAGNOSIS — M25.511 CHRONIC PAIN OF BOTH SHOULDERS: ICD-10-CM

## 2024-01-08 DIAGNOSIS — G89.29 CHRONIC PAIN OF BOTH SHOULDERS: ICD-10-CM

## 2024-01-08 DIAGNOSIS — Z96.651 CHRONIC KNEE PAIN AFTER TOTAL REPLACEMENT OF RIGHT KNEE JOINT: ICD-10-CM

## 2024-01-08 DIAGNOSIS — M25.512 CHRONIC PAIN OF BOTH SHOULDERS: ICD-10-CM

## 2024-01-08 DIAGNOSIS — G89.29 CHRONIC BILATERAL THORACIC BACK PAIN: ICD-10-CM

## 2024-01-08 DIAGNOSIS — M54.50 CHRONIC BILATERAL LOW BACK PAIN WITHOUT SCIATICA: ICD-10-CM

## 2024-01-08 DIAGNOSIS — M25.561 CHRONIC KNEE PAIN AFTER TOTAL REPLACEMENT OF RIGHT KNEE JOINT: ICD-10-CM

## 2024-01-08 DIAGNOSIS — G89.29 CHRONIC BILATERAL LOW BACK PAIN WITHOUT SCIATICA: ICD-10-CM

## 2024-01-08 DIAGNOSIS — G44.209 TENSION HEADACHE: ICD-10-CM

## 2024-01-08 DIAGNOSIS — G89.29 CHRONIC KNEE PAIN AFTER TOTAL REPLACEMENT OF RIGHT KNEE JOINT: ICD-10-CM

## 2024-01-08 DIAGNOSIS — M54.2 NECK PAIN: ICD-10-CM

## 2024-01-08 DIAGNOSIS — Q79.60 EHLERS-DANLOS SYNDROME: Primary | ICD-10-CM

## 2024-01-08 DIAGNOSIS — M54.6 CHRONIC BILATERAL THORACIC BACK PAIN: ICD-10-CM

## 2024-01-08 PROCEDURE — 97110 THERAPEUTIC EXERCISES: CPT | Mod: GP | Performed by: PHYSICAL THERAPIST

## 2024-01-08 PROCEDURE — 97140 MANUAL THERAPY 1/> REGIONS: CPT | Mod: GP | Performed by: PHYSICAL THERAPIST

## 2024-01-10 ENCOUNTER — MYC MEDICAL ADVICE (OUTPATIENT)
Dept: ORTHOPEDICS | Facility: CLINIC | Age: 64
End: 2024-01-10
Payer: MEDICARE

## 2024-01-11 NOTE — TELEPHONE ENCOUNTER
See my chart message from pt. With questions about diagnosis & procedure before surgery ordered at appt 1-4-24.  I called pt & made RTN appt. & pt agreed.     Call back prn.  Aide Chavez RN.

## 2024-01-15 ENCOUNTER — THERAPY VISIT (OUTPATIENT)
Dept: PHYSICAL THERAPY | Facility: REHABILITATION | Age: 64
End: 2024-01-15
Payer: MEDICARE

## 2024-01-15 DIAGNOSIS — G89.29 CHRONIC BILATERAL LOW BACK PAIN WITHOUT SCIATICA: ICD-10-CM

## 2024-01-15 DIAGNOSIS — M54.50 CHRONIC BILATERAL LOW BACK PAIN WITHOUT SCIATICA: ICD-10-CM

## 2024-01-15 DIAGNOSIS — M25.511 CHRONIC PAIN OF BOTH SHOULDERS: ICD-10-CM

## 2024-01-15 DIAGNOSIS — M54.2 NECK PAIN: ICD-10-CM

## 2024-01-15 DIAGNOSIS — Z96.651 CHRONIC KNEE PAIN AFTER TOTAL REPLACEMENT OF RIGHT KNEE JOINT: ICD-10-CM

## 2024-01-15 DIAGNOSIS — M25.512 CHRONIC PAIN OF BOTH SHOULDERS: ICD-10-CM

## 2024-01-15 DIAGNOSIS — G89.29 CHRONIC PAIN OF BOTH SHOULDERS: ICD-10-CM

## 2024-01-15 DIAGNOSIS — G44.209 TENSION HEADACHE: ICD-10-CM

## 2024-01-15 DIAGNOSIS — Q79.60 EHLERS-DANLOS SYNDROME: Primary | ICD-10-CM

## 2024-01-15 DIAGNOSIS — G89.29 CHRONIC BILATERAL THORACIC BACK PAIN: ICD-10-CM

## 2024-01-15 DIAGNOSIS — M54.6 CHRONIC BILATERAL THORACIC BACK PAIN: ICD-10-CM

## 2024-01-15 DIAGNOSIS — M25.561 CHRONIC KNEE PAIN AFTER TOTAL REPLACEMENT OF RIGHT KNEE JOINT: ICD-10-CM

## 2024-01-15 DIAGNOSIS — G89.29 CHRONIC KNEE PAIN AFTER TOTAL REPLACEMENT OF RIGHT KNEE JOINT: ICD-10-CM

## 2024-01-15 PROCEDURE — 97110 THERAPEUTIC EXERCISES: CPT | Mod: GP | Performed by: PHYSICAL THERAPIST

## 2024-01-15 PROCEDURE — 97140 MANUAL THERAPY 1/> REGIONS: CPT | Mod: GP | Performed by: PHYSICAL THERAPIST

## 2024-01-17 ENCOUNTER — TELEPHONE (OUTPATIENT)
Dept: ORTHOPEDICS | Facility: CLINIC | Age: 64
End: 2024-01-17
Payer: MEDICARE

## 2024-01-17 NOTE — TELEPHONE ENCOUNTER
Patient is scheduled for surgery with Dr. Borges    Spoke with: Patient    Date of Surgery: 3/11/24    Location: [place]    Post op: 6 & 12 weeks    Pre op with Provider: 1/25/24    H&P: Scheduled with PAC 2/14/24    Additional imaging/appointments: /A    Surgery packet: Received in clinic     Additional comments: N/A        Beryl Baum MA on 1/17/2024 at 11:57 AM

## 2024-01-19 NOTE — TELEPHONE ENCOUNTER
FUTURE VISIT INFORMATION      SURGERY INFORMATION:  Date: 3/11/24  [facility]: [facility]  Surgeon:  Fausto Borges MD   Anesthesia Type:  general  Procedure: Decompression and transforaminal lumbar interbody fusion with Smith Galeas Osteotomy Lumbar 4-5, device insertion, image guided   Consult: ov 24    RECORDS REQUESTED FROM:       Primary Care Provider: [facility]    Most recent EKG+ Tracin/3/23- [facility]    Most recent Sleep Study:   10/11/22- [facility]

## 2024-01-24 ENCOUNTER — THERAPY VISIT (OUTPATIENT)
Dept: PHYSICAL THERAPY | Facility: REHABILITATION | Age: 64
End: 2024-01-24
Payer: MEDICARE

## 2024-01-24 DIAGNOSIS — Z96.651 CHRONIC KNEE PAIN AFTER TOTAL REPLACEMENT OF RIGHT KNEE JOINT: ICD-10-CM

## 2024-01-24 DIAGNOSIS — M54.50 CHRONIC BILATERAL LOW BACK PAIN WITHOUT SCIATICA: ICD-10-CM

## 2024-01-24 DIAGNOSIS — G89.29 CHRONIC PAIN OF BOTH SHOULDERS: ICD-10-CM

## 2024-01-24 DIAGNOSIS — G89.29 CHRONIC KNEE PAIN AFTER TOTAL REPLACEMENT OF RIGHT KNEE JOINT: ICD-10-CM

## 2024-01-24 DIAGNOSIS — G89.29 CHRONIC BILATERAL THORACIC BACK PAIN: ICD-10-CM

## 2024-01-24 DIAGNOSIS — G44.209 TENSION HEADACHE: ICD-10-CM

## 2024-01-24 DIAGNOSIS — M54.6 CHRONIC BILATERAL THORACIC BACK PAIN: ICD-10-CM

## 2024-01-24 DIAGNOSIS — G89.29 CHRONIC BILATERAL LOW BACK PAIN WITHOUT SCIATICA: ICD-10-CM

## 2024-01-24 DIAGNOSIS — M54.2 NECK PAIN: ICD-10-CM

## 2024-01-24 DIAGNOSIS — Q79.60 EHLERS-DANLOS SYNDROME: Primary | ICD-10-CM

## 2024-01-24 DIAGNOSIS — M25.561 CHRONIC KNEE PAIN AFTER TOTAL REPLACEMENT OF RIGHT KNEE JOINT: ICD-10-CM

## 2024-01-24 DIAGNOSIS — M25.512 CHRONIC PAIN OF BOTH SHOULDERS: ICD-10-CM

## 2024-01-24 DIAGNOSIS — M25.511 CHRONIC PAIN OF BOTH SHOULDERS: ICD-10-CM

## 2024-01-24 PROCEDURE — 97110 THERAPEUTIC EXERCISES: CPT | Mod: GP | Performed by: PHYSICAL THERAPIST

## 2024-01-25 ENCOUNTER — ANCILLARY PROCEDURE (OUTPATIENT)
Dept: GENERAL RADIOLOGY | Facility: CLINIC | Age: 64
End: 2024-01-25
Attending: PHYSICIAN ASSISTANT
Payer: MEDICARE

## 2024-01-25 ENCOUNTER — OFFICE VISIT (OUTPATIENT)
Dept: ORTHOPEDICS | Facility: CLINIC | Age: 64
End: 2024-01-25
Payer: MEDICARE

## 2024-01-25 VITALS — WEIGHT: 139 LBS | BODY MASS INDEX: 23.16 KG/M2 | HEIGHT: 65 IN

## 2024-01-25 DIAGNOSIS — M43.10 DEGENERATIVE SPONDYLOLISTHESIS: ICD-10-CM

## 2024-01-25 DIAGNOSIS — M48.062 LUMBAR STENOSIS WITH NEUROGENIC CLAUDICATION: ICD-10-CM

## 2024-01-25 DIAGNOSIS — Q79.62 EHLERS-DANLOS, HYPERMOBILE TYPE: ICD-10-CM

## 2024-01-25 DIAGNOSIS — D89.40 MAST CELL ACTIVATION SYNDROME (H): Primary | ICD-10-CM

## 2024-01-25 DIAGNOSIS — G90.521 COMPLEX REGIONAL PAIN SYNDROME TYPE 1 OF RIGHT LOWER EXTREMITY: ICD-10-CM

## 2024-01-25 PROCEDURE — 72170 X-RAY EXAM OF PELVIS: CPT | Performed by: RADIOLOGY

## 2024-01-25 PROCEDURE — 99215 OFFICE O/P EST HI 40 MIN: CPT | Performed by: ORTHOPAEDIC SURGERY

## 2024-01-25 NOTE — NURSING NOTE
"Reason For Visit:   Chief Complaint   Patient presents with    RECHECK     Rediscuss procedure & Diagnosis. Omayra Qureshi        Primary MD: Pj Dillon  Ref. MD: EST    ?  No  Occupation Disability.     Date of injury: No  Type of injury: No.     Date of surgery: No  Type of surgery: No.     Smoker: No  Request smoking cessation information: No    Ht 1.651 m (5' 5\")   Wt 63 kg (139 lb)   BMI 23.13 kg/m      Pain Assessment  Patient Currently in Pain: Yes  0-10 Pain Scale: 8  Primary Pain Location: Back    Oswestry (LISA) Questionnaire        1/24/2024     4:41 PM   OSWESTRY DISABILITY INDEX   Count 9   Sum 28   Oswestry Score (%) 62.22 %        Visual Analog Pain Scale  Back Pain Scale 0-10: 8  Right leg pain: 6  Left leg pain: 6  Neck Pain Scale 0-10: 0  Right arm pain: 0  Left arm pain: 0    Promis 10 Assessment        1/24/2024     4:43 PM   PROMIS 10   In general, would you say your health is: Good   In general, would you say your quality of life is: Good   In general, how would you rate your physical health? Fair   In general, how would you rate your mental health, including your mood and your ability to think? Very good   In general, how would you rate your satisfaction with your social activities and relationships? Good   In general, please rate how well you carry out your usual social activities and roles Fair   To what extent are you able to carry out your everyday physical activities such as walking, climbing stairs, carrying groceries, or moving a chair? A little   In the past 7 days, how often have you been bothered by emotional problems such as feeling anxious, depressed, or irritable? Rarely   In the past 7 days, how would you rate your fatigue on average? Moderate   In the past 7 days, how would you rate your pain on average, where 0 means no pain, and 10 means worst imaginable pain? 8   In general, would you say your health is: 3   In general, would you say your quality of life is: " 3   In general, how would you rate your physical health? 2   In general, how would you rate your mental health, including your mood and your ability to think? 4   In general, how would you rate your satisfaction with your social activities and relationships? 3   In general, please rate how well you carry out your usual social activities and roles. (This includes activities at home, at work and in your community, and responsibilities as a parent, child, spouse, employee, friend, etc.) 2   To what extent are you able to carry out your everyday physical activities such as walking, climbing stairs, carrying groceries, or moving a chair? 2   In the past 7 days, how often have you been bothered by emotional problems such as feeling anxious, depressed, or irritable? 2   In the past 7 days, how would you rate your fatigue on average? 3   In the past 7 days, how would you rate your pain on average, where 0 means no pain, and 10 means worst imaginable pain? 8   Global Mental Health Score 14   Global Physical Health Score 9   PROMIS TOTAL - SUBSCORES 23                Jessica Abraham LPN

## 2024-01-25 NOTE — LETTER
1/25/2024         RE: Kelsy Morley  [address]  [facility] [address]        Dear Colleague,    Thank you for referring your patient, Kelsy Morley, to the [facility]. Please see a copy of my visit note below.    Kayla comes in today for a series of questions about her upcoming degenerative spondylolisthesis surgery.  She had had a significant series of questions about her transitional vertebra we have obtained a Henderson view today.  It is very clear that she has a Castelvi Gonzalez type IIIb transitional segment.  She had a series of additional questions that we went through talking about alternatives and explanations as to why.  She is also brought in a very detailed set of notes about her other associated medical conditions.  This includes from the [facility] where the suggestion is for her complex regional pain syndrome of her right leg that she received vitamin C 500 mg 2 times a day 5 days before the procedure and 5 days after.  The recommendation was for regional block during the procedure.  Also suggestion for use of an intraoperative ketamine infusion and then a nerve block after the procedure as needed.  In terms of her Omayra-Danlos syndrome comments about leaving sutures in place longer than usual.  I typically do intradermal sutures supplemented by benzoin and Steri-Strips so we already do this.  Due to her postural orthostatic hypotension she needs normal saline during the procedure as well as close monitoring of fluid shifts afterwards.  Due to her mast cell activation syndrome she will need to have antihistamines available and a prednisone burst postop to prevent the flare.  Typical dosing for this would be 50 mg of prednisolone 24 hours prior to the surgery as well as 1 to 2 hours prior to the surgery she will also need preservative dye free Benadryl 1 hour prior to the surgery.  H2 blockade prior to surgery.  Possible leukotriene receptor  antagonist Singulair prior to the surgery.  And then response if she has mast cell activation include Benadryl hydroxyzine Solu-Medrol albuterol and epinephrine as needed.  Given her mitochondrial myopathy protocol the indication is for no lactated Ringer's solution.  This can all be reviewed at her preanesthesia clinic history and physical.  This documentation will also be in the chart and I have asked the patient to bring it with her again on the day of surgery as well.    We have reviewed her imaging studies.  Talked about the numbering of levels.  The fusion level is counting from above the transitional segment I am calling that L5 in the level above that L4 which is the slipped level.  The plan is for a bilateral L4-5 transforaminal lumbar interbody fusion.  She had significant questions about this that we reviewed.  I showed her an anatomic model and discussed with her how we technically do this.    She had 2 full pages in her spiral notebook of questions that we went through and answered.    Total contact time for this visit including pre-clinic chart review, face-to-face evaluation, documentation, care coordination was greater than 60 minutes.    Fausto Borges MD

## 2024-01-25 NOTE — PROGRESS NOTES
Kayla comes in today for a series of questions about her upcoming degenerative spondylolisthesis surgery.  She had had a significant series of questions about her transitional vertebra we have obtained a Henderson view today.  It is very clear that she has a Castelvi Gonzalez type IIIb transitional segment.  She had a series of additional questions that we went through talking about alternatives and explanations as to why.  She is also brought in a very detailed set of notes about her other associated medical conditions.  This includes from the [facility] where the suggestion is for her complex regional pain syndrome of her right leg that she received vitamin C 500 mg 2 times a day 5 days before the procedure and 5 days after.  The recommendation was for regional block during the procedure.  Also suggestion for use of an intraoperative ketamine infusion and then a nerve block after the procedure as needed.  In terms of her Omayra-Danlos syndrome comments about leaving sutures in place longer than usual.  I typically do intradermal sutures supplemented by benzoin and Steri-Strips so we already do this.  Due to her postural orthostatic hypotension she needs normal saline during the procedure as well as close monitoring of fluid shifts afterwards.  Due to her mast cell activation syndrome she will need to have antihistamines available and a prednisone burst postop to prevent the flare.  Typical dosing for this would be 50 mg of prednisolone 24 hours prior to the surgery as well as 1 to 2 hours prior to the surgery she will also need preservative dye free Benadryl 1 hour prior to the surgery.  H2 blockade prior to surgery.  Possible leukotriene receptor antagonist Singulair prior to the surgery.  And then response if she has mast cell activation include Benadryl hydroxyzine Solu-Medrol albuterol and epinephrine as needed.  Given her mitochondrial myopathy protocol the indication is for no lactated Ringer's solution.   This can all be reviewed at her preanesthesia clinic history and physical.  This documentation will also be in the chart and I have asked the patient to bring it with her again on the day of surgery as well.    We have reviewed her imaging studies.  Talked about the numbering of levels.  The fusion level is counting from above the transitional segment I am calling that L5 in the level above that L4 which is the slipped level.  The plan is for a bilateral L4-5 transforaminal lumbar interbody fusion.  She had significant questions about this that we reviewed.  I showed her an anatomic model and discussed with her how we technically do this.    She had 2 full pages in her spiral notebook of questions that we went through and answered.    Total contact time for this visit including pre-clinic chart review, face-to-face evaluation, documentation, care coordination was greater than 60 minutes.    Fausto Borges MD

## 2024-02-05 ENCOUNTER — INFUSION THERAPY VISIT (OUTPATIENT)
Dept: INFUSION THERAPY | Facility: HOSPITAL | Age: 64
End: 2024-02-05
Attending: PHYSICAL MEDICINE & REHABILITATION
Payer: MEDICARE

## 2024-02-05 VITALS
TEMPERATURE: 98.2 F | OXYGEN SATURATION: 97 % | HEART RATE: 73 BPM | RESPIRATION RATE: 16 BRPM | SYSTOLIC BLOOD PRESSURE: 133 MMHG | DIASTOLIC BLOOD PRESSURE: 85 MMHG

## 2024-02-05 DIAGNOSIS — L50.8 CHRONIC URTICARIA: Primary | ICD-10-CM

## 2024-02-05 DIAGNOSIS — L50.1 IDIOPATHIC URTICARIA: ICD-10-CM

## 2024-02-05 PROCEDURE — 96372 THER/PROPH/DIAG INJ SC/IM: CPT | Performed by: REGISTERED NURSE

## 2024-02-05 PROCEDURE — 250N000011 HC RX IP 250 OP 636: Mod: JZ | Performed by: REGISTERED NURSE

## 2024-02-05 RX ORDER — ALBUTEROL SULFATE 90 UG/1
1-2 AEROSOL, METERED RESPIRATORY (INHALATION)
Status: CANCELLED
Start: 2024-02-06

## 2024-02-05 RX ORDER — DIPHENHYDRAMINE HYDROCHLORIDE 50 MG/ML
50 INJECTION INTRAMUSCULAR; INTRAVENOUS
Status: CANCELLED
Start: 2024-02-06

## 2024-02-05 RX ORDER — ALBUTEROL SULFATE 0.83 MG/ML
2.5 SOLUTION RESPIRATORY (INHALATION)
Status: CANCELLED | OUTPATIENT
Start: 2024-02-06

## 2024-02-05 RX ORDER — ALBUTEROL SULFATE 90 UG/1
1-2 AEROSOL, METERED RESPIRATORY (INHALATION)
Status: DISCONTINUED | OUTPATIENT
Start: 2024-02-05 | End: 2024-02-05 | Stop reason: HOSPADM

## 2024-02-05 RX ORDER — METHYLPREDNISOLONE SODIUM SUCCINATE 125 MG/2ML
125 INJECTION, POWDER, LYOPHILIZED, FOR SOLUTION INTRAMUSCULAR; INTRAVENOUS
Status: DISCONTINUED | OUTPATIENT
Start: 2024-02-05 | End: 2024-02-05 | Stop reason: HOSPADM

## 2024-02-05 RX ORDER — DIPHENHYDRAMINE HYDROCHLORIDE 50 MG/ML
50 INJECTION INTRAMUSCULAR; INTRAVENOUS
Status: DISCONTINUED | OUTPATIENT
Start: 2024-02-05 | End: 2024-02-05 | Stop reason: HOSPADM

## 2024-02-05 RX ORDER — ALBUTEROL SULFATE 0.83 MG/ML
2.5 SOLUTION RESPIRATORY (INHALATION)
Status: DISCONTINUED | OUTPATIENT
Start: 2024-02-05 | End: 2024-02-05 | Stop reason: HOSPADM

## 2024-02-05 RX ORDER — MEPERIDINE HYDROCHLORIDE 50 MG/ML
25 INJECTION INTRAMUSCULAR; INTRAVENOUS; SUBCUTANEOUS EVERY 30 MIN PRN
Status: DISCONTINUED | OUTPATIENT
Start: 2024-02-05 | End: 2024-02-05 | Stop reason: HOSPADM

## 2024-02-05 RX ORDER — EPINEPHRINE 1 MG/ML
0.3 INJECTION, SOLUTION INTRAMUSCULAR; SUBCUTANEOUS EVERY 5 MIN PRN
Status: CANCELLED | OUTPATIENT
Start: 2024-02-06

## 2024-02-05 RX ORDER — EPINEPHRINE 1 MG/ML
0.3 INJECTION, SOLUTION INTRAMUSCULAR; SUBCUTANEOUS EVERY 5 MIN PRN
Status: DISCONTINUED | OUTPATIENT
Start: 2024-02-05 | End: 2024-02-05 | Stop reason: HOSPADM

## 2024-02-05 RX ORDER — METHYLPREDNISOLONE SODIUM SUCCINATE 125 MG/2ML
125 INJECTION, POWDER, LYOPHILIZED, FOR SOLUTION INTRAMUSCULAR; INTRAVENOUS
Status: CANCELLED
Start: 2024-02-06

## 2024-02-05 RX ORDER — MEPERIDINE HYDROCHLORIDE 50 MG/ML
25 INJECTION INTRAMUSCULAR; INTRAVENOUS; SUBCUTANEOUS EVERY 30 MIN PRN
Status: CANCELLED | OUTPATIENT
Start: 2024-02-06

## 2024-02-05 RX ADMIN — OMALIZUMAB 300 MG: 150 INJECTION, SOLUTION SUBCUTANEOUS at 10:54

## 2024-02-05 NOTE — PROGRESS NOTES
Infusion Nursing Note:  Kelsy Morley presents today for injection. Xolair.  Patient seen by provider today: No   present during visit today: Not Applicable.        Intravenous Access:  No Intravenous access/labs at this visit.      Post Infusion Assessment:  Patient tolerated injection without incident.       Discharge Plan:   Patient discharged in stable condition accompanied by: self.  Departure Mode: Ambulatory.      Marce Olsen LPN

## 2024-02-06 ENCOUNTER — THERAPY VISIT (OUTPATIENT)
Dept: PHYSICAL THERAPY | Facility: REHABILITATION | Age: 64
End: 2024-02-06
Payer: MEDICARE

## 2024-02-06 DIAGNOSIS — G44.209 TENSION HEADACHE: ICD-10-CM

## 2024-02-06 DIAGNOSIS — M54.6 CHRONIC BILATERAL THORACIC BACK PAIN: ICD-10-CM

## 2024-02-06 DIAGNOSIS — G89.29 CHRONIC BILATERAL THORACIC BACK PAIN: ICD-10-CM

## 2024-02-06 DIAGNOSIS — M25.561 CHRONIC KNEE PAIN AFTER TOTAL REPLACEMENT OF RIGHT KNEE JOINT: ICD-10-CM

## 2024-02-06 DIAGNOSIS — M25.512 CHRONIC PAIN OF BOTH SHOULDERS: ICD-10-CM

## 2024-02-06 DIAGNOSIS — M54.50 CHRONIC BILATERAL LOW BACK PAIN WITHOUT SCIATICA: ICD-10-CM

## 2024-02-06 DIAGNOSIS — Z96.651 CHRONIC KNEE PAIN AFTER TOTAL REPLACEMENT OF RIGHT KNEE JOINT: ICD-10-CM

## 2024-02-06 DIAGNOSIS — G89.29 CHRONIC BILATERAL LOW BACK PAIN WITHOUT SCIATICA: ICD-10-CM

## 2024-02-06 DIAGNOSIS — G89.29 CHRONIC PAIN OF BOTH SHOULDERS: ICD-10-CM

## 2024-02-06 DIAGNOSIS — Q79.60 EHLERS-DANLOS SYNDROME: Primary | ICD-10-CM

## 2024-02-06 DIAGNOSIS — M54.2 NECK PAIN: ICD-10-CM

## 2024-02-06 DIAGNOSIS — G89.29 CHRONIC KNEE PAIN AFTER TOTAL REPLACEMENT OF RIGHT KNEE JOINT: ICD-10-CM

## 2024-02-06 DIAGNOSIS — M25.511 CHRONIC PAIN OF BOTH SHOULDERS: ICD-10-CM

## 2024-02-06 PROCEDURE — 97110 THERAPEUTIC EXERCISES: CPT | Mod: GP | Performed by: PHYSICAL THERAPIST

## 2024-02-06 PROCEDURE — 97140 MANUAL THERAPY 1/> REGIONS: CPT | Mod: GP | Performed by: PHYSICAL THERAPIST

## 2024-02-09 ENCOUNTER — TELEPHONE (OUTPATIENT)
Dept: CARDIOLOGY | Facility: CLINIC | Age: 64
End: 2024-02-09
Payer: MEDICARE

## 2024-02-09 NOTE — TELEPHONE ENCOUNTER
Left Voicemail (1st Attempt) and Sent Mychart (1st Attempt) for the patient to call back and schedule the following:    Appointment type: Return EP  Provider: Yolande  Return date: 6/6/24 or near  Specialty phone number: [phone] opt 1  Additional appointment(s) needed: NA  Additonal Notes: 1 yr follow up    Jeniffer Bai on 2/9/2024 at 5:31 PM

## 2024-02-12 ENCOUNTER — THERAPY VISIT (OUTPATIENT)
Dept: PHYSICAL THERAPY | Facility: REHABILITATION | Age: 64
End: 2024-02-12
Payer: MEDICARE

## 2024-02-12 DIAGNOSIS — M25.511 CHRONIC PAIN OF BOTH SHOULDERS: ICD-10-CM

## 2024-02-12 DIAGNOSIS — M25.561 CHRONIC KNEE PAIN AFTER TOTAL REPLACEMENT OF RIGHT KNEE JOINT: ICD-10-CM

## 2024-02-12 DIAGNOSIS — G44.209 TENSION HEADACHE: ICD-10-CM

## 2024-02-12 DIAGNOSIS — M25.512 CHRONIC PAIN OF BOTH SHOULDERS: ICD-10-CM

## 2024-02-12 DIAGNOSIS — M54.2 NECK PAIN: ICD-10-CM

## 2024-02-12 DIAGNOSIS — M54.50 CHRONIC BILATERAL LOW BACK PAIN WITHOUT SCIATICA: ICD-10-CM

## 2024-02-12 DIAGNOSIS — G89.29 CHRONIC KNEE PAIN AFTER TOTAL REPLACEMENT OF RIGHT KNEE JOINT: ICD-10-CM

## 2024-02-12 DIAGNOSIS — G89.29 CHRONIC BILATERAL LOW BACK PAIN WITHOUT SCIATICA: ICD-10-CM

## 2024-02-12 DIAGNOSIS — G89.29 CHRONIC PAIN OF BOTH SHOULDERS: ICD-10-CM

## 2024-02-12 DIAGNOSIS — Z96.651 CHRONIC KNEE PAIN AFTER TOTAL REPLACEMENT OF RIGHT KNEE JOINT: ICD-10-CM

## 2024-02-12 DIAGNOSIS — G89.29 CHRONIC BILATERAL THORACIC BACK PAIN: ICD-10-CM

## 2024-02-12 DIAGNOSIS — M54.6 CHRONIC BILATERAL THORACIC BACK PAIN: ICD-10-CM

## 2024-02-12 DIAGNOSIS — Q79.60 EHLERS-DANLOS SYNDROME: Primary | ICD-10-CM

## 2024-02-12 PROCEDURE — 97140 MANUAL THERAPY 1/> REGIONS: CPT | Mod: GP | Performed by: PHYSICAL THERAPIST

## 2024-02-12 PROCEDURE — 97110 THERAPEUTIC EXERCISES: CPT | Mod: GP | Performed by: PHYSICAL THERAPIST

## 2024-02-13 ENCOUNTER — MYC MEDICAL ADVICE (OUTPATIENT)
Dept: PALLIATIVE MEDICINE | Facility: OTHER | Age: 64
End: 2024-02-13
Payer: MEDICARE

## 2024-02-13 DIAGNOSIS — M47.816 LUMBAR FACET ARTHROPATHY: ICD-10-CM

## 2024-02-13 LAB
ABO/RH(D): NORMAL
ANTIBODY SCREEN: NEGATIVE
SPECIMEN EXPIRATION DATE: NORMAL

## 2024-02-13 RX ORDER — LAMOTRIGINE 25 MG/1
25 TABLET ORAL 2 TIMES DAILY
Qty: 60 TABLET | Refills: 3 | Status: SHIPPED | OUTPATIENT
Start: 2024-02-13 | End: 2024-02-20

## 2024-02-13 NOTE — TELEPHONE ENCOUNTER
Received fax request from [facility] requesting refill(s) for lamoTRIgine (LAMICTAL) 25 MG tablet      Last refilled on 12/26/23    Pt last seen on 12/26/23-patient has 1 refill available at [facility], but is now using [facility]    Next appt scheduled for 02/19/24    Will facilitate refill.

## 2024-02-13 NOTE — TELEPHONE ENCOUNTER
Signed Prescriptions:                        Disp   Refills    lamoTRIgine (LAMICTAL) 25 MG tablet        60 tab*3        Sig: Take 1 tablet (25 mg) by mouth 2 times daily  Authorizing Provider: AIDE NAJERA          Signed for colleague who is out of office. Refill(s) appear consistent with ongoing management.     Aide LOPEZ, RN CNP, FNP  [facility]  [facility] [place]

## 2024-02-14 ENCOUNTER — PRE VISIT (OUTPATIENT)
Dept: SURGERY | Facility: CLINIC | Age: 64
End: 2024-02-14

## 2024-02-14 ENCOUNTER — OFFICE VISIT (OUTPATIENT)
Dept: SURGERY | Facility: CLINIC | Age: 64
End: 2024-02-14
Payer: MEDICARE

## 2024-02-14 ENCOUNTER — LAB (OUTPATIENT)
Dept: LAB | Facility: CLINIC | Age: 64
End: 2024-02-14
Payer: MEDICARE

## 2024-02-14 ENCOUNTER — ANESTHESIA EVENT (OUTPATIENT)
Dept: SURGERY | Facility: CLINIC | Age: 64
DRG: 454 | End: 2024-02-14
Payer: MEDICARE

## 2024-02-14 VITALS
OXYGEN SATURATION: 99 % | BODY MASS INDEX: 23.39 KG/M2 | SYSTOLIC BLOOD PRESSURE: 118 MMHG | DIASTOLIC BLOOD PRESSURE: 80 MMHG | WEIGHT: 140.4 LBS | TEMPERATURE: 98.4 F | HEIGHT: 65 IN | RESPIRATION RATE: 16 BRPM | HEART RATE: 77 BPM

## 2024-02-14 DIAGNOSIS — Z01.818 PREOP EXAMINATION: ICD-10-CM

## 2024-02-14 DIAGNOSIS — M54.16 LUMBAR RADICULOPATHY: ICD-10-CM

## 2024-02-14 DIAGNOSIS — Z01.818 PREOP EXAMINATION: Primary | ICD-10-CM

## 2024-02-14 DIAGNOSIS — Q79.60 EHLERS-DANLOS SYNDROME: ICD-10-CM

## 2024-02-14 LAB
ANION GAP SERPL CALCULATED.3IONS-SCNC: 10 MMOL/L (ref 7–15)
BUN SERPL-MCNC: 15.3 MG/DL (ref 8–23)
CALCIUM SERPL-MCNC: 10.5 MG/DL (ref 8.8–10.2)
CHLORIDE SERPL-SCNC: 102 MMOL/L (ref 98–107)
CREAT SERPL-MCNC: 0.9 MG/DL (ref 0.51–0.95)
DEPRECATED HCO3 PLAS-SCNC: 31 MMOL/L (ref 22–29)
EGFRCR SERPLBLD CKD-EPI 2021: 71 ML/MIN/1.73M2
ERYTHROCYTE [DISTWIDTH] IN BLOOD BY AUTOMATED COUNT: 12.2 % (ref 10–15)
GLUCOSE SERPL-MCNC: 96 MG/DL (ref 70–99)
HCT VFR BLD AUTO: 44.3 % (ref 35–47)
HGB BLD-MCNC: 15 G/DL (ref 11.7–15.7)
MCH RBC QN AUTO: 30.5 PG (ref 26.5–33)
MCHC RBC AUTO-ENTMCNC: 33.9 G/DL (ref 31.5–36.5)
MCV RBC AUTO: 90 FL (ref 78–100)
PLATELET # BLD AUTO: 152 10E3/UL (ref 150–450)
POTASSIUM SERPL-SCNC: 4.8 MMOL/L (ref 3.4–5.3)
RBC # BLD AUTO: 4.92 10E6/UL (ref 3.8–5.2)
SODIUM SERPL-SCNC: 143 MMOL/L (ref 135–145)
WBC # BLD AUTO: 4.7 10E3/UL (ref 4–11)

## 2024-02-14 PROCEDURE — 80048 BASIC METABOLIC PNL TOTAL CA: CPT | Performed by: PATHOLOGY

## 2024-02-14 PROCEDURE — 85027 COMPLETE CBC AUTOMATED: CPT | Performed by: PATHOLOGY

## 2024-02-14 PROCEDURE — 99205 OFFICE O/P NEW HI 60 MIN: CPT | Performed by: CLINICAL NURSE SPECIALIST

## 2024-02-14 PROCEDURE — 86900 BLOOD TYPING SEROLOGIC ABO: CPT | Performed by: CLINICAL NURSE SPECIALIST

## 2024-02-14 PROCEDURE — 36415 COLL VENOUS BLD VENIPUNCTURE: CPT | Performed by: PATHOLOGY

## 2024-02-14 RX ORDER — OMEGA-3/DHA/EPA/FISH OIL 60 MG-90MG
1 CAPSULE ORAL DAILY
COMMUNITY
End: 2024-08-20

## 2024-02-14 RX ORDER — ACETAMINOPHEN 325 MG/1
975 TABLET ORAL ONCE
Status: CANCELLED | OUTPATIENT
Start: 2024-02-14 | End: 2024-02-14

## 2024-02-14 ASSESSMENT — ENCOUNTER SYMPTOMS
SEIZURES: 0
DYSRHYTHMIAS: 0

## 2024-02-14 ASSESSMENT — LIFESTYLE VARIABLES: TOBACCO_USE: 0

## 2024-02-14 ASSESSMENT — PAIN SCALES - GENERAL: PAINLEVEL: EXTREME PAIN (8)

## 2024-02-14 NOTE — H&P
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 with chest compressions during cardiac arrest.     Mitochondrial disorder   1. Generalized fatigue/weakness, but no specific respiratory weakness. Walks 5-6 miles with her dog. No respiratory issues after general anesthesia with LMA for shoulder surgery 1/2023.   2. Avoid lactated ringers     Chronic pain, left leg CRPS (lateral lower leg, dorsum of foot)   1. Ketamine infusion: Pain provider recommended ketamine infusion intraoperative, which she also received during shoulder surgery last year without issues   2. Pain provider note recommended nerve block to help left leg CRPS. She has has lumbar sympathetic blocks in the past for this. I discussed that this is an outpatient procedure, and not something that the operative team would do for this surgery. She is in agreement with this. She has otherwise had poor experience with blocks in the past. She had local infiltration for shoulder scope surgery last year and reports she did well with postop pain control. I will see if Dr. Borges would do local infiltration for the surgical site. If so, she says she is ok to receive bupivacaine   (should not receive ropivacaine).   3. We will order preoperative acetaminophen which patient tolerates well\"      Addendum 2/26/24  Kayla completed echo with following results  Interpretation " Summary     1. Normal left ventricular size and systolic performance with a visually  estimated ejection fraction of 65-70%.  2. There is trace aortic insufficiency.  3. Normal right ventricular size and systolic performance.  4. There is mild left atrial enlargement.          On the day of service:     Prep time: 20 minutes  Visit time: 25 minutes  Documentation time: 20 minutes  ------------------------------------------  Total time: 65 minutes      JOHN Amaya CNS  Preoperative Assessment Center  [facility]  Clinic and Surgery Center  Phone: [phone]  Fax: [phone]

## 2024-02-14 NOTE — PATIENT INSTRUCTIONS
Preparing for Your Surgery      Name:  Kelsy Morley   MRN:  [id number]   :  1960   Today's Date:  2024       Arriving for surgery:  Surgery date:  3/11/24  Arrival time:  5:30 am  Surgery time: 7:30 am    Please come to:     Please come to:      LONG [facility] [address]  [facility]   [address] [place]  [address]  The Green Ramp for patients and visitors is located beneath the [facility] [address]. The parking facility entrance is at the intersection of [address] [place] [address] and [address]. Patients and visitors who self-park will receive the reduced hospital parking rate (no ticket validation needed).  [employer] parking, located at the [facility] main entrance on [address] [place] [address], is available Monday - Friday from 7 am to 3:30 pm.  Discounted parking pass options can be purchased from  attendants during business hours.  -Check in at the security desk in the [facility] ([facility]) Lobby. They will direct you to the correct elevators.  -Proceed to the 3rd floor, check in at the Adult Surgery Waiting Lounge. [phone]  If you are in need of directions, a wheelchair or escort please stop at the Information Desk in the lobby.  Inform the information person that you are here for surgery; a wheelchair and escort to [address] will be provided.   An escort to the Adult Surgery Waiting Lounge will be provided.    What can I eat or drink?  -  You may eat and drink normally up to 8 hours prior to arrival time. (Until 9:30 pm on 3/10/24)  -  You may have clear liquids until 2 hours prior to arrival time. (Until 3:30 am on 3/11/24)    Examples of clear liquids:  Water  Clear broth  Juices (apple, white grape, white cranberry  and cider) without pulp  Noncarbonated, powder based beverages  (lemonade and Bruno-Aid)  Sodas (Sprite, 7-Up, ginger ale and  tim)  Coffee or tea (without milk or cream)  Gatorade    -  No Alcohol or cannabis products for at least 24 hours before surgery.     Which medicines can I take?    Hold Supplements for 7 days before surgery. Methylene blue    Hold Ibuprofen (Advil, Motrin) for 3 day(s) before surgery--unless otherwise directed by surgeon.  Hold Naproxen (Aleve) for 4 days before surgery.    Do not use Sumatriptan (Imitrex) for 24 hours before surgery.    -  DO NOT take these medications the day of surgery:    No creams or ointments   Lidocaine patch-do not put a new one on morning of surgery   Magic mouthwash   Magnesium    Lisdexamfetamine (Vyvanse)   Vitamin B-12   Vitamin D      -  PLEASE TAKE these medications the day of surgery:   Famotidine (Pepcid) as needed   Fexofenadine (Allegra)   Atrovent nasal spray as needed   Levothyroxine    Montelukast (Singulair)   Vitamin C          How do I prepare myself?  - Please take 2 showers (one the night prior to surgery and one the morning of surgery) using Scrubcare or Hibiclens soap.    Use this soap only from the neck to your toes.     Leave the soap on your skin for one minute--then rinse thoroughly.      You may use your own shampoo and conditioner. No other hair products.   - Please remove all jewelry and body piercings.  - No lotions, deodorants or fragrance.  - No makeup or fingernail polish.   - Bring your ID and insurance card.    -If you use a CPAP machine, please bring the CPAP machine, tubing, and mask to hospital.    -If you have a Deep Brain Stimulator, Spinal Cord Stimulator, or any Neuro Stimulator device---you must bring the remote control to the hospital.        Covid testing policy as of 12/06/2022  Your surgeon will notify and schedule you for a COVID test if one is needed before surgery--please direct any questions or COVID symptoms to your surgeon      Questions or Concerns:    - For any questions regarding the day of surgery or your hospital stay, please  contact the Pre Admission Nursing Office at [phone].       - If you have health changes between today and your surgery, please call your surgeon.       - For questions after surgery, please call your surgeons office.           Current Visitor Guidelines    You may have 2 visitors in the pre op area.    Visiting hours: 8 a.m. to 8:30 p.m.    Patients confirmed or suspected to have symptoms of COVID 19 or flu:     No visitors allowed for adult patients.   Children (under age 18) can have 1 named visitor.     People who are sick or showing symptoms of COVID 19 or flu:    Are not allowed to visit patients--we can only make exceptions in special situations.       Please follow these guidelines for your visit:          Please maintain social distance          Masking is optional--however at times you may be asked to wear a mask for the safety of yourself and others     Clean your hands with alcohol hand . Do this when you arrive at and leave the building and patient room,    And again after you touch your mask or anything in the room.     Go directly to and from the room you are visiting.     Stay in the patient s room during your visit. Limit going to other places in the hospital as much as possible     Leave bags and jackets at home or in the car.     For everyone s health, please don t come and go during your visit. That includes for smoking   during your visit.

## 2024-02-15 ASSESSMENT — PAIN SCALES - PAIN ENJOYMENT GENERAL ACTIVITY SCALE (PEG)
INTERFERED_GENERAL_ACTIVITY: 6
PEG_TOTALSCORE: 6.33
INTERFERED_ENJOYMENT_LIFE: 6
AVG_PAIN_PASTWEEK: 7

## 2024-02-19 ENCOUNTER — OFFICE VISIT (OUTPATIENT)
Dept: PALLIATIVE MEDICINE | Facility: OTHER | Age: 64
End: 2024-02-19
Attending: ANESTHESIOLOGY
Payer: MEDICARE

## 2024-02-19 VITALS
DIASTOLIC BLOOD PRESSURE: 76 MMHG | SYSTOLIC BLOOD PRESSURE: 127 MMHG | HEART RATE: 91 BPM | BODY MASS INDEX: 23.46 KG/M2 | OXYGEN SATURATION: 99 % | WEIGHT: 141 LBS

## 2024-02-19 DIAGNOSIS — M47.816 LUMBAR FACET ARTHROPATHY: Primary | ICD-10-CM

## 2024-02-19 PROCEDURE — G0463 HOSPITAL OUTPT CLINIC VISIT: HCPCS | Performed by: ANESTHESIOLOGY

## 2024-02-19 PROCEDURE — 99214 OFFICE O/P EST MOD 30 MIN: CPT | Performed by: ANESTHESIOLOGY

## 2024-02-19 ASSESSMENT — PAIN SCALES - GENERAL: PAINLEVEL: MODERATE PAIN (5)

## 2024-02-19 NOTE — PROGRESS NOTES
Patient presents to the clinic today for a visit with NICOLE TUTTLE MD regarding Pain Management.          11/15/2023     9:14 AM 2/19/2024     9:16 AM   PEG Score   PEG Total Score 7.33 7       UDS/CSA- 10.04.2023    Medications- na    Notes    Aide ALVES [facility] Clinical Assistant

## 2024-02-19 NOTE — PROGRESS NOTES
"[facility] Follow-up    Date of visit: 2/19/2024    Chief complaint:   Chief Complaint   Patient presents with    Pain     Follow-up for Lyme disease, chronic regional pain syndrome.  Interval history:    Reviewing the record was seen by Dr. Borges regarding degenerative spondylolisthesis surgery.  He had more questions about complex regional pain syndrome, Omayra-Danlos.  She was very pleased with his explanations, and involvement in the previous surgery clinic to have questions answered.    She has not been able to follow-up with her previous [facility] physical therapist due to insurance change.  Has been working with other physical therapists.  There is been discussion from Dr. Lewis that he may also have references for her for her knee pain after her back surgery.    She would also like to have her hernia addressed for core strength.    Reviews her \"mood is fantastic\".  She attributes that to the methylene blue \"night and day\".  Compared to the last 5 henriquez her depression is not a problem.  Other people are noticing and pointing out.  Had been using the 10 mg capsules twice a day and then using the liquid, 20 drops twice a day to save cost.  Has not necessarily found it helpful for her pain but very satisfied for her mood.    Continues with the lamotrigine at 50 mg bedtime also helping with mood, and tizanidine 4 mg helping with her sleep.    We reviewed again the use of collagen supplementations and frequency specific microcurrent as other modalities to help manage her connective tissue over the future.          Medications:  Current Outpatient Medications   Medication Sig Dispense Refill    ascorbic acid 1000 MG TABS tablet Take 1,000 mg by mouth      cetirizine HCl 10 MG CAPS Take 20 mg by mouth at bedtime      cromolyn sodium (NASALCROM) 5.2 MG/ACT nasal aerosol Spray in nostril 2 times daily      EMGALITY 120 MG/ML injection Inject 120 mg Subcutaneous " every 28 days      estradiol (ESTRACE) 0.1 MG/GM vaginal cream Place 1 g vaginally three times a week       famotidine (PEPCID) 40 MG tablet Take 40 mg by mouth as needed      fexofenadine (ALLEGRA) 60 MG tablet Take 60 mg by mouth every morning      fish oil-omega-3 fatty acids 500 MG capsule Take 1 capsule by mouth daily Helps with migraines      gabapentin (NEURONTIN) 300 MG capsule Take 300 mg by mouth at bedtime      ipratropium (ATROVENT) 0.06 % nasal spray Spray 1 spray into both nostrils every morning      lamoTRIgine (LAMICTAL) 25 MG tablet Take 1 tablet (25 mg) by mouth 2 times daily (Patient taking differently: Take 50 mg by mouth at bedtime) 60 tablet 3    LEVOTHYROXINE SODIUM PO Take 75 mcg by mouth every morning      lidocaine (LIDODERM) 5 % patch       lisdexamfetamine (VYVANSE) 30 MG capsule Take 30 mg by mouth every morning      Magic Mouthwash (FV std formula) lidocaine visc 2% 2.5mL/5mL & maalox/mylanta w/ simeth 2.5mL/5mL & diphenhydrAMINE 5mg/5mL Swish and swallow 10 mLs in mouth every 6 hours as needed for mouth sores      magnesium oxide 400 MG CAPS 2-3 a day depending on ability to have bowel movement      Methylene Blue POWD 10 mg capsule one twice a day 1 week, two twice a day one week, then 3 twice a day, #90 (Patient taking differently: Take 20 mg by mouth every morning) 90 g 1    montelukast (SINGULAIR) 10 MG tablet Take 10 mg by mouth every morning      mupirocin (BACTROBAN) 2 % external ointment Apply 1 inch topically      omalizumab (XOLAIR) 150 MG injection Inject 150 mg Subcutaneous every 28 days      SUMAtriptan Succinate (IMITREX PO) Take 100 mg by mouth every 8 hours as needed for migraine      traMADol-acetaminophen (ULTRACET) 37.5-325 MG tablet Take 1 tablet by mouth at bedtime      TRAZODONE HCL PO Take 50 mg by mouth at bedtime      vitamin B-12 (CYANOCOBALAMIN) 1000 MCG tablet       vitamin D3 (CHOLECALCIFEROL) 50 mcg (2000 units) tablet       zolpidem (AMBIEN) 10 MG tablet  Take 5 mg by mouth at bedtime             Physical Exam:  Blood pressure 127/76, pulse 91, weight 64 kg (141 lb), SpO2 99%, not currently breastfeeding.    Alert, clear sensorium.  No respiratory distress, no pain behavior.     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  Anticipating a surgery where she expects can improve her back pain and function.    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    Finds her present regimen with the methylene blue more helpful for mood symptoms for which she is pleased.    Looking into resources to manage the musculoskeletal component after surgery.    Total time 35 minutes moderate complexity         minutes spent on the date of encounter doing chart review, history, and exam documentation and further activities as noted above.     Chaitanya Ortega MD  [facility]

## 2024-02-19 NOTE — PATIENT INSTRUCTIONS
[facility] Number:  [phone]  Call with any questions about your care and for scheduling assistance.   Calls are returned Monday through Friday between 8 AM and 4:30 PM. We usually get back to you within 2 business days depending on the issue/request.    If we are prescribing your medications:  For opioid medication refills, call the clinic or send a [email or web address] message 7 days in advance.  Please include:  Name of requested medication  Name of the pharmacy.  For non-opioid medications, call your pharmacy directly to request a refill. Please allow 3-4 days to be processed.   Per [place] State Law:  All controlled substance prescriptions must be filled within 30 days of being written.    For those controlled substances allowing refills, pickup must occur within 30 days of last fill.      We believe regular attendance is key to your success in our program!    Any time you are unable to keep your appointment we ask that you call us at least 24 hours in advance to cancel.This will allow us to offer the appointment time to another patient.   Multiple missed appointments may lead to dismissal from the clinic.     PLAN:    Continue with the methylene blue 20 mg twice a day.    Continue the lamotrigine 50 mg daily.    Continue with tizanidine 4 mg 2 tablets at bedtime.    Discussed the role of frequency specific microcurrent  May contact these providers to see if they take your insurance or you want to work with transitions [phone], [facility] [phone], [facility] [phone], [facility] [phone].    You are scheduled for back surgery in March.    You are working with physical therapists to help with your hemodialysis syndrome and musculoskeletal condition.  Contact Dr. Ortega if referral is needed.    Follow-up with Dr. Ortega for return visit in 3 to 4 months

## 2024-02-20 ENCOUNTER — MYC MEDICAL ADVICE (OUTPATIENT)
Dept: PALLIATIVE MEDICINE | Facility: OTHER | Age: 64
End: 2024-02-20
Payer: MEDICARE

## 2024-02-20 DIAGNOSIS — M47.816 LUMBAR FACET ARTHROPATHY: ICD-10-CM

## 2024-02-20 RX ORDER — LAMOTRIGINE 25 MG/1
50 TABLET ORAL AT BEDTIME
Qty: 180 TABLET | Refills: 1 | Status: SHIPPED | OUTPATIENT
Start: 2024-02-20 | End: 2024-08-19

## 2024-02-20 NOTE — TELEPHONE ENCOUNTER
Received fax from pharmacy requesting refill(s) for   lamoTRIgine (LAMICTAL) 25 MG tablet     Date last filled 2/13/24    Last Appt Date:2/16/24    Next Appt scheduled: 5/15/24    Pharmacy:   [facility] - [place], [place] - [address] [place] [address],    Pending Prescriptions:                       Disp   Refills    lamoTRIgine (LAMICTAL) 25 MG tablet       180 ta*             Sig: Take 2 tablets (50 mg) by mouth at bedtime

## 2024-02-21 ENCOUNTER — THERAPY VISIT (OUTPATIENT)
Dept: PHYSICAL THERAPY | Facility: REHABILITATION | Age: 64
End: 2024-02-21
Payer: MEDICARE

## 2024-02-21 DIAGNOSIS — G44.209 TENSION HEADACHE: ICD-10-CM

## 2024-02-21 DIAGNOSIS — G89.29 CHRONIC BILATERAL THORACIC BACK PAIN: ICD-10-CM

## 2024-02-21 DIAGNOSIS — Q79.60 EHLERS-DANLOS SYNDROME: Primary | ICD-10-CM

## 2024-02-21 DIAGNOSIS — M25.561 CHRONIC KNEE PAIN AFTER TOTAL REPLACEMENT OF RIGHT KNEE JOINT: ICD-10-CM

## 2024-02-21 DIAGNOSIS — G89.29 CHRONIC BILATERAL LOW BACK PAIN WITHOUT SCIATICA: ICD-10-CM

## 2024-02-21 DIAGNOSIS — M54.6 CHRONIC BILATERAL THORACIC BACK PAIN: ICD-10-CM

## 2024-02-21 DIAGNOSIS — M54.50 CHRONIC BILATERAL LOW BACK PAIN WITHOUT SCIATICA: ICD-10-CM

## 2024-02-21 DIAGNOSIS — Z96.651 CHRONIC KNEE PAIN AFTER TOTAL REPLACEMENT OF RIGHT KNEE JOINT: ICD-10-CM

## 2024-02-21 DIAGNOSIS — G89.29 CHRONIC PAIN OF BOTH SHOULDERS: ICD-10-CM

## 2024-02-21 DIAGNOSIS — M25.512 CHRONIC PAIN OF BOTH SHOULDERS: ICD-10-CM

## 2024-02-21 DIAGNOSIS — G89.29 CHRONIC KNEE PAIN AFTER TOTAL REPLACEMENT OF RIGHT KNEE JOINT: ICD-10-CM

## 2024-02-21 DIAGNOSIS — M25.511 CHRONIC PAIN OF BOTH SHOULDERS: ICD-10-CM

## 2024-02-21 DIAGNOSIS — M54.2 NECK PAIN: ICD-10-CM

## 2024-02-21 PROCEDURE — 97140 MANUAL THERAPY 1/> REGIONS: CPT | Mod: GP | Performed by: PHYSICAL THERAPIST

## 2024-02-21 PROCEDURE — 97110 THERAPEUTIC EXERCISES: CPT | Mod: GP | Performed by: PHYSICAL THERAPIST

## 2024-02-23 ENCOUNTER — HOSPITAL ENCOUNTER (OUTPATIENT)
Dept: CARDIOLOGY | Facility: CLINIC | Age: 64
Discharge: HOME | End: 2024-02-23
Attending: CLINICAL NURSE SPECIALIST | Admitting: CLINICAL NURSE SPECIALIST
Payer: MEDICARE

## 2024-02-23 DIAGNOSIS — Z01.818 PREOP EXAMINATION: ICD-10-CM

## 2024-02-23 DIAGNOSIS — Q79.60 EHLERS-DANLOS SYNDROME: ICD-10-CM

## 2024-02-23 PROCEDURE — 93306 TTE W/DOPPLER COMPLETE: CPT

## 2024-02-23 PROCEDURE — 93306 TTE W/DOPPLER COMPLETE: CPT | Mod: 26 | Performed by: INTERNAL MEDICINE

## 2024-02-28 ENCOUNTER — PREP FOR PROCEDURE (OUTPATIENT)
Dept: ORTHOPEDICS | Facility: CLINIC | Age: 64
End: 2024-02-28
Payer: MEDICARE

## 2024-02-29 ENCOUNTER — TELEPHONE (OUTPATIENT)
Dept: ORTHOPEDICS | Facility: CLINIC | Age: 64
End: 2024-02-29

## 2024-02-29 NOTE — TELEPHONE ENCOUNTER
[facility] Call Center    Phone Message    May a detailed message be left on voicemail: yes     Reason for Call: Other: Pt called to let the doctor know that she tested positive for Covid this morning     Action Taken: Other: [facility]    Travel Screening: Not Applicable

## 2024-03-01 NOTE — TELEPHONE ENCOUNTER
See phone message from pt.    I called pt back.  Stated COVID + test today 2-29-24 symptoms started 2-27-24 sore throat, congested, Fever & cough.  Saw Primary & started treatment today.    I told pt protocol is pt surgery date must be RSC to at least 6 weeks after + test date & pt will have to see PAC clinic again for repeat preop H&P & that appt must be in person so Anesthesia Can listen to lungs for Clearance for surgery & pt agreed.    I told pt that I will inform [facility] surgery   to call pt to [facility] both.  Call back prn if symptoms do not improve with treatment & pt agreed.  Informed .   Aide Chavez RN.

## 2024-03-04 NOTE — TELEPHONE ENCOUNTER
Phoned patient to reschedule her surgery with Dr Borges. Patient rescheduled from March 11th to April 15th. PAC appt scheduled for 3/22/24.

## 2024-03-05 NOTE — TELEPHONE ENCOUNTER
FUTURE VISIT INFORMATION        SURGERY INFORMATION:  Date: 4/15/24  Location: [facility] or  Surgeon:  Fausto Borges MD   Anesthesia Type:  general  Procedure: Decompression and transforaminal lumbar interbody fusion with Smith Galeas Osteotomy Lumbar 4-5, device insertion, image guided   Consult: ov 24     RECORDS REQUESTED FROM:         Primary Care Provider: [facility]     Most recent EKG+ Tracin/3/23- [facility]     Most recent Sleep Study:   10/11/22- [facility]

## 2024-03-13 ENCOUNTER — INFUSION THERAPY VISIT (OUTPATIENT)
Dept: INFUSION THERAPY | Facility: HOSPITAL | Age: 64
End: 2024-03-13
Attending: FAMILY MEDICINE
Payer: MEDICARE

## 2024-03-13 VITALS
OXYGEN SATURATION: 99 % | TEMPERATURE: 98.1 F | SYSTOLIC BLOOD PRESSURE: 117 MMHG | DIASTOLIC BLOOD PRESSURE: 72 MMHG | HEART RATE: 72 BPM | RESPIRATION RATE: 16 BRPM

## 2024-03-13 DIAGNOSIS — L50.1 IDIOPATHIC URTICARIA: ICD-10-CM

## 2024-03-13 DIAGNOSIS — L50.8 CHRONIC URTICARIA: Primary | ICD-10-CM

## 2024-03-13 PROCEDURE — 96372 THER/PROPH/DIAG INJ SC/IM: CPT | Performed by: REGISTERED NURSE

## 2024-03-13 PROCEDURE — 250N000011 HC RX IP 250 OP 636: Mod: JZ | Performed by: REGISTERED NURSE

## 2024-03-13 RX ORDER — ALBUTEROL SULFATE 0.83 MG/ML
2.5 SOLUTION RESPIRATORY (INHALATION)
Status: DISCONTINUED | OUTPATIENT
Start: 2024-03-13 | End: 2024-03-13 | Stop reason: HOSPADM

## 2024-03-13 RX ORDER — ALBUTEROL SULFATE 90 UG/1
1-2 AEROSOL, METERED RESPIRATORY (INHALATION)
Status: DISCONTINUED | OUTPATIENT
Start: 2024-03-13 | End: 2024-03-13 | Stop reason: HOSPADM

## 2024-03-13 RX ORDER — DIPHENHYDRAMINE HYDROCHLORIDE 50 MG/ML
50 INJECTION INTRAMUSCULAR; INTRAVENOUS
Status: CANCELLED
Start: 2024-03-29

## 2024-03-13 RX ORDER — ALBUTEROL SULFATE 90 UG/1
1-2 AEROSOL, METERED RESPIRATORY (INHALATION)
Status: CANCELLED
Start: 2024-03-29

## 2024-03-13 RX ORDER — MEPERIDINE HYDROCHLORIDE 50 MG/ML
25 INJECTION INTRAMUSCULAR; INTRAVENOUS; SUBCUTANEOUS EVERY 30 MIN PRN
Status: CANCELLED | OUTPATIENT
Start: 2024-03-29

## 2024-03-13 RX ORDER — METHYLPREDNISOLONE SODIUM SUCCINATE 125 MG/2ML
125 INJECTION, POWDER, LYOPHILIZED, FOR SOLUTION INTRAMUSCULAR; INTRAVENOUS
Status: CANCELLED
Start: 2024-03-29

## 2024-03-13 RX ORDER — MEPERIDINE HYDROCHLORIDE 50 MG/ML
25 INJECTION INTRAMUSCULAR; INTRAVENOUS; SUBCUTANEOUS EVERY 30 MIN PRN
Status: DISCONTINUED | OUTPATIENT
Start: 2024-03-13 | End: 2024-03-13 | Stop reason: HOSPADM

## 2024-03-13 RX ORDER — DIPHENHYDRAMINE HYDROCHLORIDE 50 MG/ML
50 INJECTION INTRAMUSCULAR; INTRAVENOUS
Status: DISCONTINUED | OUTPATIENT
Start: 2024-03-13 | End: 2024-03-13 | Stop reason: HOSPADM

## 2024-03-13 RX ORDER — ALBUTEROL SULFATE 0.83 MG/ML
2.5 SOLUTION RESPIRATORY (INHALATION)
Status: CANCELLED | OUTPATIENT
Start: 2024-03-29

## 2024-03-13 RX ORDER — METHYLPREDNISOLONE SODIUM SUCCINATE 125 MG/2ML
125 INJECTION, POWDER, LYOPHILIZED, FOR SOLUTION INTRAMUSCULAR; INTRAVENOUS
Status: DISCONTINUED | OUTPATIENT
Start: 2024-03-13 | End: 2024-03-13 | Stop reason: HOSPADM

## 2024-03-13 RX ORDER — EPINEPHRINE 1 MG/ML
0.3 INJECTION, SOLUTION INTRAMUSCULAR; SUBCUTANEOUS EVERY 5 MIN PRN
Status: DISCONTINUED | OUTPATIENT
Start: 2024-03-13 | End: 2024-03-13 | Stop reason: HOSPADM

## 2024-03-13 RX ORDER — EPINEPHRINE 1 MG/ML
0.3 INJECTION, SOLUTION INTRAMUSCULAR; SUBCUTANEOUS EVERY 5 MIN PRN
Status: CANCELLED | OUTPATIENT
Start: 2024-03-29

## 2024-03-13 RX ADMIN — OMALIZUMAB 300 MG: 150 INJECTION, SOLUTION SUBCUTANEOUS at 10:35

## 2024-03-13 NOTE — PROGRESS NOTES
Kayla was here today for injections x 2 to left lower abdomen and lower right abdomen. Pt tolerated well and without incident. Pt declined staying for 30 minute observation due to years of getting injections and no difficulties.      Danni LU, CMA

## 2024-03-21 ENCOUNTER — MYC MEDICAL ADVICE (OUTPATIENT)
Dept: ORTHOPEDICS | Facility: CLINIC | Age: 64
End: 2024-03-21
Payer: MEDICARE

## 2024-03-22 ENCOUNTER — PRE VISIT (OUTPATIENT)
Dept: SURGERY | Facility: CLINIC | Age: 64
End: 2024-03-22

## 2024-03-22 NOTE — TELEPHONE ENCOUNTER
Patient was called and provided with the number for PAC. [phone] and also transferred.  Marissa East ATC

## 2024-03-22 NOTE — TELEPHONE ENCOUNTER
[facility] Call Center    Phone Message    May a detailed message be left on voicemail: yes     Reason for Call: Other: Patient called to schedule her pre anesthesia visit but unsure of the number. Please call patient with information.      Action Taken: Other: [phone]    Travel Screening: Not Applicable

## 2024-03-22 NOTE — TELEPHONE ENCOUNTER
FUTURE VISIT INFORMATION        SURGERY INFORMATION:  Date: 4/15/24  Location: ur or  Surgeon:  Fausto Borges MD   Anesthesia Type:  general  Procedure: Decompression and transforaminal lumbar interbody fusion with Smith Galeas Osteotomy Lumbar 4-5, device insertion, image guided   Consult: ov 24     RECORDS REQUESTED FROM:         Primary Care Provider: [facility]     Most recent EKG+ Tracin/3/23- [facility]     Most recent Sleep Study:   10/11/22- [facility]

## 2024-04-03 ENCOUNTER — THERAPY VISIT (OUTPATIENT)
Dept: PHYSICAL THERAPY | Facility: REHABILITATION | Age: 64
End: 2024-04-03
Payer: MEDICARE

## 2024-04-03 DIAGNOSIS — G89.29 CHRONIC BILATERAL LOW BACK PAIN WITHOUT SCIATICA: ICD-10-CM

## 2024-04-03 DIAGNOSIS — M54.2 NECK PAIN: ICD-10-CM

## 2024-04-03 DIAGNOSIS — M25.512 CHRONIC PAIN OF BOTH SHOULDERS: ICD-10-CM

## 2024-04-03 DIAGNOSIS — M25.511 CHRONIC PAIN OF BOTH SHOULDERS: ICD-10-CM

## 2024-04-03 DIAGNOSIS — G89.29 CHRONIC PAIN OF BOTH SHOULDERS: ICD-10-CM

## 2024-04-03 DIAGNOSIS — Z96.651 CHRONIC KNEE PAIN AFTER TOTAL REPLACEMENT OF RIGHT KNEE JOINT: ICD-10-CM

## 2024-04-03 DIAGNOSIS — M25.561 CHRONIC KNEE PAIN AFTER TOTAL REPLACEMENT OF RIGHT KNEE JOINT: ICD-10-CM

## 2024-04-03 DIAGNOSIS — G89.29 CHRONIC KNEE PAIN AFTER TOTAL REPLACEMENT OF RIGHT KNEE JOINT: ICD-10-CM

## 2024-04-03 DIAGNOSIS — M54.50 CHRONIC BILATERAL LOW BACK PAIN WITHOUT SCIATICA: ICD-10-CM

## 2024-04-03 DIAGNOSIS — G44.209 TENSION HEADACHE: ICD-10-CM

## 2024-04-03 DIAGNOSIS — Q79.60 EHLERS-DANLOS SYNDROME: Primary | ICD-10-CM

## 2024-04-03 DIAGNOSIS — M54.6 CHRONIC BILATERAL THORACIC BACK PAIN: ICD-10-CM

## 2024-04-03 DIAGNOSIS — G89.29 CHRONIC BILATERAL THORACIC BACK PAIN: ICD-10-CM

## 2024-04-03 LAB
ABO/RH(D): NORMAL
ANTIBODY SCREEN: NEGATIVE
SPECIMEN EXPIRATION DATE: NORMAL

## 2024-04-03 PROCEDURE — 97140 MANUAL THERAPY 1/> REGIONS: CPT | Mod: GP | Performed by: PHYSICAL THERAPIST

## 2024-04-03 PROCEDURE — 97110 THERAPEUTIC EXERCISES: CPT | Mod: GP | Performed by: PHYSICAL THERAPIST

## 2024-04-03 NOTE — PROGRESS NOTES
[facility]                                                                                   OUTPATIENT PHYSICAL THERAPY    PLAN OF TREATMENT FOR OUTPATIENT REHABILITATION   Patient's Last Name, First Name, Kelsy Mccarty YOB: 1960   Provider's Name   [facility]   Medical Record No.  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              Referring Provider:  Chaitanya Ortega    Initial Assessment  See Epic Evaluation- Start of Care Date: 12/27/23            PLAN  Continue therapy per current plan of care.  Patient will take 4 weeks off from PT to have lumbar surgery and continue in May.    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   PT Tx Diagnosis Neck, shoulder, knee, and mid/lower back pain secondary to hypermobility and multiple surgeries   Precautions/Limitations Hernia present R side   Quick Adds Certification   Progress Note/Certification   Start of  Care Date 12/27/23   Onset of illness/injury or Date of Surgery 11/16/23  (Order date)   Therapy Frequency 1x/week to every other week   Predicted Duration 12 weeks   Certification date from 03/26/24   Certification date to 06/18/24   Progress Note Completed Date 12/27/23   GOALS   PT Goals 2;3;4;5   PT Goal 1   Goal Identifier Self-management/HEP   Goal Description Patient will be independent in self-management of condition and HEP to reach functional goals.   Goal Progress Progressing toward - good understanding of HEP   Target Date 06/18/24   PT Goal 2   Goal Identifier Walking   Goal Description Patient will be able to walk 2 miles 2-3x/day with her dog with <4/10 pain in the knee and lower back in order to return to PLOF.   Goal Progress Progressing toward - patient is walking 1.25 miles 1x/day and another short walk   Target Date 06/18/24   PT Goal 3   Goal Identifier Putting dishes away   Goal Description Patient will be able put dishes away in cupboard with <3/10 pain in the shoulders in order to perform daily house work.   Goal Progress No change   Target Date 06/18/24   PT Goal 4   Goal Identifier Stairs   Goal Description Patient will be able to maneuver stairs with <4/10 pain in the knee and greater ease in order to improve functional mobility.   Goal Progress No change   Target Date 06/18/24   PT Goal 5   Goal Identifier Headaches   Goal Description Patient will reduce HA frequency to <1x/week in order to improve QOL.   Goal Progress Improved after last session prior to illness   Target Date 06/18/24   Subjective Report   Subjective Report Patient reports that she got Covid at the end of February, then had a sinus infection, and then got influenza A a couple weeks later. She ended up in the ED twice due to illness and needing fluids. She has her pre-op tomorrow for surgery. She didn't have as much back pain when she wasn't walking as much. As she has started doing more she has has more pain. Mid back  and knee are bothering her the most today.   Objective Measures   Objective Measures Objective Measure 1;Objective Measure 2;Objective Measure 3;Objective Measure 4;Objective Measure 5   Objective Measure 1   Objective Measure Cervical ROM (not today)   Details Mod limited flexion, extension is WNL, major limited R SB, mod limited L SB, mod limited bilateral rotation   Objective Measure 2   Objective Measure Shoulder ROM (not today)   Details Flexion to ~90 deg on the L, 110 deg R   Objective Measure 3   Objective Measure Lumbar ROM (not today)   Details Pain with lumbar extension, pain with R lumbar side bending on the R   Objective Measure 4   Objective Measure Forward bend (not today)   Details WNL   Objective Measure 5   Objective Measure Pelvic landmarks (not today)   Details R LE appears longer supine; ASIS level   Treatment Interventions (PT)   Interventions Therapeutic Procedure/Exercise;Manual Therapy   Therapeutic Procedure/Exercise   Therapeutic Procedures: strength, endurance, ROM, flexibility minutes (92127) 14   Ther Proc 1 NUSTEP   Ther Proc 1 - Details To promote axial trunk rotation and UE/LE strengthening and mobility: NUSTEP x 8 minutes WL 3.0 with 626 legs only steps on NUSTEP and subjective measures taken.   PTRx Ther Proc 1 Gluteal Myofascial Full Arc   PTRx Ther Proc 1 - Details Discontinue   PTRx Ther Proc 2 Gluteal Myofascial Upper Arc   PTRx Ther Proc 2 - Details Discontinue   PTRx Ther Proc 3 Gluteal Myofascial Lower Arc   PTRx Ther Proc 3 - Details Discontinue   PTRx Ther Proc 4 Shoulder Theraband Rows   PTRx Ther Proc 4 - Details Discontinue   PTRx Ther Proc 5 Pallof Press   PTRx Ther Proc 5 - Details Discontinue   PTRx Ther Proc 6 Supine Cervical Retraction   PTRx Ther Proc 6 - Details HEP   PTRx Ther Proc 7 Scapular Retraction/Depression   PTRx Ther Proc 7 - Details HEP   PTRx Ther Proc 8 Bilateral Rotation With Theraband   PTRx Ther Proc 8 - Details Discontinue   PTRx Ther Proc 9  Abdominal Brace Transverse Abdominis   PTRx Ther Proc 9 - Details PRN   PTRx Ther Proc 10 Supine Abdominal Exercise #3 (Marching)   PTRx Ther Proc 10 - Details Discontinue   PTRx Ther Proc 11 Pubic Shot Gun MET   PTRx Ther Proc 11 - Details PRN   PTRx Ther Proc 12 Supine MET For Anterior Posterior Innominate Rotation   PTRx Ther Proc 12 - Details PRN   PTRx Ther Proc 13 Bridging #1   PTRx Ther Proc 13 - Details Discontinue   PTRx Ther Proc 14 Treatment for Anterior/Posterior Ilium Standing- Muscle Energy Technique (MET)   PTRx Ther Proc 14 - Details PRN   PTRx Ther Proc 15 Treatment for Anterior/Posterior Ilium MET Prone   PTRx Ther Proc 15 - Details PRN   PTRx Ther Proc 16 Hip Flexion Straight Leg Raise   PTRx Ther Proc 16 - Details HEP   PTRx Ther Proc 17 Supine Abdominal Exercise #3B (Two Leg Marching)   PTRx Ther Proc 17 - Details HEP   PTRx Ther Proc 18 Shoulder Theraband IR Step Out   PTRx Ther Proc 18 - Details Discontinue   PTRx Ther Proc 19 Shoulder Theraband External Rotation Step Out   PTRx Ther Proc 19 - Details Discontinue   PTRx Ther Proc 20 Roll Outs Hooklying   PTRx Ther Proc 20 - Details L1 HEP   Skilled Intervention Discussed HEP and focusing on isometric strengthening, especially of the quadricep on the R to improve knee pain. Patient to work on HEP as able.   Patient Response/Progress Good understanding of HEP.   Neuromuscular Re-education   PTRx Neuro Re-ed 1 Wand Shoulder Flexion Supine   PTRx Neuro Re-ed 1 - Details HEP   PTRx Neuro Re-ed 2 Isometric Quad   PTRx Neuro Re-ed 2 - Details Discussed performing for home   Manual Therapy   Manual Therapy: Mobilization, MFR, MLD, friction massage minutes (19892) 26   Manual Therapy Manual Therapy 2;Manual Therapy 3   Manual Therapy 1 MFR   Manual Therapy 1 - Details Prone MFR to lumbar/lower thoracic region R>L.  Supine MFR to R knee.   Skilled Intervention MFR for improving back and knee pain to improve walking and function.   Patient  Response/Progress Tolerated treatment well today.   Education   Learner/Method Patient;Listening;Demonstration   Plan   Home program PTRx   Plan for next session Transition to [facility] for treatments. Recheck neck/shoulder.  Continue with iliacus release if needed.  Focus on shoulders/posture next session.  MT to upper cervical spine if needed for HA symptoms.  Cervical strengthening, scapular strengthening, hip strengthening, lumbar strengthening.   Comments   Comments The patient presents to PT for follow up visit regarding multi-joint pain and EDS.  Patient has not been seen for 6 weeks due to multiple illnesses (Covid, sinus infection, influenza A) which she continues to recover from. Her back and knee were the primary areas of concern today, which were treated with MFR. She has been encouraged to get back into her lower level exercises and gradually increase workload as able. Patient will be having a lumbar decompression and fusion surgery on 4/15. She will be appropriate to continue with skilled PT services after initial recovery from her surgery to improve pain level, strength, and function.   Total Session Time   Timed Code Treatment Minutes 40   Total Treatment Time (sum of timed and untimed services) 40

## 2024-04-04 ENCOUNTER — OFFICE VISIT (OUTPATIENT)
Dept: SURGERY | Facility: CLINIC | Age: 64
End: 2024-04-04
Payer: MEDICARE

## 2024-04-04 ENCOUNTER — PRE VISIT (OUTPATIENT)
Dept: SURGERY | Facility: CLINIC | Age: 64
End: 2024-04-04

## 2024-04-04 ENCOUNTER — LAB (OUTPATIENT)
Dept: LAB | Facility: CLINIC | Age: 64
End: 2024-04-04
Payer: MEDICARE

## 2024-04-04 VITALS
WEIGHT: 142 LBS | BODY MASS INDEX: 23.66 KG/M2 | OXYGEN SATURATION: 99 % | DIASTOLIC BLOOD PRESSURE: 67 MMHG | TEMPERATURE: 98.2 F | HEART RATE: 77 BPM | HEIGHT: 65 IN | SYSTOLIC BLOOD PRESSURE: 102 MMHG

## 2024-04-04 DIAGNOSIS — M54.16 LUMBAR RADICULOPATHY: ICD-10-CM

## 2024-04-04 DIAGNOSIS — D89.40 MAST CELL ACTIVATION (H): ICD-10-CM

## 2024-04-04 DIAGNOSIS — Z01.818 PREOP EXAMINATION: Primary | ICD-10-CM

## 2024-04-04 DIAGNOSIS — Z01.818 PREOP EXAMINATION: ICD-10-CM

## 2024-04-04 DIAGNOSIS — Q79.62 EHLERS-DANLOS SYNDROME TYPE III: ICD-10-CM

## 2024-04-04 LAB
ERYTHROCYTE [DISTWIDTH] IN BLOOD BY AUTOMATED COUNT: 12.8 % (ref 10–15)
HCT VFR BLD AUTO: 41.9 % (ref 35–47)
HGB BLD-MCNC: 14 G/DL (ref 11.7–15.7)
MCH RBC QN AUTO: 29.9 PG (ref 26.5–33)
MCHC RBC AUTO-ENTMCNC: 33.4 G/DL (ref 31.5–36.5)
MCV RBC AUTO: 89 FL (ref 78–100)
PLATELET # BLD AUTO: 223 10E3/UL (ref 150–450)
RBC # BLD AUTO: 4.69 10E6/UL (ref 3.8–5.2)
WBC # BLD AUTO: 5.8 10E3/UL (ref 4–11)

## 2024-04-04 PROCEDURE — 36415 COLL VENOUS BLD VENIPUNCTURE: CPT | Performed by: PATHOLOGY

## 2024-04-04 PROCEDURE — 86900 BLOOD TYPING SEROLOGIC ABO: CPT | Performed by: NURSE PRACTITIONER

## 2024-04-04 PROCEDURE — 99215 OFFICE O/P EST HI 40 MIN: CPT | Performed by: NURSE PRACTITIONER

## 2024-04-04 PROCEDURE — 85027 COMPLETE CBC AUTOMATED: CPT | Performed by: PATHOLOGY

## 2024-04-04 PROCEDURE — 99417 PROLNG OP E/M EACH 15 MIN: CPT | Performed by: NURSE PRACTITIONER

## 2024-04-04 RX ORDER — PREDNISONE 50 MG/1
50 TABLET ORAL DAILY
Qty: 2 TABLET | Refills: 0 | Status: ON HOLD | OUTPATIENT
Start: 2024-04-14 | End: 2024-04-18

## 2024-04-04 RX ORDER — ZINC GLUCONATE 50 MG
50 TABLET ORAL EVERY OTHER DAY
COMMUNITY

## 2024-04-04 RX ORDER — NYSTATIN 500000 [USP'U]/1
500000 TABLET, COATED ORAL 3 TIMES DAILY
Status: ON HOLD | COMMUNITY
Start: 2024-03-25 | End: 2024-04-18

## 2024-04-04 RX ORDER — ACETAMINOPHEN 325 MG/1
325-650 TABLET ORAL EVERY 6 HOURS PRN
Status: ON HOLD | COMMUNITY
End: 2024-04-18

## 2024-04-04 ASSESSMENT — LIFESTYLE VARIABLES: TOBACCO_USE: 0

## 2024-04-04 ASSESSMENT — ENCOUNTER SYMPTOMS: ORTHOPNEA: 0

## 2024-04-04 ASSESSMENT — PAIN SCALES - GENERAL: PAINLEVEL: SEVERE PAIN (7)

## 2024-04-04 NOTE — H&P
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 testing indicated.      On the day of service:     Prep time: 23 minutes  Visit time: 18 minutes  Documentation time: 21 minutes  ------------------------------------------  Total time: 62 minutes      JOHN Heath CNP  Preoperative Assessment Center  [facility]  Clinic and Surgery Center  Phone: [phone]  Fax: [phone]    "

## 2024-04-04 NOTE — PATIENT INSTRUCTIONS
Preparing for Your Surgery      Name:  Kelsy Morley   MRN:  [id number]   :  1960   Today's Date:  2024       Arriving for surgery:  Surgery date:  04/15/2024  Arrival time:  5:30 am    Please come to:     Please come to:      [facility] [address]  [address] [place]  [address]  The Green Ramp for patients and visitors is located beneath the [facility]. The parking facility entrance is at the intersection of [address] [place] [address] and [address]. Patients and visitors who self-park will receive the reduced hospital parking rate (no ticket validation needed).  [employer] parking, located at the [facility] main entrance on [address] [place] [address], is available Monday - Friday from 7 am to 3:30 pm.  Discounted parking pass options can be purchased from  attendants during business hours.  -Check in at the security desk in the [facility] ([facility]) Lobby. They will direct you to the correct elevators.  -Proceed to the 3rd floor, check in at the Adult Surgery Waiting Lounge. [phone]  If you are in need of directions, a wheelchair or escort please stop at the Information Desk in the lobby.  Inform the information person that you are here for surgery; a wheelchair and escort to [address] will be provided.   An escort to the Adult Surgery Waiting Lounge will be provided.    What can I eat or drink?  -  You may eat and drink normally up to 8 hours prior to arrival time. (Until 24 at 11 pm)  -  You may have clear liquids until 2 hours prior to arrival time. (Until 3:30 am)    Examples of clear liquids:  Water  Clear broth  Juices (apple, white grape, white cranberry  and cider) without pulp  Noncarbonated, powder based beverages  (lemonade and Bruno-Aid)  Sodas (Sprite, 7-Up, ginger ale and seltzer)  Coffee or tea (without milk or cream)  Gatorade    -  No Alcohol or  cannabis products for at least 24 hours before surgery.     Which medicines can I take?    Hold Aspirin for 7 days before surgery.   Hold Multivitamins for 7 days before surgery.  Hold Supplements for 7 days before surgery. (Fish oil)  Hold Ibuprofen (Advil, Motrin) for 3 day(s) before surgery--unless otherwise directed by surgeon.  Hold Naproxen (Aleve) for 4 days before surgery.    Hold Sumatriptan for 24 hours before surgery     -  DO NOT take these medications the day of surgery:  Cetirizine  Magnesium  Methylene blue  Nystatin   Vitamin B 12  Vitamin D 3  Vyvanse   Zinc      -  PLEASE TAKE these medications the day of surgery:  Benadryl  Vitamin C  Nasal spray if needed  Famotidine  Fexofenadine  Levothyroxine  Prednisone  Montelukast  Tramadol if needed        How do I prepare myself?  - Please take 2 showers (one the night prior to surgery and one the morning of surgery) using Scrubcare or Hibiclens soap.    Use this soap only from the neck to your toes.     Leave the soap on your skin for one minute--then rinse thoroughly.      You may use your own shampoo and conditioner. No other hair products.   - Please remove all jewelry and body piercings.  - No lotions, deodorants or fragrance.  - No makeup or fingernail polish.   - Bring your ID and insurance card.    -If you use a CPAP machine, please bring the CPAP machine, tubing, and mask to hospital.    -If you have a Deep Brain Stimulator, Spinal Cord Stimulator, or any Neuro Stimulator device---you must bring the remote control to the hospital.      ALL PATIENTS GOING HOME THE SAME DAY OF SURGERY ARE REQUIRED TO HAVE A RESPONSIBLE ADULT TO DRIVE AND BE IN ATTENDANCE WITH THEM FOR 24 HOURS FOLLOWING SURGERY.    Covid testing policy as of 12/06/2022  Your surgeon will notify and schedule you for a COVID test if one is needed before surgery--please direct any questions or COVID symptoms to your surgeon      Questions or Concerns:    - For any questions regarding  the day of surgery or your hospital stay, please contact the Pre Admission Nursing Office at [phone].       - If you have health changes between today and your surgery, please call your surgeon.       - For questions after surgery, please call your surgeons office.           Current Visitor Guidelines    You may have 2 visitors in the pre op area.    Visiting hours: 8 a.m. to 8:30 p.m.    Patients confirmed or suspected to have symptoms of COVID 19 or flu:     No visitors allowed for adult patients.   Children (under age 18) can have 1 named visitor.     People who are sick or showing symptoms of COVID 19 or flu:    Are not allowed to visit patients--we can only make exceptions in special situations.       Please follow these guidelines for your visit:          Please maintain social distance          Masking is optional--however at times you may be asked to wear a mask for the safety of yourself and others     Clean your hands with alcohol hand . Do this when you arrive at and leave the building and patient room,    And again after you touch your mask or anything in the room.     Go directly to and from the room you are visiting.     Stay in the patient s room during your visit. Limit going to other places in the hospital as much as possible     Leave bags and jackets at home or in the car.     For everyone s health, please don t come and go during your visit. That includes for smoking   during your visit.

## 2024-04-04 NOTE — RESULT ENCOUNTER NOTE
Abelino Tierney,    Your test results are attached. Your blood test results are within normal limits and good for surgery.         Mary Vo DNP, RN, ANP-C

## 2024-04-10 ENCOUNTER — INFUSION THERAPY VISIT (OUTPATIENT)
Dept: INFUSION THERAPY | Facility: HOSPITAL | Age: 64
End: 2024-04-10
Attending: FAMILY MEDICINE
Payer: MEDICARE

## 2024-04-10 VITALS
OXYGEN SATURATION: 100 % | SYSTOLIC BLOOD PRESSURE: 116 MMHG | DIASTOLIC BLOOD PRESSURE: 72 MMHG | TEMPERATURE: 98.6 F | HEART RATE: 91 BPM | RESPIRATION RATE: 18 BRPM

## 2024-04-10 DIAGNOSIS — L50.1 IDIOPATHIC URTICARIA: ICD-10-CM

## 2024-04-10 DIAGNOSIS — L50.8 CHRONIC URTICARIA: Primary | ICD-10-CM

## 2024-04-10 PROCEDURE — 96372 THER/PROPH/DIAG INJ SC/IM: CPT | Performed by: REGISTERED NURSE

## 2024-04-10 PROCEDURE — 250N000011 HC RX IP 250 OP 636: Mod: JZ | Performed by: REGISTERED NURSE

## 2024-04-10 RX ORDER — EPINEPHRINE 1 MG/ML
0.3 INJECTION, SOLUTION INTRAMUSCULAR; SUBCUTANEOUS EVERY 5 MIN PRN
Status: CANCELLED | OUTPATIENT
Start: 2024-04-30

## 2024-04-10 RX ORDER — MEPERIDINE HYDROCHLORIDE 50 MG/ML
25 INJECTION INTRAMUSCULAR; INTRAVENOUS; SUBCUTANEOUS EVERY 30 MIN PRN
Status: CANCELLED | OUTPATIENT
Start: 2024-04-30

## 2024-04-10 RX ORDER — METHYLPREDNISOLONE SODIUM SUCCINATE 125 MG/2ML
125 INJECTION, POWDER, LYOPHILIZED, FOR SOLUTION INTRAMUSCULAR; INTRAVENOUS
Status: CANCELLED
Start: 2024-04-30

## 2024-04-10 RX ORDER — ALBUTEROL SULFATE 0.83 MG/ML
2.5 SOLUTION RESPIRATORY (INHALATION)
Status: CANCELLED | OUTPATIENT
Start: 2024-04-30

## 2024-04-10 RX ORDER — DIPHENHYDRAMINE HYDROCHLORIDE 50 MG/ML
50 INJECTION INTRAMUSCULAR; INTRAVENOUS
Status: CANCELLED
Start: 2024-04-30

## 2024-04-10 RX ORDER — ALBUTEROL SULFATE 90 UG/1
1-2 AEROSOL, METERED RESPIRATORY (INHALATION)
Status: CANCELLED
Start: 2024-04-30

## 2024-04-10 RX ADMIN — OMALIZUMAB 300 MG: 150 INJECTION, SOLUTION SUBCUTANEOUS at 11:10

## 2024-04-10 NOTE — PROGRESS NOTES
Kayla was here today for injections x 2 to right lower abdomen and left lower abdomen.  Pt tolerated injections well and without incident.  Pt refused 30 minute post injection observation.        Danni LU CMA

## 2024-04-14 ASSESSMENT — ENCOUNTER SYMPTOMS: ORTHOPNEA: 0

## 2024-04-14 ASSESSMENT — LIFESTYLE VARIABLES: TOBACCO_USE: 0
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
## 2024-04-15 ENCOUNTER — APPOINTMENT (OUTPATIENT)
Dept: GENERAL RADIOLOGY | Facility: CLINIC | Age: 64
DRG: 454 | End: 2024-04-15
Attending: ORTHOPAEDIC SURGERY
Payer: MEDICARE

## 2024-04-15 ENCOUNTER — HOSPITAL ENCOUNTER (INPATIENT)
Facility: CLINIC | Age: 64
LOS: 3 days | Discharge: HOME | DRG: 454 | End: 2024-04-18
Attending: ORTHOPAEDIC SURGERY | Admitting: ORTHOPAEDIC SURGERY
Payer: MEDICARE

## 2024-04-15 ENCOUNTER — DOCUMENTATION ONLY (OUTPATIENT)
Dept: OTHER | Facility: CLINIC | Age: 64
End: 2024-04-15
Payer: MEDICARE

## 2024-04-15 ENCOUNTER — ANESTHESIA (OUTPATIENT)
Dept: SURGERY | Facility: CLINIC | Age: 64
DRG: 454 | End: 2024-04-15
Payer: MEDICARE

## 2024-04-15 DIAGNOSIS — E83.39 HYPOPHOSPHATEMIA: ICD-10-CM

## 2024-04-15 DIAGNOSIS — M47.816 LUMBAR FACET ARTHROPATHY: Primary | ICD-10-CM

## 2024-04-15 LAB — GLUCOSE BLDC GLUCOMTR-MCNC: 183 MG/DL (ref 70–99)

## 2024-04-15 PROCEDURE — 250N000013 HC RX MED GY IP 250 OP 250 PS 637: Performed by: PHYSICIAN ASSISTANT

## 2024-04-15 PROCEDURE — 01NB0ZZ RELEASE LUMBAR NERVE, OPEN APPROACH: ICD-10-PCS | Performed by: ORTHOPAEDIC SURGERY

## 2024-04-15 PROCEDURE — 258N000003 HC RX IP 258 OP 636

## 2024-04-15 PROCEDURE — 250N000011 HC RX IP 250 OP 636: Performed by: NURSE ANESTHETIST, CERTIFIED REGISTERED

## 2024-04-15 PROCEDURE — 22214 INCIS 1 VERTEBRAL SEG LUMBAR: CPT | Performed by: ORTHOPAEDIC SURGERY

## 2024-04-15 PROCEDURE — 272N000001 HC OR GENERAL SUPPLY STERILE: Performed by: ORTHOPAEDIC SURGERY

## 2024-04-15 PROCEDURE — 120N000003 HC R&B IMCU UMMC

## 2024-04-15 PROCEDURE — 250N000009 HC RX 250: Performed by: NURSE ANESTHETIST, CERTIFIED REGISTERED

## 2024-04-15 PROCEDURE — 250N000013 HC RX MED GY IP 250 OP 250 PS 637: Performed by: ORTHOPAEDIC SURGERY

## 2024-04-15 PROCEDURE — 258N000003 HC RX IP 258 OP 636: Performed by: NURSE ANESTHETIST, CERTIFIED REGISTERED

## 2024-04-15 PROCEDURE — 22840 INSERT SPINE FIXATION DEVICE: CPT | Performed by: NURSE ANESTHETIST, CERTIFIED REGISTERED

## 2024-04-15 PROCEDURE — 258N000003 HC RX IP 258 OP 636: Performed by: PHYSICIAN ASSISTANT

## 2024-04-15 PROCEDURE — L8699 PROSTHETIC IMPLANT NOS: HCPCS | Performed by: ORTHOPAEDIC SURGERY

## 2024-04-15 PROCEDURE — 250N000011 HC RX IP 250 OP 636: Performed by: PHYSICIAN ASSISTANT

## 2024-04-15 PROCEDURE — 0QS004Z REPOSITION LUMBAR VERTEBRA WITH INTERNAL FIXATION DEVICE, OPEN APPROACH: ICD-10-PCS | Performed by: ORTHOPAEDIC SURGERY

## 2024-04-15 PROCEDURE — 0SG0071 FUSION OF LUMBAR VERTEBRAL JOINT WITH AUTOLOGOUS TISSUE SUBSTITUTE, POSTERIOR APPROACH, POSTERIOR COLUMN, OPEN APPROACH: ICD-10-PCS | Performed by: ORTHOPAEDIC SURGERY

## 2024-04-15 PROCEDURE — 258N000003 HC RX IP 258 OP 636: Performed by: ORTHOPAEDIC SURGERY

## 2024-04-15 PROCEDURE — 250N000009 HC RX 250: Performed by: ANESTHESIOLOGY

## 2024-04-15 PROCEDURE — C1713 ANCHOR/SCREW BN/BN,TIS/BN: HCPCS | Performed by: ORTHOPAEDIC SURGERY

## 2024-04-15 PROCEDURE — 250N000011 HC RX IP 250 OP 636: Performed by: ORTHOPAEDIC SURGERY

## 2024-04-15 PROCEDURE — 999N000180 XR SURGERY CARM FLUORO LESS THAN 5 MIN: Mod: TC

## 2024-04-15 PROCEDURE — 22633 ARTHRD CMBN 1NTRSPC LUMBAR: CPT | Performed by: ORTHOPAEDIC SURGERY

## 2024-04-15 PROCEDURE — 250N000011 HC RX IP 250 OP 636: Mod: JZ | Performed by: ORTHOPAEDIC SURGERY

## 2024-04-15 PROCEDURE — 22840 INSERT SPINE FIXATION DEVICE: CPT | Performed by: ORTHOPAEDIC SURGERY

## 2024-04-15 PROCEDURE — 360N000086 HC SURGERY LEVEL 6 W/ FLUORO, PER MIN: Performed by: ORTHOPAEDIC SURGERY

## 2024-04-15 PROCEDURE — 250N000011 HC RX IP 250 OP 636: Mod: JZ | Performed by: PHYSICIAN ASSISTANT

## 2024-04-15 PROCEDURE — 258N000003 HC RX IP 258 OP 636: Performed by: ANESTHESIOLOGY

## 2024-04-15 PROCEDURE — 999N000141 HC STATISTIC PRE-PROCEDURE NURSING ASSESSMENT: Performed by: ORTHOPAEDIC SURGERY

## 2024-04-15 PROCEDURE — 250N000011 HC RX IP 250 OP 636: Performed by: ANESTHESIOLOGY

## 2024-04-15 PROCEDURE — 22853 INSJ BIOMECHANICAL DEVICE: CPT | Performed by: ORTHOPAEDIC SURGERY

## 2024-04-15 PROCEDURE — 20936 SP BONE AGRFT LOCAL ADD-ON: CPT | Performed by: ORTHOPAEDIC SURGERY

## 2024-04-15 PROCEDURE — 250N000013 HC RX MED GY IP 250 OP 250 PS 637

## 2024-04-15 PROCEDURE — 20930 SP BONE ALGRFT MORSEL ADD-ON: CPT | Performed by: ORTHOPAEDIC SURGERY

## 2024-04-15 PROCEDURE — 250N000011 HC RX IP 250 OP 636: Mod: JZ | Performed by: ANESTHESIOLOGY

## 2024-04-15 PROCEDURE — 250N000025 HC SEVOFLURANE, PER MIN: Performed by: ORTHOPAEDIC SURGERY

## 2024-04-15 PROCEDURE — 22840 INSERT SPINE FIXATION DEVICE: CPT | Performed by: ANESTHESIOLOGY

## 2024-04-15 PROCEDURE — C1762 CONN TISS, HUMAN(INC FASCIA): HCPCS | Performed by: ORTHOPAEDIC SURGERY

## 2024-04-15 PROCEDURE — 99223 1ST HOSP IP/OBS HIGH 75: CPT

## 2024-04-15 PROCEDURE — 63052 LAM FACETC/FRMT ARTHRD LUM 1: CPT | Mod: XU | Performed by: ORTHOPAEDIC SURGERY

## 2024-04-15 PROCEDURE — 370N000017 HC ANESTHESIA TECHNICAL FEE, PER MIN: Performed by: ORTHOPAEDIC SURGERY

## 2024-04-15 PROCEDURE — 0SG00AJ FUSION OF LUMBAR VERTEBRAL JOINT WITH INTERBODY FUSION DEVICE, POSTERIOR APPROACH, ANTERIOR COLUMN, OPEN APPROACH: ICD-10-PCS | Performed by: ORTHOPAEDIC SURGERY

## 2024-04-15 PROCEDURE — 99418 PROLNG IP/OBS E/M EA 15 MIN: CPT

## 2024-04-15 PROCEDURE — 8E0WXBZ COMPUTER ASSISTED PROCEDURE OF TRUNK REGION: ICD-10-PCS | Performed by: ORTHOPAEDIC SURGERY

## 2024-04-15 PROCEDURE — 250N000009 HC RX 250: Performed by: PHYSICIAN ASSISTANT

## 2024-04-15 PROCEDURE — 61783 SCAN PROC SPINAL: CPT | Performed by: ORTHOPAEDIC SURGERY

## 2024-04-15 PROCEDURE — 710N000010 HC RECOVERY PHASE 1, LEVEL 2, PER MIN: Performed by: ORTHOPAEDIC SURGERY

## 2024-04-15 PROCEDURE — 250N000011 HC RX IP 250 OP 636

## 2024-04-15 PROCEDURE — 0SB20ZZ EXCISION OF LUMBAR VERTEBRAL DISC, OPEN APPROACH: ICD-10-PCS | Performed by: ORTHOPAEDIC SURGERY

## 2024-04-15 DEVICE — SPACER 5021422 CONTROL PTC 18 DEG 14X22
Type: IMPLANTABLE DEVICE | Site: SPINE LUMBAR | Status: FUNCTIONAL
Brand: CAPSTONE CONTROL PTC SPINAL SYSTEM

## 2024-04-15 DEVICE — IMP SCR MEDT 5.5/6.0MM SOLERA 6.5X50MM MA 55840006550: Type: IMPLANTABLE DEVICE | Site: SPINE LUMBAR | Status: FUNCTIONAL

## 2024-04-15 DEVICE — GRAFT BONE FIBERS DBF 6ML INJ T50306 PRELOADED: Type: IMPLANTABLE DEVICE | Site: SPINE LUMBAR | Status: FUNCTIONAL

## 2024-04-15 DEVICE — IMP ROD MEDT SOLERA CVD 5.5X35MM CHR 1555501035: Type: IMPLANTABLE DEVICE | Site: SPINE LUMBAR | Status: FUNCTIONAL

## 2024-04-15 DEVICE — IMP SCR SET MEDT SOLERA BREAK OFF 5.5MM TI 5540030: Type: IMPLANTABLE DEVICE | Site: SPINE LUMBAR | Status: FUNCTIONAL

## 2024-04-15 DEVICE — GRAFT BONE INFUSE BMP MED 7510400: Type: IMPLANTABLE DEVICE | Site: SPINE LUMBAR | Status: FUNCTIONAL

## 2024-04-15 RX ORDER — NALOXONE HYDROCHLORIDE 0.4 MG/ML
0.4 INJECTION, SOLUTION INTRAMUSCULAR; INTRAVENOUS; SUBCUTANEOUS
Status: DISCONTINUED | OUTPATIENT
Start: 2024-04-15 | End: 2024-04-18 | Stop reason: HOSPADM

## 2024-04-15 RX ORDER — LIDOCAINE HYDROCHLORIDE 20 MG/ML
INJECTION, SOLUTION INFILTRATION; PERINEURAL PRN
Status: DISCONTINUED | OUTPATIENT
Start: 2024-04-15 | End: 2024-04-15

## 2024-04-15 RX ORDER — LANOLIN ALCOHOL/MO/W.PET/CERES
1000 CREAM (GRAM) TOPICAL DAILY
Status: DISCONTINUED | OUTPATIENT
Start: 2024-04-15 | End: 2024-04-18 | Stop reason: HOSPADM

## 2024-04-15 RX ORDER — IPRATROPIUM BROMIDE 42 UG/1
1 SPRAY, METERED NASAL EVERY MORNING
Status: DISCONTINUED | OUTPATIENT
Start: 2024-04-16 | End: 2024-04-18 | Stop reason: HOSPADM

## 2024-04-15 RX ORDER — OXYCODONE HYDROCHLORIDE 5 MG/1
5 TABLET ORAL EVERY 4 HOURS PRN
Status: DISCONTINUED | OUTPATIENT
Start: 2024-04-15 | End: 2024-04-18

## 2024-04-15 RX ORDER — LIDOCAINE HYDROCHLORIDE ANHYDROUS AND DEXTROSE MONOHYDRATE .8; 5 G/100ML; G/100ML
1 INJECTION, SOLUTION INTRAVENOUS CONTINUOUS
Status: DISCONTINUED | OUTPATIENT
Start: 2024-04-15 | End: 2024-04-15 | Stop reason: HOSPADM

## 2024-04-15 RX ORDER — CEFAZOLIN SODIUM/WATER 2 G/20 ML
2 SYRINGE (ML) INTRAVENOUS
Status: COMPLETED | OUTPATIENT
Start: 2024-04-15 | End: 2024-04-15

## 2024-04-15 RX ORDER — ASCORBIC ACID 500 MG
1000 TABLET ORAL DAILY
Status: DISCONTINUED | OUTPATIENT
Start: 2024-04-15 | End: 2024-04-17

## 2024-04-15 RX ORDER — LIDOCAINE HYDROCHLORIDE ANHYDROUS AND DEXTROSE MONOHYDRATE .8; 5 G/100ML; G/100ML
1 INJECTION, SOLUTION INTRAVENOUS CONTINUOUS
Status: DISPENSED | OUTPATIENT
Start: 2024-04-15 | End: 2024-04-17

## 2024-04-15 RX ORDER — SODIUM CHLORIDE 9 MG/ML
INJECTION, SOLUTION INTRAVENOUS CONTINUOUS
Status: DISCONTINUED | OUTPATIENT
Start: 2024-04-15 | End: 2024-04-18 | Stop reason: HOSPADM

## 2024-04-15 RX ORDER — NYSTATIN 100000/ML
500000 SUSPENSION, ORAL (FINAL DOSE FORM) ORAL 4 TIMES DAILY
Status: DISCONTINUED | OUTPATIENT
Start: 2024-04-15 | End: 2024-04-18 | Stop reason: HOSPADM

## 2024-04-15 RX ORDER — LABETALOL HYDROCHLORIDE 5 MG/ML
10 INJECTION, SOLUTION INTRAVENOUS
Status: DISCONTINUED | OUTPATIENT
Start: 2024-04-15 | End: 2024-04-15 | Stop reason: HOSPADM

## 2024-04-15 RX ORDER — SODIUM CHLORIDE 9 MG/ML
INJECTION, SOLUTION INTRAVENOUS CONTINUOUS
Status: DISCONTINUED | OUTPATIENT
Start: 2024-04-15 | End: 2024-04-15 | Stop reason: HOSPADM

## 2024-04-15 RX ORDER — HYDROMORPHONE HYDROCHLORIDE 1 MG/ML
0.4 INJECTION, SOLUTION INTRAMUSCULAR; INTRAVENOUS; SUBCUTANEOUS EVERY 5 MIN PRN
Status: DISCONTINUED | OUTPATIENT
Start: 2024-04-15 | End: 2024-04-15 | Stop reason: HOSPADM

## 2024-04-15 RX ORDER — PROCHLORPERAZINE MALEATE 5 MG
10 TABLET ORAL EVERY 6 HOURS PRN
Status: DISCONTINUED | OUTPATIENT
Start: 2024-04-15 | End: 2024-04-18 | Stop reason: HOSPADM

## 2024-04-15 RX ORDER — ZINC GLUCONATE 50 MG
50 TABLET ORAL EVERY OTHER DAY
Status: DISCONTINUED | OUTPATIENT
Start: 2024-04-15 | End: 2024-04-15

## 2024-04-15 RX ORDER — CEFAZOLIN SODIUM/WATER 2 G/20 ML
2 SYRINGE (ML) INTRAVENOUS SEE ADMIN INSTRUCTIONS
Status: DISCONTINUED | OUTPATIENT
Start: 2024-04-15 | End: 2024-04-15 | Stop reason: HOSPADM

## 2024-04-15 RX ORDER — FAMOTIDINE 20 MG/1
40 TABLET, FILM COATED ORAL DAILY PRN
Status: DISCONTINUED | OUTPATIENT
Start: 2024-04-15 | End: 2024-04-15

## 2024-04-15 RX ORDER — ONDANSETRON 2 MG/ML
4 INJECTION INTRAMUSCULAR; INTRAVENOUS EVERY 30 MIN PRN
Status: DISCONTINUED | OUTPATIENT
Start: 2024-04-15 | End: 2024-04-15 | Stop reason: HOSPADM

## 2024-04-15 RX ORDER — METHYLPREDNISOLONE ACETATE 80 MG/ML
INJECTION, SUSPENSION INTRA-ARTICULAR; INTRALESIONAL; INTRAMUSCULAR; SOFT TISSUE PRN
Status: DISCONTINUED | OUTPATIENT
Start: 2024-04-15 | End: 2024-04-15 | Stop reason: HOSPADM

## 2024-04-15 RX ORDER — NALOXONE HYDROCHLORIDE 0.4 MG/ML
0.2 INJECTION, SOLUTION INTRAMUSCULAR; INTRAVENOUS; SUBCUTANEOUS
Status: DISCONTINUED | OUTPATIENT
Start: 2024-04-15 | End: 2024-04-18 | Stop reason: HOSPADM

## 2024-04-15 RX ORDER — FEXOFENADINE HCL 180 MG/1
180 TABLET ORAL EVERY MORNING
Status: DISCONTINUED | OUTPATIENT
Start: 2024-04-16 | End: 2024-04-18 | Stop reason: HOSPADM

## 2024-04-15 RX ORDER — ONDANSETRON 2 MG/ML
4 INJECTION INTRAMUSCULAR; INTRAVENOUS EVERY 6 HOURS PRN
Status: DISCONTINUED | OUTPATIENT
Start: 2024-04-15 | End: 2024-04-18 | Stop reason: HOSPADM

## 2024-04-15 RX ORDER — LORAZEPAM 2 MG/ML
1 INJECTION INTRAMUSCULAR EVERY 30 MIN PRN
Status: DISCONTINUED | OUTPATIENT
Start: 2024-04-15 | End: 2024-04-15 | Stop reason: HOSPADM

## 2024-04-15 RX ORDER — ACETAMINOPHEN 325 MG/1
975 TABLET ORAL EVERY 8 HOURS
Status: DISCONTINUED | OUTPATIENT
Start: 2024-04-15 | End: 2024-04-17

## 2024-04-15 RX ORDER — RIBOFLAVIN (VITAMIN B2) 100 MG
400 TABLET ORAL DAILY
Status: DISCONTINUED | OUTPATIENT
Start: 2024-04-15 | End: 2024-04-18 | Stop reason: HOSPADM

## 2024-04-15 RX ORDER — ONDANSETRON 4 MG/1
4 TABLET, ORALLY DISINTEGRATING ORAL EVERY 30 MIN PRN
Status: DISCONTINUED | OUTPATIENT
Start: 2024-04-15 | End: 2024-04-15 | Stop reason: HOSPADM

## 2024-04-15 RX ORDER — SODIUM CHLORIDE 9 MG/ML
INJECTION, SOLUTION INTRAVENOUS CONTINUOUS PRN
Status: DISCONTINUED | OUTPATIENT
Start: 2024-04-15 | End: 2024-04-15

## 2024-04-15 RX ORDER — GABAPENTIN 300 MG/1
300 CAPSULE ORAL
Status: COMPLETED | OUTPATIENT
Start: 2024-04-15 | End: 2024-04-15

## 2024-04-15 RX ORDER — METHYLENE BLUE
20 POWDER (GRAM) MISCELLANEOUS EVERY MORNING
Status: DISCONTINUED | OUTPATIENT
Start: 2024-04-16 | End: 2024-04-16

## 2024-04-15 RX ORDER — ACETAMINOPHEN 325 MG/1
975 TABLET ORAL ONCE
Status: COMPLETED | OUTPATIENT
Start: 2024-04-15 | End: 2024-04-15

## 2024-04-15 RX ORDER — OXYCODONE HYDROCHLORIDE 5 MG/1
10 TABLET ORAL EVERY 4 HOURS PRN
Status: DISCONTINUED | OUTPATIENT
Start: 2024-04-15 | End: 2024-04-18

## 2024-04-15 RX ORDER — LEVOTHYROXINE SODIUM 75 UG/1
75 TABLET ORAL EVERY MORNING
Status: DISCONTINUED | OUTPATIENT
Start: 2024-04-16 | End: 2024-04-18 | Stop reason: HOSPADM

## 2024-04-15 RX ORDER — FEXOFENADINE HCL 180 MG/1
360 TABLET ORAL EVERY MORNING
Status: DISCONTINUED | OUTPATIENT
Start: 2024-04-16 | End: 2024-04-15

## 2024-04-15 RX ORDER — MONTELUKAST SODIUM 10 MG/1
10 TABLET ORAL EVERY MORNING
Status: DISCONTINUED | OUTPATIENT
Start: 2024-04-16 | End: 2024-04-18 | Stop reason: HOSPADM

## 2024-04-15 RX ORDER — NALOXONE HYDROCHLORIDE 0.4 MG/ML
0.1 INJECTION, SOLUTION INTRAMUSCULAR; INTRAVENOUS; SUBCUTANEOUS
Status: DISCONTINUED | OUTPATIENT
Start: 2024-04-15 | End: 2024-04-15 | Stop reason: HOSPADM

## 2024-04-15 RX ORDER — DEXAMETHASONE SODIUM PHOSPHATE 4 MG/ML
INJECTION, SOLUTION INTRA-ARTICULAR; INTRALESIONAL; INTRAMUSCULAR; INTRAVENOUS; SOFT TISSUE PRN
Status: DISCONTINUED | OUTPATIENT
Start: 2024-04-15 | End: 2024-04-15

## 2024-04-15 RX ORDER — ACETAMINOPHEN 325 MG/1
650 TABLET ORAL EVERY 4 HOURS PRN
Status: DISCONTINUED | OUTPATIENT
Start: 2024-04-18 | End: 2024-04-17

## 2024-04-15 RX ORDER — SODIUM CHLORIDE, SODIUM LACTATE, POTASSIUM CHLORIDE, CALCIUM CHLORIDE 600; 310; 30; 20 MG/100ML; MG/100ML; MG/100ML; MG/100ML
INJECTION, SOLUTION INTRAVENOUS CONTINUOUS
Status: DISCONTINUED | OUTPATIENT
Start: 2024-04-15 | End: 2024-04-15 | Stop reason: HOSPADM

## 2024-04-15 RX ORDER — ONDANSETRON 4 MG/1
4 TABLET, ORALLY DISINTEGRATING ORAL EVERY 6 HOURS PRN
Status: DISCONTINUED | OUTPATIENT
Start: 2024-04-15 | End: 2024-04-18 | Stop reason: HOSPADM

## 2024-04-15 RX ORDER — FENTANYL CITRATE 50 UG/ML
50 INJECTION, SOLUTION INTRAMUSCULAR; INTRAVENOUS EVERY 5 MIN PRN
Status: DISCONTINUED | OUTPATIENT
Start: 2024-04-15 | End: 2024-04-15 | Stop reason: HOSPADM

## 2024-04-15 RX ORDER — LIDOCAINE 40 MG/G
CREAM TOPICAL
Status: DISCONTINUED | OUTPATIENT
Start: 2024-04-15 | End: 2024-04-18 | Stop reason: HOSPADM

## 2024-04-15 RX ORDER — FENTANYL CITRATE 50 UG/ML
25 INJECTION, SOLUTION INTRAMUSCULAR; INTRAVENOUS EVERY 5 MIN PRN
Status: DISCONTINUED | OUTPATIENT
Start: 2024-04-15 | End: 2024-04-15 | Stop reason: HOSPADM

## 2024-04-15 RX ORDER — METHOCARBAMOL 750 MG/1
750 TABLET, FILM COATED ORAL EVERY 6 HOURS PRN
Status: DISCONTINUED | OUTPATIENT
Start: 2024-04-15 | End: 2024-04-18 | Stop reason: HOSPADM

## 2024-04-15 RX ORDER — BISACODYL 10 MG
10 SUPPOSITORY, RECTAL RECTAL DAILY PRN
Status: DISCONTINUED | OUTPATIENT
Start: 2024-04-15 | End: 2024-04-17

## 2024-04-15 RX ORDER — POLYETHYLENE GLYCOL 3350 17 G/17G
17 POWDER, FOR SOLUTION ORAL DAILY
Status: DISCONTINUED | OUTPATIENT
Start: 2024-04-16 | End: 2024-04-18 | Stop reason: HOSPADM

## 2024-04-15 RX ORDER — HYDROMORPHONE HYDROCHLORIDE 1 MG/ML
0.4 INJECTION, SOLUTION INTRAMUSCULAR; INTRAVENOUS; SUBCUTANEOUS
Status: DISCONTINUED | OUTPATIENT
Start: 2024-04-15 | End: 2024-04-18

## 2024-04-15 RX ORDER — TRAZODONE HYDROCHLORIDE 50 MG/1
50 TABLET, FILM COATED ORAL AT BEDTIME
Status: DISCONTINUED | OUTPATIENT
Start: 2024-04-15 | End: 2024-04-18 | Stop reason: HOSPADM

## 2024-04-15 RX ORDER — SODIUM CHLORIDE 9 MG/ML
INJECTION, SOLUTION INTRAVENOUS
Status: COMPLETED
Start: 2024-04-15 | End: 2024-04-15

## 2024-04-15 RX ORDER — PROPOFOL 10 MG/ML
INJECTION, EMULSION INTRAVENOUS PRN
Status: DISCONTINUED | OUTPATIENT
Start: 2024-04-15 | End: 2024-04-15

## 2024-04-15 RX ORDER — MEPERIDINE HYDROCHLORIDE 25 MG/ML
12.5 INJECTION INTRAMUSCULAR; INTRAVENOUS; SUBCUTANEOUS EVERY 5 MIN PRN
Status: DISCONTINUED | OUTPATIENT
Start: 2024-04-15 | End: 2024-04-15 | Stop reason: HOSPADM

## 2024-04-15 RX ORDER — AMOXICILLIN 250 MG
1 CAPSULE ORAL 2 TIMES DAILY
Status: DISCONTINUED | OUTPATIENT
Start: 2024-04-15 | End: 2024-04-18 | Stop reason: HOSPADM

## 2024-04-15 RX ORDER — ZINC SULFATE 50(220)MG
220 CAPSULE ORAL EVERY OTHER DAY
Status: DISCONTINUED | OUTPATIENT
Start: 2024-04-15 | End: 2024-04-18 | Stop reason: HOSPADM

## 2024-04-15 RX ORDER — LIDOCAINE HYDROCHLORIDE ANHYDROUS AND DEXTROSE MONOHYDRATE .8; 5 G/100ML; G/100ML
1 INJECTION, SOLUTION INTRAVENOUS CONTINUOUS
Status: DISCONTINUED | OUTPATIENT
Start: 2024-04-15 | End: 2024-04-15

## 2024-04-15 RX ORDER — FAMOTIDINE 20 MG/1
20 TABLET, FILM COATED ORAL 2 TIMES DAILY
Status: DISCONTINUED | OUTPATIENT
Start: 2024-04-15 | End: 2024-04-18 | Stop reason: HOSPADM

## 2024-04-15 RX ORDER — SUMATRIPTAN 100 MG/1
100 TABLET, FILM COATED ORAL 2 TIMES DAILY PRN
Status: DISCONTINUED | OUTPATIENT
Start: 2024-04-15 | End: 2024-04-18 | Stop reason: HOSPADM

## 2024-04-15 RX ORDER — HYDROMORPHONE HYDROCHLORIDE 1 MG/ML
0.2 INJECTION, SOLUTION INTRAMUSCULAR; INTRAVENOUS; SUBCUTANEOUS EVERY 5 MIN PRN
Status: DISCONTINUED | OUTPATIENT
Start: 2024-04-15 | End: 2024-04-15 | Stop reason: HOSPADM

## 2024-04-15 RX ORDER — ESTRADIOL 0.1 MG/G
1 CREAM VAGINAL
Status: DISCONTINUED | OUTPATIENT
Start: 2024-04-15 | End: 2024-04-18 | Stop reason: HOSPADM

## 2024-04-15 RX ORDER — VANCOMYCIN HYDROCHLORIDE 1 G/20ML
INJECTION, POWDER, LYOPHILIZED, FOR SOLUTION INTRAVENOUS PRN
Status: DISCONTINUED | OUTPATIENT
Start: 2024-04-15 | End: 2024-04-15 | Stop reason: HOSPADM

## 2024-04-15 RX ORDER — CEFAZOLIN SODIUM 1 G/3ML
1 INJECTION, POWDER, FOR SOLUTION INTRAMUSCULAR; INTRAVENOUS EVERY 8 HOURS
Status: DISCONTINUED | OUTPATIENT
Start: 2024-04-15 | End: 2024-04-18

## 2024-04-15 RX ORDER — FENTANYL CITRATE 50 UG/ML
INJECTION, SOLUTION INTRAMUSCULAR; INTRAVENOUS PRN
Status: DISCONTINUED | OUTPATIENT
Start: 2024-04-15 | End: 2024-04-15

## 2024-04-15 RX ORDER — VITAMIN B COMPLEX
50 TABLET ORAL DAILY
Status: DISCONTINUED | OUTPATIENT
Start: 2024-04-15 | End: 2024-04-18 | Stop reason: HOSPADM

## 2024-04-15 RX ORDER — ACETAMINOPHEN 325 MG/1
975 TABLET ORAL ONCE
Status: DISCONTINUED | OUTPATIENT
Start: 2024-04-15 | End: 2024-04-15

## 2024-04-15 RX ORDER — LAMOTRIGINE 25 MG/1
50 TABLET ORAL AT BEDTIME
Status: DISCONTINUED | OUTPATIENT
Start: 2024-04-15 | End: 2024-04-18 | Stop reason: HOSPADM

## 2024-04-15 RX ORDER — ONDANSETRON 2 MG/ML
INJECTION INTRAMUSCULAR; INTRAVENOUS PRN
Status: DISCONTINUED | OUTPATIENT
Start: 2024-04-15 | End: 2024-04-15

## 2024-04-15 RX ORDER — CETIRIZINE HYDROCHLORIDE 10 MG/1
20 TABLET ORAL AT BEDTIME
Status: DISCONTINUED | OUTPATIENT
Start: 2024-04-15 | End: 2024-04-18 | Stop reason: HOSPADM

## 2024-04-15 RX ORDER — HYDROMORPHONE HYDROCHLORIDE 1 MG/ML
0.2 INJECTION, SOLUTION INTRAMUSCULAR; INTRAVENOUS; SUBCUTANEOUS
Status: DISCONTINUED | OUTPATIENT
Start: 2024-04-15 | End: 2024-04-18

## 2024-04-15 RX ADMIN — ONDANSETRON 4 MG: 2 INJECTION INTRAMUSCULAR; INTRAVENOUS at 10:04

## 2024-04-15 RX ADMIN — ACETAMINOPHEN 975 MG: 325 TABLET, FILM COATED ORAL at 16:14

## 2024-04-15 RX ADMIN — PHENYLEPHRINE HYDROCHLORIDE 0.3 MCG/KG/MIN: 10 INJECTION INTRAVENOUS at 08:14

## 2024-04-15 RX ADMIN — FENTANYL CITRATE 50 MCG: 50 INJECTION INTRAMUSCULAR; INTRAVENOUS at 10:03

## 2024-04-15 RX ADMIN — FENTANYL CITRATE 100 MCG: 50 INJECTION INTRAMUSCULAR; INTRAVENOUS at 07:42

## 2024-04-15 RX ADMIN — Medication 2 G: at 07:48

## 2024-04-15 RX ADMIN — HYDROMORPHONE HYDROCHLORIDE 0.4 MG: 1 INJECTION, SOLUTION INTRAMUSCULAR; INTRAVENOUS; SUBCUTANEOUS at 11:59

## 2024-04-15 RX ADMIN — TRANEXAMIC ACID 1920 MG: 1 INJECTION, SOLUTION INTRAVENOUS at 08:06

## 2024-04-15 RX ADMIN — SODIUM CHLORIDE: 9 INJECTION, SOLUTION INTRAVENOUS at 14:56

## 2024-04-15 RX ADMIN — MIDAZOLAM 1 MG: 1 INJECTION INTRAMUSCULAR; INTRAVENOUS at 07:42

## 2024-04-15 RX ADMIN — PROCHLORPERAZINE MALEATE 10 MG: 5 TABLET ORAL at 17:56

## 2024-04-15 RX ADMIN — HYDROMORPHONE HYDROCHLORIDE 0.4 MG: 1 INJECTION, SOLUTION INTRAMUSCULAR; INTRAVENOUS; SUBCUTANEOUS at 11:24

## 2024-04-15 RX ADMIN — GABAPENTIN 300 MG: 300 CAPSULE ORAL at 06:30

## 2024-04-15 RX ADMIN — ACETAMINOPHEN 975 MG: 325 TABLET, FILM COATED ORAL at 06:29

## 2024-04-15 RX ADMIN — Medication 50 MG: at 07:42

## 2024-04-15 RX ADMIN — PROPOFOL 150 MG: 10 INJECTION, EMULSION INTRAVENOUS at 07:42

## 2024-04-15 RX ADMIN — PHENYLEPHRINE HYDROCHLORIDE 0.2 MCG/KG/MIN: 10 INJECTION INTRAVENOUS at 10:09

## 2024-04-15 RX ADMIN — SUGAMMADEX 150 MG: 100 INJECTION, SOLUTION INTRAVENOUS at 10:42

## 2024-04-15 RX ADMIN — ZINC SULFATE 220 MG (50 MG) CAPSULE 220 MG: CAPSULE at 21:26

## 2024-04-15 RX ADMIN — LIDOCAINE HYDROCHLORIDE 100 MG: 20 INJECTION, SOLUTION INFILTRATION; PERINEURAL at 07:42

## 2024-04-15 RX ADMIN — SENNOSIDES AND DOCUSATE SODIUM 1 TABLET: 8.6; 5 TABLET ORAL at 16:14

## 2024-04-15 RX ADMIN — SODIUM CHLORIDE 150 MG: 9 INJECTION, SOLUTION INTRAVENOUS at 10:02

## 2024-04-15 RX ADMIN — LIDOCAINE HYDROCHLORIDE 1 MG/KG/HR: 8 INJECTION, SOLUTION INTRAVENOUS at 14:45

## 2024-04-15 RX ADMIN — LIDOCAINE HYDROCHLORIDE 1 MG/KG/HR: 8 INJECTION, SOLUTION INTRAVENOUS at 08:09

## 2024-04-15 RX ADMIN — SODIUM CHLORIDE, PRESERVATIVE FREE: 5 INJECTION INTRAVENOUS at 21:29

## 2024-04-15 RX ADMIN — KETAMINE HYDROCHLORIDE 5 MG/HR: 10 INJECTION INTRAMUSCULAR; INTRAVENOUS at 14:47

## 2024-04-15 RX ADMIN — Medication 20 MG: at 08:40

## 2024-04-15 RX ADMIN — FENTANYL CITRATE 50 MCG: 50 INJECTION INTRAMUSCULAR; INTRAVENOUS at 10:50

## 2024-04-15 RX ADMIN — SODIUM CHLORIDE: 9 INJECTION, SOLUTION INTRAVENOUS at 08:03

## 2024-04-15 RX ADMIN — DEXAMETHASONE SODIUM PHOSPHATE 8 MG: 4 INJECTION, SOLUTION INTRA-ARTICULAR; INTRALESIONAL; INTRAMUSCULAR; INTRAVENOUS; SOFT TISSUE at 07:42

## 2024-04-15 RX ADMIN — METHOCARBAMOL TABLETS 750 MG: 750 TABLET, COATED ORAL at 17:57

## 2024-04-15 RX ADMIN — OXYCODONE HYDROCHLORIDE 10 MG: 5 TABLET ORAL at 21:27

## 2024-04-15 RX ADMIN — CEFAZOLIN 1 G: 1 INJECTION, POWDER, FOR SOLUTION INTRAMUSCULAR; INTRAVENOUS at 18:54

## 2024-04-15 RX ADMIN — PHENYLEPHRINE HYDROCHLORIDE 0.3 MCG/KG/MIN: 10 INJECTION INTRAVENOUS at 08:05

## 2024-04-15 RX ADMIN — FENTANYL CITRATE 50 MCG: 50 INJECTION, SOLUTION INTRAMUSCULAR; INTRAVENOUS at 11:12

## 2024-04-15 RX ADMIN — LAMOTRIGINE 50 MG: 25 TABLET ORAL at 21:27

## 2024-04-15 RX ADMIN — SODIUM CHLORIDE: 9 INJECTION, SOLUTION INTRAVENOUS at 08:04

## 2024-04-15 RX ADMIN — LORAZEPAM 1 MG: 2 INJECTION, SOLUTION INTRAMUSCULAR; INTRAVENOUS at 12:28

## 2024-04-15 RX ADMIN — KETAMINE HYDROCHLORIDE 5 MG/HR: 100 INJECTION, SOLUTION, CONCENTRATE INTRAMUSCULAR; INTRAVENOUS at 08:09

## 2024-04-15 RX ADMIN — ONDANSETRON 4 MG: 2 INJECTION INTRAMUSCULAR; INTRAVENOUS at 15:03

## 2024-04-15 RX ADMIN — FENTANYL CITRATE 50 MCG: 50 INJECTION, SOLUTION INTRAMUSCULAR; INTRAVENOUS at 11:05

## 2024-04-15 RX ADMIN — PHENYLEPHRINE HYDROCHLORIDE 0.3 MCG/KG/MIN: 10 INJECTION INTRAVENOUS at 09:07

## 2024-04-15 RX ADMIN — Medication 10 MG: at 09:47

## 2024-04-15 RX ADMIN — HYDROMORPHONE HYDROCHLORIDE 0.4 MG: 1 INJECTION, SOLUTION INTRAMUSCULAR; INTRAVENOUS; SUBCUTANEOUS at 13:08

## 2024-04-15 RX ADMIN — SENNOSIDES AND DOCUSATE SODIUM 1 TABLET: 8.6; 5 TABLET ORAL at 21:27

## 2024-04-15 RX ADMIN — ACETAMINOPHEN 975 MG: 325 TABLET, FILM COATED ORAL at 21:26

## 2024-04-15 RX ADMIN — Medication 10 MG: at 11:46

## 2024-04-15 RX ADMIN — NYSTATIN 500000 UNITS: 100000 SUSPENSION ORAL at 21:26

## 2024-04-15 RX ADMIN — TRANEXAMIC ACID 10 MG/KG/HR: 1 INJECTION, SOLUTION INTRAVENOUS at 08:07

## 2024-04-15 RX ADMIN — Medication 10 MG: at 13:29

## 2024-04-15 RX ADMIN — TRAZODONE HYDROCHLORIDE 50 MG: 50 TABLET ORAL at 21:27

## 2024-04-15 ASSESSMENT — ACTIVITIES OF DAILY LIVING (ADL)
ADLS_ACUITY_SCORE: 18
ADLS_ACUITY_SCORE: 19
ADLS_ACUITY_SCORE: 18
ADLS_ACUITY_SCORE: 19
ADLS_ACUITY_SCORE: 19
ADLS_ACUITY_SCORE: 27
ADLS_ACUITY_SCORE: 27
ADLS_ACUITY_SCORE: 18
ADLS_ACUITY_SCORE: 19
ADLS_ACUITY_SCORE: 27
ADLS_ACUITY_SCORE: 27
ADLS_ACUITY_SCORE: 18
ADLS_ACUITY_SCORE: 27
ADLS_ACUITY_SCORE: 19
ADLS_ACUITY_SCORE: 18
ADLS_ACUITY_SCORE: 16

## 2024-04-15 NOTE — PROGRESS NOTES
PACU to Inpatient Nursing Handoff    Patient Kelsy Morley is a 63 year old female who speaks English.   Procedure Procedure(s):  Decompression and transforaminal lumbar interbody fusion with Smith Galaes Osteotomy Lumbar 4-5, device insertion, image guided   Surgeon(s) Primary: Fausto Borges MD  Fellow - Assisting: Tio Mcgrath MD     Allergies   Allergen Reactions    Chlorhexidine Swelling and Rash     Burning of skin    Other (Do Not Use) Other (See Comments)     Do NOT use Lactated Ringers solution- Pt was told to avoid due to Mitochondrial myopathy    Trimethoprim Hives    Celecoxib Other (See Comments)     Kidney failure   Kidney failure       Clonidine      Other reaction(s): Irritation At Patch Site    Diflucan [Fluconazole] Hives    Duloxetine Dizziness     Diarrhea and severe HA    Epinephrine Other (See Comments)     Other reaction(s): Gastrointestinal, Headache  Allergy Provider has recommended no more than 0.15 mg/ml of epinephrine if it needs to be given.     Ropivacaine Hives    Tobramycin Other (See Comments)     Eye drops cause pain  Eye drops cause pain      Adhesive Tape Rash     Needs ekg patches to be the ones for sensitive skin!!!    Liquid Adhesive Rash     EKG electrodes     No Clinical Screening - See Comments Rash and Other (See Comments)     Gel from ECG electrodes  Gel from ECG electrodes, eats through her skin  Other reaction(s): redness  Needs ekg patches to be the ones for sensitive skin!!!  Fluoroquinalone Antibiotics    Gel from ECG electrodes  Ands sensitive skin pads    Sulfa Antibiotics Hives and Rash       Isolation  No active isolations     Past Medical History   has a past medical history of Chronic bilateral low back pain without sciatica (12/27/2023), Chronic bilateral thoracic back pain (12/27/2023), Degenerative joint disease (08/13/2009), Depressive disorder, Dysautonomia (H), Dysautonomia (H), EDS (Omayra-Danlos syndrome), Omayra-Danlos syndrome,  Headache, Hyperlipidemia (1990), Hypotension, Immune disorder (H24) (01/17/2011), Lumbar radiculopathy, Mast cell activation syndrome (H24), 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    Anesthesia General   Dermatome Level     Preop Meds acetaminophen (Tylenol) - time given: 0629  gabapentin (Neurontin) - time given: 0630   Nerve block    Intraop Meds midazolam 1 mg/mL (mg)   Total dose: 1 mg  Date/Time Rate/Dose/Volume Action Route Admin User Audit    04/15/24 0742 1 mg Given Intravenous Diane Becerril APRN CRNA     fentaNYL 50 mcg/mL (mcg)   Total dose: 200 mcg  Date/Time Rate/Dose/Volume Action Route Admin User Audit    04/15/24 0742 100 mcg Given Intravenous Diane Becerril APRN CRNA     1003 50 mcg Given Intravenous Diane Becerril APRN CRNA     1050 50 mcg Given Intravenous Diane Becerril APRN CRNA     lidocaine 2% (mg)   Total dose: 100 mg  Date/Time Rate/Dose/Volume Action Route Admin User Audit    04/15/24 0742 100 mg Given Intravenous Diane Becerril APRN CRNA     propofol 10 mg/mL (mg)   Total dose: 150 mg  Date/Time Rate/Dose/Volume Action Route Admin User Audit    04/15/24 0742 150 mg Given Intravenous Diane Becerril APRN CRNA     rocuronium 10 mg/mL (mg)   Total dose: 80 mg  Date/Time Rate/Dose/Volume Action Route Admin User Audit    04/15/24 0742 50 mg Given Intravenous Diane Becerril APRN CRNA     0840 20 mg Given Intravenous Diane Becerril APRN CRNA     0947 10 mg Given Intravenous Kelly Santiago APRN CRNA     dexamethasone (DECADRON) 4 mg/mL (mg)   Total dose: 8 mg  Date/Time Rate/Dose/Volume Action Route Admin User Audit    04/15/24 0742 8 mg Given Intravenous Diane Becerril APRN CRNA     ondansetron 2 mg/mL (mg)   Total dose: 4 mg  Date/Time Rate/Dose/Volume Action Route Admin User Audit    04/15/24 1004 4 mg Given  Intravenous Diane Becerril APRN CRNA     sugammadex (BRIDION) 200mg/2mL (mg)   Total dose: 150 mg  Date/Time Rate/Dose/Volume Action Route Admin User Audit    04/15/24 1042 150 mg Given Intravenous Diane Becerril APRN CRNA     ceFAZolin Sodium (ANCEF) injection 2 g (g)   Total dose: 2 g Dosing weight: 64  Date/Time Rate/Dose/Volume Action Route Admin User Audit    04/15/24 0748 2 g (over 3 min) Given Intravenous Diane Becerril APRN CRNA edited    tranexamic acid (CYKLOKAPRON) 1,920 mg in sodium chloride 0.9 % 50 mL bolus (mg)   Total dose: 1,920 mg Dosing weight: 64  Date/Time Rate/Dose/Volume Action Route Admin User Audit    04/15/24 0806 1,920 mg New Bag Intravenous Diane Becerril APRN CRNA edited    tranexamic acid (CYKLOKAPRON) 2 g in sodium chloride 0.9 % 100 mL infusion (mg/kg/hr)   Total dose: 1,514.4 mg Dosing weight: 63.1  Date/Time Rate/Dose/Volume Action Route Admin User Audit    04/15/24 0807 10 mg/kg/hr - 31.55 mL/hr New Bag Intravenous Diane Becerril APRN CRNA     1031  Stopped Intravenous Diane Becerril APRN CRNA     ketamine (KETALAR) 100 mg in sodium chloride 0.9 % 50 mL infusion (mg/hr)   Total dose: 10.79 mg Dosing weight: 63.1  Date/Time Rate/Dose/Volume Action Route Admin User Audit    04/15/24 0809 5 mg/hr - 2.5 mL/hr New Bag Intravenous Diane Becerril APRN CRNA edited    1010 2.5 mg/hr - 1.25 mL/hr Rate Change Intravenous Diane Becerril APRN CRNA     1027  Stopped Intravenous Diane Becerril APRN CRNA     phenylephrine 0.1 mg/mL infusion (mcg/kg/min) (mcg/kg/min)   Total dose: 2.18 mg Dosing weight: 63.1  Date/Time Rate/Dose/Volume Action Route Admin User Audit    04/15/24 0805 0.3 mcg/kg/min - 11.358 mL/hr New Bag Intravenous Diane Becerril APRN CRNA edited    0814 0.3 mcg/kg/min - 11.358 mL/hr New Bag Intravenous Diane Becerril APRN CRNA     0823 0.1 mcg/kg/min - 3.786 mL/hr Rate Change Intravenous Diane Becerril APRN CRNA edited    0835 0.3  mcg/kg/min - 11.358 mL/hr Rate Change Intravenous Diane Becerril JOHN CRNA     0907 0.3 mcg/kg/min - 11.358 mL/hr New Bag Intravenous Diane Becerril JOHN CRNA     0934 0.1 mcg/kg/min - 3.786 mL/hr Rate Change Intravenous Diane Becerril JOHN CRNA     0941 0.2 mcg/kg/min - 7.572 mL/hr Rate Change Intravenous Diane Becerril JOHN CRNA     1009 0.2 mcg/kg/min - 7.572 mL/hr New Bag Intravenous Diane Becerril JOHN Martinez CRNA     1029  Stopped Intravenous Dona BecerrilJOHN Hernandez CRNA     lidocaine 2g/250 mL D5W (ADULT STD PREMIX) ANALGESIA (mg/kg/hr)   Total dose: 133.95 mg Dosing weight: 57  Date/Time Rate/Dose/Volume Action Route Admin User Audit    04/15/24 0809 1 mg/kg/hr - 7.125 mL/hr New Bag Intravenous Diane BecerrilJOHN rick CRNA edited    1009 0.5 mg/kg/hr - 3.563 mL/hr Rate Change Intravenous Diane Becerril JOHN Martinez CRNA     fosaprepitant (EMEND) 150 mg in sodium chloride 0.9 % 275 mL intermittent infusion (mg)   Total dose: 150 mg Dosing weight: 63.1  Date/Time Rate/Dose/Volume Action Route Admin User Audit    04/15/24 1002 150 mg (over 20 min) - 250 mL New Bag Intravenous Jerrica DianeJOHN Hernandez CRNA edited    0.9% NS (mL/hr)   Total volume: 500 mL  Date/Time Rate/Dose/Volume Action Route Admin User Audit    04/15/24 0803 50 mL/hr New Bag Intravenous Dona BecerrilJOHN Hernandez CRNA     0916 100 mL/hr Rate Change Intravenous JerricaoDnaJOHN Hernandez CRNA     1045 500 mL Anesthesia Volume Adjustment Intravenous Diane Becerril APRN CRNA     sodium chloride 0.9 % infusion (mL/hr)   Total volume: 300 mL Dosing weight: 64.4    Date/Time Rate/Dose/Volume Action Route Admin User Audit    04/15/24 0804 50 mL/hr New Bag Intravenous Diane Becerril APRN CRNA edited    1033 300 mL Stopped Intravenous Diaen Becerril APRN CRNA        Local Meds Yes in epidural space. No symptoms of LAST   Antibiotics cefazolin (Ancef) - last given at 0748     Pain Patient Currently in Pain: yes   PACU meds  fentanyl  (Sublimaze): 100 mcg (total dose) last given at 1112   hydromorphone (Dilaudid): 1.2 mg (total dose) last given at 1308   20 mg ketamine last at 1329  Lidocaine infusion at 0.5 mg/kg/hr  Will start ketamine infusion   PCA / epidural No   Capnography     Telemetry ECG Rhythm: Sinus rhythm   Inpatient Telemetry Monitor Ordered? No        Labs Glucose Lab Results   Component Value Date     04/15/2024    GLC 82 05/12/2020       Hgb Lab Results   Component Value Date    HGB 14.0 04/04/2024    HGB 13.7 12/01/2009       INR Lab Results   Component Value Date    INR 0.94 02/06/2019      PACU Imaging Not applicable     Wound/Incision Incision/Surgical Site 04/15/24 Lower Back (Active)   Incision Assessment UTV 04/15/24 1230   Dressing Other (Comment) 04/15/24 1046   Closure COOKIE;Sutures;Adhesive strip(s) 04/15/24 1230   Incision Drainage Amount None 04/15/24 1230   Dressing Intervention Clean, dry, intact 04/15/24 1230   Number of days: 0      CMS        Equipment ice pack   Other LDA       IV Access Peripheral IV 04/15/24 Anterior;Left Lower forearm (Active)   Site Assessment WDL 04/15/24 1330   Line Status Infusing 04/15/24 1330   Dressing Transparent 04/15/24 1330   Dressing Status clean;dry;intact 04/15/24 1330   Phlebitis Scale 0-->no symptoms 04/15/24 1330   Infiltration? no 04/15/24 1330   Number of days: 0      Blood Products Not applicable  mL   Intake/Output Date 04/15/24 0700 - 04/16/24 0659   Shift 8626-2882 1625-9478 1817-1926 24 Hour Total   INTAKE   I.V. 800   800   IV Piggyback 250   250   Shift Total(mL/kg) 1050(16.64)   1050(16.64)   OUTPUT   Blood 100   100   Shift Total(mL/kg) 100(1.58)   100(1.58)   Weight (kg) 63.1 63.1 63.1 63.1      Drains / Meraz Closed/Suction Drain 1 Back Accordion 10 [place] (Active)   Site Description UTV 04/15/24 1230   Dressing Status Normal: Clean, Dry & Intact 04/15/24 1230   Drainage Appearance Bloody/Bright Red 04/15/24 1230   Status To bulb suction 04/15/24  1230   Number of days: 0       Urethral Catheter 04/15/24 Straight-tip;Non-latex 16 fr (Active)   Collection Container Standard 04/15/24 1230   Securement Method Securing device (Describe) 04/15/24 1230   Rationale for Continued Use Surgical procedure 04/15/24 1230   Urine Output 125 mL 04/15/24 1403   Number of days: 0      Time of void PreOp Time of Void Prior to Procedure: 0500 (04/15/24 0658)    PostOp      Diapered? No   Bladder Scan     PO    tolerating sips and water     Vitals    B/P: 92/46  T: 98  F (36.7  C)    Temp src: Axillary  P:  Pulse: 60 (04/15/24 1330)          R: 18  O2:  SpO2: 100 %    O2 Device: Nasal cannula (04/15/24 1300)    Oxygen Delivery: 2 LPM (04/15/24 1300)         Family/support present daughter   Patient belongings     Patient transported on bed   DC meds/scripts (obs/outpt) Not applicable   Inpatient Pain Meds Released? Yes       Special needs/considerations None   Tasks needing completion None       Beatrice Quiroga, RN

## 2024-04-15 NOTE — PROGRESS NOTES
Chart reviewed due to initial plan of admitting patient in the ICU, however acuity changed , currently not needing ICU  care.

## 2024-04-15 NOTE — PROGRESS NOTES
Dr. Mccallum MDA called to bedside and alerted about pt's telemetry rhythm. Pt was experiencing was appeared to be small pauses HR 45. BP's stayed intact. Per CRESENCIO continue to monitor.

## 2024-04-15 NOTE — ANESTHESIA PROCEDURE NOTES
Airway       Patient location during procedure: OR       Procedure Start/Stop Times: 4/15/2024 7:48 AM  Staff -        CRNA: Diane Becerril APRN CRNA       Performed By: CRNA  Consent for Airway        Urgency: elective  Indications and Patient Condition       Indications for airway management: lakhwinder-procedural       Induction type:intravenous       Mask difficulty assessment: 1 - vent by mask    Final Airway Details       Final airway type: endotracheal airway       Successful airway: ETT - single and Oral  Endotracheal Airway Details        ETT size (mm): 7.0       Cuffed: yes       Successful intubation technique: video laryngoscopy       VL Blade Size: Glidescope 3       Grade View of Cords: 1       Adjucts: stylet       Position: Right       Measured from: lips       Secured at (cm): 22       Bite block used: None    Post intubation assessment        Placement verified by: capnometry, equal breath sounds and chest rise        Number of attempts at approach: 1       Secured with: tape       Ease of procedure: easy       Dentition: Intact    Medication(s) Administered   Medication Administration Time: 4/15/2024 7:48 AM

## 2024-04-15 NOTE — OP NOTE
Preoperative diagnosis:  1.  L4-5 degenerative spondylolisthesis.  2.  Bertolotti's syndrome.  3.  Mast cell activation syndrome.  4.  Postural orthostatic hypotension syndrome.  5.  Flatback syndrome.  6.  Omayra-Danlos syndrome.    Postoperative diagnosis:  1.  L4-5 degenerative spondylolisthesis with lateral recess stenosis.  2.  Bertolotti syndrome.  3.  Mast cell activation syndrome.  4.  Postural orthostatic hypotension syndrome.  5.  Flatback syndrome.  6.  Omayra-Danlos syndrome.    Operation performed:  1.  Posterior spinal fusion L4-5.  2.  Nonsegmental instrumentation L4-5.  3.  Mora-Galeas osteotomy L4-5.  4.  Transforaminal lumbar interbody fusion.  5.  Insertion intervertebral device.  6.  Bilateral L5 hemilaminotomies caudal to the pedicle.  7.  Image guided surgery.  8.  Local autogenous bone graft.  9.  Bone morphogenetic protein.  10.  Demineralized bone matrix.    Surgeon: Fausto Borges MD.    Assistant: Femi Mcgrath MD.  He was necessary for assistance with positioning exposure instrumentation bone grafting and closure.  No qualified resident was available.    Indication for operation: Patient is a 63-year-old female with Bertolotti syndrome and a degenerative spondylolisthesis at L4-5 with spinal stenosis and neurogenic claudication.  This has been unresponsive to nonoperative management.  Her medical history is complicated by mast cell activation syndrome and postural orthostatic hypotension syndrome.  In preparation for the surgery she was seen by the preanesthesia clinic along with recommendations provided by her pain care provider from [place].  She had been extensively counseled about risk benefits alternative treatments and expected outcomes.    The OR team was briefed about the plan and the patient was brought to the operating room and underwent the induction of adequate general anesthesia.  Appropriate lines were placed and she was turned and positioned prone on the pro axis table.  She  was prepared and draped in usual sterile fashion a timeout was done confirming the site and type of surgery.  She did receive prophylactic antibiotics.    The skin was incised and the spine progressively exposed.  Intraoperative imaging was obtained to confirm the appropriate level of dissection.  A tracking arc was placed on the spinous process of L5 and intraoperative 3D images obtained from the O-arm and transferred to the Stealth image guided workstation.  This was now used to place pedicle screws in navigated fashion.  A navigated awl was used to identify the start point then a bur was used to create a cortical defect.  A navigated tap was then used to create a tract followed by navigated screw placement.  4 screws from the Medtronic 5.5 Solera system that was 6.5 mm in diameter and 50 mm in length will place bilaterally at L4 and L5.  Intraoperative 2D and 3D images were obtained confirming appropriate positioning.    Next the inferior articular facets of L4 were resected.  The interspinous ligament was taken down.  The ligamentum flavum was taken down.  A wheeled lamina  was used to distract across the segment and then the superior articular facets of L5 were resected using a combination of Leksell and Kerrison rongeurs.    The disc space was now identified with a Penfield 4.  This was then entered utilizing a Penfield 4 and then an osteotome.  Insert and rotate distractors were used to elevate this up.  Curettes and pituitary rongeurs were used to remove the disc material.  This was sized to except to Medtronic peek capstone control cages that were 14 mm high and 18 degrees lordotic.  These were titanium coated capstone control cages.  Approximately 5 cc of local autogenous bone graft was placed in between the cages.  2D imaging was used to confirm appropriate positioning.    At this point it became clear there was still some residual lateral recess stenosis caudal to the pedicles.  Kerrison rongeurs  were used to resect the cephalad portion of the L5 lamina bilaterally to free up the lateral recess stenosis further.  This was resection beyond that required for the TLIF.    The table which had been flexed was now extended back to neutral.  235 mm precontoured titanium rods were seated into place and slight compression applied across them.  2D imaging was obtained and showed excellent realignment.    The wound was copiously irrigated out.    Depo-Medrol 0.5 cc of 20 mg/mL was dripped into the lateral recess area.    The posterior elements were decorticated and bone grafted with a combination of local autogenous bone, BMP, demineralized bone matrix.  BMP was also placed lateral to the rods.    1 g of vancomycin was distributed throughout the wound and then the wound was closed in layers.  #1 absorbable sutures were used to reapproximate the thoracolumbar fascia and attach it back to the spinous processes.  A #1 rongeur was used to oversew this.  An 8 inch Hemovac drain was placed superficial and brought out proximally.  2-0 sutures were used to close the subcutaneous tissue followed by 4-0 Monocryl benzoin Steri-Strips and an Aquacel dressing.    Estimated blood loss was 100 cc there was an adequate loss for any Cell Saver return.        Fausto Borges MD    Implants

## 2024-04-15 NOTE — ANESTHESIA CARE TRANSFER NOTE
Patient: Kelsy Morley    Procedure: Procedure(s):  Decompression and transforaminal lumbar interbody fusion with Smith Galeas Osteotomy Lumbar 4-5, device insertion, image guided       Diagnosis: Lumbar radiculopathy [M54.16]  Degenerative spondylolisthesis [M43.10]  Diagnosis Additional Information: No value filed.    Anesthesia Type:   General     Note:    Oropharynx: oropharynx clear of all foreign objects  Level of Consciousness: awake  Oxygen Supplementation: face mask  Level of Supplemental Oxygen (L/min / FiO2): 8  Independent Airway: airway patency satisfactory and stable  Dentition: dentition unchanged  Vital Signs Stable: post-procedure vital signs reviewed and stable  Report to RN Given: handoff report given  Patient transferred to: PACU    Handoff Report: Identifed the Patient, Identified the Reponsible Provider, Reviewed the pertinent medical history, Discussed the surgical course, Reviewed Intra-OP anesthesia mangement and issues during anesthesia, Set expectations for post-procedure period and Allowed opportunity for questions and acknowledgement of understanding      Vitals:  Vitals Value Taken Time   /66 04/15/24 1049   Temp 36.6    Pulse 88 04/15/24 1049   Resp 16    SpO2 99 % 04/15/24 1051   Vitals shown include unfiled device data.    Electronically Signed By: JOHN Mayorga CRNA  April 15, 2024  10:53 AM

## 2024-04-15 NOTE — BRIEF OP NOTE
[facility]    Brief Operative Note    Pre-operative diagnosis: Lumbar radiculopathy [M54.16]  Degenerative spondylolisthesis [M43.10]  Post-operative diagnosis Same as pre-operative diagnosis    Procedure: Decompression and transforaminal lumbar interbody fusion with Smith Galeas Osteotomy Lumbar 4-5, device insertion, image guided, N/A - Spine    Surgeon: Surgeons and Role:     * Fausto Borges MD - Primary     * Tio Mcgrath MD - Fellow - Assisting  Anesthesia: General   Estimated Blood Loss: Less than 100 ml    Drains: Hemovac  Specimens: * No specimens in log *  Findings:   None.  Complications: None.  Implants:   Implant Name Type Inv. Item Serial No.  Lot No. LRB No. Used Action   GRAFT BONE FIBERS DBF 6ML INJ [id number] PRELOADED - [id number] Bone/Tissue/Biologic GRAFT BONE FIBERS DBF 6ML INJ [id number] PRELOADED [id number] [employer]  N/A 1 Implanted   GRAFT BONE INFUSE BMP MED [id number] - [id number]  GRAFT BONE INFUSE BMP MED [id number]  [employer] [id number] N/A 1 Implanted   IMP SCR SET MEDT SOLERA BREAK OFF 5.5MM TI [id number] - [id number] Metallic Hardware/[place] IMP SCR SET MEDT SOLERA BREAK OFF 5.5MM TI [id number]  [employer]  N/A 4 Implanted   IMP SCR MEDT 5.5/6.0MM SOLERA 6.5X50MM MA [id number] - [id number] Metallic Hardware/[place] IMP SCR MEDT 5.5/6.0MM SOLERA 6.5X50MM MA [id number]  [employer]  N/A 4 Implanted   Medtronic Captsone Spinal Spacer 14 mm x 22 mm 18 deg    [employer] [id number] N/A 2 Implanted   IMP NICHO MEDT SOLERA CVD 5.5X35MM CHR [id number] - [id number] Metallic Hardware/[place] IMP NICHO MEDT SOLERA CVD 5.5X35MM CHR [id number]  [employer]  N/A 2 Implanted
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
## 2024-04-15 NOTE — PHARMACY-ADMISSION MEDICATION HISTORY
Pharmacist Admission Medication History    Admission medication history is complete. The information provided in this note is only as accurate as the sources available at the time of the update.    Information Source(s): Patient via in-person    Pertinent Information: n/a    Changes made to PTA medication list:  Added: None  Deleted: None  Changed: fexofenadine dose, vtiamin B12 dose    Allergies reviewed with patient and updates made in EHR: unable to assess    Medication History Completed By: Candelario Shelton [facility] 4/15/2024 6:37 PM    Prior to Admission medications    Medication Sig Last Dose Taking? Auth Provider Long Term End Date   acetaminophen (TYLENOL) 325 MG tablet Take 325-650 mg by mouth every 6 hours as needed for mild pain 4/15/2024 at 0630 Yes Reported, Patient     ascorbic acid 1000 MG TABS tablet Take 1,000 mg by mouth daily 4/15/2024 at 0500 Yes Reported, Patient     cetirizine HCl 10 MG CAPS Take 20 mg by mouth at bedtime 4/14/2024 at 2000 Yes Reported, Patient     estradiol (ESTRACE) 0.1 MG/GM vaginal cream Place 1 g vaginally three times a week  Past Month Yes Reported, Patient     famotidine (PEPCID) 40 MG tablet Take 40 mg by mouth as needed 4/15/2024 at 0500 Yes Reported, Patient No    fexofenadine (ALLEGRA) 60 MG tablet Take 180 mg by mouth every morning 4/15/2024 at 0500 Yes Reported, Patient     fish oil-omega-3 fatty acids 500 MG capsule Take 1 capsule by mouth daily Helps with migraines More than a month Yes Reported, Patient     gabapentin (NEURONTIN) 300 MG capsule Take 300 mg by mouth at bedtime 4/15/2024 at 0630 Yes Reported, Patient No    ipratropium (ATROVENT) 0.06 % nasal spray Spray 1 spray into both nostrils every morning 4/15/2024 at 0500 Yes Reported, Patient     lamoTRIgine (LAMICTAL) 25 MG tablet Take 2 tablets (50 mg) by mouth at bedtime 4/14/2024 at 2000 Yes Chaitanya Ortega MD Yes    LEVOTHYROXINE SODIUM PO Take 75 mcg by mouth every morning 4/15/2024 at 0500 Yes Reported,  Patient Yes    lisdexamfetamine (VYVANSE) 30 MG capsule Take 30 mg by mouth every morning 4/14/2024 at 0500 Yes Reported, Patient     Magic Mouthwash (FV std formula) lidocaine visc 2% 2.5mL/5mL & maalox/mylanta w/ simeth 2.5mL/5mL & diphenhydrAMINE 5mg/5mL Swish and swallow 10 mLs in mouth every 6 hours as needed for mouth sores Unknown Yes Reported, Patient     magnesium oxide 400 MG CAPS Take by mouth as needed 2-3 a day depending on ability to have bowel movement 4/14/2024 at 2000 Yes Reported, Patient     Methylene Blue POWD Take 20 mg by mouth every morning #120 4/14/2024 at 0500 Yes Chaitanya Ortega MD     montelukast (SINGULAIR) 10 MG tablet Take 10 mg by mouth every morning 4/15/2024 at 0500 Yes Reported, Patient Yes    mupirocin (BACTROBAN) 2 % external ointment Apply 1 inch topically daily as needed More than a month Yes Reported, Patient     nystatin (MYCOSTATIN) 014273 units TABS tablet Take 500,000 Units by mouth 3 times daily 4/15/2024 at 0500 Yes Reported, Patient     predniSONE (DELTASONE) 50 MG tablet Take 1 tablet (50 mg) by mouth daily for 2 days Take 1 tablet 24 hours prior to surgery and 1 tablet 1-2 hours prior to surgery 4/15/2024 at 0500 Yes Mary Vo APRN CNP  4/16/24   SUMAtriptan Succinate (IMITREX PO) Take 100 mg by mouth every 8 hours as needed for migraine Past Month Yes Reported, Patient     traMADol-acetaminophen (ULTRACET) 37.5-325 MG tablet Take 1 tablet by mouth at bedtime 4/14/2024 at 2000 Yes Reported, Patient     TRAZODONE HCL PO Take 100 mg by mouth at bedtime 4/14/2024 at 2000 Yes Reported, Patient Yes    vitamin B-12 (CYANOCOBALAMIN) 1000 MCG tablet 1,000 mcg daily 4/14/2024 at 0500 Yes Reported, Patient     vitamin D3 (CHOLECALCIFEROL) 50 mcg (2000 units) tablet Take 1 tablet by mouth daily 4/14/2024 at 0500 Yes Reported, Patient     zinc gluconate 50 MG tablet Take 50 mg by mouth every other day 4/14/2024 at 0500 Yes Reported, Patient     zolpidem (AMBIEN)  10 MG tablet Take 5 mg by mouth at bedtime 4/14/2024 at 2000 Yes Reported, Patient     EMGALITY 120 MG/ML injection Inject 120 mg Subcutaneous every 28 days Last dose 2/23/24 4/12/2024  Reported, Patient     lidocaine (LIDODERM) 5 % patch daily as needed for moderate pain 4/13/2024  Reported, Patient     omalizumab (XOLAIR) 150 MG injection Inject 150 mg Subcutaneous every 28 days Next dose 4/10/24 4/10/2024  Reported, Patient No

## 2024-04-16 ENCOUNTER — APPOINTMENT (OUTPATIENT)
Dept: PHYSICAL THERAPY | Facility: CLINIC | Age: 64
DRG: 454 | End: 2024-04-16
Attending: ORTHOPAEDIC SURGERY
Payer: MEDICARE

## 2024-04-16 ENCOUNTER — APPOINTMENT (OUTPATIENT)
Dept: OCCUPATIONAL THERAPY | Facility: CLINIC | Age: 64
DRG: 454 | End: 2024-04-16
Attending: ORTHOPAEDIC SURGERY
Payer: MEDICARE

## 2024-04-16 LAB
ANION GAP SERPL CALCULATED.3IONS-SCNC: 5 MMOL/L (ref 7–15)
BUN SERPL-MCNC: 12.7 MG/DL (ref 8–23)
CALCIUM SERPL-MCNC: 8 MG/DL (ref 8.8–10.2)
CHLORIDE SERPL-SCNC: 110 MMOL/L (ref 98–107)
CREAT SERPL-MCNC: 0.72 MG/DL (ref 0.51–0.95)
DEPRECATED HCO3 PLAS-SCNC: 26 MMOL/L (ref 22–29)
EGFRCR SERPLBLD CKD-EPI 2021: >90 ML/MIN/1.73M2
GLUCOSE BLDC GLUCOMTR-MCNC: 114 MG/DL (ref 70–99)
GLUCOSE SERPL-MCNC: 110 MG/DL (ref 70–99)
HGB BLD-MCNC: 11.2 G/DL (ref 11.7–15.7)
POTASSIUM SERPL-SCNC: 4.4 MMOL/L (ref 3.4–5.3)
SODIUM SERPL-SCNC: 141 MMOL/L (ref 135–145)
T4 FREE SERPL-MCNC: 1.57 NG/DL (ref 0.9–1.7)
TSH SERPL DL<=0.005 MIU/L-ACNC: 0.26 UIU/ML (ref 0.3–4.2)

## 2024-04-16 PROCEDURE — 250N000011 HC RX IP 250 OP 636: Performed by: ORTHOPAEDIC SURGERY

## 2024-04-16 PROCEDURE — 250N000013 HC RX MED GY IP 250 OP 250 PS 637: Performed by: INTERNAL MEDICINE

## 2024-04-16 PROCEDURE — 36415 COLL VENOUS BLD VENIPUNCTURE: CPT | Performed by: ORTHOPAEDIC SURGERY

## 2024-04-16 PROCEDURE — 250N000013 HC RX MED GY IP 250 OP 250 PS 637: Performed by: ORTHOPAEDIC SURGERY

## 2024-04-16 PROCEDURE — 97530 THERAPEUTIC ACTIVITIES: CPT | Mod: GP

## 2024-04-16 PROCEDURE — 84443 ASSAY THYROID STIM HORMONE: CPT

## 2024-04-16 PROCEDURE — 97165 OT EVAL LOW COMPLEX 30 MIN: CPT | Mod: GO

## 2024-04-16 PROCEDURE — 250N000013 HC RX MED GY IP 250 OP 250 PS 637

## 2024-04-16 PROCEDURE — 99232 SBSQ HOSP IP/OBS MODERATE 35: CPT | Performed by: INTERNAL MEDICINE

## 2024-04-16 PROCEDURE — 80048 BASIC METABOLIC PNL TOTAL CA: CPT | Performed by: ORTHOPAEDIC SURGERY

## 2024-04-16 PROCEDURE — 97535 SELF CARE MNGMENT TRAINING: CPT | Mod: GO

## 2024-04-16 PROCEDURE — 85018 HEMOGLOBIN: CPT | Performed by: ORTHOPAEDIC SURGERY

## 2024-04-16 PROCEDURE — 120N000003 HC R&B IMCU UMMC

## 2024-04-16 PROCEDURE — 84439 ASSAY OF FREE THYROXINE: CPT

## 2024-04-16 PROCEDURE — 97161 PT EVAL LOW COMPLEX 20 MIN: CPT | Mod: GP

## 2024-04-16 RX ORDER — MAGNESIUM CARB/ALUMINUM HYDROX 105-160MG
296 TABLET,CHEWABLE ORAL ONCE
Status: COMPLETED | OUTPATIENT
Start: 2024-04-16 | End: 2024-04-16

## 2024-04-16 RX ORDER — CALCIUM CARBONATE 500 MG/1
1000 TABLET, CHEWABLE ORAL 3 TIMES DAILY PRN
Status: DISCONTINUED | OUTPATIENT
Start: 2024-04-16 | End: 2024-04-18 | Stop reason: HOSPADM

## 2024-04-16 RX ORDER — MAGNESIUM OXIDE 400 MG/1
400 TABLET ORAL 3 TIMES DAILY
Status: DISCONTINUED | OUTPATIENT
Start: 2024-04-16 | End: 2024-04-16

## 2024-04-16 RX ORDER — TERCONAZOLE 8 MG/G
1 CREAM VAGINAL AT BEDTIME
Status: DISCONTINUED | OUTPATIENT
Start: 2024-04-16 | End: 2024-04-18 | Stop reason: HOSPADM

## 2024-04-16 RX ORDER — MAGNESIUM OXIDE 400 MG/1
400 TABLET ORAL 2 TIMES DAILY
Status: DISCONTINUED | OUTPATIENT
Start: 2024-04-16 | End: 2024-04-16

## 2024-04-16 RX ADMIN — SENNOSIDES AND DOCUSATE SODIUM 1 TABLET: 8.6; 5 TABLET ORAL at 08:31

## 2024-04-16 RX ADMIN — ACETAMINOPHEN 975 MG: 325 TABLET, FILM COATED ORAL at 20:56

## 2024-04-16 RX ADMIN — OXYCODONE HYDROCHLORIDE 5 MG: 5 TABLET ORAL at 17:09

## 2024-04-16 RX ADMIN — NYSTATIN 500000 UNITS: 100000 SUSPENSION ORAL at 13:26

## 2024-04-16 RX ADMIN — CETIRIZINE HYDROCHLORIDE 20 MG: 10 TABLET, FILM COATED ORAL at 00:32

## 2024-04-16 RX ADMIN — NYSTATIN 500000 UNITS: 100000 SUSPENSION ORAL at 20:52

## 2024-04-16 RX ADMIN — POLYETHYLENE GLYCOL 3350 17 G: 17 POWDER, FOR SOLUTION ORAL at 08:32

## 2024-04-16 RX ADMIN — METHOCARBAMOL TABLETS 750 MG: 750 TABLET, COATED ORAL at 18:24

## 2024-04-16 RX ADMIN — ACETAMINOPHEN 975 MG: 325 TABLET, FILM COATED ORAL at 04:56

## 2024-04-16 RX ADMIN — MONTELUKAST 10 MG: 10 TABLET, FILM COATED ORAL at 08:30

## 2024-04-16 RX ADMIN — METHOCARBAMOL TABLETS 750 MG: 750 TABLET, COATED ORAL at 06:57

## 2024-04-16 RX ADMIN — Medication 50 MCG: at 08:31

## 2024-04-16 RX ADMIN — NYSTATIN 500000 UNITS: 100000 SUSPENSION ORAL at 08:29

## 2024-04-16 RX ADMIN — OXYCODONE HYDROCHLORIDE 10 MG: 5 TABLET ORAL at 13:22

## 2024-04-16 RX ADMIN — OXYCODONE HYDROCHLORIDE AND ACETAMINOPHEN 1000 MG: 500 TABLET ORAL at 08:29

## 2024-04-16 RX ADMIN — NYSTATIN 500000 UNITS: 100000 SUSPENSION ORAL at 16:53

## 2024-04-16 RX ADMIN — IPRATROPIUM BROMIDE 1 SPRAY: 42 SPRAY, METERED NASAL at 08:32

## 2024-04-16 RX ADMIN — Medication 400 MG: at 08:31

## 2024-04-16 RX ADMIN — BISACODYL 10 MG: 10 SUPPOSITORY RECTAL at 20:52

## 2024-04-16 RX ADMIN — CEFAZOLIN 1 G: 1 INJECTION, POWDER, FOR SOLUTION INTRAMUSCULAR; INTRAVENOUS at 16:15

## 2024-04-16 RX ADMIN — MAGNESIUM CITRATE 296 ML: 1.75 LIQUID ORAL at 16:53

## 2024-04-16 RX ADMIN — CETIRIZINE HYDROCHLORIDE 20 MG: 10 TABLET, FILM COATED ORAL at 21:08

## 2024-04-16 RX ADMIN — OXYCODONE HYDROCHLORIDE 10 MG: 5 TABLET ORAL at 08:56

## 2024-04-16 RX ADMIN — SENNOSIDES AND DOCUSATE SODIUM 1 TABLET: 8.6; 5 TABLET ORAL at 21:00

## 2024-04-16 RX ADMIN — CEFAZOLIN 1 G: 1 INJECTION, POWDER, FOR SOLUTION INTRAMUSCULAR; INTRAVENOUS at 08:32

## 2024-04-16 RX ADMIN — CALCIUM CARBONATE (ANTACID) CHEW TAB 500 MG 1000 MG: 500 CHEW TAB at 20:56

## 2024-04-16 RX ADMIN — CYANOCOBALAMIN TAB 1000 MCG 1000 MCG: 1000 TAB at 08:31

## 2024-04-16 RX ADMIN — CALCIUM CARBONATE (ANTACID) CHEW TAB 500 MG 1000 MG: 500 CHEW TAB at 20:52

## 2024-04-16 RX ADMIN — FEXOFENADINE HCL 180 MG: 180 TABLET ORAL at 08:31

## 2024-04-16 RX ADMIN — OXYCODONE HYDROCHLORIDE 10 MG: 5 TABLET ORAL at 04:56

## 2024-04-16 RX ADMIN — METHOCARBAMOL TABLETS 750 MG: 750 TABLET, COATED ORAL at 12:25

## 2024-04-16 RX ADMIN — CEFAZOLIN 1 G: 1 INJECTION, POWDER, FOR SOLUTION INTRAMUSCULAR; INTRAVENOUS at 01:35

## 2024-04-16 RX ADMIN — ACETAMINOPHEN 975 MG: 325 TABLET, FILM COATED ORAL at 13:22

## 2024-04-16 RX ADMIN — LEVOTHYROXINE SODIUM 75 MCG: 75 TABLET ORAL at 10:53

## 2024-04-16 RX ADMIN — TERCONAZOLE 5 G: 8 CREAM VAGINAL at 21:01

## 2024-04-16 RX ADMIN — FAMOTIDINE 20 MG: 20 TABLET ORAL at 08:31

## 2024-04-16 RX ADMIN — LAMOTRIGINE 50 MG: 25 TABLET ORAL at 21:00

## 2024-04-16 RX ADMIN — FAMOTIDINE 20 MG: 20 TABLET ORAL at 20:56

## 2024-04-16 RX ADMIN — TRAZODONE HYDROCHLORIDE 50 MG: 50 TABLET ORAL at 23:18

## 2024-04-16 ASSESSMENT — ACTIVITIES OF DAILY LIVING (ADL)
ADLS_ACUITY_SCORE: 27
PREVIOUS_RESPONSIBILITIES: MEAL PREP;HOUSEKEEPING;LAUNDRY;SHOPPING;MEDICATION MANAGEMENT;FINANCES;DRIVING
ADLS_ACUITY_SCORE: 27

## 2024-04-16 NOTE — PROGRESS NOTES
"   04/16/24 1315   Appointment Info   Signing Clinician's Name / Credentials (OT) Layne Santos MS, OTR/L, CLT   Rehab Comments (OT) spinal precautions   Living Environment   People in Home alone   Current Living Arrangements house   Home Accessibility stairs to enter home   Number of Stairs, Main Entrance 3   Stair Railings, Main Entrance railings safe and in good condition   Transportation Anticipated family or friend will provide   Living Environment Comments Pt lives in a townhouse, reports a walk-in shower with built in seat and grab bars, [facility]. Pt has a standard toilet but is RTS she can use if needed. Pt has arranged for family to come and stay with her at the end of the week for a few days.   Self-Care   Usual Activity Tolerance good   Current Activity Tolerance moderate   Regular Exercise No   Equipment Currently Used at Home none   Fall history within last six months no   Activity/Exercise/Self-Care Comment Pt ind with ADL/IADL at baseline   Instrumental Activities of Daily Living (IADL)   Previous Responsibilities meal prep;housekeeping;laundry;shopping;medication management;finances;driving   IADL Comments Pt worked as an RN. Pt reports she hired a friend to clean for her for a few weeks. She has a dog that the neighbor will watch for the first week.   General Information   Onset of Illness/Injury or Date of Surgery 04/15/24   Referring Physician Pj Dillon MD   Patient/Family Therapy Goal Statement (OT) be ind in room   Additional Occupational Profile Info/Pertinent History of Current Problem Per chart: \"Kelsy Morley is a 63 year old female s/p L4/5 TLIF on 4/16/24 with Dr. Borges.\"   Existing Precautions/Restrictions spinal   Left Upper Extremity (Weight-bearing Status) partial weight-bearing (PWB)  (10#)   Right Upper Extremity (Weight-bearing Status) partial weight-bearing (PWB)  (10#)   Left Lower Extremity (Weight-bearing Status) weight-bearing as tolerated (WBAT)   Right Lower " Extremity (Weight-bearing Status) weight-bearing as tolerated (WBAT)   General Observations and Info Activity: ambulate   Cognitive Status Examination   Orientation Status orientation to person, place and time   Pain Assessment   Patient Currently in Pain Yes, see Vital Sign flowsheet   Strength Comprehensive (MMT)   Comment, General Manual Muscle Testing (MMT) Assessment BUE WFL   Muscle Tone Assessment   Muscle Tone Comments n/t but appears WFL during ADL   Bed Mobility   Comment (Bed Mobility) verbal cues   Transfers   Transfer Comments CGA FWW   Balance   Balance Comments CGA FWW   Activities of Daily Living   BADL Assessment/Intervention bathing;lower body dressing;toileting   Bathing Assessment/Intervention   Quay Level (Bathing) minimum assist (75% patient effort)   Lower Body Dressing Assessment/Training   Quay Level (Lower Body Dressing) moderate assist (50% patient effort)   Toileting   Quay Level (Toileting) minimum assist (75% patient effort)   Clinical Impression   Criteria for Skilled Therapeutic Interventions Met (OT) Yes, treatment indicated   OT Diagnosis decreased I with ADL   OT Problem List-Impairments impacting ADL activity tolerance impaired;pain;post-surgical precautions   Assessment of Occupational Performance 1-3 Performance Deficits   Identified Performance Deficits dressing, bathing, transfers, IADL   Planned Therapy Interventions (OT) ADL retraining;IADL retraining;transfer training   Clinical Decision Making Complexity (OT) problem focused assessment/low complexity   Risk & Benefits of therapy have been explained evaluation/treatment results reviewed;care plan/treatment goals reviewed;risks/benefits reviewed;current/potential barriers reviewed;participants voiced agreement with care plan;participants included;patient   Clinical Impression Comments Pt presents with impaired ADL 2/2 the above. Pt to benefit from skilled OT to address and maximize safety and I with  ADL   OT Total Evaluation Time   OT Eval, Low Complexity Minutes (07799) 7   OT Goals   Therapy Frequency (OT) Daily   OT Predicted Duration/Target Date for Goal Attainment 04/18/24   OT Goals Lower Body Dressing;Toilet Transfer/Toileting   OT: Lower Body Dressing Modified independent;within precautions;including set-up/clothing retrieval   OT: Toilet Transfer/Toileting Modified independent;within precautions   OT Discharge Planning   OT Plan FB dressing with item retrieval, review toileting and transfer   OT Discharge Recommendation (DC Rec) home with assist   OT Rationale for DC Rec Pt moving well POD 1, anticipate will progress to safe d/c home with assist for heavy IADL prn 2/2 post op precautions.   OT Brief overview of current status CGA/SBA FWW   OT Equipment Needed at Discharge reacher;dressing equipment   Total Session Time   Timed Code Treatment Minutes 25   Total Session Time (sum of timed and untimed services) 32

## 2024-04-16 NOTE — PLAN OF CARE
End of shift Summary: See flowsheet for VS and detail assessments.     Changes this Shift:     Neuro/CMS: A&Ox4. Able to make needs known. Denies dizziness, headaches and nausea/vomiting. Pt has baseline numbness/tingling on BLE.      Pulmonary: On RA. Pt lung sound clear and equal bilaterally. Denies cough, chest pain and SOB.      Output: Meraz in place that is patent and draining clear, yellow urine. Pt c/o of constipation. Magnesium citrate solution given with no result.      Activity: Ambulated with therapy today. Up SBA with walker and gait belt.      Skin: Spinal Incision     Pain: Rates pain 5-7/10; managed with continuous lidocaine drip, PRN PO oxycodone, PRN PO robaxin and ice pack.      Dressings/Drains: Dressing on spinal incision C/D/I. Hemovac dressing C/D/I.      IV: L PIV running continuous lidocaine drip and  ABX in between.     Additional info:Meraz removed @15:10. Pt voidedx1 this shift. Care plan updated     Plan:  Continue with POC.

## 2024-04-16 NOTE — PROGRESS NOTES
"Orthopedic Surgery Progress Note:     Subjective:   Doing well. Ketamine stopped overnight. No leg pain. Back pain well controlled. Tolerating PO. Discussed surgery and plan for pt/ot today.     Objective:   /72 (BP Location: Right arm, Patient Position: Semi-Souza's, Cuff Size: Adult Regular)   Pulse 61   Temp 98  F (36.7  C) (Oral)   Resp 17   Ht 1.651 m (5' 5\")   Wt 63.1 kg (139 lb 1.8 oz)   SpO2 100%   BMI 23.15 kg/m    I/O this shift:  In: -   Out: 865 [Urine:850; Drains:15]  General: NAD. Resting comfortably in bed.  Respiratory: Breathing comfortably on RA.  Drain Output: 15ml overnight.  Musculoskeletal: dressing cdi     Motor Strength Right Left   Hip flexion: L1, L2, L3 5/5 5/5        Knee flexion: S1 5/5 5/5   Knee extension: L3, L4 5/5 5/5   Ankle dosiflexion: L4, L5 5/5 5/5   EHL: L5 5/5 5/5   Ankle plantarflexion: S1 5/5 5/5     Sensation from L1-S2 is preserved.    Laboratory Data:  Lab Results   Component Value Date    WBC 5.8 04/04/2024    HGB 14.0 04/04/2024     04/04/2024    INR 0.94 02/06/2019       Images:  No new images were obtained.    Assessment & Plan:   Kelsy Morley is a 63 year old female s/p L4/5 TLIF on 4/16/24 with Dr. Borges.     Goals for today:  PT/ot   Remove carmen   Monitor drain     Spine Primary  - Activity: Up with assist until independent. No excessive bending or twisting. No lifting >10 lbs x 6 weeks. No Davin lift for transfers.   - Weightbearing Status: WBAT.  - Pain Management: Transition from IV to PO as tolerated. No NSAIDs.   - Antibiotics: Ancef 1 g Q8H while drains remain.  - Diet: Begin with clear fluids and progress diet as tolerated.   - DVT Prophylaxis: SCDs only. No chemical DVT prophylaxis needed.  - Imaging: XR Upright  PTDC; ordered.  - Labs: Labs PRN.  - Bracing/Splinting: None.  - Dressings: Keep Aquacel C/D/I x 7 days.  - Drains: Document output per shift; will be discontinued at orthopedic surgery discretion.  - Carmen catheter: " Remove POD1.   - Physical Therapy/Occupational Therapy: Evaluation and treatment.  - Consults: Hospitalist.  - Follow-up: Clinic with Dr. Borges in 6 weeks with repeat radiographs.    - Disposition: Pending progress with therapies, pain control on orals, and medical stability; anticipate discharge to home on POD4-5.    Orthopedic surgery staff for this patient is Dr. Borges.    -----------------------------------------------------------------------------  Tio Mcgrath MD  Spine Fellow     PLEASE PAGE ME directly with any questions/concerns during regular weekday hours before 5 pm. If there is no response, if it is a weekend, or if it is during evening hours then please page the orthopedic surgery resident on call.    FOLLOWUP:    Future Appointments   Date Time Provider Department [place]   4/16/2024  8:15 AM Layne Graham, OT [facility] [place]   4/16/2024  8:45 AM Iesha Calixto, PT [facility] [place]   4/16/2024  2:45 PM Iesha Calixto, PT [facility] [place]   5/8/2024 10:30 AM [facility] INFUSION CHAIR [facility]   5/8/2024  2:30 PM [facility]   5/15/2024  9:00 AM [facility]   5/22/2024  2:30 PM [facility]   5/29/2024 12:00 PM Fausto Borges MD [facility]   6/5/2024  9:30 AM [facility] INFUSION NURSE [facility]   6/5/2024  2:30 PM [facility]ogel[facility] PT [facility]   6/12/2024  2:30 PM Cathi Vogel, PT [facility]   6/19/2024  2:30 PM Cathi Vogel, PT [facility]   6/26/2024  2:30 PM Cathi Vogel, PT [facility]   7/3/2024  9:30 AM [facility] INFUSION NURSE [facility]   7/3/2024  2:30 PM Cathi Vogel, PT [facility]   7/10/2024  2:00 PM Fausto Borges MD [facility]   7/10/2024  2:30 PM Cathi Vogel, PT [facility]   7/17/2024  2:30 PM Cathi Vogel, PT [facility]   7/31/2024  9:30 AM [facility] INFUSION NURSE [facility]   8/20/2024  3:30 PM Fausto Lanier MD [facility]   8/28/2024  9:30 AM [facility] INFUSION NURSE [facility]   9/25/2024  9:00 AM [facility] INFUSION CHAIR IESHANOAM MARION [facility]   10/23/2024  9:00 AM [facility] INFUSION CHAIR IESHANOAM [facility]   11/20/2024  9:30 AM [facility] INFUSION NURSE IESHANOAM [facility]   12/18/2024  9:00 AM [facility] INFUSION CHAIR IESHANOAM [facility]

## 2024-04-16 NOTE — PLAN OF CARE
Goal Outcome Evaluation: reviewed w/ pt.    Received alert and oriented x4, on capnography.   With Oxygen at 1LPM, saturating well  Complained of pain at surgical site, Oxycodone & Tylenol given.  Applied cold packs - reported pain decreased.  Surgical dressing is dry and intact, w/ 1 hemovac - output 15ml  Denies N/V, SOB and chest discomfort  Baseline - b/l numbness/tingling sensation on b/l foot  With ongoing Lidocaine drip and Ketamine drip  At 22:00PM, pt asked to stop Ketamine. Page Hospitalist and Ortho - Ortho allowed to pause Ketamine for now.  Instructed pt to report any signs and symptoms of s/e of Lidocaine  Oral meds given and IV antibiotic administered.  Meraz catheter is intact, draining well  Blood glucose checked, post op day 1 - 114mg/dl

## 2024-04-16 NOTE — PROGRESS NOTES
04/16/24 1146   Appointment Info   Signing Clinician's Name / Credentials (PT) Iesha Calixto DPT       Present no   Language English   Living Environment   People in Home alone   Current Living Arrangements house  (townhouse)   Home Accessibility stairs to enter home   Number of Stairs, Main Entrance 3   Stair Railings, Main Entrance railings safe and in good condition   Transportation Anticipated family or friend will provide   Self-Care   Usual Activity Tolerance good   Current Activity Tolerance moderate   Regular Exercise No   Equipment Currently Used at Home none   Fall history within last six months no   General Information   Onset of Illness/Injury or Date of Surgery 04/15/24   Referring Physician Tio Mcgrath MD   Patient/Family Therapy Goals Statement (PT) Did not endorse   Pertinent History of Current Problem (include personal factors and/or comorbidities that impact the POC) 63 year old female s/p L4/5 TLIF on 4/16/24 with Dr. Borges.   Existing Precautions/Restrictions fall;spinal   Weight-Bearing Status - LUE full weight-bearing   Weight-Bearing Status - RUE full weight-bearing   Weight-Bearing Status - LLE weight-bearing as tolerated   Weight-Bearing Status - RLE weight-bearing as tolerated   General Observations Supine in bed upon arrival, agreeable to PT   Cognition   Affect/Mental Status (Cognition) WNL   Orientation Status (Cognition) oriented x 3   Follows Commands (Cognition) WNL   Pain Assessment   Patient Currently in Pain Yes, see Vital Sign flowsheet   Integumentary/Edema   Integumentary/Edema no deficits were identifed   Posture    Posture Forward head position;Protracted shoulders;Kyphosis   Posture Comments Mild sitting EOB and standing   Range of Motion (ROM)   ROM Comment Did not formally assess, demonstrates functional ROM with mobility   Strength (Manual Muscle Testing)   Strength Comments Did not formally assess, demonstrates functional strength with  mobility   Bed Mobility   Comment, (Bed Mobility) CGA for supine to sit transfer with use of log roll technique   Transfers   Comment, (Transfers) CGA for sit<>stand transfer with use of walker   Gait/Stairs (Locomotion)   Comment, (Gait/Stairs) Ambulates CGA progressing to SBA using walker   Balance   Balance Comments Independent sitting balance, SBA for standing balance with UE support from walker   Sensory Examination   Sensory Perception WNL   Clinical Impression   Criteria for Skilled Therapeutic Intervention Yes, treatment indicated   PT Diagnosis (PT) impaired functional mobility   Influenced by the following impairments increased post-op pain, precautions, decreased activity tolerance   Functional limitations due to impairments impaired bed mobility, transfers and ambulation   Clinical Presentation (PT Evaluation Complexity) stable   Clinical Presentation Rationale Per clinical judgment   Clinical Decision Making (Complexity) low complexity   Planned Therapy Interventions (PT) balance training;bed mobility training;gait training;home exercise program;stair training;strengthening;transfer training;progressive activity/exercise;risk factor education;home program guidelines   Risk & Benefits of therapy have been explained evaluation/treatment results reviewed;care plan/treatment goals reviewed;risks/benefits reviewed;current/potential barriers reviewed   PT Total Evaluation Time   PT Eval, Low Complexity Minutes (72269) 6   Physical Therapy Goals   PT Frequency Daily   PT Predicted Duration/Target Date for Goal Attainment 04/19/24   PT Goals Bed Mobility;Transfers;Gait;Stairs   PT: Bed Mobility Modified independent;Supine to/from sit;Within precautions   PT: Transfers Modified independent;Sit to/from stand;Bed to/from chair;Assistive device   PT: Gait Modified independent;Assistive device;Greater than 200 feet   PT: Stairs Supervision/stand-by assist;3 stairs;Rail on left   Therapeutic Activity   Therapeutic  Activities: dynamic activities to improve functional performance Minutes (14029) 24   Symptoms Noted During/After Treatment Fatigue;Increased pain   Treatment Detail/Skilled Intervention PT: Supine in bed upon arrival, agreeable to PT. Education provided on precautions following surgery and log roll technique, verbalized understanding. Completes supine to sit transfer then with use of log roll technique, side rail and CGA. Sat EOB with fairly good tolerance. Some dizziness noted (per patient happens at baseline at times) BP stable. Completes sit<>stand transfer with CGA and use of walker. Tolerated ambulating in murillo CGA progressing to SBA > 350'. Safe throughout. Ended session in recliner, set up with all needs in reach.   PT Discharge Planning   PT Plan PT: Flat bed mobility without rail, gait, stairs per home set up   PT Discharge Recommendation (DC Rec) home with assist   PT Rationale for DC Rec PT: Pending LOS anticipate patient to make great progress towards a safe discharge home with PRN assist   PT Brief overview of current status PT: CGA for bed mobility, transfers, SBA for ambulation with walker   Total Session Time   Timed Code Treatment Minutes 24   Total Session Time (sum of timed and untimed services) 30

## 2024-04-16 NOTE — PLAN OF CARE
Goal Outcome Evaluation:         Pt A&Ox4. Pt denies chest pain SOB, N/V, N/T. Pt states pain 7/10. Managed with Robaxin, lidocaine continuous, Ketamine continous, ice packs. 2 RN skin assessment completed. Dressing CDI, Bruise on right foot. Pt on capno. Pt states nausea, no relief from Zofran, gave Compazine, pt states some relief. Continue POC.

## 2024-04-16 NOTE — PROGRESS NOTES
[facility]    Medicine Progress Note - Hospitalist Service, GOLD TEAM 19    Date of Admission:  4/15/2024    Assessment & Plan   Kelsy Morley is a 62 yo female admitted on 4/15/2024. 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  S/p elective L4/5 TLIF on 4/16/24 with Dr. Borges.  Hospitalist service consulted for postop medical management.     S/p L4/5 TLIF on 4/16/24 with Dr. Borges   ---   POD #1  ---   Continue prophylactic Ancef  ---   PT/OT consulted  ---   Hemovac drain care per spine service    DVT prophylaxis  ---   PCD    Acute postop low back pain  ---   Ketamine and lidocaine drips per primary team    Acute postop blood loss anemia  ---   Hgb was 14.0 g on 4/4, 11.2 g today   ---   Patient denied lightheadedness, dizziness CP or SOB  ---   No indications for PRBC transfusion    Vaginal Itching  Recent Oropharyngeal Candidiasis  ---   Pt recently received Augmentin for a sinus infection following COVID.  After finishing this, she developed oropharyngeal candidiasis and a yeast infection.  Notes that she completed a 1-week course of Monistat for the yeast infection, but continues to experience vaginal itching  ---   Will check vaginal swab  ---   She has a h/o allergy to Fluconazole - hives  ---   Continue PTA Nystatin swish and swallow  ---   Will treat her w/ a dose of micafungin 200 mg IV      Omayra-Danlos Syndrome   ----   Updated surveillance TTE on 2/23/24 showed EF 65-70%, no valvular abnormalities, normal RV function and size.   ---   Avoid excess IVF given risk of airway edema and reportedly  difficult intubation  ---   PT as ordered per Primary Team      Mast Cell Activation Sydrome  ---   Per PCP note, pt had a pre-op plan including Prednisone 50mg x1 dose 24 hrs prior to surgery, and then 1-2 hrs prior to surgery, as well as Benadryl 50mg + Pepcid 40mg + Singulair 10mg ~1hr prior to surgery.  Otherwise, PTA she gets periodic Xolair injections which have helped her and is on multiple PTA allergy meds.  ---   Should pt develop hypotension, she has the following rescue plan:              - PO/IV Benadryl 25-50mg, Hydroxyzine 25mg PO, or IV Solumedrol 120mg, as well as Albuterol neb, and Epinephrine (not to exceed 0.15mg per dose)  ---   Continue PTA Fenofexadine, Singulair, Zyrtec, Famotidine, and Ipratropium      Autonomic Dysfunction  ---   Follows w/ Cardiology as an OP.   ---   Last saw 6/6/23, autonomic dysfunction for her manifests as periodic low BP and temperature regulation issues.   ---   No current cardiac medications and BP stable      Eosinophilic Enteritis  ---   Followed by [facility], has been recommended Budesonide, but cost-prohibitive.   ---   No acute GI complaints      Chronic Fatigue Syndrome  CRPS of LLE  ---   CRPS in LLE affecting lateral extremity and dorsum of foot. Ketamine infusion intraoperatively was recommended, and appears she did get this for a few hours in perioperative period, now discontinued. She followed w/ Pain Psychiatry at [place] (Dr. Chaitanya Ortega), who initiated her on the psychotropic meds as below. Also followed w/ Pain Psychology as an OP.  ---   Follow-up w/ Pain Psychology as an OP  ---   Continue PTA psychotropic meds as below     Hypothyroidism 2/2 Hashimoto's Thyroiditis  ---   TSH is 0.26 (low) but free T4 level within normal limit  ---   Continue PTA Levothyroxine 75mcg daily.     Migraines  ---   Follows w/ Neurology as an OP, last saw 1/23/24.   ---   She is on Emgality once per month.   ---   Has tried numerous other medications and failed these.    ---   Continue PTA Vitamin B2 400mcg daily   ---   Continue PTA Imitrex 100mg PRN (max x2 tabs in 24 hr period)  ---   APAP PRN      PTSD  Insomnia  Depression  ---   PTA Methylene Blue 20mg daily, Lamictal 50mg at bedtime, and Tizanidine 4mg at bedtime.   ---   Continue PTA meds     KLAUS   ---   No longer using CPAP after reducing her weight.   ---   Per PCP pre-op note, waiting to update her sleep study until after above surgery.  ---   Follow-up w/ PCP for discussion of repeat sleep study     Myocardial Myopathy  ---   Per PCP pre-op note, generalized weakness is the main manifestation of this, but no specific respiratory weakness.   ---   No respiratory issues following prior general anesthesia.  ---   No lactated ringers          Diet: Advance Diet as Tolerated: Regular Diet Adult  Snacks/Supplements Adult: Other; Orange Kash at B/D and chocolate Ensure Enlive at L; With Meals    DVT Prophylaxis: Pneumatic Compression Devices  Meraz Catheter: PRESENT, indication: Surgical procedure  Lines: None     Cardiac Monitoring: None  Code Status: Full Code    Disposition Plan    Medically not cleared for discharge home yet          Diamond Lawrence MD  Hospitalist Service, GOLD TEAM [address] [place] [address]  Securely message with [employer] (more info)  Text page via [employer] Paging/Directory   See signed in provider for up to date coverage information  ______________________________________________________________________    Interval History   Complaint of vaginal itching   She also feels like her oral candidiasis has not cleared  She requested her mag citrate increase to 3 times daily  Postop pain managed with ketamine and lidocaine drips    Physical Exam   Vital Signs: Temp: 97.8  F (36.6  C) Temp src: Oral BP: 112/78 Pulse: 61   Resp: 17 SpO2: 100 % O2 Device: Nasal cannula Oxygen Delivery: 1 LPM  Weight: 139 lbs 1.76 oz  General: aao x 3, NAD.  HEENT:  NC/AT, PERRL, EOMI, neck supple, no  thyromegaly, op clear, mmm.  CVS:  NL s 1 and s2, no m/r/g.  Lungs:  CTA B/L.   Abd:  Soft, + bs, NT, no rebound or gaurding, no fluid shift.  Ext:  No c/c.  Lymph:  No edema.  Neuro:  Nonfocal.  Musculoskeletal: No calf tenderness to palpation.    Skin:  No rash.  Psychiatry:  Mood and affect appropriate.  Back exam:  Deferred        Data     I have personally reviewed the following data over the past 24 hrs:    N/A  \   11.2 (L)   / N/A     141 110 (H) 12.7 /  110 (H)   4.4 26 0.72 \     TSH: 0.26 (L) T4: 1.57 A1C: N/A       Imaging results reviewed over the past 24 hrs:   No results found for this or any previous visit (from the past 24 hour(s)).

## 2024-04-16 NOTE — PROGRESS NOTES
"CLINICAL NUTRITION SERVICES     Reason for Assessment: Provider Order - specify Nutrition Education - Patient is Post op Complex Spine - Patient needs high-protein education and ordering of preferred supplements at 1.5 g protein per kg body weight    Reason for admission: Chronic Lower Back Pain and Lumbar Radiculopathy 2/2 Degenerative Spondylolisthesis (S/P Decompression, Transforaminal Lumbar Fusion L4-L5 on 4/15/24)     PMH: chronic lower back pain & lumbar radiculopathy 2/2 degenerative spondylolisthesis (S/P decompression, transforaminal lumbar fusion L4-L5 4/15/24), Omayra-Danlos syndrome, Mast Cell Activation Syndrome, Autonomic Dysfunction, 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 Pt concerned about gaining weight due to lack of physical activity r/t recent surgery. Discussed protein shakes and recommended Premier Protein or FairLife Nutrition Plan shakes (30 grams protein 160-150 kcals per shake). Encouraged protein at each meal.   Visit abbreviated by arrival of provider,    165.1 cm 5' 5\"  Body mass index is 23.15 kg/m .  Wt Readings from Last 11 Encounters:   04/15/24 63.1 kg (139 lb 1.8 oz)   04/04/24 64.4 kg (142 lb)   02/19/24 64 kg (141 lb)   02/14/24 63.7 kg (140 lb 6.4 oz)   01/25/24 63 kg (139 lb)   01/04/24 64 kg (141 lb)   12/26/23 64 kg (141 lb)   11/15/23 62.4 kg (137 lb 8 oz)   10/04/23 61.7 kg (136 lb)   06/06/23 60.9 kg (134 lb 4.8 oz)   03/30/23 61.8 kg (136 lb 3.2 oz)       Nutrition Diagnosis: Predicted food- and nutrition-related knowledge deficit r/t therapeutic recommendations    Interventions: Provided instruction on adequate protein intakes for healing, ordered Kash BID and Ensure Enlive daily.  Provided High-protein foods list " and list of foods on the hospital menu with their estimated protein content to assist pt with ordering protein-rich meals.  Recommended 95 grams/day protein    Goals: Patient will verbalize understanding of therapeutic recommendations and accept supplement - met.    Follow-up: Will monitor per LOS policy unless otherwise consulted.      Vicki CASTILLO  Clinical Dietitian  [facility]   Available by [place], [place], desk [phone]  Weekend/Holiday [place]: Weekend Holiday Clinical Dietitian [Multi Site Groups]

## 2024-04-17 ENCOUNTER — APPOINTMENT (OUTPATIENT)
Dept: OCCUPATIONAL THERAPY | Facility: CLINIC | Age: 64
DRG: 454 | End: 2024-04-17
Attending: ORTHOPAEDIC SURGERY
Payer: MEDICARE

## 2024-04-17 ENCOUNTER — APPOINTMENT (OUTPATIENT)
Dept: PHYSICAL THERAPY | Facility: CLINIC | Age: 64
DRG: 454 | End: 2024-04-17
Attending: ORTHOPAEDIC SURGERY
Payer: MEDICARE

## 2024-04-17 ENCOUNTER — APPOINTMENT (OUTPATIENT)
Dept: CT IMAGING | Facility: CLINIC | Age: 64
DRG: 454 | End: 2024-04-17
Payer: MEDICARE

## 2024-04-17 LAB
ALBUMIN SERPL BCG-MCNC: 3.9 G/DL (ref 3.5–5.2)
ALBUMIN UR-MCNC: NEGATIVE MG/DL
ALP SERPL-CCNC: 66 U/L (ref 40–150)
ALT SERPL W P-5'-P-CCNC: 19 U/L (ref 0–50)
ANION GAP SERPL CALCULATED.3IONS-SCNC: 8 MMOL/L (ref 7–15)
APPEARANCE UR: CLEAR
APTT PPP: 27 SECONDS (ref 22–38)
AST SERPL W P-5'-P-CCNC: 43 U/L (ref 0–45)
ATRIAL RATE - MUSE: 59 BPM
BACTERIA #/AREA URNS HPF: ABNORMAL /HPF
BASE EXCESS BLDV CALC-SCNC: 3.2 MMOL/L (ref -3–3)
BILIRUB SERPL-MCNC: 0.2 MG/DL
BILIRUB UR QL STRIP: NEGATIVE
BUN SERPL-MCNC: 19 MG/DL (ref 8–23)
CALCIUM SERPL-MCNC: 8.3 MG/DL (ref 8.8–10.2)
CHLORIDE SERPL-SCNC: 104 MMOL/L (ref 98–107)
COLOR UR AUTO: ABNORMAL
CREAT SERPL-MCNC: 0.73 MG/DL (ref 0.51–0.95)
DEPRECATED HCO3 PLAS-SCNC: 25 MMOL/L (ref 22–29)
DIASTOLIC BLOOD PRESSURE - MUSE: NORMAL MMHG
EGFRCR SERPLBLD CKD-EPI 2021: >90 ML/MIN/1.73M2
ERYTHROCYTE [DISTWIDTH] IN BLOOD BY AUTOMATED COUNT: 13.2 % (ref 10–15)
GLUCOSE SERPL-MCNC: 104 MG/DL (ref 70–99)
GLUCOSE UR STRIP-MCNC: NEGATIVE MG/DL
HCO3 BLDV-SCNC: 27 MMOL/L (ref 21–28)
HCT VFR BLD AUTO: 36.4 % (ref 35–47)
HGB BLD-MCNC: 12 G/DL (ref 11.7–15.7)
HGB UR QL STRIP: NEGATIVE
HOLD SPECIMEN: NORMAL
HYALINE CASTS: 4 /LPF
INR PPP: 1.17 (ref 0.85–1.15)
INTERPRETATION ECG - MUSE: NORMAL
KETONES UR STRIP-MCNC: NEGATIVE MG/DL
LACTATE SERPL-SCNC: 1.3 MMOL/L (ref 0.7–2)
LEUKOCYTE ESTERASE UR QL STRIP: NEGATIVE
MAGNESIUM SERPL-MCNC: 2.3 MG/DL (ref 1.7–2.3)
MCH RBC QN AUTO: 29.9 PG (ref 26.5–33)
MCHC RBC AUTO-ENTMCNC: 33 G/DL (ref 31.5–36.5)
MCV RBC AUTO: 91 FL (ref 78–100)
MUCOUS THREADS #/AREA URNS LPF: PRESENT /LPF
NITRATE UR QL: NEGATIVE
O2/TOTAL GAS SETTING VFR VENT: 21 %
OXYHGB MFR BLDV: 81 % (ref 70–75)
P AXIS - MUSE: 74 DEGREES
PCO2 BLDV: 37 MM HG (ref 40–50)
PH BLDV: 7.47 [PH] (ref 7.32–7.43)
PH UR STRIP: 7 [PH] (ref 5–7)
PHOSPHATE SERPL-MCNC: 1.1 MG/DL (ref 2.5–4.5)
PLATELET # BLD AUTO: 116 10E3/UL (ref 150–450)
PO2 BLDV: 40 MM HG (ref 25–47)
POTASSIUM SERPL-SCNC: 3.7 MMOL/L (ref 3.4–5.3)
PR INTERVAL - MUSE: 120 MS
PROT SERPL-MCNC: 5.8 G/DL (ref 6.4–8.3)
QRS DURATION - MUSE: 72 MS
QT - MUSE: 394 MS
QTC - MUSE: 390 MS
R AXIS - MUSE: 40 DEGREES
RBC # BLD AUTO: 4.01 10E6/UL (ref 3.8–5.2)
RBC URINE: 1 /HPF
SAO2 % BLDV: 82.8 % (ref 70–75)
SODIUM SERPL-SCNC: 137 MMOL/L (ref 135–145)
SP GR UR STRIP: 1.01 (ref 1–1.03)
SQUAMOUS EPITHELIAL: <1 /HPF
SYSTOLIC BLOOD PRESSURE - MUSE: NORMAL MMHG
T AXIS - MUSE: 61 DEGREES
TROPONIN T SERPL HS-MCNC: 7 NG/L
TSH SERPL DL<=0.005 MIU/L-ACNC: 1.52 UIU/ML (ref 0.3–4.2)
UROBILINOGEN UR STRIP-MCNC: NORMAL MG/DL
VENTRICULAR RATE- MUSE: 59 BPM
WBC # BLD AUTO: 5.6 10E3/UL (ref 4–11)
WBC URINE: 1 /HPF

## 2024-04-17 PROCEDURE — 85027 COMPLETE CBC AUTOMATED: CPT

## 2024-04-17 PROCEDURE — 81001 URINALYSIS AUTO W/SCOPE: CPT

## 2024-04-17 PROCEDURE — 250N000011 HC RX IP 250 OP 636: Performed by: ORTHOPAEDIC SURGERY

## 2024-04-17 PROCEDURE — 93005 ELECTROCARDIOGRAM TRACING: CPT

## 2024-04-17 PROCEDURE — 97530 THERAPEUTIC ACTIVITIES: CPT | Mod: GO

## 2024-04-17 PROCEDURE — 250N000013 HC RX MED GY IP 250 OP 250 PS 637: Performed by: INTERNAL MEDICINE

## 2024-04-17 PROCEDURE — 84484 ASSAY OF TROPONIN QUANT: CPT

## 2024-04-17 PROCEDURE — 99207 PR NO BILLABLE SERVICE THIS VISIT: CPT | Performed by: INTERNAL MEDICINE

## 2024-04-17 PROCEDURE — 250N000013 HC RX MED GY IP 250 OP 250 PS 637: Performed by: ORTHOPAEDIC SURGERY

## 2024-04-17 PROCEDURE — 97535 SELF CARE MNGMENT TRAINING: CPT | Mod: GO

## 2024-04-17 PROCEDURE — 250N000009 HC RX 250

## 2024-04-17 PROCEDURE — 99233 SBSQ HOSP IP/OBS HIGH 50: CPT | Performed by: INTERNAL MEDICINE

## 2024-04-17 PROCEDURE — 84100 ASSAY OF PHOSPHORUS: CPT

## 2024-04-17 PROCEDURE — 70496 CT ANGIOGRAPHY HEAD: CPT | Mod: MA

## 2024-04-17 PROCEDURE — 84443 ASSAY THYROID STIM HORMONE: CPT

## 2024-04-17 PROCEDURE — 99232 SBSQ HOSP IP/OBS MODERATE 35: CPT | Mod: 24

## 2024-04-17 PROCEDURE — 80053 COMPREHEN METABOLIC PANEL: CPT

## 2024-04-17 PROCEDURE — 97530 THERAPEUTIC ACTIVITIES: CPT | Mod: GP

## 2024-04-17 PROCEDURE — 250N000013 HC RX MED GY IP 250 OP 250 PS 637

## 2024-04-17 PROCEDURE — 250N000013 HC RX MED GY IP 250 OP 250 PS 637: Performed by: NURSE PRACTITIONER

## 2024-04-17 PROCEDURE — 83605 ASSAY OF LACTIC ACID: CPT

## 2024-04-17 PROCEDURE — 120N000003 HC R&B IMCU UMMC

## 2024-04-17 PROCEDURE — 82805 BLOOD GASES W/O2 SATURATION: CPT

## 2024-04-17 PROCEDURE — 250N000011 HC RX IP 250 OP 636: Mod: JZ | Performed by: PHYSICIAN ASSISTANT

## 2024-04-17 PROCEDURE — 258N000003 HC RX IP 258 OP 636

## 2024-04-17 PROCEDURE — 250N000011 HC RX IP 250 OP 636

## 2024-04-17 PROCEDURE — 85610 PROTHROMBIN TIME: CPT

## 2024-04-17 PROCEDURE — 83735 ASSAY OF MAGNESIUM: CPT

## 2024-04-17 PROCEDURE — 70450 CT HEAD/BRAIN W/O DYE: CPT | Mod: MA

## 2024-04-17 PROCEDURE — 85730 THROMBOPLASTIN TIME PARTIAL: CPT

## 2024-04-17 PROCEDURE — 97116 GAIT TRAINING THERAPY: CPT | Mod: GP

## 2024-04-17 PROCEDURE — 93010 ELECTROCARDIOGRAM REPORT: CPT | Mod: GC | Performed by: INTERNAL MEDICINE

## 2024-04-17 RX ORDER — ASCORBIC ACID 500 MG
500 TABLET ORAL 2 TIMES DAILY
Status: DISCONTINUED | OUTPATIENT
Start: 2024-04-17 | End: 2024-04-18 | Stop reason: HOSPADM

## 2024-04-17 RX ORDER — IOPAMIDOL 755 MG/ML
117 INJECTION, SOLUTION INTRAVASCULAR ONCE
Status: COMPLETED | OUTPATIENT
Start: 2024-04-17 | End: 2024-04-17

## 2024-04-17 RX ORDER — ACETAMINOPHEN 325 MG/1
975 TABLET ORAL EVERY 6 HOURS PRN
Status: DISCONTINUED | OUTPATIENT
Start: 2024-04-17 | End: 2024-04-18 | Stop reason: HOSPADM

## 2024-04-17 RX ORDER — ZOLPIDEM TARTRATE 5 MG/1
5 TABLET ORAL AT BEDTIME
Status: DISCONTINUED | OUTPATIENT
Start: 2024-04-17 | End: 2024-04-18 | Stop reason: HOSPADM

## 2024-04-17 RX ORDER — BISACODYL 10 MG
10 SUPPOSITORY, RECTAL RECTAL DAILY PRN
Status: DISCONTINUED | OUTPATIENT
Start: 2024-04-17 | End: 2024-04-18 | Stop reason: HOSPADM

## 2024-04-17 RX ORDER — SODIUM CHLORIDE 9 MG/ML
INJECTION, SOLUTION INTRAVENOUS
Status: COMPLETED
Start: 2024-04-17 | End: 2024-04-17

## 2024-04-17 RX ADMIN — ONDANSETRON 4 MG: 2 INJECTION INTRAMUSCULAR; INTRAVENOUS at 05:53

## 2024-04-17 RX ADMIN — OXYCODONE HYDROCHLORIDE AND ACETAMINOPHEN 500 MG: 500 TABLET ORAL at 09:29

## 2024-04-17 RX ADMIN — ZOLPIDEM TARTRATE 5 MG: 5 TABLET ORAL at 21:51

## 2024-04-17 RX ADMIN — ACETAMINOPHEN 975 MG: 325 TABLET, FILM COATED ORAL at 04:46

## 2024-04-17 RX ADMIN — NYSTATIN 500000 UNITS: 100000 SUSPENSION ORAL at 16:55

## 2024-04-17 RX ADMIN — IPRATROPIUM BROMIDE 1 SPRAY: 42 SPRAY, METERED NASAL at 08:09

## 2024-04-17 RX ADMIN — SODIUM CHLORIDE 80 ML: 9 INJECTION, SOLUTION INTRAVENOUS at 06:12

## 2024-04-17 RX ADMIN — FEXOFENADINE HCL 180 MG: 180 TABLET ORAL at 09:00

## 2024-04-17 RX ADMIN — NYSTATIN 500000 UNITS: 100000 SUSPENSION ORAL at 11:17

## 2024-04-17 RX ADMIN — METHOCARBAMOL TABLETS 750 MG: 750 TABLET, COATED ORAL at 01:15

## 2024-04-17 RX ADMIN — OXYCODONE HYDROCHLORIDE AND ACETAMINOPHEN 500 MG: 500 TABLET ORAL at 20:15

## 2024-04-17 RX ADMIN — POTASSIUM & SODIUM PHOSPHATES POWDER PACK 280-160-250 MG 2 PACKET: 280-160-250 PACK at 21:51

## 2024-04-17 RX ADMIN — METHOCARBAMOL TABLETS 750 MG: 750 TABLET, COATED ORAL at 10:15

## 2024-04-17 RX ADMIN — LIDOCAINE HYDROCHLORIDE 1 MG/KG/HR: 8 INJECTION, SOLUTION INTRAVENOUS at 04:45

## 2024-04-17 RX ADMIN — MONTELUKAST 10 MG: 10 TABLET, FILM COATED ORAL at 09:01

## 2024-04-17 RX ADMIN — LAMOTRIGINE 50 MG: 25 TABLET ORAL at 21:51

## 2024-04-17 RX ADMIN — TRAZODONE HYDROCHLORIDE 50 MG: 50 TABLET ORAL at 21:51

## 2024-04-17 RX ADMIN — LEVOTHYROXINE SODIUM 75 MCG: 75 TABLET ORAL at 08:07

## 2024-04-17 RX ADMIN — SENNOSIDES AND DOCUSATE SODIUM 1 TABLET: 8.6; 5 TABLET ORAL at 20:15

## 2024-04-17 RX ADMIN — NYSTATIN 500000 UNITS: 100000 SUSPENSION ORAL at 20:15

## 2024-04-17 RX ADMIN — SODIUM CHLORIDE 500 ML: 900 INJECTION, SOLUTION INTRAVENOUS at 17:12

## 2024-04-17 RX ADMIN — CEFAZOLIN 1 G: 1 INJECTION, POWDER, FOR SOLUTION INTRAMUSCULAR; INTRAVENOUS at 16:56

## 2024-04-17 RX ADMIN — NYSTATIN 500000 UNITS: 100000 SUSPENSION ORAL at 08:59

## 2024-04-17 RX ADMIN — METHOCARBAMOL TABLETS 750 MG: 750 TABLET, COATED ORAL at 16:55

## 2024-04-17 RX ADMIN — CEFAZOLIN 1 G: 1 INJECTION, POWDER, FOR SOLUTION INTRAMUSCULAR; INTRAVENOUS at 08:07

## 2024-04-17 RX ADMIN — ACETAMINOPHEN 975 MG: 325 TABLET, FILM COATED ORAL at 18:57

## 2024-04-17 RX ADMIN — CETIRIZINE HYDROCHLORIDE 20 MG: 10 TABLET, FILM COATED ORAL at 21:51

## 2024-04-17 RX ADMIN — FAMOTIDINE 20 MG: 20 TABLET ORAL at 20:15

## 2024-04-17 RX ADMIN — POTASSIUM & SODIUM PHOSPHATES POWDER PACK 280-160-250 MG 2 PACKET: 280-160-250 PACK at 13:46

## 2024-04-17 RX ADMIN — ACETAMINOPHEN 975 MG: 325 TABLET, FILM COATED ORAL at 11:16

## 2024-04-17 RX ADMIN — CEFAZOLIN 1 G: 1 INJECTION, POWDER, FOR SOLUTION INTRAMUSCULAR; INTRAVENOUS at 00:32

## 2024-04-17 RX ADMIN — FAMOTIDINE 20 MG: 20 TABLET ORAL at 09:01

## 2024-04-17 RX ADMIN — POTASSIUM & SODIUM PHOSPHATES POWDER PACK 280-160-250 MG 2 PACKET: 280-160-250 PACK at 16:56

## 2024-04-17 RX ADMIN — IOPAMIDOL 67 ML: 755 INJECTION, SOLUTION INTRAVENOUS at 06:12

## 2024-04-17 ASSESSMENT — ACTIVITIES OF DAILY LIVING (ADL)
ADLS_ACUITY_SCORE: 25
ADLS_ACUITY_SCORE: 25
ADLS_ACUITY_SCORE: 27
ADLS_ACUITY_SCORE: 25
ADLS_ACUITY_SCORE: 25
ADLS_ACUITY_SCORE: 27
ADLS_ACUITY_SCORE: 25
ADLS_ACUITY_SCORE: 27
ADLS_ACUITY_SCORE: 25
ADLS_ACUITY_SCORE: 25
ADLS_ACUITY_SCORE: 27
ADLS_ACUITY_SCORE: 25
ADLS_ACUITY_SCORE: 27
ADLS_ACUITY_SCORE: 25
ADLS_ACUITY_SCORE: 27
ADLS_ACUITY_SCORE: 25

## 2024-04-17 NOTE — PROGRESS NOTES
[facility] Critical Response Team   RRT Note  4/17/2024       RRT called for: altered mental status    Assessment & Plan   Kelsy Morley is a 64 yo female admitted on 4/15/2024. 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  S/p elective L4/5 TLIF on 4/16/24 with Dr. Borges.  Hospitalist service consulted for postop medical management.     Patient was up to the restroom with bedside staff this AM and became dizzy and had complaints of vertigo. Patient placed back in bed and RRT was activated. Upon arrival to bedside patient encephalopathic. Stroke code was then activated. Hospitalist service present at bedside upon my arrival to room.     [employer] Stroke Scale  Exam Interval: Baseline   Score    Level of consciousness: (1)   Not alert; arousable w/ minor stim to obey/answer/respond    LOC questions: (0)   Answers both questions correctly    LOC commands: (0)   Performs both tasks correctly    Best gaze: (0)   Normal    Visual: (0)   No visual loss    Facial palsy: (0)   Normal symmetrical movements    Motor arm (left): ((1) Some effort against gravity    Motor arm (right): (1)   Some effort against gravity    Motor leg (left): (2)   Some effort against gravity    Motor leg (right): (2)   Some effort against gravity    Limb ataxia: (2)   Present in two limbs    Sensory: (1)   Mild to moderate sensory loss    Best language: (1)   Mild to moderate aphasia    Dysarthria: (1)   Mild to moderate dysarthria    Extinction and inattention: (0)   No abnormality        Total Score:  13      Encephalopathy markedly improved upon return form imaging. Opening eyes spontaneously, generalized weakness persistent but markedly improved able to perform anti-gravity well, orientated x4, and appropriate to conversation. Stroke tele Dr. Chand evaluated patient via remote video. Writer was also updated per neuroradiology that was no LVO or hemorrhage findings on CT imaging. Dr. Chand then de-escalated the stroke code. Ortho fellow and hospitalist service updated regarding plan of care. Differentials for presentation remain broad including postural hypotension during ambulation, lidocaine toxicity component as patient is on infusion, hospital acquired delirium, polypharmacy as patient has received opioids/anesthetics in lakhwinder-op period, and patient also has complex PMH with dysautonomia, POTS, mast cell activation, chronic fatigue syndrome, hashimoto's, mitochondrial myopathy, and jameson-danlos        INTERVENTIONS:  - MRI recommended per stroke neurology  - Formal neurology consult recommended per stroke neurology  - Labs ordered including TSH w/ free T4  - EKG unremarkable  - Hospitalist service to provide follow up care and decision making  - UA w/ microflex to UC ordered; low concern for infection etiology at this time  - Stop lidocaine infusion      Discussed with and defer further cares to Hospitalist service and orthopaedics    s    Allergies   Allergies   Allergen Reactions    Chlorhexidine Swelling and Rash     Burning of skin    Other (Do Not Use) Other (See Comments)     Do NOT use Lactated Ringers solution- Pt was told to avoid due to Mitochondrial myopathy    Trimethoprim Hives    Celecoxib Other (See Comments)     Kidney failure   Kidney failure       Clonidine      Other reaction(s): Irritation At Patch Site    Diflucan [Fluconazole] Hives    Duloxetine Dizziness     Diarrhea and severe HA    Epinephrine Other (See Comments)     Other reaction(s): Gastrointestinal, Headache  Allergy Provider has  recommended no more than 0.15 mg/ml of epinephrine if it needs to be given.     Ropivacaine Hives    Tobramycin Other (See Comments)     Eye drops cause pain  Eye drops cause pain      Adhesive Tape Rash     Needs ekg patches to be the ones for sensitive skin!!!    Liquid Adhesive Rash     EKG electrodes     No Clinical Screening - See Comments Rash and Other (See Comments)     Gel from ECG electrodes  Gel from ECG electrodes, eats through her skin  Other reaction(s): redness  Needs ekg patches to be the ones for sensitive skin!!!  Fluoroquinalone Antibiotics    Gel from ECG electrodes  Ands sensitive skin pads    Sulfa Antibiotics Hives and Rash       Physical Exam   Vital Signs with Ranges:  Temp:  [97.8  F (36.6  C)-98.2  F (36.8  C)] 98  F (36.7  C)  Pulse:  [] 60  Resp:  [9-35] 11  BP: ()/() 113/71  SpO2:  [85 %-100 %] 92 %  I/O last 3 completed shifts:  In: -   Out: 2210 [Urine:2150; Drains:60]    Physical exam prior to RRT completion:    GEN: not in distress  EYES: PERRL, Anicteric sclera.   HEENT:  Normocephalic, atraumatic, trachea midline,  Pupils PERRLA  CV: RRR, no gallops, rubs, or murmurs  PULM/CHEST: Clear breath sounds bilaterally without rhonchi, crackles or wheeze, symmetric chest rise  GI: normal bowel sounds, soft, non-tender, no rebound tenderness or guarding, no masses  : Voids spontaneously; urine color blue/clear (methylene blue PO meds)  EXTREMITIES: No peripheral edema, moving all extremities, peripheral pulses intact  NEURO: AO x 4, mild generalized weakness, following commands, and endorsing baseline numbness/tingling in feet  SKIN: Spinal incision CDI  PSYCH:  Affect: appropriate anxious       Data       ABG:  -  Recent Labs   Lab 04/17/24  0539   O2PER 21       Troponin:    No lab results found.    IMAGING: (X-ray/CT/MRI)   Recent Results (from the past 24 hour(s))   CT Head w/o Contrast    Narrative    EXAM: CT HEAD W/O CONTRAST, CTA HEAD NECK W CONTRAST  LOCATION:  [facility]  DATE/TIME: 4/17/2024 6:16 AM CDT    INDICATION: Code stroke; change in mental status. Dizziness and unsteadiness.  COMPARISON: None.  CONTRAST: 67mL Isovue 370  TECHNIQUE: Head and neck CT angiogram with IV contrast. Noncontrast head CT followed by axial helical CT images of the head and neck vessels obtained during the arterial phase of intravenous contrast administration. Axial 2D reconstructed images and   multiplanar 3D MIP reconstructed images of the head and neck vessels were performed by the technologist. Dose reduction techniques were used. All stenosis measurements made according to NASCET criteria unless otherwise specified.    FINDINGS:   NONCONTRAST HEAD CT:   INTRACRANIAL CONTENTS: No intracranial hemorrhage, extraaxial collection, or mass effect.  No CT evidence of acute infarct. Mild presumed chronic small vessel ischemic changes. Mild generalized volume loss. No hydrocephalus.     VISUALIZED ORBITS/SINUSES/MASTOIDS: Prior bilateral cataract surgery. Visualized portions of the orbits are otherwise unremarkable. Mild mucosal thickening scattered about the paranasal sinuses. No middle ear or mastoid effusion. Metallic BB or   ornamentation in the nasal septum.    BONES/SOFT TISSUES: No acute abnormality.    HEAD CTA:  ANTERIOR CIRCULATION: No stenosis/occlusion, aneurysm, or high flow vascular malformation. Standard [place] anatomy.    POSTERIOR CIRCULATION: No stenosis/occlusion, aneurysm, or high flow vascular malformation. Balanced vertebral arteries supply a normal basilar artery.     DURAL VENOUS SINUSES: Expected enhancement of the major dural venous sinuses.    NECK CTA:  RIGHT CAROTID: No measurable stenosis or dissection.    LEFT CAROTID: No measurable stenosis or dissection.    VERTEBRAL ARTERIES: No focal stenosis or dissection. Balanced vertebral arteries.    AORTIC ARCH: Classic aortic arch anatomy with no significant  stenosis at the origin of the great vessels.    NONVASCULAR STRUCTURES: Unremarkable.      Impression    IMPRESSION:   HEAD CT:  1.  No acute intracranial process.    HEAD CTA:   1.  No large vessel occlusion or hemodynamically significant stenosis.    NECK CTA:  1.  No large vessel occlusion or hemodynamically significant stenosis.      Chuck Herrera NP  was contacted by me on   4/17/2024 6:28 AM CDT  with the preliminary report.   CTA Head Neck with Contrast    Narrative    EXAM: CT HEAD W/O CONTRAST, CTA HEAD NECK W CONTRAST  LOCATION: [facility]  DATE/TIME: 4/17/2024 6:16 AM CDT    INDICATION: Code stroke; change in mental status. Dizziness and unsteadiness.  COMPARISON: None.  CONTRAST: 67mL Isovue 370  TECHNIQUE: Head and neck CT angiogram with IV contrast. Noncontrast head CT followed by axial helical CT images of the head and neck vessels obtained during the arterial phase of intravenous contrast administration. Axial 2D reconstructed images and   multiplanar 3D MIP reconstructed images of the head and neck vessels were performed by the technologist. Dose reduction techniques were used. All stenosis measurements made according to NASCET criteria unless otherwise specified.    FINDINGS:   NONCONTRAST HEAD CT:   INTRACRANIAL CONTENTS: No intracranial hemorrhage, extraaxial collection, or mass effect.  No CT evidence of acute infarct. Mild presumed chronic small vessel ischemic changes. Mild generalized volume loss. No hydrocephalus.     VISUALIZED ORBITS/SINUSES/MASTOIDS: Prior bilateral cataract surgery. Visualized portions of the orbits are otherwise unremarkable. Mild mucosal thickening scattered about the paranasal sinuses. No middle ear or mastoid effusion. Metallic BB or   ornamentation in the nasal septum.    BONES/SOFT TISSUES: No acute abnormality.    HEAD CTA:  ANTERIOR CIRCULATION: No stenosis/occlusion, aneurysm, or high flow vascular malformation.  "Standard [place] of [place] anatomy.    POSTERIOR CIRCULATION: No stenosis/occlusion, aneurysm, or high flow vascular malformation. Balanced vertebral arteries supply a normal basilar artery.     DURAL VENOUS SINUSES: Expected enhancement of the major dural venous sinuses.    NECK CTA:  RIGHT CAROTID: No measurable stenosis or dissection.    LEFT CAROTID: No measurable stenosis or dissection.    VERTEBRAL ARTERIES: No focal stenosis or dissection. Balanced vertebral arteries.    AORTIC ARCH: Classic aortic arch anatomy with no significant stenosis at the origin of the great vessels.    NONVASCULAR STRUCTURES: Unremarkable.      Impression    IMPRESSION:   HEAD CT:  1.  No acute intracranial process.    HEAD CTA:   1.  No large vessel occlusion or hemodynamically significant stenosis.    NECK CTA:  1.  No large vessel occlusion or hemodynamically significant stenosis.      Chuck Herrera NP  was contacted by me on   4/17/2024 6:28 AM CDT  with the preliminary report.       CBC with Diff:  Recent Labs   Lab Test 04/17/24  0539   WBC 5.6   HGB 12.0   MCV 91   *   INR 1.17*        Lactic Acid:    Lab Results   Component Value Date    LACT 1.3 04/17/2024           Comprehensive Metabolic Panel:  Recent Labs   Lab 04/17/24  0539      POTASSIUM 3.7   CHLORIDE 104   CO2 25   ANIONGAP 8   *   BUN 19.0   CR 0.73   GFRESTIMATED >90   HALLIE 8.3*   MAG 2.3   PHOS 1.1*   PROTTOTAL 5.8*   ALBUMIN 3.9   BILITOTAL 0.2   ALKPHOS 66   AST 43   ALT 19       INR:    Recent Labs   Lab Test 04/17/24  0539   INR 1.17*       D-DIMER:  No results found for: \"DIMER\"    BNP:  No results found for: \"BNP\"    UA:  No results for input(s): \"COLOR\", \"APPEARANCE\", \"URINEGLC\", \"URINEBILI\", \"URINEKETONE\", \"SG\", \"UBLD\", \"URINEPH\", \"PROTEIN\", \"UROBILINOGEN\", \"NITRITE\", \"LEUKEST\", \"RBCU\", \"WBCU\" in the last 168 hours.    Time Spent on this Encounter   I spent 45 minutes on the unit/floor managing the care of Kelsy Morley. Over 50% of " my time was spent counseling the patient and/or coordinating care regarding services listed in this note.      JOHN Nye CNP

## 2024-04-17 NOTE — PROGRESS NOTES
"Called by hospitalist. Reviewed consult note from Dr. Simeon overnight. Pt unable to undergo MRI due to metal in nasal septum. Recommend general neurology consult, discussed with Dr. Paul Roberson PA-C  Vascular Neurology  04/17/2024 12:55 PM  Securely message with the Vocera Web Console (learn more here)  To page me or covering stroke neurology team member, click here: [facility]  Choose \"On Call\" tab at top, then search dropdown box for \"Neurology\" & press Enter, look for Neuro ICU/Stroke    "

## 2024-04-17 NOTE — PROGRESS NOTES
[facility]    Medicine Progress Note - Hospitalist Service, GOLD TEAM 19    Date of Admission:  4/15/2024    Assessment & Plan   Kelsy Morley is a 64 yo female admitted on 4/15/2024. 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  S/p elective L4/5 TLIF on 4/16/24 with Dr. Borges.  Hospitalist service consulted for postop medical management.    MAJOR THINKS AND CHANGES TODAY  ---   Pt was up to the restroom with bedside staff this AM when she became dizzy and developed vertigo.   ---   She placed back in bed   ---   She had tremors of neck and upper body   ---   She was unable to speak/had slurred speech  ---   She can hear people talking around her but was unable to communicate  ---   She states she had vertigo in the past with infection but her vertigo symptoms today was severe.    ---   RRT and code stroke was activated  ---   Seen by neurologist on call  ---   CT head w/o contrast and CTA head and neck with IV contrast were negative for acute intracranial pathology  ---   Neurologist who evaluated patient recommended further workup with MRI brain and EEG  ---   Unfortunately pt still condition septal buttons in her nose (placed for treatment of perforated septum) and is unable to undergo MRI without removal of this septal buttons  ---   Patient's symptoms were attributed to lidocaine drip which has since discontinued  ---   She had a nonfocal neurologic exam when seen this morning by me  ---   Will reconsult with general neurology service     S/p L4/5 TLIF on 4/16/24 with Dr. Borges   ---   POD #2  ---   Continue  prophylactic Ancef  ---   Resume PT/OT   ---   Hemovac drain management per primary team  ---   Further care per ortho    DVT prophylaxis  ---   PCD    Acute postop low back pain  ---   Ketamine and lidocaine drips discontinued  ---   Continue Tylenol, oxycodone, Robaxin and IV Dilaudid    Acute postop blood loss anemia  ---   Hgb was 14.0 g on 4/4  ---   Hgb was 11.2 g on POD #1 ---> 12.0 g today  ---   Denied lightheadedness, dizziness CP or SOB  ---   No indications for PRBC transfusion    Vaginal Itching  Recent Oropharyngeal Candidiasis  ---   Pt recently received Augmentin for a sinus infection following COVID.  After finishing this, she developed oropharyngeal candidiasis and a yeast infection.  Notes that she completed a 1-week course of Monistat for the yeast infection, but continues to experience vaginal itching  ---   She has a h/o allergy to Fluconazole - hives  ---   Continue Nystatin swish and swallow  ---   Continue terconazole 0.8% cream 1 vaginal application at bedtime x 3 days, 4/16 - 4/18/24    Hypophosphatemia  ---   Phosphorus level is 1.1  ---   Start replacement protocol     Omayra-Danlos Syndrome   ----   Updated surveillance TTE on 2/23/24 showed EF 65-70%, no valvular abnormalities, normal RV function and size.   ---   Avoid excess IVF given risk of airway edema and reportedly difficult intubation     Mast Cell Activation Sydrome  ---   Per PCP note, pt had a pre-op plan including Prednisone 50mg x1 dose 24 hrs prior to surgery, and then 1-2 hrs prior to surgery, as well as Benadryl 50mg + Pepcid 40mg + Singulair 10mg ~1hr prior to surgery.  Otherwise, PTA she gets periodic Xolair injections which have helped her and is on multiple PTA allergy meds.  ---   Should pt develop hypotension, she has the following rescue plan:              - PO/IV Benadryl 25-50mg, Hydroxyzine 25mg PO, or IV Solumedrol 120mg, as well as Albuterol neb, and Epinephrine (not to exceed 0.15mg per dose)  ---    Continue PTA Fenofexadine, Singulair, Zyrtec, Famotidine, and Ipratropium      Autonomic Dysfunction  ---   Follows w/ Cardiology as an OP.   ---   Last saw 6/6/23, autonomic dysfunction for her manifests as periodic low BP and temperature regulation issues.   ---   No current cardiac medications and BP stable      Eosinophilic Enteritis  ---   Followed by [facility], has been recommended Budesonide, but cost-prohibitive.   ---   No acute GI complaints      Chronic Fatigue Syndrome  CRPS of LLE  ---   CRPS in LLE affecting lateral extremity and dorsum of foot. Ketamine infusion intraoperatively was recommended, and appears she did get this for a few hours in perioperative period, now discontinued. She followed w/ Pain Psychiatry at [place] (Dr. Chaitanya Ortega), who initiated her on the psychotropic meds as below. Also followed w/ Pain Psychology as an OP.  ---   Follow-up w/ Pain Psychology as an OP  ---   Continue PTA psychotropic meds as below     Hypothyroidism 2/2 Hashimoto's Thyroiditis  ---   TSH is 0.26 (low) but free T4 level within normal limit  ---   Continue PTA Levothyroxine 75mcg daily.     Migraines  ---   Follows w/ Neurology as an OP, last saw 1/23/24.   ---   She is on Emgality once per month.   ---   Has tried numerous other medications and failed these.   ---   Continue PTA Vitamin B2 400mcg daily   ---   Continue PTA Imitrex 100mg PRN   ---   APAP PRN      PTSD  Insomnia  Depression  ---   Continue PTA Methylene Blue 20mg daily, Lamictal 50mg at bedtime, and Tizanidine 4mg at bedtime.   ---   Start Ambien 5 mg qhs per pt's request     KLAUS   ---   No longer using CPAP after reducing her weight.   ---   Per PCP pre-op note, waiting to update her sleep study until after above surgery.  ---   Follow-up w/ PCP for discussion of repeat sleep study     Myocardial Myopathy  ---   Per PCP pre-op note, generalized weakness is the main manifestation of this, but no specific respiratory weakness.   ---   No  respiratory issues following prior general anesthesia.  ---   No lactated ringers          Diet: Snacks/Supplements Adult: Other; Orange Kash at B/D and chocolate Ensure Enlive at L; With Meals  Regular Diet Adult    DVT Prophylaxis: Pneumatic Compression Devices  Meraz Catheter: Not present  Lines: None     Cardiac Monitoring: ACTIVE order. Indication: Stroke, acute (48 hours)  Code Status: Full Code    Disposition Plan    Medically not cleared for discharge to home yet          Diamond Lawrence MD  Hospitalist Service, GOLD TEAM 19  [facility]  Securely message with [employer] (more info)  Text page via [facility] Paging/Directory   See signed in provider for up to date coverage information  ______________________________________________________________________    Interval History   Patient had vertigo and strokelike symptoms earlier this morning  CT head without contrast and CTA head and neck were negative for acute pathology  She was completely asymptomatic when seen this morning  She had an focal neurologic exam  Speech was fluent  Vertigo symptoms resolved    Physical Exam   Vital Signs: Temp: 97.5  F (36.4  C) Temp src: Oral BP: 114/70 Pulse: 60   Resp: 14 SpO2: 92 % O2 Device: None (Room air)    Weight: 139 lbs 1.76 oz  General: aao x 3, NAD.  HEENT:  NC/AT, PERRL, EOMI, neck supple, no thyromegaly, op clear, mmm.  CVS:  NL s 1 and s2, no m/r/g.  Lungs:  CTA B/L.   Abd:  Soft, + bs, NT, no rebound or gaurding, no fluid shift.  Ext:  No c/c.  Lymph:  No edema.  Neuro:  Nonfocal.  Musculoskeletal: No calf tenderness to palpation.    Skin:  No rash.  Psychiatry:  Mood and affect appropriate.  Back exam:  Deferred        Data     I have personally reviewed the following data over the past 24 hrs:    5.6  \   12.0   / 116 (L)     137 104 19.0 /  104 (H)   3.7 25 0.73 \     ALT: 19 AST: 43 AP: 66 TBILI: 0.2   ALB: 3.9 TOT PROTEIN: 5.8 (L) LIPASE: N/A     Trop: 7 BNP: N/A      TSH: 1.52 T4: N/A A1C: N/A     Procal: N/A CRP: N/A Lactic Acid: 1.3       INR:  1.17 (H) PTT:  27   D-dimer:  N/A Fibrinogen:  N/A       Imaging results reviewed over the past 24 hrs:   Recent Results (from the past 24 hour(s))   CT Head w/o Contrast    Narrative    EXAM: CT HEAD W/O CONTRAST, CTA HEAD NECK W CONTRAST  LOCATION: [facility]  DATE/TIME: 4/17/2024 6:16 AM CDT    INDICATION: Code stroke; change in mental status. Dizziness and unsteadiness.  COMPARISON: None.  CONTRAST: 67mL Isovue 370  TECHNIQUE: Head and neck CT angiogram with IV contrast. Noncontrast head CT followed by axial helical CT images of the head and neck vessels obtained during the arterial phase of intravenous contrast administration. Axial 2D reconstructed images and   multiplanar 3D MIP reconstructed images of the head and neck vessels were performed by the technologist. Dose reduction techniques were used. All stenosis measurements made according to NASCET criteria unless otherwise specified.    FINDINGS:   NONCONTRAST HEAD CT:   INTRACRANIAL CONTENTS: No intracranial hemorrhage, extraaxial collection, or mass effect.  No CT evidence of acute infarct. Mild presumed chronic small vessel ischemic changes. Mild generalized volume loss. No hydrocephalus.     VISUALIZED ORBITS/SINUSES/MASTOIDS: Prior bilateral cataract surgery. Visualized portions of the orbits are otherwise unremarkable. Mild mucosal thickening scattered about the paranasal sinuses. No middle ear or mastoid effusion. Metallic BB or   ornamentation in the nasal septum.    BONES/SOFT TISSUES: No acute abnormality.    HEAD CTA:  ANTERIOR CIRCULATION: No stenosis/occlusion, aneurysm, or high flow vascular malformation. Standard [place] anatomy.    POSTERIOR CIRCULATION: No stenosis/occlusion, aneurysm, or high flow vascular malformation. Balanced vertebral arteries supply a normal basilar artery.     DURAL VENOUS  SINUSES: Expected enhancement of the major dural venous sinuses.    NECK CTA:  RIGHT CAROTID: No measurable stenosis or dissection.    LEFT CAROTID: No measurable stenosis or dissection.    VERTEBRAL ARTERIES: No focal stenosis or dissection. Balanced vertebral arteries.    AORTIC ARCH: Classic aortic arch anatomy with no significant stenosis at the origin of the great vessels.    NONVASCULAR STRUCTURES: Unremarkable.      Impression    IMPRESSION:   HEAD CT:  1.  No acute intracranial process.    HEAD CTA:   1.  No large vessel occlusion or hemodynamically significant stenosis.    NECK CTA:  1.  No large vessel occlusion or hemodynamically significant stenosis.      Chuck Herrera NP  was contacted by me on   4/17/2024 6:28 AM CDT  with the preliminary report.   CTA Head Neck with Contrast    Narrative    EXAM: CT HEAD W/O CONTRAST, CTA HEAD NECK W CONTRAST  LOCATION: [facility]  DATE/TIME: 4/17/2024 6:16 AM CDT    INDICATION: Code stroke; change in mental status. Dizziness and unsteadiness.  COMPARISON: None.  CONTRAST: 67mL Isovue 370  TECHNIQUE: Head and neck CT angiogram with IV contrast. Noncontrast head CT followed by axial helical CT images of the head and neck vessels obtained during the arterial phase of intravenous contrast administration. Axial 2D reconstructed images and   multiplanar 3D MIP reconstructed images of the head and neck vessels were performed by the technologist. Dose reduction techniques were used. All stenosis measurements made according to NASCET criteria unless otherwise specified.    FINDINGS:   NONCONTRAST HEAD CT:   INTRACRANIAL CONTENTS: No intracranial hemorrhage, extraaxial collection, or mass effect.  No CT evidence of acute infarct. Mild presumed chronic small vessel ischemic changes. Mild generalized volume loss. No hydrocephalus.     VISUALIZED ORBITS/SINUSES/MASTOIDS: Prior bilateral cataract surgery. Visualized portions of the  orbits are otherwise unremarkable. Mild mucosal thickening scattered about the paranasal sinuses. No middle ear or mastoid effusion. Metallic BB or   ornamentation in the nasal septum.    BONES/SOFT TISSUES: No acute abnormality.    HEAD CTA:  ANTERIOR CIRCULATION: No stenosis/occlusion, aneurysm, or high flow vascular malformation. Standard [place] anatomy.    POSTERIOR CIRCULATION: No stenosis/occlusion, aneurysm, or high flow vascular malformation. Balanced vertebral arteries supply a normal basilar artery.     DURAL VENOUS SINUSES: Expected enhancement of the major dural venous sinuses.    NECK CTA:  RIGHT CAROTID: No measurable stenosis or dissection.    LEFT CAROTID: No measurable stenosis or dissection.    VERTEBRAL ARTERIES: No focal stenosis or dissection. Balanced vertebral arteries.    AORTIC ARCH: Classic aortic arch anatomy with no significant stenosis at the origin of the great vessels.    NONVASCULAR STRUCTURES: Unremarkable.      Impression    IMPRESSION:   HEAD CT:  1.  No acute intracranial process.    HEAD CTA:   1.  No large vessel occlusion or hemodynamically significant stenosis.    NECK CTA:  1.  No large vessel occlusion or hemodynamically significant stenosis.      Chuck Herrera NP  was contacted by me on   4/17/2024 6:28 AM CDT  with the preliminary report.

## 2024-04-17 NOTE — CONSULTS
[facility]     Stroke Code Note          History of Present Illness     Chief Complaint: No chief complaint on file.      Kelsy Morley is a 63 year old woman with hx of Ehler- Danlos syndrome, mast cell activation syndrome, autonomic dysfunction, CRPS to LLE, chronic fatigue syndrome, hypothyroidism, PTSD, depression, insomnia, migraines, KLAUS, mitochondrial myopathy admitted for elective L4L5 TLIF on 4/16. This AM, she got up to go to the bathroom and came back and was very confused and had generalized weakness and a RRT followed by a stroke code was activated. CT head and CTA head and neck were done and were without acute changes. By the time, I saw her her encephalopathy and generalized weakness was improving/resolving. Stroke code was deescalated.         Past Medical History     Stroke risk factors: migraines, KLAUS    Preadmission antithrombotic regimen: none    Modified Merissa Score (Pre-morbid)  0 - No symptoms.                   Assessment and Plan       1.  Spell of confusion and generalized weakness, could be related to her autonomic lability. Occurred after positional change.  Unlikely to be a stroke.     Intravenous Thrombolysis  Not given due to:   - minor/isolated/quickly resolving symptoms     Endovascular Treatment  Not initiated due to absence of proximal vessel occlusion     Plan:  Can plan for an MRI brain wo contrast.   Routine EEG.   Last ECHO in Feb 2024 - EF 70%, mild LA enlargement, trace Aortic insufficiency. No need to repeat.  Please call neurology for follow up;   ___________________________________________________________________    Manuel Simeon MD  ___________________________________________________________________        Imaging/Labs   (personally reviewed )    IMPRESSION:     HEAD CT:  1.  No acute intracranial process.     HEAD CTA:   1.  No large vessel occlusion or hemodynamically significant stenosis.     NECK CTA:  1.  No  large vessel occlusion or hemodynamically significant stenosis.         Physical Examination     BP: 113/71   Pulse: 60   Resp: 11   Temp: 98  F (36.7  C)   Temp src: Oral   SpO2: 92 %   O2 Device: None (Room air)   Weight: 63.1 kg (139 lb 1.8 oz)    Wt Readings from Last 2 Encounters:   04/15/24 63.1 kg (139 lb 1.8 oz)   04/04/24 64.4 kg (142 lb)       General Exam  General:  patient lying in bed without any acute distress    HEENT:  normocephalic/atraumatic  Cardio:  RRR  Pulmonary:  no respiratory distress  Abdomen:  non-distended  Extremities:  no edema  Skin:  intact     Neuro Exam  Mental Status:  alert, oriented x 3, follows commands, speech clear and fluent, naming and repetition normal  Cranial Nerves:  visual fields intact (tested by nurse), EOMI with normal smooth pursuit, facial sensation intact and symmetric (tested by nurse), facial movements symmetric, hearing not formally tested but intact to conversation, no dysarthria, shoulder shrug equal bilaterally, tongue protrusion midline  Motor:  no abnormal movements, able to move all limbs antigravity spontaneously with no signs of hemiparesis observed, no pronator drift   Reflexes:  unable to test (telestroke)  Sensory:  light touch sensation intact and symmetric throughout upper and lower extremities (assessed by nurse)  Coordination:  normal finger-to-nose and heel-to-shin bilaterally without dysmetria, rapid alternating movements symmetric  Station/Gait:  unable to test due to telestroke        Stroke Scales       NIHSS  1a. Level of Consciousness 0-->Alert, keenly responsive   1b. LOC Questions 0-->Answers both questions correctly   1c. LOC Commands 0-->Performs both tasks correctly   2.   Best Gaze 0-->Normal   3.   Visual 0-->No visual loss   4.   Facial Palsy 0-->Normal symmetrical movements   5a. Motor Arm, Left 0-->No drift, limb holds 90 (or 45) degrees for full 10 secs   5b. Motor Arm, Right 0-->No drift, limb holds 90 (or 45) degrees for full  10 secs   6a. Motor Leg, Left 0-->No drift, leg holds 30 degree position for full 5 secs   6b. Motor Leg, right 0-->No drift, leg holds 30 degree position for full 5 secs   7.   Limb Ataxia 0-->Absent   8.   Sensory 0-->Normal, no sensory loss   9.   Best Language 0-->No aphasia, normal   10. Dysarthria 0-->Normal   11. Extinction and Inattention  0-->No abnormality   Total 0 (04/17/24 0645)            Labs     CBC  Lab Results   Component Value Date    HGB 12.0 04/17/2024    HCT 36.4 04/17/2024    WBC 5.6 04/17/2024     (L) 04/17/2024       BMP  Lab Results   Component Value Date     04/17/2024    POTASSIUM 3.7 04/17/2024    CHLORIDE 104 04/17/2024    CO2 25 04/17/2024    BUN 19.0 04/17/2024    CR 0.73 04/17/2024     (H) 04/17/2024    HALLIE 8.3 (L) 04/17/2024       INR  INR   Date Value Ref Range Status   04/17/2024 1.17 (H) 0.85 - 1.15 Final   02/06/2019 0.94 0.86 - 1.14 Final       Data   Stroke Code Data  (for stroke code with tele)  Stroke code activated 04/17/24  0534   First stroke provider response 04/17/24  0540   Video start time 04/17/24  0540   Video end time 04/17/24  0540   Last known normal 04/17/24  0500   Time of discovery (or onset of symptoms)  04/17/24  0500   Head CT read by Stroke Neuro Provider 04/17/24  0600   Was stroke code de-escalated? Yes  04/17/24  0649     Telestroke Service Details  Type of service telemedicine diagnostic assessment of acute neurological changes   Reason telemedicine is appropriate patient requires assessment with a specialist for diagnosis and treatment of neurological symptoms   Mode of transmission secure interactive audio and video communication per [place]   Originating site (patient location) [facility]    Distant site (provider location) Provider remote site        Clinically Significant Risk Factors          # Hypocalcemia: Lowest Ca = 8 mg/dL in last 2 days, will monitor and replace as appropriate       # Coagulation Defect: INR = 1.17 (Ref range: 0.85 - 1.15) and/or PTT = 27 Seconds (Ref range: 22 - 38 Seconds), will monitor for bleeding  # Thrombocytopenia: Lowest platelets = 116 in last 2 days, will monitor for bleeding                      Time Spent on this Encounter   Billing: I personally examined and evaluated the patient today. At the time of my evaluation and management the patient was critical condition today due to stroke code. I personally managed stroke code. Key decisions made today included review of imaging and further imaging, TNK decision. I spent a total of 45 minutes providing critical care services, evaluating the patient, directing care and reviewing laboratory values and radiologic reports.

## 2024-04-17 NOTE — PROVIDER NOTIFICATION
04/17/24 0600   Call Information   Date of Call 04/17/24   Time of Call 0515   Name of person requesting the team JULIETA Mittal   Title of person requesting team RN   RRT Arrival time 0516   Reason for call   Type of RRT Adult   Primary reason for call Neurological;Staff concerned;Sudden or unanticipated change in patient condition   Neurological Acute change in LOC or neuro status;Agitated/restless;Numb/tingling;Speech loss/slurred;Lethargic;Movement loss   Was patient transferred from the ED, ICU, or PACU within last 24 hours prior to RRT call? No   SBAR   Situation patient walked with RN to bathroom, patient became dizzy as she went back to bed. Patient began slurrying her speech and becoming less arousable.   Background Patient recently had spine surgery and was A/O x4.   Assessment Patient had slurred speech, generalized weakness in limbs, not responding to staff the same as before.   Interventions ECG;Labs   Adjustments to Recommend stroke code called by ANGELY team upon arrival to RRT   Patient Outcome   Patient Outcome Stabilized on unit   RRT Team   Attending/Primary/Covering Physician Chuck Herrera ANGELY   Physician(s) Chuck Herrera, ANGELY   Lead JULIETA Mittal RN   RN Aide RN, Yuliya RN, Tabatha RN   RT n/a   Other staff lab, ANS, and nursing assistant

## 2024-04-17 NOTE — PLAN OF CARE
End of shift Summary: See flowsheet for VS and detail assessments.     Changes this Shift: Assumed cares at 1000. A&Ox4, VSS, sats maintained on RA.  Denies chest pain, SOB, cough.  Voiding well. Multiple loose Bms this shift. Reporting abd gurgling but not feeling constipated. Declined Fleets enema.  SBA GBW. Ambulating with therapies, up to bathroom, back to bed.  Pain managed with robaxin and tylenol. Avoiding narcotics for BM promotion.  LUE bruise. Surgical incision to back. Aquacel drsg CDI. HV patent.  Baseline N/T to all extremities. CMS intact.  2 R PIV SL after abx.  UA collected and sent.  Call light remains within reach, able to make needs known.    Plan: Cont POC.

## 2024-04-17 NOTE — PROGRESS NOTES
Cross cover    Rapid response called for change in mental status. Pt was at her nl baseline neuro status. Got up to use the restroom and on her way back felt dizziness and unsteady and by the time she returned to the bed she was somnolent, inconsistently following commands or opening her eyes, speech slowed and slightly slurred. C/o dizziness/spinning, neck pain, and mild nausea. She denies headache or chest pain.    ICU team and medicine teams both present. Stroke code called. Labs and imaging ordered.    VSS. On exam, she has her eyes closed and she is moaning intermittently, opens eyes after much prompting, EOMI, PERRL, CN grossly intact but not able to complete all strength/neuro testing, is oriented to year and place and situation but speech is slowed and she takes some time to arrive at her answers.     Ddx broad and includes stroke, hemorrhage, seizure/postictal state, complex migraine, medication side effect, autonomic dysfunction, electrolyte abnormalities, etc. She has a complex PMH but I'm not sure that any of her conditions would cause a sudden change in her mentation and she has not received any meds in the last few hours other than Robaxin, Tylenol, and lidocaine gtt.    Plan:  - lidocaine gtt stopped already before our arrival  - labs  - imaging, r/o stroke  - EKG without ischemic changes    F/up results.     Rajendra Phelan MD ([place])  Internal Medicine/Pediatrics  Hospitalist

## 2024-04-17 NOTE — PROGRESS NOTES
"Orthopedic Surgery Progress Note:     Subjective:   Code stroke called this am for some confusion. Discussed with icu doctor, low suspicion for stroke at this time. Doing better this am. Report feeling dizzy after returning from the restroom. Some confusion. Currently oriented. Able to recall her day yesterday. Worked with therapy.     Addendum at 10am: patient is more oriented this morning, complaining of severe pain. Hoping to avoid using oxycodone related to bowels and confusion. Bm yesterday described as small, rock hard stools, with liquid stool around it. 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     Objective:   /71   Pulse 60   Temp 98  F (36.7  C) (Oral)   Resp 11   Ht 1.651 m (5' 5\")   Wt 63.1 kg (139 lb 1.8 oz)   SpO2 92%   BMI 23.15 kg/m    I/O this shift:  In: -   Out: 600 [Urine:600]  General: NAD. Resting comfortably in bed.  Respiratory: Breathing comfortably on RA.  Drain Output: 45 yesterday  Musculoskeletal: dressing is clean and dry.    Intact shoulder abduction, elbow flexion, extension, wrist flexion and extension, hand intrinsics    Motor Strength Right Left   Hip flexion: L1, L2, L3 5/5 5/5   Hip adduction: L2, L3 5/5 5/5   Knee flexion: S1 5/5 5/5   Knee extension: L3, L4 5/5 5/5   Ankle dosiflexion: L4, L5 5/5 5/5   EHL: L5 5/5 5/5   Ankle plantarflexion: S1 5/5 5/5     Sensation from L1-S2 is preserved.    Addendum: Abdomen is soft, without distention or tympany. Mildly tender generally per patient,  but no wincing with exam.     Laboratory Data:  Lab Results   Component Value Date    WBC 5.6 04/17/2024    HGB 12.0 04/17/2024     (L) 04/17/2024    INR 1.17 (H) 04/17/2024       Images:  No new images were obtained.    Assessment & Plan:   Kelsy Morley is a 63 year old female s/p L4/5 TLIF on 4/16/24 with Dr. Borges.      Goals for today:  PT/OT   Monitor marques   Follow up head ct   Neuro consult- " appreciate recommendations.  Enema ordered. Will consider pink lady enema vs. Additional suppository tomorrow     Spine Primary  - Activity: Up with assist until independent. No excessive bending or twisting. No lifting >10 lbs x 6 weeks. No Davin lift for transfers.   - Weightbearing Status: WBAT.  - Pain Management: Transition from IV to PO as tolerated. No NSAIDs.   - Antibiotics: Ancef 1 g Q8H while drains remain.  - Diet: Begin with clear fluids and progress diet as tolerated.   - DVT Prophylaxis: SCDs only. No chemical DVT prophylaxis needed.  - Imaging: XR Upright  PTDC; ordered.  - Labs: Labs PRN.  - Bracing/Splinting: None.  - Dressings: Keep Aquacel C/D/I x 7 days.  - Drains: Document output per shift; will be discontinued at orthopedic surgery discretion.  - Physical Therapy/Occupational Therapy: Evaluation and treatment.  - Consults: Hospitalist.  - Follow-up: Clinic with Dr. Borges in 6 weeks with repeat radiographs.     - Disposition: Pending progress with therapies, pain control on orals, and medical stability; anticipate discharge to home on POD4-5.     Orthopedic surgery staff for this patient is Dr. Borges.  Tio Mcgrath MD  Spine Fellow     PLEASE PAGE ME directly with any questions/concerns during regular weekday hours before 5 pm. If there is no response, if it is a weekend, or if it is during evening hours then please page the orthopedic surgery resident on call.    FOLLOWUP:    Future Appointments   Date Time Provider Department Center   4/17/2024  9:30 AM Iesha Calixto, PT [facility] [place]   4/17/2024  1:30 PM Layne Graham, OT [facility] [place]   5/8/2024 10:30 AM [facility] INFUSION CHAIR [facility]   5/8/2024  2:30 PM [facility]   5/15/2024  9:00 AM [facility]   5/22/2024  2:30 PM Cathi Vogel, PT [facility]   5/29/2024 12:00 PM Fausto Borges MD [facility]   6/5/2024  9:30 AM [facility] INFUSION NURSE [facility]   6/5/2024  2:30 PM  Blu Vogelil, PT [facility]   6/12/2024  2:30 PM Smida, Cathi, PT [facility]   6/19/2024  2:30 PM Smida, Cathi, PT [facility]   6/26/2024  2:30 PM Smida, Cathi, PT [facility]   7/3/2024  9:30 AM [facility] INF[facility]ION NURSE [facility]   7/3/2024  2:30 PM Cathi Vogel, PT [facility]   7/10/2024  2:00 PM Fausto Borges MD [facility]   7/10/2024  2:30 PM Cathi Vogel, PT [facility]   7/17/2024  2:30 PM SmiCathi ramey, PT [facility]   7/31/2024  9:30 AM [facility] INFUSION NURSE [facility]   8/20/2024  3:30 PM Fausto Lanier MD [facility]   8/28/2024  9:30 AM [facility] INFUSION NURSE [facility]   9/25/2024  9:00 AM [facility] INFUSION CHAIR [facility]   10/23/2024  9:00 AM [facility] INFUSION CHAIR [facility]   11/20/2024  9:30 AM [facility]ION NURSE [facility]   12/18/2024  9:00 AM [facility] INFUSION CHAIR [facility]

## 2024-04-17 NOTE — ANESTHESIA POSTPROCEDURE EVALUATION
Patient: Kelsy Morley    Procedure: Procedure(s):  Decompression and transforaminal lumbar interbody fusion with Smith Galeas Osteotomy Lumbar 4-5, device insertion, image guided       Anesthesia Type:  General    Note:  Disposition: Admission   Postop Pain Control: Uneventful            Sign Out: Well controlled pain   PONV: No   Neuro/Psych: Uneventful            Sign Out: Acceptable/Baseline neuro status   Airway/Respiratory: Uneventful            Sign Out: Acceptable/Baseline resp. status   CV/Hemodynamics: Uneventful            Sign Out: Acceptable CV status; No obvious hypovolemia; No obvious fluid overload   Other NRE: NONE   DID A NON-ROUTINE EVENT OCCUR? No    Event details/Postop Comments:  No neurological deficits prior to discharge. No visual loss.       Last vitals:  Vitals Value Taken Time   /62 04/15/24 1445   Temp 36.8  C (98.2  F) 04/15/24 1400   Pulse 56 04/15/24 1505   Resp 17 04/15/24 1430   SpO2 100 % 04/15/24 1505       Electronically Signed By: Jessica Mccallum MD  April 17, 2024  8:00 AM

## 2024-04-18 ENCOUNTER — APPOINTMENT (OUTPATIENT)
Dept: GENERAL RADIOLOGY | Facility: CLINIC | Age: 64
DRG: 454 | End: 2024-04-18
Attending: NURSE PRACTITIONER
Payer: MEDICARE

## 2024-04-18 ENCOUNTER — APPOINTMENT (OUTPATIENT)
Dept: PHYSICAL THERAPY | Facility: CLINIC | Age: 64
DRG: 454 | End: 2024-04-18
Attending: ORTHOPAEDIC SURGERY
Payer: MEDICARE

## 2024-04-18 ENCOUNTER — APPOINTMENT (OUTPATIENT)
Dept: OCCUPATIONAL THERAPY | Facility: CLINIC | Age: 64
DRG: 454 | End: 2024-04-18
Attending: ORTHOPAEDIC SURGERY
Payer: MEDICARE

## 2024-04-18 VITALS
WEIGHT: 139.11 LBS | BODY MASS INDEX: 23.18 KG/M2 | HEIGHT: 65 IN | OXYGEN SATURATION: 97 % | SYSTOLIC BLOOD PRESSURE: 109 MMHG | RESPIRATION RATE: 13 BRPM | TEMPERATURE: 97.7 F | HEART RATE: 64 BPM | DIASTOLIC BLOOD PRESSURE: 73 MMHG

## 2024-04-18 LAB
HOLD SPECIMEN: NORMAL
PHOSPHATE SERPL-MCNC: 2.1 MG/DL (ref 2.5–4.5)
POTASSIUM SERPL-SCNC: 4.1 MMOL/L (ref 3.4–5.3)

## 2024-04-18 PROCEDURE — 250N000013 HC RX MED GY IP 250 OP 250 PS 637: Performed by: ORTHOPAEDIC SURGERY

## 2024-04-18 PROCEDURE — 250N000013 HC RX MED GY IP 250 OP 250 PS 637: Performed by: NURSE PRACTITIONER

## 2024-04-18 PROCEDURE — 97530 THERAPEUTIC ACTIVITIES: CPT | Mod: GP

## 2024-04-18 PROCEDURE — 99222 1ST HOSP IP/OBS MODERATE 55: CPT | Mod: 24 | Performed by: STUDENT IN AN ORGANIZED HEALTH CARE EDUCATION/TRAINING PROGRAM

## 2024-04-18 PROCEDURE — 99232 SBSQ HOSP IP/OBS MODERATE 35: CPT | Performed by: INTERNAL MEDICINE

## 2024-04-18 PROCEDURE — 250N000013 HC RX MED GY IP 250 OP 250 PS 637

## 2024-04-18 PROCEDURE — 97535 SELF CARE MNGMENT TRAINING: CPT | Mod: GO | Performed by: OCCUPATIONAL THERAPIST

## 2024-04-18 PROCEDURE — 84132 ASSAY OF SERUM POTASSIUM: CPT | Performed by: ORTHOPAEDIC SURGERY

## 2024-04-18 PROCEDURE — 84100 ASSAY OF PHOSPHORUS: CPT | Performed by: ORTHOPAEDIC SURGERY

## 2024-04-18 PROCEDURE — 36415 COLL VENOUS BLD VENIPUNCTURE: CPT | Performed by: ORTHOPAEDIC SURGERY

## 2024-04-18 PROCEDURE — 999N000065 XR LUMBAR SPINE 2/3 VIEWS

## 2024-04-18 PROCEDURE — 250N000013 HC RX MED GY IP 250 OP 250 PS 637: Performed by: INTERNAL MEDICINE

## 2024-04-18 PROCEDURE — 250N000011 HC RX IP 250 OP 636: Performed by: ORTHOPAEDIC SURGERY

## 2024-04-18 RX ORDER — TRAMADOL HYDROCHLORIDE 50 MG/1
50 TABLET ORAL EVERY 12 HOURS PRN
Status: DISCONTINUED | OUTPATIENT
Start: 2024-04-18 | End: 2024-04-18 | Stop reason: HOSPADM

## 2024-04-18 RX ORDER — ACETAMINOPHEN 325 MG/1
325-650 TABLET ORAL EVERY 6 HOURS PRN
COMMUNITY
Start: 2024-04-18

## 2024-04-18 RX ORDER — ACETAMINOPHEN 325 MG/1
975 TABLET ORAL EVERY 6 HOURS PRN
Status: CANCELLED | COMMUNITY
Start: 2024-04-18

## 2024-04-18 RX ORDER — METHOCARBAMOL 750 MG/1
750 TABLET, FILM COATED ORAL EVERY 6 HOURS PRN
Qty: 40 TABLET | Refills: 0 | Status: SHIPPED | OUTPATIENT
Start: 2024-04-18 | End: 2024-05-15

## 2024-04-18 RX ADMIN — ACETAMINOPHEN 975 MG: 325 TABLET, FILM COATED ORAL at 06:48

## 2024-04-18 RX ADMIN — CYANOCOBALAMIN TAB 1000 MCG 1000 MCG: 1000 TAB at 08:12

## 2024-04-18 RX ADMIN — POTASSIUM & SODIUM PHOSPHATES POWDER PACK 280-160-250 MG 1 PACKET: 280-160-250 PACK at 12:53

## 2024-04-18 RX ADMIN — FAMOTIDINE 20 MG: 20 TABLET ORAL at 08:11

## 2024-04-18 RX ADMIN — Medication 50 MCG: at 08:09

## 2024-04-18 RX ADMIN — METHOCARBAMOL TABLETS 750 MG: 750 TABLET, COATED ORAL at 06:48

## 2024-04-18 RX ADMIN — ACETAMINOPHEN 975 MG: 325 TABLET, FILM COATED ORAL at 00:45

## 2024-04-18 RX ADMIN — Medication 400 MG: at 08:09

## 2024-04-18 RX ADMIN — POLYETHYLENE GLYCOL 3350 17 G: 17 POWDER, FOR SOLUTION ORAL at 08:13

## 2024-04-18 RX ADMIN — MONTELUKAST 10 MG: 10 TABLET, FILM COATED ORAL at 08:11

## 2024-04-18 RX ADMIN — LEVOTHYROXINE SODIUM 75 MCG: 75 TABLET ORAL at 05:48

## 2024-04-18 RX ADMIN — IPRATROPIUM BROMIDE 1 SPRAY: 42 SPRAY, METERED NASAL at 11:28

## 2024-04-18 RX ADMIN — NYSTATIN 500000 UNITS: 100000 SUSPENSION ORAL at 12:53

## 2024-04-18 RX ADMIN — NYSTATIN 500000 UNITS: 100000 SUSPENSION ORAL at 08:09

## 2024-04-18 RX ADMIN — FEXOFENADINE HCL 180 MG: 180 TABLET ORAL at 08:11

## 2024-04-18 RX ADMIN — METHOCARBAMOL TABLETS 750 MG: 750 TABLET, COATED ORAL at 00:45

## 2024-04-18 RX ADMIN — SENNOSIDES AND DOCUSATE SODIUM 1 TABLET: 8.6; 5 TABLET ORAL at 08:13

## 2024-04-18 RX ADMIN — CEFAZOLIN 1 G: 1 INJECTION, POWDER, FOR SOLUTION INTRAMUSCULAR; INTRAVENOUS at 00:35

## 2024-04-18 RX ADMIN — OXYCODONE HYDROCHLORIDE AND ACETAMINOPHEN 500 MG: 500 TABLET ORAL at 08:11

## 2024-04-18 ASSESSMENT — ACTIVITIES OF DAILY LIVING (ADL)
ADLS_ACUITY_SCORE: 25

## 2024-04-18 NOTE — PLAN OF CARE
Physical Therapy Discharge Summary    Reason for therapy discharge:    All goals and outcomes met, no further needs identified.    Progress towards therapy goal(s). See goals on Care Plan in [facility] electronic health record for goal details.  Goals met    Therapy recommendation(s):    No further therapy is recommended.

## 2024-04-18 NOTE — PLAN OF CARE
Pt. discharged at 1630 via automobile to home.  Pt. was accompanied by daughter, and left with personal belongings.  Prior to discharge, PIV was removed.  Pt. received complete discharge paperwork and will  new meds at [facility] in [place].  Pt. was given times of last dose for all discharge medications in writing on discharge medication sheets.  Discharge teaching included new medication, pain management, activity restrictions, dressing changes, and signs and symptoms of infection.  Pt. to follow up with Dr. Borges in clinic.  Pt. had no further questions at the time of discharge and no unmet needs were identified.

## 2024-04-18 NOTE — PROGRESS NOTES
"Orthopedic Surgery Progress Note:     Subjective:   No further issues this AM. Doing well. Was able to walk the halls. BM yesterday. Pain controlled.     Objective:   /73 (BP Location: Left arm, Patient Position: Right side, Cuff Size: Adult Regular)   Pulse 64   Temp 97.7  F (36.5  C) (Oral)   Resp 13   Ht 1.651 m (5' 5\")   Wt 63.1 kg (139 lb 1.8 oz)   SpO2 97%   BMI 23.15 kg/m    I/O this shift:  In: -   Out: 30 [Drains:30]  General: NAD. Resting comfortably in bed.  Respiratory: Breathing comfortably on RA.  Drain Output: 60/30 over last 24 hours shifts.  Musculoskeletal: dressing clean and dry     Motor Strength Right Left   Hip flexion: L1, L2, L3 5/5 5/5   Hip adduction: L2, L3 5/5 5/5   Knee flexion: S1 5/5 5/5   Knee extension: L3, L4 5/5 5/5   Ankle dosiflexion: L4, L5 5/5 5/5   EHL: L5 5/5 5/5   Ankle plantarflexion: S1 5/5 5/5      Sensation from L1-S2 is preserved.    Laboratory Data:  Lab Results   Component Value Date    WBC 5.6 04/17/2024    HGB 12.0 04/17/2024     (L) 04/17/2024    INR 1.17 (H) 04/17/2024       Images:  No new images were obtained.    Assessment & Plan:   Kelsy Morley is a 63 year old female s/p L4/5 TLIF on 4/16/24 with Dr. Borges.      Goals for today:  PT/OT   Remove drain  Neuro consult- appreciate recommendations.  XR today after drain removed      Spine Primary  - Activity: Up with assist until independent. No excessive bending or twisting. No lifting >10 lbs x 6 weeks. No Davin lift for transfers.   - Weightbearing Status: WBAT.  - Pain Management: Transition from IV to PO as tolerated. No NSAIDs.   - Antibiotics: Ancef 1 g Q8H while drains remain.  - Diet: Begin with clear fluids and progress diet as tolerated.   - DVT Prophylaxis: SCDs only. No chemical DVT prophylaxis needed.  - Imaging: XR Upright  PTDC; ordered.  - Labs: Labs PRN.  - Bracing/Splinting: None.  - Dressings: Keep Aquacel C/D/I x 7 days.  - Drains:remove today   - Physical " Therapy/Occupational Therapy: Evaluation and treatment.  - Consults: Hospitalist.  - Follow-up: Clinic with Dr. Borges in 6 weeks with repeat radiographs.     - Disposition: Pending progress with therapies, pain control on orals, and medical stability; anticipate discharge to home on POD4-5.     Orthopedic surgery staff for this patient is Dr. Borges.  Tio Mcgrath MD  Spine Fellow     PLEASE PAGE ME directly with any questions/concerns during regular weekday hours before 5 pm. If there is no response, if it is a weekend, or if it is during evening hours then please page the orthopedic surgery resident on call.    FOLLOWUP:    Future Appointments   Date Time Provider Department [place]   4/18/2024  8:45 AM Fiona Clemente, CUR[facility]   5/8/2024 10:30 AM [facility] INFUSION CHAIR [facility]   5/8/2024  2:30 PM Smida, Cathi[facility]   5/15/2024  9:00 AM [facility]   5/22/2024  2:30 PM Smida, Cathi, PT [facility]   5/29/2024 12:00 PM Fausto Borges MD [facility]   6/5/2024  9:30 AM [facility] INFUSION NURSE [facility]   6/5/2024  2:30 PM Smida, Cathi, PT [facility]   6/12/2024  2:30 PM Smida, Cathi, PT [facility]   6/19/2024  2:30 PM Smida, Cathi, PT [facility]   6/26/2024  2:30 PM Smida, Cathi, PT [facility]   7/3/2024  9:30 AM [facility] INFUSION NURSE [facility]   7/3/2024  2:30 PM Smida, Cathi, PT [facility]   7/10/2024  2:00 PM Fausto Borges MD [facility]   7/10/2024  2:30 PM Smida, Cathi, PT [facility]   7/17/2024  2:30 PM Smida, Cathi, PT [facility]   7/31/2024  9:30 AM [facility] INFUSION NURSE [facility]   8/20/2024  3:30 PM Fausto Lanier MD [facility]   8/28/2024  9:30 AM [facility] INFUSION NURSE [facility]   9/25/2024  9:00 AM [facility] INFUSION CHAIR [facility]   10/23/2024  9:00 AM [facility] INFUSION CHAIR [facility]   11/20/2024  9:30 AM [facility] INFUSION NURSE [facility]   12/18/2024  9:00 AM [facility] INFUSION CHAIR  [facility]

## 2024-04-18 NOTE — PROGRESS NOTES
[facility]    Medicine Progress Note - Hospitalist Service, GOLD TEAM 19    Date of Admission:  4/15/2024    Assessment & Plan   Kelsy Morley is a 64 yo female admitted on 4/15/2024. 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  S/p elective L4/5 TLIF on 4/16/24 with Dr. Borges.  Hospitalist service consulted for postop medical management.    MAJOR THINKS AND CHANGES TODAY  ---   Uneventful night  ---   No vertigo, focal weakness or seizure activities past 24 hrs     S/p L4/5 TLIF on 4/16/24 with Dr. Borges   ---   POD #3  ---   Continue prophylactic Ancef  ---   Continue PT/OT   ---   Hemovac drain management per primary team  ---   Further care per ortho    DVT prophylaxis  ---   PCD    Acute postop low back pain  ---   S/p Ketamine and lidocaine drips  ---   Continue Tylenol, oxycodone, Robaxin    Acute postop blood loss anemia  ---   Hgb was 14.0 g on 4/4  ---   Hgb was 11.2 g on POD #1 ---> 12.0 g today  ---   Denied lightheadedness, dizziness CP or SOB  ---   No indications for PRBC transfusion    Vaginal Itching  Recent Oropharyngeal Candidiasis  ---   Pt recently received Augmentin for a sinus infection following COVID.  After finishing this, she developed oropharyngeal candidiasis and a yeast infection.  Notes that she completed a 1-week course of Monistat for the yeast infection, but continues to experience vaginal itching  ---   She has a h/o allergy to Fluconazole - hives  ---   Continue Nystatin swish and swallow  ---   Continue terconazole 0.8% cream 1 vaginal application at bedtime x 3 days, 4/16 -  4/18/24    Hypophosphatemia  ---   Phosphorus level is 2.1  ---   Continue replacement protocol     Omayra-Danlos Syndrome   ----   Updated surveillance TTE on 2/23/24 showed EF 65-70%, no valvular abnormalities, normal RV function and size.   ---   Avoid excess IVF given risk of airway edema and reportedly difficult intubation     Mast Cell Activation Sydrome  ---   Per PCP note, pt had a pre-op plan including Prednisone 50mg x1 dose 24 hrs prior to surgery, and then 1-2 hrs prior to surgery, as well as Benadryl 50mg + Pepcid 40mg + Singulair 10mg ~1hr prior to surgery.  Otherwise, PTA she gets periodic Xolair injections which have helped her and is on multiple PTA allergy meds.  ---   Should pt develop hypotension, she has the following rescue plan:              - PO/IV Benadryl 25-50mg, Hydroxyzine 25mg PO, or IV Solumedrol 120mg, as well as Albuterol neb, and Epinephrine (not to exceed 0.15mg per dose)  ---   Continue PTA Fenofexadine, Singulair, Zyrtec, Famotidine, and Ipratropium      Autonomic Dysfunction  ---   Follows w/ Cardiology as an OP.   ---   Last saw 6/6/23, autonomic dysfunction for her manifests as periodic low BP and temperature regulation issues.   ---   No current cardiac medications and BP stable      Eosinophilic Enteritis  ---   Followed by [facility], has been recommended Budesonide, but cost-prohibitive.   ---   No acute GI complaints      Chronic Fatigue Syndrome  CRPS of LLE  ---   CRPS in LLE affecting lateral extremity and dorsum of foot. Ketamine infusion intraoperatively was recommended, and appears she did get this for a few hours in perioperative period, now discontinued. She followed w/ Pain Psychiatry at [place] (Dr. Chaitanya Ortega), who initiated her on the psychotropic meds as below. Also followed w/ Pain Psychology as an OP.  ---   Follow-up w/ Pain Psychology as an OP  ---   Continue PTA psychotropic meds as below     Hypothyroidism 2/2 Hashimoto's Thyroiditis  ---   TSH is 0.26  (low) but free T4 level within normal limit  ---   Continue PTA Levothyroxine 75mcg daily.     Migraines  ---   Follows w/ Neurology as an OP, last saw 1/23/24.   ---   She is on Emgality once per month.   ---   Has tried numerous other medications and failed these.   ---   Continue PTA Vitamin B2 400mcg daily   ---   Continue PTA Imitrex 100mg PRN   ---   APAP PRN      PTSD  Insomnia  Depression  ---   Continue PTA Methylene Blue 20mg daily, Lamictal 50mg at bedtime, and Tizanidine 4mg at bedtime.   ---   Start Ambien 5 mg qhs per pt's request     KLAUS   ---   No longer using CPAP after reducing her weight.   ---   Per PCP pre-op note, waiting to update her sleep study until after above surgery.  ---   Follow-up w/ PCP for discussion of repeat sleep study     Myocardial Myopathy  ---   Per PCP pre-op note, generalized weakness is the main manifestation of this, but no specific respiratory weakness.   ---   No respiratory issues following prior general anesthesia.  ---   No lactated ringers          Diet: Snacks/Supplements Adult: Other; Orange Kash at B/D and chocolate Ensure Enlive at L; With Meals  Regular Diet Adult    DVT Prophylaxis: Pneumatic Compression Devices  Meraz Catheter: Not present  Lines: None     Cardiac Monitoring: ACTIVE order. Indication: Stroke, acute (48 hours)  Code Status: Full Code    Disposition Plan    Medically not cleared for discharge to home yet          Diamond Lawrence MD  Hospitalist Service, GOLD TEAM 19  [facility]  Securely message with [employer] (more info)  Text page via [facility] Paging/Directory   See signed in provider for up to date coverage information  ______________________________________________________________________    Interval History   No new complaints  Uneventful night  Able to ambulate with a walker    Physical Exam   Vital Signs: Temp: 97.7  F (36.5  C) Temp src: Oral BP: 109/73 Pulse: 64   Resp: 13 SpO2: 97 % O2 Device:  None (Room air)    Weight: 139 lbs 1.76 oz  General: aao x 3, NAD.  HEENT:  NC/AT, PERRL, EOMI, neck supple, no thyromegaly, op clear, mmm.  CVS:  NL s 1 and s2, no m/r/g.  Lungs:  CTA B/L.   Abd:  Soft, + bs, NT, no rebound or gaurding, no fluid shift.  Ext:  No c/c.  Lymph:  No edema.  Neuro:  Nonfocal.  Musculoskeletal: No calf tenderness to palpation.    Skin:  No rash.  Psychiatry:  Mood and affect appropriate.  Back exam:  Deferred        Data     I have personally reviewed the following data over the past 24 hrs:    N/A  \   N/A   / N/A     N/A N/A N/A /  N/A   4.1 N/A N/A \       Imaging results reviewed over the past 24 hrs:   Recent Results (from the past 24 hour(s))   XR Lumbar Spine 2/3 Views    Narrative    EXAM: XR LUMBAR SPINE 2/3 VIEWS  LOCATION: [facility]  DATE: 4/18/2024    INDICATION: Post op Lumbar Surgery  COMPARISON: Intraoperative fluoroscopic and CT images 04/15/2024 and lumbar spine radiographs 01/04/2024      Impression    IMPRESSION: Lumbar vertebral body heights are maintained. Recent interval postoperative changes of posterior instrumented fusion and interbody fusion device at L4-L5. Hardware appears intact and appropriately positioned, without evidence for fracture or   other early postoperative complication. There is expected diffuse posterior paraspinous soft tissue swelling.    Mild broad thoracolumbar levocurvature. 2 mm grade 1 anterolistheses of L3 on L4 and of L5 on S1. Redemonstrated mild to moderate lumbar spondylosis at the nonfused levels. Mild bilateral sacroiliac DJD and mild osteitis pubis. Right abdominal surgical   clips.

## 2024-04-18 NOTE — PLAN OF CARE
Goal Outcome Evaluation:  [id number]  No acute changes  Patient A&O x4, SBA with walker   Has surgical incision in mid back aqua seal CDI   Independent with cares  Hemo vac in place, 2 Right PIV SL  Pain is managed with Robaxin and Tylenol  Patient request not be woken up for pain meds

## 2024-04-18 NOTE — PLAN OF CARE
Occupational Therapy Discharge Summary    Reason for therapy discharge:    All goals and outcomes met, no further needs identified.    Progress towards therapy goal(s). See goals on Care Plan in [facility] electronic health record for goal details.  Pt. [place]/I with BADLs and functional mob. Amb. With FWW. PT. Educ. On post op prec., A.E./DME for home.    Therapy recommendation(s):    No further therapy is recommended.

## 2024-04-18 NOTE — PLAN OF CARE
"Goal Outcome Evaluation:  /73 (BP Location: Left arm, Patient Position: Right side, Cuff Size: Adult Regular)   Pulse 64   Temp 97.7  F (36.5  C) (Oral)   Resp 13   Ht 1.651 m (5' 5\")   Wt 63.1 kg (139 lb 1.8 oz)   SpO2 97%   BMI 23.15 kg/m     On continous pulse oximeter,satting well on room air.  Tele NSR.Denies any chest pain/dizziness.  Lower back incision with aquacel drsg,CDI.  Baseline numbness/tingling on all extremities.  Hemovac emptied 30ml.Still on IV abx,PIV lines RUE.  Up SBA1 and walker.Voiding well.No BM this shift,passing gas,reported had some loose stools yesterday.  Pain managed with tylenol and robaxin. Aware and trying to wean self off narcs.Ice pack has been offered,declined.  K/Phos protocol.Recheck again this morning.  Terconazole cream nowhere to be found and is no longer available from pharmacy and other hospitals.Pharmacist states if no stock this morning,that this can be switched to some other med that is available.  Reported to have slept well tonight.                          "

## 2024-04-18 NOTE — PLAN OF CARE
Goal Outcome Evaluation:  [id number]    Plan of Care Reviewed With: patient    Overall Patient Progress: no change  Date/Time: April 17, 2024 06:30 AM     Cognitive/Mentation: Pt is A&O x4.  VS:VSS, on RA. B/P: 113/71, T: 98.0, P: 60, R: 19     GI: Pt denies n/v. PRN Bisacodyl suppository provided for constipation, pt had a large BM afterwards.   : Pt voiding well with blue/greenish color output.   Pain: Pt on Lidocaine gtt until code stroke, see prior notes.     Drains: Hemovac dressing in place, CDI.   Skin: Spinal incision, CDI.   Activity:SBA w/walker and gait belt.   Diet: Regular       Discharge:Pending.     End of shift summary: Writer got pt up to bathroom at 0505. Pt stated she was dizzy while walking back with writer to bed. Writer got pt in bed and started to help pt get comfortable. Pt started slurring her speech and shortly became more agitated and restless while yelling incoherently. RRT called afterwards, see prior notes on this incident. POC.
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
## 2024-04-19 ENCOUNTER — TELEPHONE (OUTPATIENT)
Dept: ORTHOPEDICS | Facility: CLINIC | Age: 64
End: 2024-04-19
Payer: MEDICARE

## 2024-04-19 ENCOUNTER — PATIENT OUTREACH (OUTPATIENT)
Dept: CARE COORDINATION | Facility: CLINIC | Age: 64
End: 2024-04-19
Payer: MEDICARE

## 2024-04-19 NOTE — TELEPHONE ENCOUNTER
See phone message from pt.    I called back & told pt to reinforce dressing that is rolling up with paper tape & if comes off then replace with gauze dressing & pt agreed.    Pt stated doing well postop.  Call back prn.   Pt agreed.  Aide Chavez RN.

## 2024-04-19 NOTE — PROGRESS NOTES
Connected Care Resource Center: [facility]    Background: Transitional Care Management program identified per system criteria and reviewed by [facility] team for possible outreach.    Assessment: Upon chart review, CCR Team member will not proceed with patient outreach related to this episode of Transitional Care Management program due to reason below:    Patient has active communication with a nurse, provider or care team for reason of post-hospital follow up plan.  Outreach call by CCRC team not indicated to minimize duplicative efforts.     Plan: Transitional Care Management episode addressed appropriately per reason noted above.      JERI Ames  [facility] [place], [facility]    *Connected Care Resource Team does NOT follow patient ongoing. Referrals are identified based on internal discharge reports and the outreach is to ensure patient has an understanding of their discharge instructions.

## 2024-04-19 NOTE — DISCHARGE SUMMARY
ORTHOPAEDIC DISCHARGE SUMMARY    Date of Admission: 4/15/2024  Date of Discharge: 4/18/2024  5:00 PM  Disposition: Home  Staff Physician: Fausto Borges MD    DISCHARGE DIAGNOSIS:   Lumbar neurogenic clausication and spondylolisthesis     PROCEDURES:    4/15/2024  Decompression and transforaminal lumbar interbody fusion with Smith Galeas Osteotomy Lumbar 4-5, device insertion, image guided   Procedures:    * Decompression and transforaminal lumbar interbody fusion with Smith Galeas Osteotomy Lumbar 4-5, device insertion, image guided       HOSPITAL COURSE:   63 year old female with lumbar spondylolisthesis.  Patient elected to undergo the procedures listed above on 4/15/2024 after having treatment options, alternatives, risks, and benefits explained.       Pre-operative antibiotics were given and continued intra-operatively as well as post-operatively.  Hospital course without complication.   At time of discharge, patient was tolerating PO pain control, voiding, and eating a pre-operative diet.  Patient was discharged to home.    FOLLOW UP:      Follow up with Dr. Fausto Borges MD    Future Appointments   Date Time Provider Department Center   5/8/2024 10:30 AM [facility] INFUSION CHAIR [facility]   5/8/2024  2:30 PM Smida[facility] Cathi, PT [facility]   5/15/2024  9:00 AM [facility]   5/22/2024  2:30 PM Smida, Cathi, PT [facility]   5/29/2024 12:00 PM Fausto Borges MD [facility]   6/5/2024  9:30 AM [facility] INFUSION NURSE [facility]   6/5/2024  2:30 PM Smida, Cathi, PT [facility]   6/12/2024  2:30 PM Smida, Cathi, PT [facility]   6/19/2024  2:30 PM Smida, Cathi, PT [facility]   6/26/2024  2:30 PM Smida, Cathi, PT [facility]   7/3/2024  9:30 AM [facility] INFUSION NURSE [facility]   7/3/2024  2:30 PM Smida, Cathi, PT [facility]   7/10/2024  2:00 PM Fausto Borges MD [facility]   7/10/2024  2:30 PM Cathi Vogel, PT [facility]   7/17/2024  2:30 PM  "Cathi Vogel, PT [facility]   7/31/2024  9:30 AM [facility] INFUSION NURSE [facility]   8/20/2024  3:30 PM Fausto Lanier MD [facility]   8/28/2024  9:30 AM [facility] INFUSION NURSE [facility]   9/25/2024  9:00 AM [facility] INFUSION CHAIR [facility]   10/23/2024  9:00 AM [facility] INFUSION CHAIR [facility]   11/20/2024  9:30 AM [facility] INFUSION NURSE [facility]   12/18/2024  9:00 AM [facility] INFUSION CHAIR [facility]      ORTHOPEDICS - Located 4th Floor (4D ) in the [facility] at [address] [place] [address].  parking is very convenient and highly recommended.  is a $7 flat rate fee; no tips accepted. Both  and self parkers should enter the main arrival plaza from [facility]; parking attendants will direct you based on your parking preference.     Department Address: [address] [place] [address] 4th [facility] [address]     [facility] Orthopedic Scheduling contact number: [phone]    Call if you haven't heard regarding these appointments within 7 days of discharge.      PHYSICAL EXAMINATION:  General: No acute distress  Vital Signs: /73 (BP Location: Left arm, Patient Position: Right side, Cuff Size: Adult Regular)   Pulse 64   Temp 97.7  F (36.5  C) (Oral)   Resp 13   Ht 1.651 m (5' 5\")   Wt 63.1 kg (139 lb 1.8 oz)   SpO2 97%   BMI 23.15 kg/m        Pulmonary:  Nonlabored  Cardiovascular:  Intact distal pulses, capillary refill <3s  Musculoskeletal:  Dressing was clean and dry, neurologically intact in her BLE             Discharge Procedure Orders   Reason for your hospital stay   Order Comments: Lumbar fusion     Brief Discharge Instructions   Order Comments: Post-operative Discharge Instructions:     WOUND CARE:  You have a dressing on your incision which can be worn for up to 7 days, and it can be worn in the shower. After the dressing has been removed, you do not need a dressing. There is \"surgical glue\" directly over the incision. " This will fall off on its own, which can take up to 2 weeks. It is okay to shower and wash gently with soap and water. Do not soak in the bath. No pools, hot tubs, or lakes for 6 weeks.      After surgery, you may have a sensitive scar.  When the incision has fully healed, you may massage the scar to decrease sensitivity and help break down scar tissue. Do this up to 4 times per day.    DIET & EXERCISE:  - When you get home, you may resume your normal diet as tolerated. You may not be very hungry but try to eat small healthy meals to help you heal. Remember to drink plenty of water/fluids to help keep you hydrated.     - You will be seen in the clinic at 6 weeks following surgery. You will not need to attend physical therapy during this time. You can focus on cardiovascular fitness by walking as much as you can tolerate. Avoid bending, lifting, and twisting. Your weight lifting restriction is 10 pounds until your first follow-up appointment.       PAIN MEDICATIONS:  For postoperative pain control, we have prescribed a variety of medications. Tylenol has been prescribed and should be taken as part of the complete pain management regimen. You may also have been prescribed a muscle relaxer to be taken as needed for muscle spasms. Other pain management techniques include icing the surgical area (for 15 minutes on, 15 minutes off) throughout the day to help with inflammation. Avoid NSAIDs (ibuprofen, Aleve, Advil, etc) for the first 12 weeks after surgery, unless approved by your surgeon.     You also received a prescription for a opioid medication.  This should be taken for the first few weeks following surgery.  As pain improves, decrease the amount you take (see tapering plan below). Opioid have numerous side effects including nausea, constipation, and drowsiness. If you experience nausea, this may be relieved by taking the medication with food or a light meal. To avoid constipation, please use an over-the-counter  "stool softeners and drink lots of water and eat fruits and vegetables. No operating heavy machinery or driving an automobile while on opioid medications.        Tapering opioids: As your pain symptoms improve, focus your efforts on decreasing (tapering) use of opioid medications. The most successful tapering strategy is to first, decrease the number of tablets you take every 4-6 hours to the minimum prescribed. Then, increase the amount of time between doses.  For example:  First, taper to   or 1 tablet every 4-6 hours.  Then, taper to   or 1 tablet every 6-8 hours.  Then, taper to   or 1 tablet every 8-10 hours.  Then, taper to   or 1 tablet every 10-12 hours.  Then, taper to   or 1 tablet at bedtime.  The bedtime dose can help with comfort during sleep and is typically the last dose to be discontinued after surgery.     Call the orthopedic clinic at [phone] several business days in advance of when you need a refill. Early refills will not be provided, and you may not take more than what is prescribed. Inform the nurse how much of the medication you are taking and how many tablets you have left.     WHEN TO CALL:  Please call or return if you experience the following:  - Fevers (temperature greater than 100.4 degrees Fahrenheit).  - Pain that is getting worse or does not respond to pain medications.  - Drainage from your wound.  - Increasing redness about the wound.  - Changes in strength or sensation to your arms/legs.   - Any other worrisome symptoms.     You may reach the clinic by dialing [phone].  After hours, you may reach the resident on call by dialing [phone].      When to call - Contact Surgeon Team   Order Comments: You may experience symptoms that require follow-up before your scheduled appointment. Refer to the \"Stoplight Tool\" for instructions on when to contact your Surgeon Team if you are concerned about pain control, blood clots, constipation, or if you are unable to urinate. "     When to call - Reach out to Urgent Care   Order Comments: If you are not able to reach your Surgeon Team and you need immediate care, go to the Orthopedic Walk-in Clinic or Urgent Care at your Surgeon's office.  Do NOT go to the Emergency Room unless you have shortness of breath, chest pain, or other signs of a medical emergency.     When to call - Reasons to Call 911   Order Comments: Call 911 immediately if you experience sudden-onset chest pain, arm weakness/numbness, slurred speech, or shortness of breath     Discharge Instruction - Breathing exercises   Order Comments: Perform breathing exercises using your Incentive Spirometer 10 times per hour while awake for 2 weeks.     Symptoms - Fever Management   Order Comments: A low grade fever can be expected after surgery.  Use acetaminophen (TYLENOL) as needed for fever management.  Contact your Surgeon Team if you have a fever greater than 101.5 F, chills, and/or night sweats.     Symptoms - Constipation management   Order Comments: Constipation (hard, dry bowel movements) is expected after surgery due to the combination of being less active, the anesthetic, and the opioid pain medication.  You can do the following to help reduce constipation:  ~  FLUIDS:  Drink clear liquids (water or Gatorade), or juice (apple/prune).  ~  DIET:  Eat a fiber rich diet.    ~  ACTIVITY:  Get up and move around several times a day.  Increase your activity as you are able.  MEDICATIONS:  Reduce the risk of constipation by starting medications before you are constipated.  You can take Miralax   (1 packet as directed) and/or a stool softener (Senokot 1-2 tablets 1-2 times a day).  If you already have constipation and these medications are not working, you can get magnesium citrate and use as directed.  If you continue to have constipation you can try an over the counter suppository or enema.  Call your Surgeon Team if it has been greater than 3 days since your last bowel movement.      Symptoms - Reduced Urine Output   Order Comments: Changes in the amount of fluids you drank before and after surgery may result in problems urinating.  It is important to stay well-hydrated after surgery and drink plenty of water. If it has been greater than 8 hours since you have urinated despite drinking plenty of water, call your Surgeon Team.     Activity - Exercises to prevent blood clots   Order Comments: Unless otherwise directed by your Surgeon team, perform the following exercises at least three times per day for the first four weeks after surgery to prevent blood clots in your legs: 1) Point and flex your feet (Ankle Pumps), 2) Move your ankle around in big circles, 3) Wiggle your toes, 4) Walk, even for short distances, several times a day, will help decrease the risk of blood clots.     Order Specific Question Answer Comments   Is discharge order? Yes      Comfort and Pain Management - Pain after Surgery   Order Comments: Pain after surgery is normal and expected.  You will have some amount of pain for several weeks after surgery.  Your pain will improve with time.  There are several things you can do to help reduce your pain including: rest, ice, elevation, and using pain medications as needed. Contact your Surgeon Team if you have pain that persists or worsens after surgery despite rest, ice, elevation, and taking your medication(s) as prescribed. Contact your Surgeon Team if you have new numbness, tingling, or weakness in your operative extremity.     Comfort and Pain Management - Swelling after Surgery   Order Comments: Swelling and/or bruising of the surgical extremity is common and may persist for several months after surgery. In addition to frequent icing and elevation, gentle compressive support with an ACE wrap or tubigrip may help with swelling. Apply compression regularly, removing at least twice daily to perform skin checks. Contact your Surgeon Team if your swelling increases and is NOT  associated with an increase in your activity level, or if your swelling increases and is associated with redness and pain.     Comfort and Pain Management - Cold therapy   Order Comments: Ice can be used to control swelling and discomfort after surgery. Place a thin towel over your operative site and apply the ice pack overtop. Leave ice pack in place for 20 minutes, then remove for 20 minutes. Repeat this 20 minutes on/20 minutes off routine as often as tolerated.     Medication Instructions - Acetaminophen (TYLENOL) Instructions   Order Comments: You were discharged with acetaminophen (TYLENOL) for pain management after surgery. Acetaminophen most effectively manages pain symptoms when it is taken on a schedule without missing doses (every four, six, or eight hours). Your Provider will prescribe a safe daily dose between 3000 - 4000 mg.  Do NOT exceed this daily dose. Most patients use acetaminophen for pain control for the first four weeks after surgery.  You can wean from this medication as your pain decreases.     Medication Instructions - NSAID Instructions   Order Comments: Do not use NSAIDs x 12 weeks. Some common anti-inflammatories include: ibuprofen (ADVIL, MOTRIN), naproxen (ALEVE, NAPROSYN), celecoxib (CELEBREX), meloxicam (MOBIC), ketorolac (TORADOL).     Medication Instructions - Opioids - Tapering Instructions   Order Comments: In the first three days following surgery, your symptoms may warrant use of the narcotic pain medication every four to six hours as prescribed. This is normal. As your pain symptoms improve, focus your efforts on decreasing (tapering) use of narcotic medications. The most successful tapering strategy is to first, decrease the number of tablets you take every 4-6 hours to the minimum prescribed. Then, increase the amount of time between doses.  For example:  First, taper to   or 1 tablet every 4-6 hours.  Then, taper to   or 1 tablet every 6-8 hours.  Then, taper to   or 1  tablet every 8-10 hours.  Then, taper to   or 1 tablet every 10-12 hours.  Then, taper to   or 1 tablet at bedtime.  The bedtime dose can help with comfort during sleep and is typically the last dose to be discontinued after surgery.     Medication Instructions - Muscle relaxant Medication Instructions   Order Comments: You were discharged with a muscle relaxer medication that can be used in conjunction with acetaminophen (TYLENOL) and the opioid medication to manage pain symptoms. Take the muscle relaxant medication exactly as directed.     Follow Up Care   Order Comments: Follow-up with your Surgeon Team in 6 weeks for wound check.     Medication instructions - No pharmacologic VTE prophylaxis prescribed   Order Comments: Your Surgeon did not prescribe medication for anticoagulation.     Medication Instructions - Opioid Instructions (0.5 - 1 tablet Q 4-6 hours, MAX 4 tablets)   Order Comments: Per your request, you were NOT discharged with your recommended opioid medication (tramadol) as you stated you have this at home. This medication should only be taken for breakthrough pain that is not controlled with acetaminophen (TYLENOL). If you rate your pain less than 3 you do not need this medication.  Pain rating 0-3:  You do not need this medication  Pain rating 4-6:  Take 1/2 tablet every 4-6 hours as needed  Pain rating 7-10:  Take 1 tablet every 4-6 hours as needed  Do not exceed 4 tablets per day     Discharge Instruction - Regular Diet Adult   Order Comments: Return to your pre-surgery diet unless instructed otherwise     Order Specific Question Answer Comments   Is discharge order? Yes        Tio Mcgrath MD  Orthopaedic Surgery

## 2024-04-19 NOTE — TELEPHONE ENCOUNTER
[facility] Call Center     Phone Message     May a detailed message be left on voicemail: Yes     Reason for Call:  Pt has surgery on 4/15 with Dr. Borges and was discharge yesterday, she notice that before she went to bed last night the ridge at the bottom of adhesive bandage, the bandages are rolling up. What should pt do?

## 2024-04-26 ENCOUNTER — NURSE TRIAGE (OUTPATIENT)
Dept: NURSING | Facility: CLINIC | Age: 64
End: 2024-04-26

## 2024-04-27 ENCOUNTER — HOSPITAL ENCOUNTER (EMERGENCY)
Facility: CLINIC | Age: 64
Discharge: HOME | End: 2024-04-27
Attending: EMERGENCY MEDICINE | Admitting: EMERGENCY MEDICINE
Payer: MEDICARE

## 2024-04-27 ENCOUNTER — TELEPHONE (OUTPATIENT)
Dept: OTHER | Facility: CLINIC | Age: 64
End: 2024-04-27
Payer: MEDICARE

## 2024-04-27 ENCOUNTER — APPOINTMENT (OUTPATIENT)
Dept: CT IMAGING | Facility: CLINIC | Age: 64
End: 2024-04-27
Attending: EMERGENCY MEDICINE
Payer: MEDICARE

## 2024-04-27 VITALS
WEIGHT: 144.1 LBS | BODY MASS INDEX: 24.01 KG/M2 | HEIGHT: 65 IN | TEMPERATURE: 98.3 F | OXYGEN SATURATION: 99 % | HEART RATE: 80 BPM | SYSTOLIC BLOOD PRESSURE: 123 MMHG | DIASTOLIC BLOOD PRESSURE: 79 MMHG | RESPIRATION RATE: 16 BRPM

## 2024-04-27 DIAGNOSIS — M54.41 ACUTE RIGHT-SIDED LOW BACK PAIN WITH RIGHT-SIDED SCIATICA: ICD-10-CM

## 2024-04-27 PROCEDURE — 72131 CT LUMBAR SPINE W/O DYE: CPT | Mod: MF

## 2024-04-27 PROCEDURE — G1010 CDSM STANSON: HCPCS

## 2024-04-27 PROCEDURE — 99284 EMERGENCY DEPT VISIT MOD MDM: CPT | Mod: 25 | Performed by: EMERGENCY MEDICINE

## 2024-04-27 PROCEDURE — 99284 EMERGENCY DEPT VISIT MOD MDM: CPT | Performed by: EMERGENCY MEDICINE

## 2024-04-27 PROCEDURE — 250N000013 HC RX MED GY IP 250 OP 250 PS 637: Performed by: EMERGENCY MEDICINE

## 2024-04-27 RX ORDER — OXYCODONE HYDROCHLORIDE 5 MG/1
5 TABLET ORAL ONCE
Status: COMPLETED | OUTPATIENT
Start: 2024-04-27 | End: 2024-04-27

## 2024-04-27 RX ORDER — METHYLPREDNISOLONE 4 MG
TABLET, DOSE PACK ORAL
Qty: 21 TABLET | Refills: 0 | Status: SHIPPED | OUTPATIENT
Start: 2024-04-27 | End: 2024-05-15

## 2024-04-27 RX ADMIN — OXYCODONE HYDROCHLORIDE 5 MG: 5 TABLET ORAL at 01:16

## 2024-04-27 ASSESSMENT — ACTIVITIES OF DAILY LIVING (ADL)
ADLS_ACUITY_SCORE: 36
ADLS_ACUITY_SCORE: 36

## 2024-04-27 ASSESSMENT — COLUMBIA-SUICIDE SEVERITY RATING SCALE - C-SSRS
2. HAVE YOU ACTUALLY HAD ANY THOUGHTS OF KILLING YOURSELF IN THE PAST MONTH?: NO
6. HAVE YOU EVER DONE ANYTHING, STARTED TO DO ANYTHING, OR PREPARED TO DO ANYTHING TO END YOUR LIFE?: NO
1. IN THE PAST MONTH, HAVE YOU WISHED YOU WERE DEAD OR WISHED YOU COULD GO TO SLEEP AND NOT WAKE UP?: NO

## 2024-04-27 NOTE — DISCHARGE INSTRUCTIONS
Someone from the orthopedics team should call you in the morning, you can try the number below if you do not hear from them.    If you develop fevers, incontinence of bowel or bladder, numbness tingling or weakness return to the emergency department.    Please make an appointment to follow up with Orthopedics Clinic (phone: [phone])

## 2024-04-27 NOTE — TELEPHONE ENCOUNTER
Nurse Triage SBAR    Situation: Back Pain.     Background:   -Patient calling  -It is okay to call back and leave a detailed message at this number:    Assessment: Pt had spinal fusion and decompression on 4/15/2024.  She has been doing well since surgery.  She went for a walk this evening and, while walking, began having sharp pain in her right back where she had the fusion done.  If she bends or moves, she has increased discomfort.  She rates her pain 9/10. She is also having difficulty lifting her right leg.     -no confusion/disorientation    Recommendation: Go to ED now. Pt agreeable.     -Plans to follow recommendations  -Call back with and questions, concerns, or any change in symptoms           Reason for Disposition   [1] SEVERE back pain (e.g., excruciating) AND [2] sudden onset AND [3] age > 60 years    Additional Information   Negative: Passed out (i.e., lost consciousness, collapsed and was not responding)   Negative: Shock suspected (e.g., cold/pale/clammy skin, too weak to stand, low BP, rapid pulse)   Negative: Sounds like a life-threatening emergency to the triager    Protocols used: Back Pain-A-

## 2024-04-27 NOTE — ED NOTES
Pt is ready to be discharged    All questions answered to the best of my ability    Pt didn't want vs taken prior to leaving    Pt has family at beside that will be able to drive her home
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
## 2024-04-27 NOTE — ED TRIAGE NOTES
Triage Assessment (Adult)       Row Name 04/27/24 0026          Triage Assessment    Airway WDL WDL

## 2024-04-27 NOTE — TELEPHONE ENCOUNTER
Ortho Telephone Note    I spoke with the ED about Kayla. Reported to be experiencing new back and R buttock pain described as deep ache and burning. I reviewed her CT which shows screws and cage in excellent position -- no interval change as compared to postop. We decided to try medrol dose pack.    I spoke to Kayla this morning. She further clarified her activity and symptoms. She has been going on 10-15 min walks with dog and walking up to 8K steps per day. She had cut back on activity when she started having pain but then increased it Thursday. Pain started out of nowhere Friday. Pain is in R buttock, low back (R side) and around the knee. She has a previous TKA and think there may be issue with prosthesis but this hasn't been worked up previously.    We discussed the benefits and side effects of prednisone burst. She is starting that today. We discussed increasing gabapentin (takes 300 at bedtime) to help with nerve irritation. Side effects include sedation so we discussed either doing 300 TID or 600 at bedtime. Takes a few days to notice effects.     She will call our clinic on Monday if symptoms are not resolving and over the weekend if symptoms worsen acutely.    I did discuss her care with Dr. Borges.    Jacki Sam, PGY-4

## 2024-04-30 ENCOUNTER — MYC MEDICAL ADVICE (OUTPATIENT)
Dept: ORTHOPEDICS | Facility: CLINIC | Age: 64
End: 2024-04-30
Payer: MEDICARE

## 2024-04-30 DIAGNOSIS — M47.816 LUMBAR FACET ARTHROPATHY: ICD-10-CM

## 2024-04-30 NOTE — TELEPHONE ENCOUNTER
"Called Kayla in response to her [employer] message today.  She was seen in the emergency department on 4/27/2024 for new onset right low back pain with right buttock pain.  She had a CT scan done at that visit which was reviewed by Dr. Borges and one of our residents, Dr. Jacki Sam.  Discharged with Medrol Dosepak.     Today, she says that the \"sharpness\" of the pain has improved. Pain rated 3/10 at baseline, increases with movement. She still has pain in the right low back radiating into the right buttock with lifting her leg. No leg weakness or B/B changes. She says it \"Feels like L3-4\".  Says that right low back and buttock feel tight.  No injury or trauma, but she has been increasing walking significantly over past few weeks.     She reports that she had swelling where RAGINI drain was postoperatively.  However, now she says her back seems diffusely swollen, right and left side.  She does not think it appears red or warm.  No wound dehiscence per her report.  She has had a few drops of drainage from incision over the last few days; not increasing.  Denies fever or chills or infectious symptoms.      She is still taking the Medrol dose pack, has 2 days left.  This helped for the first 1 to 2 days.  She has been taking Robaxin twice a day which provides slight relief.  She started taking gabapentin 300 mg twice a day on Saturday, but this is making her drowsy.  She has had poor results with Lyrica in the past.  She continues to take tramadol and Tylenol and ice her back regularly.    Reviewed differential diagnosis with patient: She does have degenerative changes at the levels above and below fusion which could be Source of new pain.  CT scan from emergency department shows stable instrumentation without fracture or hardware failure.  She could also be experiencing muscle spasm, QL and/or piriformis due to increased activities. Infectious cause could also be source of increased pain, but incision still appears well and " patient doesn't have systemic symptoms. Recommend we continue to manage conservatively at this point. Instructed to reach out immediately if drainage increases, wound opens, fever/ chills/ systemic symptoms develop. Or if leg weakness develops or pain worsens. If symptoms do not improve after 1 week of conservative care, we should get updated MRI (she can get MRI incompatible nasal septum devices removed prior).    Patient verbalized understanding and is agreeable with plan.    Mindy Randle PA-C

## 2024-05-01 ENCOUNTER — LAB (OUTPATIENT)
Dept: LAB | Facility: CLINIC | Age: 64
End: 2024-05-01
Payer: MEDICARE

## 2024-05-01 ENCOUNTER — OFFICE VISIT (OUTPATIENT)
Dept: ORTHOPEDICS | Facility: CLINIC | Age: 64
End: 2024-05-01
Payer: MEDICARE

## 2024-05-01 DIAGNOSIS — M54.16 LUMBAR RADICULOPATHY: Primary | ICD-10-CM

## 2024-05-01 DIAGNOSIS — Z98.890 POST-OPERATIVE STATE: ICD-10-CM

## 2024-05-01 LAB — CRP SERPL-MCNC: <3 MG/L

## 2024-05-01 PROCEDURE — 86140 C-REACTIVE PROTEIN: CPT | Performed by: PATHOLOGY

## 2024-05-01 PROCEDURE — 36415 COLL VENOUS BLD VENIPUNCTURE: CPT | Performed by: PATHOLOGY

## 2024-05-01 PROCEDURE — 99024 POSTOP FOLLOW-UP VISIT: CPT | Performed by: PHYSICIAN ASSISTANT

## 2024-05-01 RX ORDER — CEPHALEXIN 500 MG/1
500 CAPSULE ORAL 2 TIMES DAILY
Qty: 14 CAPSULE | Refills: 0 | Status: SHIPPED | OUTPATIENT
Start: 2024-05-01 | End: 2024-05-08

## 2024-05-01 NOTE — TELEPHONE ENCOUNTER
See multiple [employer] messages back & forth.  Pt seen in clinic 5-1-24-see dictation.    Aide Chavez RN.

## 2024-05-01 NOTE — NURSING NOTE
Reason For Visit:   Chief Complaint   Patient presents with    RECHECK     Incision check       Primary MD: Pj Dillon  Ref. MD: EST    ?  No  Occupation Disability.     Date of injury: No  Type of injury: No.     Date of surgery: 4/15/24  Type of surgery: Decompression and transforaminal lumbar interbody fusion with Smith Galeas Osteotomy Lumbar 4-5, device insertion, image guided      Smoker: No  Request smoking cessation information: No    There were no vitals taken for this visit.    Pain Assessment  Patient Currently in Pain: Yes  0-10 Pain Scale: 8  Primary Pain Location: Back    Oswestry (LISA) Questionnaire        4/30/2024     7:16 PM   OSWESTRY DISABILITY INDEX   Count 10   Sum 33   Oswestry Score (%) 66 %        Visual Analog Pain Scale  Back Pain Scale 0-10: 8  Right leg pain: 0  Left leg pain: 0  Neck Pain Scale 0-10: 0  Right arm pain: 0  Left arm pain: 0    Promis 10 Assessment        4/30/2024     7:18 PM   PROMIS 10   In general, would you say your health is: Very good   In general, would you say your quality of life is: Very good   In general, how would you rate your physical health? Good   In general, how would you rate your mental health, including your mood and your ability to think? Very good   In general, how would you rate your satisfaction with your social activities and relationships? Very good   In general, please rate how well you carry out your usual social activities and roles Fair   To what extent are you able to carry out your everyday physical activities such as walking, climbing stairs, carrying groceries, or moving a chair? A little   In the past 7 days, how often have you been bothered by emotional problems such as feeling anxious, depressed, or irritable? Never   In the past 7 days, how would you rate your fatigue on average? Mild   In the past 7 days, how would you rate your pain on average, where 0 means no pain, and 10 means worst imaginable pain? 7   In  general, would you say your health is: 4   In general, would you say your quality of life is: 4   In general, how would you rate your physical health? 3   In general, how would you rate your mental health, including your mood and your ability to think? 4   In general, how would you rate your satisfaction with your social activities and relationships? 4   In general, please rate how well you carry out your usual social activities and roles. (This includes activities at home, at work and in your community, and responsibilities as a parent, child, spouse, employee, friend, etc.) 2   To what extent are you able to carry out your everyday physical activities such as walking, climbing stairs, carrying groceries, or moving a chair? 2   In the past 7 days, how often have you been bothered by emotional problems such as feeling anxious, depressed, or irritable? 1   In the past 7 days, how would you rate your fatigue on average? 2   In the past 7 days, how would you rate your pain on average, where 0 means no pain, and 10 means worst imaginable pain? 7   Global Mental Health Score 17   Global Physical Health Score 11   PROMIS TOTAL - SUBSCORES 28                Jessica Abraham LPN

## 2024-05-01 NOTE — LETTER
5/1/2024         RE: Kelsy Morley  [address]  [facility] [address]        Dear Colleague,    Thank you for referring your patient, Kelsy Morley, to the [facility]. Please see a copy of my visit note below.      Spine Surgery Post-Op Visit    Subjective:  Kelsy Morley is a 63 year old female who is s/p L4-L5 fusion 4/15/24 by Dr. Borges. She was doing great immediately post-op and off all medications. However, over the last week pain increased and went to the ED on 4/27. She had increased back pain which has radiated through buttock to posterolateral thigh. She is taking 1000 mg AM tylenol, 500 pm tylenol + tramadol hs, robaxin BID. Medrol dose pack helped for 1 day, but hasn't reduced pain significantly. She then contacted us yesterday with drainage from the wound. She states the drains sites were initially firm but have become softer. No fevers or chills.    She notes that she is prone to infections, had infection of prior knee surgery. She is using protein shakes.    Oswestry (LISA) Questionnaire        4/30/2024     7:16 PM   OSWESTRY DISABILITY INDEX   Count 10   Sum 33   Oswestry Score (%) 66 %     Visual Analog Scale (VAS) Questionnaire        5/1/2024    12:24 PM   VISUAL ANALOG PAIN SCALE   Back Pain Scale 0-10 8   Right leg pain 0   Left leg pain 0   Neck Pain Scale 0-10 0   Right arm pain 0   Left arm pain 0        Physical Exam:  Vitals: There were no vitals taken for this visit.  Constitutional: Patient is healthy, well-nourished and appears stated age.  Respiratory: Patient is breathing normally and in no respiratory distress.  Skin: Incision well healed, no evidence of wound dehiscence. Mildly erythematous. No fluctuance.   Gait: Non-antalgic gait without use of assistive devices.   Musculoskeletal: Strength: grossly intact LE    Assessment:  63 year old female 2 weeks s/p L4-L5 fusion  Increased pain, possibly BMP reaction  Possible superficial  cellulitis of incision    Plan:  Keflex  CRP, if very elevated will trend  Daily dressing changes  Follow up with [employer] message next week.  Advised she can increase pain medications as needed.      The patient was shown their own imaging and all questions were answered.   Thank you for allowing me to be a part of this patient's care.     Zoe Ladd PA-C   Orthopedic Spine Surgery

## 2024-05-01 NOTE — PROGRESS NOTES
Spine Surgery Post-Op Visit    Subjective:  Kelsy Morley is a 63 year old female who is s/p L4-L5 fusion 4/15/24 by Dr. Borges. She was doing great immediately post-op and off all medications. However, over the last week pain increased and went to the ED on 4/27. She had increased back pain which has radiated through buttock to posterolateral thigh. She is taking 1000 mg AM tylenol, 500 pm tylenol + tramadol hs, robaxin BID. Medrol dose pack helped for 1 day, but hasn't reduced pain significantly. She then contacted us yesterday with drainage from the wound. She states the drains sites were initially firm but have become softer. No fevers or chills.    She notes that she is prone to infections, had infection of prior knee surgery. She is using protein shakes.    Oswestry (LISA) Questionnaire        4/30/2024     7:16 PM   OSWESTRY DISABILITY INDEX   Count 10   Sum 33   Oswestry Score (%) 66 %     Visual Analog Scale (VAS) Questionnaire        5/1/2024    12:24 PM   VISUAL ANALOG PAIN SCALE   Back Pain Scale 0-10 8   Right leg pain 0   Left leg pain 0   Neck Pain Scale 0-10 0   Right arm pain 0   Left arm pain 0        Physical Exam:  Vitals: There were no vitals taken for this visit.  Constitutional: Patient is healthy, well-nourished and appears stated age.  Respiratory: Patient is breathing normally and in no respiratory distress.  Skin: Incision well healed, no evidence of wound dehiscence. Mildly erythematous. No fluctuance.   Gait: Non-antalgic gait without use of assistive devices.   Musculoskeletal: Strength: grossly intact LE    Assessment:  63 year old female 2 weeks s/p L4-L5 fusion  Increased pain, possibly BMP reaction  Possible superficial cellulitis of incision    Plan:  Keflex  CRP, if very elevated will trend  Daily dressing changes  Follow up with [employer] message next week.  Advised she can increase pain medications as needed.      The patient was shown their own imaging and all questions were  answered.   Thank you for allowing me to be a part of this patient's care.     Zoe Ladd PA-C   Orthopedic Spine Surgery

## 2024-05-04 ENCOUNTER — MYC MEDICAL ADVICE (OUTPATIENT)
Dept: ORTHOPEDICS | Facility: CLINIC | Age: 64
End: 2024-05-04
Payer: MEDICARE

## 2024-05-04 ENCOUNTER — TELEPHONE (OUTPATIENT)
Dept: OTHER | Facility: CLINIC | Age: 64
End: 2024-05-04
Payer: MEDICARE

## 2024-05-04 DIAGNOSIS — M19.09 PRIMARY OSTEOARTHRITIS, OTHER SPECIFIED SITE: ICD-10-CM

## 2024-05-04 DIAGNOSIS — Z98.890 POST-OPERATIVE STATE: Primary | ICD-10-CM

## 2024-05-04 NOTE — TELEPHONE ENCOUNTER
Brief orthopedic note    Return phone call from Kelsy regarding low-grade increased temperature to 100.3 degrees.  This does not technically meet the medical definition of a fever, but she feels like she has been feeling a little bit crummy.  Notably, she was on Keflex due to presumed suture abscess.  Pictures uploaded to the chart demonstrate a well-healing incision without any evidence of redness, warmth, drainage, dehiscence.  She denies any new symptoms of numbness, tingling, weakness.  Overall, she is feeling okay.    I think at this point, it is reasonable to continue to monitor.  Discussed that if she has extremely elevated temperatures that she should come in sooner.  Further if she has elevated temperatures over the course of the evening, then she should come into the emergency department tomorrow morning to be evaluated.  I will plan to message our clinic schedulers so that she can get in and have her wound evaluated on Monday.    The patient expressed her understanding and agreement with this assessment and plan.  All questions answered today.    Jose Florentino MD  Orthopaedic Surgery PGY-4  [phone]

## 2024-05-06 ENCOUNTER — TELEPHONE (OUTPATIENT)
Dept: ORTHOPEDICS | Facility: CLINIC | Age: 64
End: 2024-05-06
Payer: MEDICARE

## 2024-05-06 NOTE — TELEPHONE ENCOUNTER
"Called patient today in response to previous Connectivity messages. She reports:  Fever: didn't have last week (prior to starting antibiotics which were prescribed on Wednesday). Fevers Started on Saturday; that day, she \"sweat through clothes\" twice. Took her temp that afternoon and it was 100.3F. she also felt \"crummy\"/sick those days (Sat/Sun). Today, temperature at 99.8F.    She reports that back/ leg pain is actually getting better. She denies SOB, CP, calf pain/ erythema/swelling, or other systemic symptoms. Denies UTI symptoms. Denies URI symptoms. No other associated symptoms. She is still taking keflex as prescribed.     States she is the \"infection queen\", PMH multiple sinus infections,  suture abscess after knee surgery (did NOT require washout). She reports that her CRP and WBC has never been elevated, even with previous infections. Therefore, she is anxious that the normal CRP value last week could be false negative.     Reviewed with patient that at this time, her drainage is improving, back pain improving, wound appears to be healing well. Therefore, I have a low suspicion for surgical infection. We discussed that there could be various etiologies for fever. She doesn't currently have any UTI, URI symptoms or symptoms concerning for DVT/PE. Recommend continue to monitor symptoms and complete course of antibiotics. Reviewed concerning symptoms to monitor for and to reach out if these develop. Patient understands and is agreeable.    Mindy Randle PA-C    "

## 2024-05-06 NOTE — TELEPHONE ENCOUNTER
Called patient in response to [employer] messages. Left VM to call us back.    Mindy Randle PA-C

## 2024-05-06 NOTE — TELEPHONE ENCOUNTER
[facility] Call Center    Phone Message    May a detailed message be left on voicemail: yes     Reason for Call: Other: Patient stated that her phone did not connect or something when Mindy called at 1:15pm. She got a new phone so that could be the issue. Would like a call back from Mindy.     Action Taken: Message routed to:  [facility] ([facility] Orthopedics    Travel Screening: Not Applicable

## 2024-05-07 ENCOUNTER — TELEPHONE (OUTPATIENT)
Dept: ORTHOPEDICS | Facility: CLINIC | Age: 64
End: 2024-05-07
Payer: MEDICARE

## 2024-05-07 ENCOUNTER — LAB (OUTPATIENT)
Dept: LAB | Facility: CLINIC | Age: 64
End: 2024-05-07
Payer: MEDICARE

## 2024-05-07 DIAGNOSIS — Z98.890 POST-OPERATIVE STATE: ICD-10-CM

## 2024-05-07 DIAGNOSIS — M19.09 PRIMARY OSTEOARTHRITIS, OTHER SPECIFIED SITE: ICD-10-CM

## 2024-05-07 LAB
BASOPHILS # BLD AUTO: 0.1 10E3/UL (ref 0–0.2)
BASOPHILS NFR BLD AUTO: 2 %
EOSINOPHIL # BLD AUTO: 0.2 10E3/UL (ref 0–0.7)
EOSINOPHIL NFR BLD AUTO: 4 %
ERYTHROCYTE [DISTWIDTH] IN BLOOD BY AUTOMATED COUNT: 13.2 % (ref 10–15)
HCT VFR BLD AUTO: 41.1 % (ref 35–47)
HGB BLD-MCNC: 13.6 G/DL (ref 11.7–15.7)
IMM GRANULOCYTES # BLD: 0 10E3/UL
IMM GRANULOCYTES NFR BLD: 0 %
LYMPHOCYTES # BLD AUTO: 1.4 10E3/UL (ref 0.8–5.3)
LYMPHOCYTES NFR BLD AUTO: 25 %
MCH RBC QN AUTO: 30.5 PG (ref 26.5–33)
MCHC RBC AUTO-ENTMCNC: 33.1 G/DL (ref 31.5–36.5)
MCV RBC AUTO: 92 FL (ref 78–100)
MONOCYTES # BLD AUTO: 0.6 10E3/UL (ref 0–1.3)
MONOCYTES NFR BLD AUTO: 10 %
NEUTROPHILS # BLD AUTO: 3.3 10E3/UL (ref 1.6–8.3)
NEUTROPHILS NFR BLD AUTO: 60 %
PLATELET # BLD AUTO: 186 10E3/UL (ref 150–450)
RBC # BLD AUTO: 4.46 10E6/UL (ref 3.8–5.2)
WBC # BLD AUTO: 5.5 10E3/UL (ref 4–11)

## 2024-05-07 PROCEDURE — 85025 COMPLETE CBC W/AUTO DIFF WBC: CPT

## 2024-05-07 PROCEDURE — 36415 COLL VENOUS BLD VENIPUNCTURE: CPT

## 2024-05-07 NOTE — TELEPHONE ENCOUNTER
[facility] called and scheduled pt a post-op spine appointment with Dr. Borges on 5/9/24 at 7 am.     Jessica Abraham LPN

## 2024-05-08 ENCOUNTER — INFUSION THERAPY VISIT (OUTPATIENT)
Dept: INFUSION THERAPY | Facility: HOSPITAL | Age: 64
End: 2024-05-08
Attending: FAMILY MEDICINE
Payer: MEDICARE

## 2024-05-08 ENCOUNTER — THERAPY VISIT (OUTPATIENT)
Dept: PHYSICAL THERAPY | Facility: REHABILITATION | Age: 64
End: 2024-05-08
Payer: MEDICARE

## 2024-05-08 VITALS
DIASTOLIC BLOOD PRESSURE: 73 MMHG | HEART RATE: 84 BPM | RESPIRATION RATE: 18 BRPM | SYSTOLIC BLOOD PRESSURE: 117 MMHG | OXYGEN SATURATION: 98 % | TEMPERATURE: 97.8 F

## 2024-05-08 DIAGNOSIS — G89.29 CHRONIC BILATERAL LOW BACK PAIN WITHOUT SCIATICA: Primary | ICD-10-CM

## 2024-05-08 DIAGNOSIS — L50.8 CHRONIC URTICARIA: Primary | ICD-10-CM

## 2024-05-08 DIAGNOSIS — G89.29 CHRONIC PAIN OF BOTH SHOULDERS: ICD-10-CM

## 2024-05-08 DIAGNOSIS — Q79.60 EHLERS-DANLOS SYNDROME: ICD-10-CM

## 2024-05-08 DIAGNOSIS — G89.29 CHRONIC KNEE PAIN AFTER TOTAL REPLACEMENT OF RIGHT KNEE JOINT: ICD-10-CM

## 2024-05-08 DIAGNOSIS — L50.1 IDIOPATHIC URTICARIA: ICD-10-CM

## 2024-05-08 DIAGNOSIS — M25.512 CHRONIC PAIN OF BOTH SHOULDERS: ICD-10-CM

## 2024-05-08 DIAGNOSIS — G89.29 CHRONIC BILATERAL THORACIC BACK PAIN: ICD-10-CM

## 2024-05-08 DIAGNOSIS — M25.561 CHRONIC KNEE PAIN AFTER TOTAL REPLACEMENT OF RIGHT KNEE JOINT: ICD-10-CM

## 2024-05-08 DIAGNOSIS — M54.50 CHRONIC BILATERAL LOW BACK PAIN WITHOUT SCIATICA: Primary | ICD-10-CM

## 2024-05-08 DIAGNOSIS — M25.511 CHRONIC PAIN OF BOTH SHOULDERS: ICD-10-CM

## 2024-05-08 DIAGNOSIS — M54.2 NECK PAIN: ICD-10-CM

## 2024-05-08 DIAGNOSIS — Z96.651 CHRONIC KNEE PAIN AFTER TOTAL REPLACEMENT OF RIGHT KNEE JOINT: ICD-10-CM

## 2024-05-08 DIAGNOSIS — M54.6 CHRONIC BILATERAL THORACIC BACK PAIN: ICD-10-CM

## 2024-05-08 DIAGNOSIS — G44.209 TENSION HEADACHE: ICD-10-CM

## 2024-05-08 PROCEDURE — 250N000011 HC RX IP 250 OP 636: Mod: JZ | Performed by: REGISTERED NURSE

## 2024-05-08 PROCEDURE — 97140 MANUAL THERAPY 1/> REGIONS: CPT | Mod: GP | Performed by: PHYSICAL THERAPIST

## 2024-05-08 PROCEDURE — 96372 THER/PROPH/DIAG INJ SC/IM: CPT | Performed by: REGISTERED NURSE

## 2024-05-08 RX ORDER — DIPHENHYDRAMINE HYDROCHLORIDE 50 MG/ML
50 INJECTION INTRAMUSCULAR; INTRAVENOUS
Status: CANCELLED
Start: 2024-05-28

## 2024-05-08 RX ORDER — ALBUTEROL SULFATE 90 UG/1
1-2 AEROSOL, METERED RESPIRATORY (INHALATION)
Status: DISCONTINUED | OUTPATIENT
Start: 2024-05-08 | End: 2024-05-08 | Stop reason: HOSPADM

## 2024-05-08 RX ORDER — MEPERIDINE HYDROCHLORIDE 50 MG/ML
25 INJECTION INTRAMUSCULAR; INTRAVENOUS; SUBCUTANEOUS EVERY 30 MIN PRN
Status: DISCONTINUED | OUTPATIENT
Start: 2024-05-08 | End: 2024-05-08 | Stop reason: HOSPADM

## 2024-05-08 RX ORDER — METHYLPREDNISOLONE SODIUM SUCCINATE 125 MG/2ML
125 INJECTION, POWDER, LYOPHILIZED, FOR SOLUTION INTRAMUSCULAR; INTRAVENOUS
Status: CANCELLED
Start: 2024-05-28

## 2024-05-08 RX ORDER — ALBUTEROL SULFATE 0.83 MG/ML
2.5 SOLUTION RESPIRATORY (INHALATION)
Status: CANCELLED | OUTPATIENT
Start: 2024-05-28

## 2024-05-08 RX ORDER — ALBUTEROL SULFATE 0.83 MG/ML
2.5 SOLUTION RESPIRATORY (INHALATION)
Status: DISCONTINUED | OUTPATIENT
Start: 2024-05-08 | End: 2024-05-08 | Stop reason: HOSPADM

## 2024-05-08 RX ORDER — MEPERIDINE HYDROCHLORIDE 50 MG/ML
25 INJECTION INTRAMUSCULAR; INTRAVENOUS; SUBCUTANEOUS EVERY 30 MIN PRN
Status: CANCELLED | OUTPATIENT
Start: 2024-05-28

## 2024-05-08 RX ORDER — EPINEPHRINE 1 MG/ML
0.3 INJECTION, SOLUTION INTRAMUSCULAR; SUBCUTANEOUS EVERY 5 MIN PRN
Status: CANCELLED | OUTPATIENT
Start: 2024-05-28

## 2024-05-08 RX ORDER — ALBUTEROL SULFATE 90 UG/1
1-2 AEROSOL, METERED RESPIRATORY (INHALATION)
Status: CANCELLED
Start: 2024-05-28

## 2024-05-08 RX ORDER — EPINEPHRINE 1 MG/ML
0.3 INJECTION, SOLUTION INTRAMUSCULAR; SUBCUTANEOUS EVERY 5 MIN PRN
Status: DISCONTINUED | OUTPATIENT
Start: 2024-05-08 | End: 2024-05-08 | Stop reason: HOSPADM

## 2024-05-08 RX ORDER — DIPHENHYDRAMINE HYDROCHLORIDE 50 MG/ML
50 INJECTION INTRAMUSCULAR; INTRAVENOUS
Status: DISCONTINUED | OUTPATIENT
Start: 2024-05-08 | End: 2024-05-08 | Stop reason: HOSPADM

## 2024-05-08 RX ORDER — METHYLPREDNISOLONE SODIUM SUCCINATE 125 MG/2ML
125 INJECTION, POWDER, LYOPHILIZED, FOR SOLUTION INTRAMUSCULAR; INTRAVENOUS
Status: DISCONTINUED | OUTPATIENT
Start: 2024-05-08 | End: 2024-05-08 | Stop reason: HOSPADM

## 2024-05-08 RX ADMIN — OMALIZUMAB 300 MG: 150 INJECTION, SOLUTION SUBCUTANEOUS at 10:34

## 2024-05-08 NOTE — PROGRESS NOTES
Kayla was here today for injections x 2 to left lower abdomen and right lower abdomen. Pt tolerated injections well and without incident.      Danni LU, CMA

## 2024-05-09 ENCOUNTER — OFFICE VISIT (OUTPATIENT)
Dept: ORTHOPEDICS | Facility: CLINIC | Age: 64
End: 2024-05-09
Payer: MEDICARE

## 2024-05-09 ENCOUNTER — LAB (OUTPATIENT)
Dept: LAB | Facility: CLINIC | Age: 64
End: 2024-05-09
Payer: MEDICARE

## 2024-05-09 VITALS — HEIGHT: 65 IN | BODY MASS INDEX: 23.32 KG/M2 | WEIGHT: 140 LBS

## 2024-05-09 DIAGNOSIS — L08.9 SUPERFICIAL BACTERIAL SKIN INFECTION: ICD-10-CM

## 2024-05-09 DIAGNOSIS — B96.89 SUPERFICIAL BACTERIAL SKIN INFECTION: ICD-10-CM

## 2024-05-09 DIAGNOSIS — Z98.1 HISTORY OF SPINAL FUSION: ICD-10-CM

## 2024-05-09 DIAGNOSIS — Z98.890 POST-OPERATIVE STATE: ICD-10-CM

## 2024-05-09 DIAGNOSIS — Z98.890 POST-OPERATIVE STATE: Primary | ICD-10-CM

## 2024-05-09 LAB
BASOPHILS # BLD AUTO: 0.1 10E3/UL (ref 0–0.2)
BASOPHILS NFR BLD AUTO: 1 %
CRP SERPL-MCNC: <3 MG/L
EOSINOPHIL # BLD AUTO: 0.3 10E3/UL (ref 0–0.7)
EOSINOPHIL NFR BLD AUTO: 5 %
ERYTHROCYTE [DISTWIDTH] IN BLOOD BY AUTOMATED COUNT: 13.4 % (ref 10–15)
ERYTHROCYTE [SEDIMENTATION RATE] IN BLOOD BY WESTERGREN METHOD: 14 MM/HR (ref 0–30)
HCT VFR BLD AUTO: 41.7 % (ref 35–47)
HGB BLD-MCNC: 13.4 G/DL (ref 11.7–15.7)
IMM GRANULOCYTES # BLD: 0 10E3/UL
IMM GRANULOCYTES NFR BLD: 0 %
LYMPHOCYTES # BLD AUTO: 1.3 10E3/UL (ref 0.8–5.3)
LYMPHOCYTES NFR BLD AUTO: 24 %
MCH RBC QN AUTO: 30 PG (ref 26.5–33)
MCHC RBC AUTO-ENTMCNC: 32.1 G/DL (ref 31.5–36.5)
MCV RBC AUTO: 94 FL (ref 78–100)
MONOCYTES # BLD AUTO: 0.6 10E3/UL (ref 0–1.3)
MONOCYTES NFR BLD AUTO: 11 %
NEUTROPHILS # BLD AUTO: 3.1 10E3/UL (ref 1.6–8.3)
NEUTROPHILS NFR BLD AUTO: 59 %
NRBC # BLD AUTO: 0 10E3/UL
NRBC BLD AUTO-RTO: 0 /100
PLATELET # BLD AUTO: 173 10E3/UL (ref 150–450)
RBC # BLD AUTO: 4.46 10E6/UL (ref 3.8–5.2)
WBC # BLD AUTO: 5.4 10E3/UL (ref 4–11)

## 2024-05-09 PROCEDURE — 85025 COMPLETE CBC W/AUTO DIFF WBC: CPT | Performed by: PATHOLOGY

## 2024-05-09 PROCEDURE — 86140 C-REACTIVE PROTEIN: CPT | Performed by: PATHOLOGY

## 2024-05-09 PROCEDURE — 85652 RBC SED RATE AUTOMATED: CPT | Performed by: PATHOLOGY

## 2024-05-09 PROCEDURE — 99024 POSTOP FOLLOW-UP VISIT: CPT | Performed by: ORTHOPAEDIC SURGERY

## 2024-05-09 PROCEDURE — 36415 COLL VENOUS BLD VENIPUNCTURE: CPT | Performed by: PATHOLOGY

## 2024-05-09 NOTE — NURSING NOTE
"Reason For Visit:   Chief Complaint   Patient presents with    RECHECK     Wound check       Primary MD: Pj Dillon  Ref. MD: EST    ?  No  Occupation Disability.     Date of injury: No  Type of injury: No.     Date of surgery: 4/15/24  Type of surgery: Decompression and transforaminal lumbar interbody fusion with Smith Galeas Osteotomy Lumbar 4-5, device insertion, image guided      Smoker: No  Request smoking cessation information: No    Ht 1.66 m (5' 5.35\")   Wt 63.5 kg (140 lb)   BMI 23.05 kg/m      Pain Assessment  Patient Currently in Pain: Yes  0-10 Pain Scale: 6  Primary Pain Location: Back    Oswestry (LISA) Questionnaire        4/30/2024     7:16 PM   OSWESTRY DISABILITY INDEX   Count 10   Sum 33   Oswestry Score (%) 66 %        Visual Analog Pain Scale  Back Pain Scale 0-10: 6  Right leg pain: 3  Left leg pain: 0  Neck Pain Scale 0-10: 0  Right arm pain: 0  Left arm pain: 0    Promis 10 Assessment        4/30/2024     7:18 PM   PROMIS 10   In general, would you say your health is: Very good   In general, would you say your quality of life is: Very good   In general, how would you rate your physical health? Good   In general, how would you rate your mental health, including your mood and your ability to think? Very good   In general, how would you rate your satisfaction with your social activities and relationships? Very good   In general, please rate how well you carry out your usual social activities and roles Fair   To what extent are you able to carry out your everyday physical activities such as walking, climbing stairs, carrying groceries, or moving a chair? A little   In the past 7 days, how often have you been bothered by emotional problems such as feeling anxious, depressed, or irritable? Never   In the past 7 days, how would you rate your fatigue on average? Mild   In the past 7 days, how would you rate your pain on average, where 0 means no pain, and 10 means worst imaginable " pain? 7   In general, would you say your health is: 4   In general, would you say your quality of life is: 4   In general, how would you rate your physical health? 3   In general, how would you rate your mental health, including your mood and your ability to think? 4   In general, how would you rate your satisfaction with your social activities and relationships? 4   In general, please rate how well you carry out your usual social activities and roles. (This includes activities at home, at work and in your community, and responsibilities as a parent, child, spouse, employee, friend, etc.) 2   To what extent are you able to carry out your everyday physical activities such as walking, climbing stairs, carrying groceries, or moving a chair? 2   In the past 7 days, how often have you been bothered by emotional problems such as feeling anxious, depressed, or irritable? 1   In the past 7 days, how would you rate your fatigue on average? 2   In the past 7 days, how would you rate your pain on average, where 0 means no pain, and 10 means worst imaginable pain? 7   Global Mental Health Score 17   Global Physical Health Score 11   PROMIS TOTAL - SUBSCORES 28                Jessica Abraham LPN
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
## 2024-05-13 ENCOUNTER — OFFICE VISIT (OUTPATIENT)
Dept: ORTHOPEDICS | Facility: CLINIC | Age: 64
End: 2024-05-13
Payer: MEDICARE

## 2024-05-13 DIAGNOSIS — Z98.890 POST-OPERATIVE STATE: Primary | ICD-10-CM

## 2024-05-13 DIAGNOSIS — Z98.1 HISTORY OF SPINAL FUSION: ICD-10-CM

## 2024-05-13 PROCEDURE — 99024 POSTOP FOLLOW-UP VISIT: CPT | Performed by: PHYSICIAN ASSISTANT

## 2024-05-13 ASSESSMENT — PAIN SCALES - GENERAL: PAINLEVEL: MODERATE PAIN (5)

## 2024-05-13 NOTE — NURSING NOTE
"Reason For Visit:   Chief Complaint   Patient presents with    RECHECK     Incision check // Temp was 100.1 on Saturday and yesterday was 100. Not taking antipyretics        Primary MD: Pj Dillon  Ref. MD: EST     ?  No  Occupation Disability.     Date of injury: No  Type of injury: No.     Date of surgery: 4/15/24  Type of surgery: Decompression and transforaminal lumbar interbody fusion with Smith Galeas Osteotomy Lumbar 4-5, device insertion, image guided      Smoker: No  Request smoking cessation information: No     Ht 1.66 m (5' 5.35\")   Wt 63.5 kg (140 lb)   BMI 23.05 kg/m      Oswestry (LISA) Questionnaire        4/30/2024     7:16 PM   OSWESTRY DISABILITY INDEX   Count 10   Sum 33   Oswestry Score (%) 66 %            Neck Disability Index (NDI) Questionnaire         No data to display                            Promis 10 Assessment        4/30/2024     7:18 PM   PROMIS 10   In general, would you say your health is: Very good   In general, would you say your quality of life is: Very good   In general, how would you rate your physical health? Good   In general, how would you rate your mental health, including your mood and your ability to think? Very good   In general, how would you rate your satisfaction with your social activities and relationships? Very good   In general, please rate how well you carry out your usual social activities and roles Fair   To what extent are you able to carry out your everyday physical activities such as walking, climbing stairs, carrying groceries, or moving a chair? A little   In the past 7 days, how often have you been bothered by emotional problems such as feeling anxious, depressed, or irritable? Never   In the past 7 days, how would you rate your fatigue on average? Mild   In the past 7 days, how would you rate your pain on average, where 0 means no pain, and 10 means worst imaginable pain? 7   In general, would you say your health is: 4   In general, " would you say your quality of life is: 4   In general, how would you rate your physical health? 3   In general, how would you rate your mental health, including your mood and your ability to think? 4   In general, how would you rate your satisfaction with your social activities and relationships? 4   In general, please rate how well you carry out your usual social activities and roles. (This includes activities at home, at work and in your community, and responsibilities as a parent, child, spouse, employee, friend, etc.) 2   To what extent are you able to carry out your everyday physical activities such as walking, climbing stairs, carrying groceries, or moving a chair? 2   In the past 7 days, how often have you been bothered by emotional problems such as feeling anxious, depressed, or irritable? 1   In the past 7 days, how would you rate your fatigue on average? 2   In the past 7 days, how would you rate your pain on average, where 0 means no pain, and 10 means worst imaginable pain? 7   Global Mental Health Score 17   Global Physical Health Score 11   PROMIS TOTAL - SUBSCORES 28                Trina Bell RN

## 2024-05-13 NOTE — PROGRESS NOTES
Spine Surgery Post-Op Visit    Subjective:  Kelsy Morley is a 63 year old female who is  s/p L4-L5 fusion 4/15/24 by Dr. Borges. She followed up on 5/1 and then 5/9 for drainage from wound. She had a week course of Keflex on 5/1. CRP and WBC have remained normal. She has a history of post-operative infection, mast cell activation syndrome, EDS.     There has been a two days of scant drainage over the weekend. She has noted elevated temperatures up to 100.1 but no fevers or chills. Her pain has been improving, still some limitations on walking due to back pain. She is walking 8000 steps/day.     Oswestry (LISA) Questionnaire        4/30/2024     7:16 PM   OSWESTRY DISABILITY INDEX   Count 10   Sum 33   Oswestry Score (%) 66 %         Visual Analog Scale (VAS) Questionnaire        5/9/2024     7:03 AM   VISUAL ANALOG PAIN SCALE   Back Pain Scale 0-10 6   Right leg pain 3   Left leg pain 0   Neck Pain Scale 0-10 0   Right arm pain 0   Left arm pain 0        Physical Exam:  Vitals: There were no vitals taken for this visit.  Constitutional: Patient is healthy, well-nourished and appears stated age.  Respiratory: Patient is breathing normally and in no respiratory distress.  Skin: Incision well healed, very mild erythema along suture line, reduced from prior. There may be a small amount of fluid at the distal aspect of the incision, but no significant fluid collection which could be aspirated. No induration.    Gait: Non-antalgic gait without use of assistive devices.   Musculoskeletal: Strength: grossly intact        Assessment:  63 year old female 4 weeks s/p L4-L5 fusion    Plan:  Reassured her that it seems the appearance of her incision is improving and there are no indications at this time for I&D. We discussed that since I cannot palpate a fluid collection, I would not recommend an attempted aspiration given the risk of introducing infection. She will continue monitoring her symptoms and wound appearance and  follow up earlier than scheduled if any worsening. Otherwise, she has follow up in 2.5 weeks.     The patient was shown their own imaging and all questions were answered.    Thank you for allowing me to be a part of this patient's care.     Zoe Ladd PA-C   Orthopedic Spine Surgery

## 2024-05-13 NOTE — LETTER
5/13/2024         RE: Kelsy Morley  [address]  [facility] [address]        Dear Colleague,    Thank you for referring your patient, Kelsy Morley, to the [facility] [place]. Please see a copy of my visit note below.      Spine Surgery Post-Op Visit    Subjective:  Kelsy Morley is a 63 year old female who is  s/p L4-L5 fusion 4/15/24 by Dr. Borges. She followed up on 5/1 and then 5/9 for drainage from wound. She had a week course of Keflex on 5/1. CRP and WBC have remained normal. She has a history of post-operative infection, mast cell activation syndrome, EDS.     There has been a two days of scant drainage over the weekend. She has noted elevated temperatures up to 100.1 but no fevers or chills. Her pain has been improving, still some limitations on walking due to back pain. She is walking 8000 steps/day.     Oswestry (LISA) Questionnaire        4/30/2024     7:16 PM   OSWESTRY DISABILITY INDEX   Count 10   Sum 33   Oswestry Score (%) 66 %         Visual Analog Scale (VAS) Questionnaire        5/9/2024     7:03 AM   VISUAL ANALOG PAIN SCALE   Back Pain Scale 0-10 6   Right leg pain 3   Left leg pain 0   Neck Pain Scale 0-10 0   Right arm pain 0   Left arm pain 0        Physical Exam:  Vitals: There were no vitals taken for this visit.  Constitutional: Patient is healthy, well-nourished and appears stated age.  Respiratory: Patient is breathing normally and in no respiratory distress.  Skin: Incision well healed, very mild erythema along suture line, reduced from prior. There may be a small amount of fluid at the distal aspect of the incision, but no significant fluid collection which could be aspirated. No induration.    Gait: Non-antalgic gait without use of assistive devices.   Musculoskeletal: Strength: grossly intact        Assessment:  63 year old female 4 weeks s/p L4-L5 fusion    Plan:  Reassured her that it seems the appearance of her incision is improving and  there are no indications at this time for I&D. We discussed that since I cannot palpate a fluid collection, I would not recommend an attempted aspiration given the risk of introducing infection. She will continue monitoring her symptoms and wound appearance and follow up earlier than scheduled if any worsening. Otherwise, she has follow up in 2.5 weeks.     The patient was shown their own imaging and all questions were answered.    Thank you for allowing me to be a part of this patient's care.     Zoe Ladd PA-C   Orthopedic Spine Surgery

## 2024-05-15 ENCOUNTER — OFFICE VISIT (OUTPATIENT)
Dept: PALLIATIVE MEDICINE | Facility: OTHER | Age: 64
End: 2024-05-15
Attending: ANESTHESIOLOGY
Payer: MEDICARE

## 2024-05-15 VITALS
HEART RATE: 88 BPM | SYSTOLIC BLOOD PRESSURE: 108 MMHG | OXYGEN SATURATION: 98 % | DIASTOLIC BLOOD PRESSURE: 71 MMHG | WEIGHT: 142 LBS | BODY MASS INDEX: 23.37 KG/M2

## 2024-05-15 DIAGNOSIS — M47.816 LUMBAR FACET ARTHROPATHY: Primary | ICD-10-CM

## 2024-05-15 PROCEDURE — G0463 HOSPITAL OUTPT CLINIC VISIT: HCPCS | Performed by: ANESTHESIOLOGY

## 2024-05-15 PROCEDURE — G2211 COMPLEX E/M VISIT ADD ON: HCPCS | Performed by: ANESTHESIOLOGY

## 2024-05-15 PROCEDURE — 99214 OFFICE O/P EST MOD 30 MIN: CPT | Performed by: ANESTHESIOLOGY

## 2024-05-15 ASSESSMENT — PAIN SCALES - GENERAL: PAINLEVEL: MODERATE PAIN (5)

## 2024-05-15 NOTE — PROGRESS NOTES
[facility] Follow-up    Date of visit: 5/15/2024    Chief complaint:   Chief Complaint   Patient presents with    Pain     Follow-up for complex regional pain syndrome, mood disorder.  Interval history:    She did have her lumbar fusion last month.    Follow-up with the emergency room 4/27 with a flareup of right leg pain.    She was seen in the [facility] 5/14 where they reviewed her course.    She describes today the decompression surgery went well.  She was on a ketamine drip for 24 hours for CV RPS.    Describes having unusual reaction with encephalopathy thought to have been related to the lidocaine drip.  At home she has been walking more.  She did go to the emergency room with some inflammation on the nerve and may be having some reaction to the suture that is settling.  She is up to 9-10,000 steps wants to continue get more condition.  She is pleased she is 1/2 inch taller.    She has been going to physical therapy and started with a new therapist who is going to do some training that may be helpful for her EDS for which she is optimistic.    She is to send having pain 8 or 9 hours and 1 or 2.    Continues using Ultracet 1 at bedtime which she had before.    She is using the lamotrigine 50 mg which she thinks has been helpful for central sensitization.  Rarely using tizanidine.    Using methylene blue 20 mg which she describes has been extremely helpful for her mood.  People are commenting she looks like a different person.  Has struggled with depression on and off for several years.    She does note a history of an eating disorder which has been but more active.  Has gained some 10 pounds which is struggling.  Describes particularly struggling in the evenings with craving.    She spoke with her primary care doctor yesterday and reviewed the use of low-dose naltrexone.  I am aware that there was a formulation of low-dose naltrexone and  Wellbutrin at 1 time.  She had been prescribed it previously by her ED as Dr. Ld wyatt did not particular help with pain.  She begins and tried again see if it helps with this condition.    We also reviewed some different energy management approaches.    She is planning to meet with a new psychotherapist who may do EMDR and ART that might be useful for her condition.            Medications:  Current Outpatient Medications   Medication Sig Dispense Refill    acetaminophen (TYLENOL) 325 MG tablet Take 1-2 tablets (325-650 mg) by mouth every 6 hours as needed for mild pain Use a Maximum of 4,000mg of acetaminophen from all sources      cetirizine HCl 10 MG CAPS Take 20 mg by mouth at bedtime      EMGALITY 120 MG/ML injection Inject 120 mg Subcutaneous every 28 days Last dose 2/23/24      estradiol (ESTRACE) 0.1 MG/GM vaginal cream Place 1 g vaginally three times a week       famotidine (PEPCID) 40 MG tablet Take 40 mg by mouth as needed      fexofenadine (ALLEGRA) 60 MG tablet Take 180 mg by mouth every morning      fish oil-omega-3 fatty acids 500 MG capsule Take 1 capsule by mouth daily Helps with migraines      gabapentin (NEURONTIN) 300 MG capsule Take 300 mg by mouth daily      ipratropium (ATROVENT) 0.06 % nasal spray Spray 1 spray into both nostrils every morning      lamoTRIgine (LAMICTAL) 25 MG tablet Take 2 tablets (50 mg) by mouth at bedtime 180 tablet 1    LEVOTHYROXINE SODIUM PO Take 75 mcg by mouth every morning      lidocaine (LIDODERM) 5 % patch daily as needed for moderate pain      lisdexamfetamine (VYVANSE) 30 MG capsule Take 30 mg by mouth every morning      Magic Mouthwash (FV std formula) lidocaine visc 2% 2.5mL/5mL & maalox/mylanta w/ simeth 2.5mL/5mL & diphenhydrAMINE 5mg/5mL Swish and swallow 10 mLs in mouth every 6 hours as needed for mouth sores      magnesium oxide 400 MG CAPS Take by mouth as needed 2-3 a day depending on ability to have bowel movement      Methylene Blue POWD Take 20 mg by  mouth every morning #120 120 g 2    montelukast (SINGULAIR) 10 MG tablet Take 10 mg by mouth every morning      mupirocin (BACTROBAN) 2 % external ointment Apply 1 inch topically daily as needed      omalizumab (XOLAIR) 150 MG injection Inject 150 mg Subcutaneous every 28 days Next dose 4/10/24      SUMAtriptan Succinate (IMITREX PO) Take 100 mg by mouth every 8 hours as needed for migraine      tiZANidine (ZANAFLEX) 4 MG tablet Take 4 mg by mouth 3 times daily as needed for muscle spasms      traMADol-acetaminophen (ULTRACET) 37.5-325 MG tablet Take 1 tablet by mouth at bedtime Take a maximum of 4,000mg of acetaminophen from all sources      TRAZODONE HCL PO Take 100 mg by mouth at bedtime      vitamin B-12 (CYANOCOBALAMIN) 1000 MCG tablet 1,000 mcg daily      vitamin D3 (CHOLECALCIFEROL) 50 mcg (2000 units) tablet Take 1 tablet by mouth daily      zinc gluconate 50 MG tablet Take 50 mg by mouth every other day      zolpidem (AMBIEN) 10 MG tablet Take 5 mg by mouth at bedtime             Physical Exam:  Blood pressure 108/71, pulse 88, weight 64.4 kg (142 lb), SpO2 98%, not currently breastfeeding.      She is alert, clear sensorium.  No respiratory distress.  No pain behavior.  Able to go from sitting to standing quite smoothly.    Affect full range.      Assessment:   History of lumbar fusion, either Danlos syndrome and chronic regional pain syndrome in the past and relatively stable.    Has had a comorbid mood disorder for which she is pleased with the methylene blue and the lamotrigine.    Additional approaches discussed as above.    Total time 35.       minutes spent on the date of encounter doing chart review, history, and exam documentation and further activities as noted above.     Chaitanya Ortega MD  [facility]

## 2024-05-15 NOTE — PATIENT INSTRUCTIONS
PLAN:    Continue the methylene blue 20 mg daily.  You may change to the liquid formulation for cost.    Continue lamotrigine 50 mg daily.    You will look into resources for the low-dose naltrexone and contact Dr. Ortega to send a prescription.    Discussed other approaches to energy management.    Follow-up with Dr. Ortega for return visit in 10 to 12 weeks.

## 2024-05-15 NOTE — PROGRESS NOTES
Patient presents to the clinic today for a visit with NICOLE TUTTLE MD regarding Pain Management.          11/15/2023     9:14 AM 2/19/2024     9:16 AM 5/15/2024     8:56 AM   PEG Score   PEG Total Score 7.33 7 3.67       UDS/CSA- 10.04.2023    Medications- Ultracet ([place]) Last taken last night    Notes    Aide Valenzuela  [facility] Clinical Assistant

## 2024-05-16 NOTE — ADDENDUM NOTE
Addendum Note by Aubrie Vo CMA at 10/26/2018  8:35 AM     Author: Aubrie Vo CMA Service: -- Author Type: Certified Medical Assistant    Filed: 10/26/2018  8:35 AM Encounter Date: 10/12/2018 Status: Signed    : Aubrie Vo CMA (Certified Medical Assistant)    Addended by: AUBRIE VO on: 10/26/2018 08:35 AM        Modules accepted: Orders         [Cough] : cough [Joint Pain] : joint pain [Joint Swelling] : joint swelling [Negative] : Heme/Lymph

## 2024-05-22 ENCOUNTER — THERAPY VISIT (OUTPATIENT)
Dept: PHYSICAL THERAPY | Facility: REHABILITATION | Age: 64
End: 2024-05-22
Payer: MEDICARE

## 2024-05-22 DIAGNOSIS — M54.2 NECK PAIN: ICD-10-CM

## 2024-05-22 DIAGNOSIS — M54.6 CHRONIC BILATERAL THORACIC BACK PAIN: ICD-10-CM

## 2024-05-22 DIAGNOSIS — M25.511 CHRONIC PAIN OF BOTH SHOULDERS: ICD-10-CM

## 2024-05-22 DIAGNOSIS — G44.209 TENSION HEADACHE: ICD-10-CM

## 2024-05-22 DIAGNOSIS — G89.29 CHRONIC BILATERAL LOW BACK PAIN WITHOUT SCIATICA: Primary | ICD-10-CM

## 2024-05-22 DIAGNOSIS — Z96.651 CHRONIC KNEE PAIN AFTER TOTAL REPLACEMENT OF RIGHT KNEE JOINT: ICD-10-CM

## 2024-05-22 DIAGNOSIS — G89.29 CHRONIC BILATERAL THORACIC BACK PAIN: ICD-10-CM

## 2024-05-22 DIAGNOSIS — G89.29 CHRONIC PAIN OF BOTH SHOULDERS: ICD-10-CM

## 2024-05-22 DIAGNOSIS — M54.50 CHRONIC BILATERAL LOW BACK PAIN WITHOUT SCIATICA: Primary | ICD-10-CM

## 2024-05-22 DIAGNOSIS — G89.29 CHRONIC KNEE PAIN AFTER TOTAL REPLACEMENT OF RIGHT KNEE JOINT: ICD-10-CM

## 2024-05-22 DIAGNOSIS — Q79.60 EHLERS-DANLOS SYNDROME: ICD-10-CM

## 2024-05-22 DIAGNOSIS — M25.561 CHRONIC KNEE PAIN AFTER TOTAL REPLACEMENT OF RIGHT KNEE JOINT: ICD-10-CM

## 2024-05-22 DIAGNOSIS — M25.512 CHRONIC PAIN OF BOTH SHOULDERS: ICD-10-CM

## 2024-05-22 PROCEDURE — 97140 MANUAL THERAPY 1/> REGIONS: CPT | Mod: GP | Performed by: PHYSICAL THERAPIST

## 2024-05-28 DIAGNOSIS — Z98.1 S/P SPINAL FUSION: Primary | ICD-10-CM

## 2024-05-29 ENCOUNTER — OFFICE VISIT (OUTPATIENT)
Dept: ORTHOPEDICS | Facility: CLINIC | Age: 64
End: 2024-05-29
Payer: MEDICARE

## 2024-05-29 ENCOUNTER — ANCILLARY PROCEDURE (OUTPATIENT)
Dept: GENERAL RADIOLOGY | Facility: CLINIC | Age: 64
End: 2024-05-29
Attending: ORTHOPAEDIC SURGERY
Payer: MEDICARE

## 2024-05-29 VITALS — WEIGHT: 143 LBS | BODY MASS INDEX: 23.82 KG/M2 | HEIGHT: 65 IN

## 2024-05-29 DIAGNOSIS — M25.561 CHRONIC PAIN OF RIGHT KNEE: Primary | ICD-10-CM

## 2024-05-29 DIAGNOSIS — Q79.62 EHLERS-DANLOS, HYPERMOBILE TYPE: ICD-10-CM

## 2024-05-29 DIAGNOSIS — M43.10 DEGENERATIVE SPONDYLOLISTHESIS: ICD-10-CM

## 2024-05-29 DIAGNOSIS — G89.29 CHRONIC PAIN OF RIGHT KNEE: Primary | ICD-10-CM

## 2024-05-29 DIAGNOSIS — Z96.651 HISTORY OF TOTAL RIGHT KNEE REPLACEMENT: ICD-10-CM

## 2024-05-29 DIAGNOSIS — Z98.1 S/P SPINAL FUSION: ICD-10-CM

## 2024-05-29 DIAGNOSIS — D89.40 MAST CELL ACTIVATION SYNDROME (H): ICD-10-CM

## 2024-05-29 PROCEDURE — 72082 X-RAY EXAM ENTIRE SPI 2/3 VW: CPT | Performed by: STUDENT IN AN ORGANIZED HEALTH CARE EDUCATION/TRAINING PROGRAM

## 2024-05-29 PROCEDURE — 99024 POSTOP FOLLOW-UP VISIT: CPT | Performed by: ORTHOPAEDIC SURGERY

## 2024-05-29 PROCEDURE — 77073 BONE LENGTH STUDIES: CPT | Performed by: STUDENT IN AN ORGANIZED HEALTH CARE EDUCATION/TRAINING PROGRAM

## 2024-05-29 NOTE — NURSING NOTE
"Reason For Visit:   Chief Complaint   Patient presents with    RECHECK     POST-OP SPINE - DOS 4-15-24 TLIF for Degen. Spondy. & Lumbar Radiculopathy        Primary MD: Pj Dillon  Ref. MD: EST    ?  No  Occupation Disability.  Currently working? No.  Work status?  On disability.  Date of injury: no  Type of injury: no.  Date of surgery: 4/15/24   Type of surgery: Decompression and transforaminal lumbar interbody fusion with Smith Galeas Osteotomy Lumbar 4-5, device insertion, image guided .  Smoker: No  Request smoking cessation information: No    Ht 1.651 m (5' 5\")   Wt 64.9 kg (143 lb)   BMI 23.80 kg/m      Pain Assessment  Patient Currently in Pain: Yes  0-10 Pain Scale: 6 (low back pain)  Primary Pain Location: Back    Oswestry (LISA) Questionnaire        5/29/2024    12:04 PM   OSWESTRY DISABILITY INDEX   Count 9   Sum 17   Oswestry Score (%) 37.78 %            Neck Disability Index (NDI) Questionnaire         No data to display                       Visual Analog Pain Scale  Back Pain Scale 0-10: 6 (low back pain)  Right leg pain: 6 (knee pain)  Left leg pain: 0  Neck Pain Scale 0-10: 3 (left side neck pain)  Right arm pain: 0  Left arm pain: 0    Promis 10 Assessment        5/29/2024    11:52 AM   PROMIS 10   In general, would you say your health is: Very good   In general, would you say your quality of life is: Very good   In general, how would you rate your physical health? Good   In general, how would you rate your mental health, including your mood and your ability to think? Very good   In general, how would you rate your satisfaction with your social activities and relationships? Very good   In general, please rate how well you carry out your usual social activities and roles Good   To what extent are you able to carry out your everyday physical activities such as walking, climbing stairs, carrying groceries, or moving a chair? Moderately   In the past 7 days, how often have you been " bothered by emotional problems such as feeling anxious, depressed, or irritable? Rarely   In the past 7 days, how would you rate your fatigue on average? Mild   In the past 7 days, how would you rate your pain on average, where 0 means no pain, and 10 means worst imaginable pain? 6   In general, would you say your health is: 4   In general, would you say your quality of life is: 4   In general, how would you rate your physical health? 3   In general, how would you rate your mental health, including your mood and your ability to think? 4   In general, how would you rate your satisfaction with your social activities and relationships? 4   In general, please rate how well you carry out your usual social activities and roles. (This includes activities at home, at work and in your community, and responsibilities as a parent, child, spouse, employee, friend, etc.) 3   To what extent are you able to carry out your everyday physical activities such as walking, climbing stairs, carrying groceries, or moving a chair? 3   In the past 7 days, how often have you been bothered by emotional problems such as feeling anxious, depressed, or irritable? 2   In the past 7 days, how would you rate your fatigue on average? 2   In the past 7 days, how would you rate your pain on average, where 0 means no pain, and 10 means worst imaginable pain? 6   Global Mental Health Score 16   Global Physical Health Score 13   PROMIS TOTAL - SUBSCORES 29                James Walker MA

## 2024-05-29 NOTE — LETTER
5/29/2024         RE: Kelsy Morley  [address]  [facility] [address]        Dear Colleague,    Thank you for referring your patient, Kelsy Morley, to the [facility] [place]. Please see a copy of my visit note below.    Kayla is now 6 weeks out from her L4-5 fusion.  Her medical history is complicated by mast cell activation syndrome and Omayra-Danlos.  She has had some complaint of knee swelling in her right total knee replacement area and would like to see someone here potentially about that so we will see about getting that set up.  He has been able to walk a mile today but this has been a little slow going forward.  She is part of the [employer] study.  Visual analog pain scale today is a 6 Oswestry disability index score is 38%.    Objective: Physical exam shows a well-developed well-nourished female in no acute distress.  Her incision is healing appropriately she has a absorbable suture that is not quite out yet at the caudal aspect but there is no erythema or inflammation.  She is nontender with rotation plus extension indicating lack of facet joint loading pain.  She is tender over her left posterior superior iliac spine.  She is nontender over her piriformis.    Imaging: AP and lateral EOS imaging is obtained today.  She does have unilateral cage settling.  There is no obvious fusion mass yet.  Is been no migration of the instrumentation.  I did review her prior CT scan which does show unilateral cage settling.        Assessment: Degenerative spondylolisthesis status post L4-5 TLIF progressing.    Plan: She can lift 10 to 20 pounds and walk as tolerated.  I like to hold off getting aggressive with PT for another 6 weeks.  After next follow-up I think it would be quite okay for her to work aggressively with PT.    Fausto Borges MD

## 2024-05-29 NOTE — PROGRESS NOTES
Kayla is now 6 weeks out from her L4-5 fusion.  Her medical history is complicated by mast cell activation syndrome and Omayra-Danlos.  She has had some complaint of knee swelling in her right total knee replacement area and would like to see someone here potentially about that so we will see about getting that set up.  He has been able to walk a mile today but this has been a little slow going forward.  She is part of the [employer] study.  Visual analog pain scale today is a 6 Oswestry disability index score is 38%.    Objective: Physical exam shows a well-developed well-nourished female in no acute distress.  Her incision is healing appropriately she has a absorbable suture that is not quite out yet at the caudal aspect but there is no erythema or inflammation.  She is nontender with rotation plus extension indicating lack of facet joint loading pain.  She is tender over her left posterior superior iliac spine.  She is nontender over her piriformis.    Imaging: AP and lateral EOS imaging is obtained today.  She does have unilateral cage settling.  There is no obvious fusion mass yet.  Is been no migration of the instrumentation.  I did review her prior CT scan which does show unilateral cage settling.        Assessment: Degenerative spondylolisthesis status post L4-5 TLIF progressing.    Plan: She can lift 10 to 20 pounds and walk as tolerated.  I like to hold off getting aggressive with PT for another 6 weeks.  After next follow-up I think it would be quite okay for her to work aggressively with PT.    Fausto Borges MD

## 2024-06-05 ENCOUNTER — THERAPY VISIT (OUTPATIENT)
Dept: PHYSICAL THERAPY | Facility: REHABILITATION | Age: 64
End: 2024-06-05
Payer: MEDICARE

## 2024-06-05 DIAGNOSIS — M54.6 CHRONIC BILATERAL THORACIC BACK PAIN: ICD-10-CM

## 2024-06-05 DIAGNOSIS — G89.29 CHRONIC BILATERAL THORACIC BACK PAIN: ICD-10-CM

## 2024-06-05 DIAGNOSIS — M54.2 NECK PAIN: ICD-10-CM

## 2024-06-05 DIAGNOSIS — M25.512 CHRONIC PAIN OF BOTH SHOULDERS: ICD-10-CM

## 2024-06-05 DIAGNOSIS — G89.29 CHRONIC PAIN OF BOTH SHOULDERS: ICD-10-CM

## 2024-06-05 DIAGNOSIS — G89.29 CHRONIC KNEE PAIN AFTER TOTAL REPLACEMENT OF RIGHT KNEE JOINT: ICD-10-CM

## 2024-06-05 DIAGNOSIS — Z96.651 CHRONIC KNEE PAIN AFTER TOTAL REPLACEMENT OF RIGHT KNEE JOINT: ICD-10-CM

## 2024-06-05 DIAGNOSIS — G89.29 CHRONIC BILATERAL LOW BACK PAIN WITHOUT SCIATICA: ICD-10-CM

## 2024-06-05 DIAGNOSIS — Q79.60 EHLERS-DANLOS SYNDROME: Primary | ICD-10-CM

## 2024-06-05 DIAGNOSIS — G44.209 TENSION HEADACHE: ICD-10-CM

## 2024-06-05 DIAGNOSIS — M25.561 CHRONIC KNEE PAIN AFTER TOTAL REPLACEMENT OF RIGHT KNEE JOINT: ICD-10-CM

## 2024-06-05 DIAGNOSIS — M54.50 CHRONIC BILATERAL LOW BACK PAIN WITHOUT SCIATICA: ICD-10-CM

## 2024-06-05 DIAGNOSIS — M25.511 CHRONIC PAIN OF BOTH SHOULDERS: ICD-10-CM

## 2024-06-05 PROCEDURE — 97140 MANUAL THERAPY 1/> REGIONS: CPT | Mod: GP | Performed by: PHYSICAL THERAPIST

## 2024-06-12 ENCOUNTER — THERAPY VISIT (OUTPATIENT)
Dept: PHYSICAL THERAPY | Facility: REHABILITATION | Age: 64
End: 2024-06-12
Payer: MEDICARE

## 2024-06-12 DIAGNOSIS — G89.29 CHRONIC BILATERAL LOW BACK PAIN WITHOUT SCIATICA: ICD-10-CM

## 2024-06-12 DIAGNOSIS — Q79.60 EHLERS-DANLOS SYNDROME: Primary | ICD-10-CM

## 2024-06-12 DIAGNOSIS — Z96.651 CHRONIC KNEE PAIN AFTER TOTAL REPLACEMENT OF RIGHT KNEE JOINT: ICD-10-CM

## 2024-06-12 DIAGNOSIS — G89.29 CHRONIC BILATERAL THORACIC BACK PAIN: ICD-10-CM

## 2024-06-12 DIAGNOSIS — M25.561 CHRONIC KNEE PAIN AFTER TOTAL REPLACEMENT OF RIGHT KNEE JOINT: ICD-10-CM

## 2024-06-12 DIAGNOSIS — G44.209 TENSION HEADACHE: ICD-10-CM

## 2024-06-12 DIAGNOSIS — M54.50 CHRONIC BILATERAL LOW BACK PAIN WITHOUT SCIATICA: ICD-10-CM

## 2024-06-12 DIAGNOSIS — M54.6 CHRONIC BILATERAL THORACIC BACK PAIN: ICD-10-CM

## 2024-06-12 DIAGNOSIS — G89.29 CHRONIC PAIN OF BOTH SHOULDERS: ICD-10-CM

## 2024-06-12 DIAGNOSIS — G89.29 CHRONIC KNEE PAIN AFTER TOTAL REPLACEMENT OF RIGHT KNEE JOINT: ICD-10-CM

## 2024-06-12 DIAGNOSIS — M54.2 NECK PAIN: ICD-10-CM

## 2024-06-12 DIAGNOSIS — M25.512 CHRONIC PAIN OF BOTH SHOULDERS: ICD-10-CM

## 2024-06-12 DIAGNOSIS — M25.511 CHRONIC PAIN OF BOTH SHOULDERS: ICD-10-CM

## 2024-06-12 PROCEDURE — 97140 MANUAL THERAPY 1/> REGIONS: CPT | Mod: GP | Performed by: PHYSICAL THERAPIST

## 2024-06-19 ENCOUNTER — THERAPY VISIT (OUTPATIENT)
Dept: PHYSICAL THERAPY | Facility: REHABILITATION | Age: 64
End: 2024-06-19
Payer: MEDICARE

## 2024-06-19 DIAGNOSIS — Z96.651 CHRONIC KNEE PAIN AFTER TOTAL REPLACEMENT OF RIGHT KNEE JOINT: ICD-10-CM

## 2024-06-19 DIAGNOSIS — M54.6 CHRONIC BILATERAL THORACIC BACK PAIN: ICD-10-CM

## 2024-06-19 DIAGNOSIS — M25.512 CHRONIC PAIN OF BOTH SHOULDERS: ICD-10-CM

## 2024-06-19 DIAGNOSIS — M54.2 NECK PAIN: ICD-10-CM

## 2024-06-19 DIAGNOSIS — G44.209 TENSION HEADACHE: ICD-10-CM

## 2024-06-19 DIAGNOSIS — Q79.60 EHLERS-DANLOS SYNDROME: Primary | ICD-10-CM

## 2024-06-19 DIAGNOSIS — G89.29 CHRONIC BILATERAL THORACIC BACK PAIN: ICD-10-CM

## 2024-06-19 DIAGNOSIS — M54.50 CHRONIC BILATERAL LOW BACK PAIN WITHOUT SCIATICA: ICD-10-CM

## 2024-06-19 DIAGNOSIS — G89.29 CHRONIC KNEE PAIN AFTER TOTAL REPLACEMENT OF RIGHT KNEE JOINT: ICD-10-CM

## 2024-06-19 DIAGNOSIS — G89.29 CHRONIC BILATERAL LOW BACK PAIN WITHOUT SCIATICA: ICD-10-CM

## 2024-06-19 DIAGNOSIS — M25.561 CHRONIC KNEE PAIN AFTER TOTAL REPLACEMENT OF RIGHT KNEE JOINT: ICD-10-CM

## 2024-06-19 DIAGNOSIS — M25.511 CHRONIC PAIN OF BOTH SHOULDERS: ICD-10-CM

## 2024-06-19 DIAGNOSIS — G89.29 CHRONIC PAIN OF BOTH SHOULDERS: ICD-10-CM

## 2024-06-19 PROCEDURE — 97140 MANUAL THERAPY 1/> REGIONS: CPT | Mod: GP | Performed by: PHYSICAL THERAPIST

## 2024-06-19 NOTE — PROGRESS NOTES
[facility]                                                                                   OUTPATIENT PHYSICAL THERAPY    PLAN OF TREATMENT FOR OUTPATIENT REHABILITATION   Patient's Last Name, First Name, Kelsy Mccarty YOB: 1960   Provider's Name   [facility]   Medical Record No.  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              Referring Provider:  Chaitanya Ortega    Initial Assessment  See Epic Evaluation- Start of Care Date: 12/27/23            PLAN  Continue therapy per current plan of care.    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   PT Tx Diagnosis Neck, shoulder, knee, and mid/lower back pain secondary to hypermobility and multiple surgeries   Precautions/Limitations Hernia present R side   Other pertinent information R shoulder is w/c   Quick Adds Certification   Progress Note/Certification   Start of Care Date 12/27/23   Onset of illness/injury or Date of  Surgery 11/16/23  (Order date)   Therapy Frequency 1x/week to every other week   Predicted Duration 12 weeks   Certification date from 06/18/24   Certification date to 09/16/24   Progress Note Completed Date 12/27/23   GOALS   PT Goals 2;3;4;5   PT Goal 1   Goal Identifier Self-management/HEP   Goal Description Patient will be independent in self-management of condition and HEP to reach functional goals.   Rationale to maximize safety and independence with performance of ADLs and functional tasks;to maximize safety and independence within the home;to maximize safety and independence with self cares   Goal Progress Progressing toward. Has and is ding HEp as set up, is pacing chores as able to achieve outdoor task, interupts prolonged or repetative activities of positions.   Target Date 09/17/24   PT Goal 2   Goal Identifier Walking   Goal Description Patient will be able to walk 2 miles ,1x/day with her dog with <4/10 pain in the knee and lower back in order to return to PLOF.   Rationale to maximize safety and independence with performance of ADLs and functional tasks;to maximize safety and independence within the home;to maximize safety and independence within the community;to maximize safety and independence with self cares   Goal Progress Progressing toward. Patient is walking 1-1.5 miles 1x/day, and another short walk of 1/2 mile. When pt walks this distance, 4/10 LBP and R knee pain 5-6/10. Pt feels Right knee pain is limiting factor- doesn't feel aligned.   Target Date 09/17/24   PT Goal 3   Goal Identifier Putting dishes away   Goal Description Patient will be able put dishes away in cupboard with <3/10 pain in the shoulders in order to perform daily house work.   Rationale to maximize safety and independence with performance of ADLs and functional tasks;to maximize safety and independence with transportation;to maximize safety and independence with self cares   Goal Progress DIfficult and not met with both  shoulders. Putting dishes away with right arm is 2-3/10, with L arm 5-6/10 and uses neck.   Target Date 09/17/24   PT Goal 4   Goal Identifier Stairs   Goal Description Patient will be able to maneuver stairs with <4/10 pain in the knee and greater ease in order to improve functional mobility.   Rationale to maximize safety and independence with performance of ADLs and functional tasks;to maximize safety and independence within the home;to maximize safety and independence within the community;to maximize safety and independence with transportation;to maximize safety and independence with self cares   Goal Progress Ongoing- maybe little change. Pt must manuever right knee to avoid pain and achieve climb- right knee 5-7/10.  (Pt also doing PT at [facility] for body/EDS)   Target Date 09/17/24   PT Goal 5   Goal Identifier Headaches   Goal Description Patient will reduce HA frequency to <1x/week in order to improve QOL.   Rationale to maximize safety and independence with transportation;to maximize safety and independence within the home;to maximize safety and independence with performance of ADLs and functional tasks;to maximize safety and independence with self cares   Goal Progress Improved. Pt has had less HA frequently - now is at HA 1x/wk or less. Intensity of HA when present- 7-8/10 at base of right skull and wraps to right eye. Pt feels HA can be affected by sleep position.   Target Date 09/17/24   Subjective Report   Subjective Report I t has been a bad week. Left SI/L5 area painful and pinching 7/10, and right knee feels fat and burns and twist - coinstant 6-7/10. The back pain lets up when sitting or laying. Burning right buttock/outer thigh to right knee medial knee and back of calf  to numb all tos and bottom of foot. Right knee feels fat and kneecap hurts and tight- Had PT e;sewhere and it helped but still sore.   Objective Measures   Objective Measures Objective Measure 1;Objective Measure  2;Objective Measure 3;Objective Measure 4;Objective Measure 5   Objective Measure 1   Objective Measure Cervical ROM   Details 6/19/24 L rot 45 deg, R rot 58 deg, 4/3/24 Mod limited flexion, extension is WNL, major limited R SB, mod limited L SB, mod limited bilateral rotation   Objective Measure 2   Objective Measure Shoulder ROM   Details 6/19/24 L shldr flexion 88 deg  5/10, R 170 deg 1/10, 1/2/24 Flexion to ~90 deg on the L, 110 deg R   Objective Measure 3   Objective Measure Lumbar ROM (not today)   Details 6/19/24 flexion to 8 inches with mid arc pain level 3/10,Pain with lumbar extension, pain with R lumbar side bending on the R   Objective Measure 4   Objective Measure Marked R lat quad and R QL spasm   Treatment Interventions (PT)   Interventions Manual Therapy   Therapeutic Procedure/Exercise   Ther Proc 1 NUSTEP   Ther Proc 1 - Details To promote axial trunk rotation and UE/LE strengthening and mobility: NUSTEP x 8 minutes WL 3.0 with 626 legs only steps on NUSTEP and subjective measures taken.   PTRx Ther Proc 1 Gluteal Myofascial Full Arc   PTRx Ther Proc 1 - Details Discontinue   PTRx Ther Proc 2 Gluteal Myofascial Upper Arc   PTRx Ther Proc 2 - Details Discontinue   PTRx Ther Proc 3 Gluteal Myofascial Lower Arc   PTRx Ther Proc 3 - Details Discontinue   PTRx Ther Proc 4 Shoulder Theraband Rows   PTRx Ther Proc 4 - Details Discontinue   PTRx Ther Proc 5 Pallof Press   PTRx Ther Proc 5 - Details Discontinue   PTRx Ther Proc 6 Supine Cervical Retraction   PTRx Ther Proc 6 - Details HEP   PTRx Ther Proc 7 Scapular Retraction/Depression   PTRx Ther Proc 7 - Details HEP   PTRx Ther Proc 8 Bilateral Rotation With Theraband   PTRx Ther Proc 8 - Details Discontinue   PTRx Ther Proc 9 Abdominal Brace Transverse Abdominis   PTRx Ther Proc 9 - Details PRN   PTRx Ther Proc 10 Supine Abdominal Exercise #3 (Marching)   PTRx Ther Proc 10 - Details Discontinue   PTRx Ther Proc 11 Pubic Shot Gun MET   PTRx Ther Proc 11  - Details PRN   PTRx Ther Proc 12 Supine MET For Anterior Posterior Innominate Rotation   PTRx Ther Proc 12 - Details PRN   PTRx Ther Proc 13 Bridging #1   PTRx Ther Proc 13 - Details Discontinue   PTRx Ther Proc 14 Treatment for Anterior/Posterior Ilium Standing- Muscle Energy Technique (MET)   PTRx Ther Proc 14 - Details PRN   PTRx Ther Proc 15 Treatment for Anterior/Posterior Ilium MET Prone   PTRx Ther Proc 15 - Details PRN   PTRx Ther Proc 16 Hip Flexion Straight Leg Raise   PTRx Ther Proc 16 - Details HEP   PTRx Ther Proc 17 Supine Abdominal Exercise #3B (Two Leg Marching)   PTRx Ther Proc 17 - Details HEP   PTRx Ther Proc 18 Shoulder Theraband IR Step Out   PTRx Ther Proc 18 - Details Discontinue   PTRx Ther Proc 19 Shoulder Theraband External Rotation Step Out   PTRx Ther Proc 19 - Details Discontinue   PTRx Ther Proc 20 Roll Outs Hooklying   PTRx Ther Proc 20 - Details R KTC and pubic  squeeze- 1x/day   Skilled Intervention Discussed HEP and focusing on isometric strengthening, especially of the quadricep on the R to improve knee pain. Patient to work on HEP as able.   Patient Response/Progress Good understanding of HEP.   Neuromuscular Re-education   PTRx Neuro Re-ed 1 Wand Shoulder Flexion Supine   PTRx Neuro Re-ed 1 - Details HEP   PTRx Neuro Re-ed 2 Isometric Quad   PTRx Neuro Re-ed 2 - Details Discussed performing for home   Manual Therapy   Manual Therapy: Mobilization, MFR, MLD, friction massage minutes ([id number]) 55   Manual Therapy 1 MFR/MT/SCS to abdomen and pelvis and LE   Manual Therapy 1 - Details Counterstrain   Skilled Intervention R patellar glide in and med, R MSG/MGI-LV, R POP-LV,R LPERF-A, R PES-LV, LF L3-MA   Patient Response/Progress decreased LBP and easier to move- less right QL spasm.Tolerated prone with good positioning for 45 minutes   Self Care/home Management   Skilled Intervention Use pillow at waist line to unload shoulder to trial sidesleeping   Education   Learner/Method  Patient;Listening;Demonstration   Plan   Home program R KTC and pubic squeeze, shldr and neck ex, lumbar extension   Updates to plan of care Treat sacrum and right LE neural/arterial fascial dusfynction   Plan for next session Continue MT/Counterstrain to fascial dysfunctions-emphasize fascial dysufnction causing pain and R LE burning/compresion R umbar ALL/LF, PS2-3 48,PELV 62, IMP 69   Comments   Comments Pt feels part of her pain issue is the L3-4 nerve root injury which occurred during anesthesia injection for TKA as it has resulted in Right quad weakness. Pt is sore and still having difficulty sleeping- states she wakes every 20-30 min all night which is due to pain, She cannot sleep on her stomach or shoulders, and jeanne is compressing tender spots at night causing her to wake. She is appropriete to continue PT to work on fascial restrictions present to decrease pain and improve sleeping and ADL pain levels.   Total Session Time   Timed Code Treatment Minutes 55   Total Treatment Time (sum of timed and untimed services) 55

## 2024-06-26 ENCOUNTER — THERAPY VISIT (OUTPATIENT)
Dept: PHYSICAL THERAPY | Facility: REHABILITATION | Age: 64
End: 2024-06-26
Payer: MEDICARE

## 2024-06-26 DIAGNOSIS — M54.2 NECK PAIN: ICD-10-CM

## 2024-06-26 DIAGNOSIS — M25.511 CHRONIC PAIN OF BOTH SHOULDERS: ICD-10-CM

## 2024-06-26 DIAGNOSIS — G89.29 CHRONIC BILATERAL THORACIC BACK PAIN: ICD-10-CM

## 2024-06-26 DIAGNOSIS — M25.561 CHRONIC KNEE PAIN AFTER TOTAL REPLACEMENT OF RIGHT KNEE JOINT: ICD-10-CM

## 2024-06-26 DIAGNOSIS — G89.29 CHRONIC PAIN OF BOTH SHOULDERS: ICD-10-CM

## 2024-06-26 DIAGNOSIS — M25.512 CHRONIC PAIN OF BOTH SHOULDERS: ICD-10-CM

## 2024-06-26 DIAGNOSIS — M54.50 CHRONIC BILATERAL LOW BACK PAIN WITHOUT SCIATICA: ICD-10-CM

## 2024-06-26 DIAGNOSIS — Z96.651 CHRONIC KNEE PAIN AFTER TOTAL REPLACEMENT OF RIGHT KNEE JOINT: ICD-10-CM

## 2024-06-26 DIAGNOSIS — G44.209 TENSION HEADACHE: ICD-10-CM

## 2024-06-26 DIAGNOSIS — Q79.60 EHLERS-DANLOS SYNDROME: Primary | ICD-10-CM

## 2024-06-26 DIAGNOSIS — G89.29 CHRONIC KNEE PAIN AFTER TOTAL REPLACEMENT OF RIGHT KNEE JOINT: ICD-10-CM

## 2024-06-26 DIAGNOSIS — M54.6 CHRONIC BILATERAL THORACIC BACK PAIN: ICD-10-CM

## 2024-06-26 DIAGNOSIS — G89.29 CHRONIC BILATERAL LOW BACK PAIN WITHOUT SCIATICA: ICD-10-CM

## 2024-06-26 PROCEDURE — 97140 MANUAL THERAPY 1/> REGIONS: CPT | Mod: GP | Performed by: PHYSICAL THERAPIST

## 2024-07-03 ENCOUNTER — THERAPY VISIT (OUTPATIENT)
Dept: PHYSICAL THERAPY | Facility: REHABILITATION | Age: 64
End: 2024-07-03
Payer: MEDICARE

## 2024-07-03 DIAGNOSIS — G89.29 CHRONIC BILATERAL THORACIC BACK PAIN: ICD-10-CM

## 2024-07-03 DIAGNOSIS — G89.29 CHRONIC KNEE PAIN AFTER TOTAL REPLACEMENT OF RIGHT KNEE JOINT: ICD-10-CM

## 2024-07-03 DIAGNOSIS — Q79.60 EHLERS-DANLOS SYNDROME: Primary | ICD-10-CM

## 2024-07-03 DIAGNOSIS — G89.29 CHRONIC BILATERAL LOW BACK PAIN WITHOUT SCIATICA: ICD-10-CM

## 2024-07-03 DIAGNOSIS — M25.511 CHRONIC PAIN OF BOTH SHOULDERS: ICD-10-CM

## 2024-07-03 DIAGNOSIS — G44.209 TENSION HEADACHE: ICD-10-CM

## 2024-07-03 DIAGNOSIS — M25.512 CHRONIC PAIN OF BOTH SHOULDERS: ICD-10-CM

## 2024-07-03 DIAGNOSIS — M54.50 CHRONIC BILATERAL LOW BACK PAIN WITHOUT SCIATICA: ICD-10-CM

## 2024-07-03 DIAGNOSIS — M54.2 NECK PAIN: ICD-10-CM

## 2024-07-03 DIAGNOSIS — G89.29 CHRONIC PAIN OF BOTH SHOULDERS: ICD-10-CM

## 2024-07-03 DIAGNOSIS — M54.6 CHRONIC BILATERAL THORACIC BACK PAIN: ICD-10-CM

## 2024-07-03 DIAGNOSIS — M25.561 CHRONIC KNEE PAIN AFTER TOTAL REPLACEMENT OF RIGHT KNEE JOINT: ICD-10-CM

## 2024-07-03 DIAGNOSIS — Z96.651 CHRONIC KNEE PAIN AFTER TOTAL REPLACEMENT OF RIGHT KNEE JOINT: ICD-10-CM

## 2024-07-03 PROCEDURE — 97140 MANUAL THERAPY 1/> REGIONS: CPT | Mod: GP | Performed by: PHYSICAL THERAPIST

## 2024-07-05 DIAGNOSIS — Z98.1 S/P SPINAL FUSION: Primary | ICD-10-CM

## 2024-07-10 ENCOUNTER — OFFICE VISIT (OUTPATIENT)
Dept: ORTHOPEDICS | Facility: CLINIC | Age: 64
End: 2024-07-10
Payer: MEDICARE

## 2024-07-10 ENCOUNTER — ANCILLARY PROCEDURE (OUTPATIENT)
Dept: GENERAL RADIOLOGY | Facility: CLINIC | Age: 64
End: 2024-07-10
Attending: ORTHOPAEDIC SURGERY
Payer: MEDICARE

## 2024-07-10 VITALS — WEIGHT: 138 LBS | HEIGHT: 65 IN | BODY MASS INDEX: 22.99 KG/M2

## 2024-07-10 DIAGNOSIS — Z96.651 HISTORY OF TOTAL RIGHT KNEE REPLACEMENT: ICD-10-CM

## 2024-07-10 DIAGNOSIS — M25.561 CHRONIC PAIN OF RIGHT KNEE: ICD-10-CM

## 2024-07-10 DIAGNOSIS — M54.16 LUMBAR RADICULOPATHY: Primary | ICD-10-CM

## 2024-07-10 DIAGNOSIS — M80.00XA AGE-RELATED OSTEOPOROSIS WITH CURRENT PATHOLOGICAL FRACTURE, INITIAL ENCOUNTER: ICD-10-CM

## 2024-07-10 DIAGNOSIS — Z98.1 S/P SPINAL FUSION: ICD-10-CM

## 2024-07-10 DIAGNOSIS — Q79.62 EHLERS-DANLOS, HYPERMOBILE TYPE: ICD-10-CM

## 2024-07-10 DIAGNOSIS — M43.10 DEGENERATIVE SPONDYLOLISTHESIS: ICD-10-CM

## 2024-07-10 DIAGNOSIS — G89.29 CHRONIC PAIN OF RIGHT KNEE: ICD-10-CM

## 2024-07-10 DIAGNOSIS — D89.40 MAST CELL ACTIVATION SYNDROME (H): ICD-10-CM

## 2024-07-10 PROCEDURE — 99024 POSTOP FOLLOW-UP VISIT: CPT | Performed by: ORTHOPAEDIC SURGERY

## 2024-07-10 PROCEDURE — 72082 X-RAY EXAM ENTIRE SPI 2/3 VW: CPT | Mod: GC | Performed by: RADIOLOGY

## 2024-07-10 PROCEDURE — 77073 BONE LENGTH STUDIES: CPT | Mod: GC | Performed by: RADIOLOGY

## 2024-07-10 NOTE — PROGRESS NOTES
"  Spine Surgery Post-Op Visit    Subjective:  Kelsy Morley is a 64 year old female with h/o EDS who is s/p L4-L5 TLIF with BMP 4/15/24 by Dr. Borges. She is having pain in the right lumbosacral area which is present at night and worsens without the day. She also had some right lower thoracic spasm which finally resolved with TENS. She has a longstanding difficulty with posture that she related to EDS. Does PT for EDS. Takes tramadol at night which is her chronic regimen. She thinks some symptoms are better with surgery, but it has been up and down. She has been walk 12-13K steps/day. No leg pain, numbness, tingling    Bone health history  Takes Ca/Vit D supplementation   Previously on Fosamax  Last DEXA showed osteopenia with lowest T score -2.3. Frax 3.6% for hip fracture  Following with Dr. Calderon at [facility]  Opportunistic BMD 87 at L1 indicating osteoporosis  Had history of bilateral wrist fractures      Oswestry (LISA) Questionnaire        7/8/2024     9:34 PM   OSWESTRY DISABILITY INDEX   Count 10   Sum 25   Oswestry Score (%) 50 %      Visual Analog Scale (VAS) Questionnaire        7/10/2024     1:50 PM   VISUAL ANALOG PAIN SCALE   Back Pain Scale 0-10 5   Right leg pain 4   Left leg pain 0   Neck Pain Scale 0-10 3   Right arm pain 2   Left arm pain 4        Physical Exam:  Vitals: Ht 1.651 m (5' 5\")   Wt 62.6 kg (138 lb)   BMI 22.96 kg/m    Constitutional: Patient is healthy, well-nourished and appears stated age.  Respiratory: Patient is breathing normally and in no respiratory distress.  Skin: Incision well healed, no evidence of wound dehiscence or erythema.   Gait: Non-antalgic gait without use of assistive devices.   Musculoskeletal: Strength: 5/5 iliopsoas, 5/5 quadriceps, 5/5 hamstrings, 5/5 anterior tibialis, 5/5 extensor hallucis longus, 5/5 gastrocnemius.   Piriformis stretch test positive on the right    Imaging:  We independently reviewed and interpreted the following imaging at this " clinic visit which were also reviewed with patient  Xrays EOS Scoliosis AP/lat 7/10/2024  L4-L5 interbody and pedicle screw fusion with stable subsidence of the interbody cages into the L4 vertebral body.  We reviewed the IntraOp postoperative CT scan on 4/27/2024 which shows that the subsidence is stable compared to the postoperative CT scan but new compared to IntraOp.  There is stable disc height loss at L5-S1.  No evidence of hardware loosening or failure.      Assessment:  64 year old female 3 months status post L4-5 fusion with stable subsidence of the interbody cages and fracture into the L4 vertebral body   History of osteoporosis    Plan:  Since her FRAX score puts her in the osteoporosis category and since she has had subsidence of the cage, we recommend that she pursue treatment of her osteoporosis with an osteoanabolic agent.  We would prefer Evenity or Forteo.  We have asked that she make a follow-up appointment with Dr. Calderon.  Overall, x-rays have been stable and we are hopeful that she will heal. However, if she doesn't heal, she could need a reoperation surgery in the future.  PT referral    Follow up in 3 months with repeat EOS Scoliosis AP/lat XR.     The patient was shown their own imaging and all questions were answered. Patient was seen and examined with Dr. Borges.  Thank you for allowing me to be a part of this patient's care.     Zoe Ladd PA-C   Orthopedic Spine Surgery     I saw and evaluated the patient and developed the plan.  Fausto Borges MD

## 2024-07-10 NOTE — NURSING NOTE
"Reason For Visit:   Chief Complaint   Patient presents with    RECHECK     DOS 4-15-24 TLIF for Degen. Spondy. & Lumbar Radiculopathy       Primary MD: Pj Dillon  Ref. MD: EST     ?  No  Occupation Disability.  Currently working? No.  Work status?  On disability.  Date of injury: no  Type of injury: no.  Date of surgery: 4/15/24   Type of surgery: Decompression and transforaminal lumbar interbody fusion with Smith Galeas Osteotomy Lumbar 4-5, device insertion, image guided .  Smoker: No  Request smoking cessation information: No    Ht 1.651 m (5' 5\")   Wt 62.6 kg (138 lb)   BMI 22.96 kg/m      Pain Assessment  Patient Currently in Pain: Yes  0-10 Pain Scale: 5 (mid to low back pain)  Primary Pain Location: Back    Oswestry (LISA) Questionnaire        7/8/2024     9:34 PM   OSWESTRY DISABILITY INDEX   Count 10   Sum 25   Oswestry Score (%) 50 %            Neck Disability Index (NDI) Questionnaire         No data to display                       Visual Analog Pain Scale  Back Pain Scale 0-10: 5 (mid to low back pain)  Right leg pain: 4 (knee pain)  Left leg pain: 0  Neck Pain Scale 0-10: 3 (neck pain left side worse)  Right arm pain: 2 (shoulder pain)  Left arm pain: 4 (shoulder pain)    Promis 10 Assessment        7/8/2024     9:37 PM   PROMIS 10   In general, would you say your health is: Very good   In general, would you say your quality of life is: Very good   In general, how would you rate your physical health? Good   In general, how would you rate your mental health, including your mood and your ability to think? Very good   In general, how would you rate your satisfaction with your social activities and relationships? Very good   In general, please rate how well you carry out your usual social activities and roles Good   To what extent are you able to carry out your everyday physical activities such as walking, climbing stairs, carrying groceries, or moving a chair? Mostly   In the past 7 " days, how often have you been bothered by emotional problems such as feeling anxious, depressed, or irritable? Sometimes   In the past 7 days, how would you rate your fatigue on average? Mild   In the past 7 days, how would you rate your pain on average, where 0 means no pain, and 10 means worst imaginable pain? 6   In general, would you say your health is: 4   In general, would you say your quality of life is: 4   In general, how would you rate your physical health? 3   In general, how would you rate your mental health, including your mood and your ability to think? 4   In general, how would you rate your satisfaction with your social activities and relationships? 4   In general, please rate how well you carry out your usual social activities and roles. (This includes activities at home, at work and in your community, and responsibilities as a parent, child, spouse, employee, friend, etc.) 3   To what extent are you able to carry out your everyday physical activities such as walking, climbing stairs, carrying groceries, or moving a chair? 4   In the past 7 days, how often have you been bothered by emotional problems such as feeling anxious, depressed, or irritable? 3   In the past 7 days, how would you rate your fatigue on average? 2   In the past 7 days, how would you rate your pain on average, where 0 means no pain, and 10 means worst imaginable pain? 6   Global Mental Health Score 15   Global Physical Health Score 14   PROMIS TOTAL - SUBSCORES 29                James Walker MA

## 2024-07-10 NOTE — LETTER
"7/10/2024      Kelsy Morley  [address]  [facility] [place] [facility] [address]      Dear Colleague,    Thank you for referring your patient, Kelsy Morley, to the [facility] [place]. Please see a copy of my visit note below.      Spine Surgery Post-Op Visit    Subjective:  Kelsy Morley is a 64 year old female with h/o EDS who is s/p L4-L5 TLIF with BMP 4/15/24 by Dr. Borges. She is having pain in the right lumbosacral area which is present at night and worsens without the day. She also had some right lower thoracic spasm which finally resolved with TENS. She has a longstanding difficulty with posture that she related to EDS. Does PT for EDS. Takes tramadol at night which is her chronic regimen. She thinks some symptoms are better with surgery, but it has been up and down. She has been walk 12-13K steps/day. No leg pain, numbness, tingling    Bone health history  Takes Ca/Vit D supplementation   Previously on Fosamax  Last DEXA showed osteopenia with lowest T score -2.3. Frax 3.6% for hip fracture  Following with Dr. Calderon at [facility]  Opportunistic BMD 87 at L1 indicating osteoporosis  Had history of bilateral wrist fractures      Oswestry (LISA) Questionnaire        7/8/2024     9:34 PM   OSWESTRY DISABILITY INDEX   Count 10   Sum 25   Oswestry Score (%) 50 %      Visual Analog Scale (VAS) Questionnaire        7/10/2024     1:50 PM   VISUAL ANALOG PAIN SCALE   Back Pain Scale 0-10 5   Right leg pain 4   Left leg pain 0   Neck Pain Scale 0-10 3   Right arm pain 2   Left arm pain 4        Physical Exam:  Vitals: Ht 1.651 m (5' 5\")   Wt 62.6 kg (138 lb)   BMI 22.96 kg/m    Constitutional: Patient is healthy, well-nourished and appears stated age.  Respiratory: Patient is breathing normally and in no respiratory distress.  Skin: Incision well healed, no evidence of wound dehiscence or erythema.   Gait: Non-antalgic gait without use of assistive devices.   Musculoskeletal: Strength: 5/5 " iliopsoas, 5/5 quadriceps, 5/5 hamstrings, 5/5 anterior tibialis, 5/5 extensor hallucis longus, 5/5 gastrocnemius.   Piriformis stretch test positive on the right    Imaging:  We independently reviewed and interpreted the following imaging at this clinic visit which were also reviewed with patient  Xrays EOS Scoliosis AP/lat 7/10/2024  L4-L5 interbody and pedicle screw fusion with stable subsidence of the interbody cages into the L4 vertebral body.  We reviewed the IntraOp postoperative CT scan on 4/27/2024 which shows that the subsidence is stable compared to the postoperative CT scan but new compared to IntraOp.  There is stable disc height loss at L5-S1.  No evidence of hardware loosening or failure.      Assessment:  64 year old female 3 months status post L4-5 fusion with stable subsidence of the interbody cages and fracture into the L4 vertebral body   History of osteoporosis    Plan:  Since her FRAX score puts her in the osteoporosis category and since she has had subsidence of the cage, we recommend that she pursue treatment of her osteoporosis with an osteoanabolic agent.  We would prefer Evenity or Forteo.  We have asked that she make a follow-up appointment with Dr. Calderon.  Overall, x-rays have been stable and we are hopeful that she will heal. However, if she doesn't heal, she could need a reoperation surgery in the future.  PT referral    Follow up in 3 months with repeat EOS Scoliosis AP/lat XR.     The patient was shown their own imaging and all questions were answered. Patient was seen and examined with Dr. Borges.  Thank you for allowing me to be a part of this patient's care.     Zoe Ladd PA-C   Orthopedic Spine Surgery     I saw and evaluated the patient and developed the plan.  Fausto Borges MD

## 2024-07-13 ASSESSMENT — KOOS JR
TWISING OR PIVOTING ON KNEE: MODERATE
BENDING TO THE FLOOR TO PICK UP OBJECT: SEVERE
HOW SEVERE IS YOUR KNEE STIFFNESS AFTER FIRST WAKING IN MORNING: MODERATE
GOING UP OR DOWN STAIRS: SEVERE
RISING FROM SITTING: MODERATE
STRAIGHTENING KNEE FULLY: MODERATE
KOOS JR SCORING: 44.91
STANDING UPRIGHT: SEVERE

## 2024-07-15 DIAGNOSIS — M25.561 CHRONIC PAIN OF RIGHT KNEE: Primary | ICD-10-CM

## 2024-07-15 DIAGNOSIS — G89.29 CHRONIC PAIN OF RIGHT KNEE: Primary | ICD-10-CM

## 2024-07-17 DIAGNOSIS — M81.0 SENILE OSTEOPOROSIS: Primary | ICD-10-CM

## 2024-07-17 NOTE — TELEPHONE ENCOUNTER
Action July 18, 2024 8:48 AM MT   Action Taken Called [facility] for imaging to be pushed STAT, no answer. Sent a STAT request for imaging.       DIAGNOSIS: RIGHT KNEE   APPOINTMENT DATE: 07/18/2024   NOTES STATUS DETAILS   OFFICE NOTE from referring provider SELF    OFFICE NOTE from other specialist Internal  Care Everywhere [facility]    09/09/2022 - Burak Doherty MD - [facility]    OPERATIVE REPORT Care Everywhere 06/30/2024 - RT TKA   [facility] (for Spine) Care Everywhere Media Tab: 07/11/2022, 10/19/2024 - [facility]       XRAYS (IMAGES & REPORTS) PACS Internal:  EOS Total Body/RT Knee/Leg Length    [facility]:  09/28/2022, 12/16/2021, 10/11/2021, 07/20/2021, 05/28/2021, 01/07/2020 - Bilateral Knee  07/01/2021 - Knee Standing  06/30/2021- Knee PORT

## 2024-07-18 ENCOUNTER — OFFICE VISIT (OUTPATIENT)
Dept: ORTHOPEDICS | Facility: CLINIC | Age: 64
End: 2024-07-18
Payer: MEDICARE

## 2024-07-18 ENCOUNTER — PRE VISIT (OUTPATIENT)
Dept: ORTHOPEDICS | Facility: CLINIC | Age: 64
End: 2024-07-18

## 2024-07-18 ENCOUNTER — ANCILLARY PROCEDURE (OUTPATIENT)
Dept: GENERAL RADIOLOGY | Facility: CLINIC | Age: 64
End: 2024-07-18
Attending: ORTHOPAEDIC SURGERY
Payer: MEDICARE

## 2024-07-18 VITALS — WEIGHT: 137 LBS | HEIGHT: 65 IN | BODY MASS INDEX: 22.82 KG/M2

## 2024-07-18 DIAGNOSIS — M25.561 CHRONIC PAIN OF RIGHT KNEE: ICD-10-CM

## 2024-07-18 DIAGNOSIS — G89.29 CHRONIC PAIN OF RIGHT KNEE: ICD-10-CM

## 2024-07-18 DIAGNOSIS — Z96.651 HISTORY OF TOTAL RIGHT KNEE REPLACEMENT: ICD-10-CM

## 2024-07-18 PROCEDURE — 73562 X-RAY EXAM OF KNEE 3: CPT | Mod: RT | Performed by: RADIOLOGY

## 2024-07-18 PROCEDURE — 99214 OFFICE O/P EST MOD 30 MIN: CPT | Performed by: ORTHOPAEDIC SURGERY
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
## 2024-07-18 NOTE — NURSING NOTE
"Reason For Visit:   Chief Complaint   Patient presents with    Consult     Right TKA June 2021. Burak Doherty at [facility]. Gets no response from them, surgeon said everything looks great. L3 and L4 nerve roots were damaged by the nerve block. Has EMG results with her. Weak quads. Lots of pain. Feels leg \"rotates in on it's own\" Has hurt since she woke up from surgery. Knee cap moves around a lot.      44.91  Ht 1.651 m (5' 5\")   Wt 62.1 kg (137 lb)   BMI 22.80 kg/m           Marissa East, ATC    "
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
## 2024-07-19 ENCOUNTER — THERAPY VISIT (OUTPATIENT)
Dept: PHYSICAL THERAPY | Facility: CLINIC | Age: 64
End: 2024-07-19
Attending: ORTHOPAEDIC SURGERY
Payer: MEDICARE

## 2024-07-19 DIAGNOSIS — M54.50 CHRONIC BILATERAL LOW BACK PAIN WITHOUT SCIATICA: ICD-10-CM

## 2024-07-19 DIAGNOSIS — M54.6 CHRONIC BILATERAL THORACIC BACK PAIN: ICD-10-CM

## 2024-07-19 DIAGNOSIS — G89.29 CHRONIC KNEE PAIN AFTER TOTAL REPLACEMENT OF RIGHT KNEE JOINT: ICD-10-CM

## 2024-07-19 DIAGNOSIS — M25.561 CHRONIC KNEE PAIN AFTER TOTAL REPLACEMENT OF RIGHT KNEE JOINT: ICD-10-CM

## 2024-07-19 DIAGNOSIS — Z96.651 CHRONIC KNEE PAIN AFTER TOTAL REPLACEMENT OF RIGHT KNEE JOINT: ICD-10-CM

## 2024-07-19 DIAGNOSIS — Q79.62 EHLERS-DANLOS, HYPERMOBILE TYPE: ICD-10-CM

## 2024-07-19 DIAGNOSIS — M54.16 LUMBAR RADICULOPATHY: ICD-10-CM

## 2024-07-19 DIAGNOSIS — M25.511 CHRONIC PAIN OF BOTH SHOULDERS: ICD-10-CM

## 2024-07-19 DIAGNOSIS — G89.29 CHRONIC BILATERAL THORACIC BACK PAIN: ICD-10-CM

## 2024-07-19 DIAGNOSIS — Q79.60 EHLERS-DANLOS SYNDROME: Primary | ICD-10-CM

## 2024-07-19 DIAGNOSIS — M43.10 DEGENERATIVE SPONDYLOLISTHESIS: ICD-10-CM

## 2024-07-19 DIAGNOSIS — M54.2 NECK PAIN: ICD-10-CM

## 2024-07-19 DIAGNOSIS — G44.209 TENSION HEADACHE: ICD-10-CM

## 2024-07-19 DIAGNOSIS — M80.00XA AGE-RELATED OSTEOPOROSIS WITH CURRENT PATHOLOGICAL FRACTURE, INITIAL ENCOUNTER: ICD-10-CM

## 2024-07-19 DIAGNOSIS — Z98.1 S/P SPINAL FUSION: ICD-10-CM

## 2024-07-19 DIAGNOSIS — M25.512 CHRONIC PAIN OF BOTH SHOULDERS: ICD-10-CM

## 2024-07-19 DIAGNOSIS — G89.29 CHRONIC BILATERAL LOW BACK PAIN WITHOUT SCIATICA: ICD-10-CM

## 2024-07-19 DIAGNOSIS — D89.40 MAST CELL ACTIVATION SYNDROME (H): ICD-10-CM

## 2024-07-19 DIAGNOSIS — G89.29 CHRONIC PAIN OF BOTH SHOULDERS: ICD-10-CM

## 2024-07-19 PROCEDURE — 97162 PT EVAL MOD COMPLEX 30 MIN: CPT | Mod: GP | Performed by: PHYSICAL THERAPIST

## 2024-07-19 PROCEDURE — 97535 SELF CARE MNGMENT TRAINING: CPT | Mod: GP | Performed by: PHYSICAL THERAPIST

## 2024-07-19 NOTE — PROGRESS NOTES
PHYSICAL THERAPY EVALUATION  Type of Visit: Evaluation       Fall Risk Screen:  Fall screen completed by: PT  Have you fallen 2 or more times in the past year?: Yes  Have you fallen and had an injury in the past year?: No  Is patient a fall risk?: Yes    Subjective  Spine fusion decompression with Dr Borges 4/15/24  Pain is worse than pre-op after 6 weeks.  Cage punched through the load bearing vertebrae.  Plan is starting to start bone density med.  EDS dx and chronic pain sx in 2017.   Nerve damage during nerve block in 21. Since surgery lots of mid back spasm.  Sleep is really disrupted. Used to be albe to get about 3-4 hours consecutively and now is more like 1-2 hours consecutively.  Tried to get her walking going but wound up having to go the the ED due to pain. Was walking 12-13k steps per day but lately less.  Used to be if she was walking it was somewhat better. Now not true. Until a couple of weeks ago, if she wws horizontal it was better. Past couple of weeks more painful.  POTs has been way better this past couple of years.         Presenting condition or subjective complaint: Omayra Danlow syndrome, mitochondria myopathy, Bertolotti syndrome S/P L3/L4 fusion and decompression  Date of onset: 04/15/24    Relevant medical history: Arthritis; Cold or hot arm or leg; Concussions; Depression; Foot drop; Hearing problems; Incontinence; Menopause; Migraines or headaches; Neck injury; Numbness or tingling in perianal area; Osteoarthritis; Osteoporosis; Pain at night or rest; Thyroid problems   Dates & types of surgery: Spinal fusion, R TKA, Five shoulder surgeries, five sinus surgeries, left CMC, two hernia surgeries, gallbladder, removal, etc.    Prior diagnostic imaging/testing results: MRI; CT scan; X-ray; EMG; Bone scan     Prior therapy history for the same diagnosis, illness or injury: Yes PT Since 2018    Prior Level of Function  Transfers: Independent  Ambulation: Independent  ADL: Independent  IADL:   Indep    Living Environment  Social support: Alone   Type of home: [facility]; 2-story   Stairs to enter the home: Yes 3 Is there a railing: No     Ramp: No   Stairs inside the home: Yes 15 Is there a railing: Yes     Help at home: None  Equipment owned:       Employment: No    Hobbies/Interests: Sewing, camping, bicycling, hiking    Patient goals for therapy: Have less pain, get on my bike, walk further, clean my own home, go on a road trip, go camping again    Pain assessment: Pain present R LE Particularly Knee feels like quad is weak and painful.  (B) shoulders are painful, R hip painful,  CRPS in L LE. Allodynia in R foot and calf. Interrupts sleep     Objective      Cognitive Status Examination  Orientation: Oriented to person, place and time   Level of Consciousness: Alert  Follows Commands and Answers Questions: 100% of the time  Personal Safety and Judgement: Intact  Memory: Intact    OBSERVATION: Tearful due to frustration and fatigue with pain.  INTEGUMENTARY: Intact  POSTURE:  Grossly WFL genu recurvatem on L.   PALPATION: NT  RANGE OF MOTION:  Hyper mobile.   STRENGTH:   Pain: - none + mild ++ moderate +++ severe  Strength Scale: 0-5/5 Left Right   Ankle Dorsiflexion 5 5   Ankle Plantarflexion 5 5   Great Toe Extension 5 5   Anterior Tibialis 5 5     Pain: - none + mild ++ moderate +++ severe  Strength Scale: 0-5/5 Left Right   Hip Flexion 5 4+   Knee Flexion 5 4+   Knee Extension 5 4+     Functional strength in upright posture limited by pain and impaired length tension relationships.     BED MOBILITY: Independent    TRANSFERS: Independent    GAIT:   Level of [place]: Independent  Assistive Device(s): None  Gait Deviations: WFL  Gait Distance: 20'   Stairs: NT    BALANCE: Sitting balance is indep.  Standing and dynamic balance not fully assessed.    SPECIAL TESTS  Castro Balance Scale (BBS)     5 Times Sit-to-Stand (5TSTS)  11:94s  increasing pain with each one       SENSATION:  decreasd sensation /  numbness and tingling R all toes and lower leg,  feels cold.     REFLEXES: NT  COORDINATION: Grossly WFL. Not tested today  MUSCLE TONE: WFL  Beighton Hypermobility Scale    B elbow (2) 2   B knees (2) 1   B thumbs (2) 1   B small finger HE (2) 1   Bend and touch floor with flat palms (1) 1   Score: (0-9)   6          Generalized joint hypermobility identified by:  ?6 pre-pubertal children and adolescents  ?5 pubertal men and woman to age 50  ?4 men and women over the age of 50    When she was younger and pre surgeries she scored 9/9  Symptomatic pes planus? Yes fasciotomy on the L R side also loses arch on R CRPS in L LE     Coat  Phenomenon (suboccipital and paracervical pain that worsens in upright posture): yes    English Impact of Hypermobility Questionnaire: 217/360 (60.2%)      Dysautonomia Screening - Active Stand Test      POTS and dysautonomia largely under control. HR often low but not symptomatic. Does get orthostatic from time to time. BP used to be 90s/60s now it's higher due to age      Assessment & Plan   CLINICAL IMPRESSIONS  Medical Diagnosis: Lumbar radiculopathy  Age-related osteoporosis with current pathological fracture, initial encounter  Mast cell activation syndrome (H24)  Omayra-Danlos, hypermobile type  Degenerative spondylolisthesis  S/P spinal fusion    Treatment Diagnosis: force production deficit   Impression/Assessment: Patient is a 64 year old female with pain, decreased strength and activity tolerance complaints.  The following significant findings have been identified: Pain, Decreased strength, Decreased proprioception, Impaired sensation, Inflammation, Impaired gait, Impaired muscle performance, Decreased activity tolerance, and Instability. These impairments interfere with their ability to perform self care tasks, recreational activities, household chores, household mobility, and community mobility as compared to previous level of function.     Clinical Decision Making  (Complexity):  Clinical Presentation: Evolving/Changing  Clinical Presentation Rationale: based on medical and personal factors listed in PT evaluation  Clinical Decision Making (Complexity): Moderate complexity    PLAN OF CARE  Treatment Interventions:  Interventions: Gait Training, Manual Therapy, Neuromuscular Re-education, Therapeutic Activity, Therapeutic Exercise, Self-Care/Home Management    Long Term Goals     PT Goal 1  Goal Identifier: BIoH Questionnaire  Goal Description: Pt will demonstrate a 10% improvement in their BIoH score to indicate improved QOL and function.  Goal Progress: 217/360= 60.2%  Target Date: 10/17/24  PT Goal 2  Goal Identifier: Sleep  Goal Description: Pt will report able to sleep for at least 3 hours consecutively  at least 2x per week in order to begin to get restorative sleep.  Target Date: 10/17/24  PT Goal 3  Goal Identifier: Selfcare/home management  Goal Description: Pt will verbalize and demo appropriate use of supports and indep with HEP in order to maintian correct joint alignement and decrease pain.  Target Date: 10/17/24  PT Goal 4  Goal Identifier: Walking  Goal Description: Pt will report able to walk for 10 mins with dog, with appropriate supports, BID with no more than a 2 point increase in her baseline pain.  Rationale: to maximize safety and independence within the home;to maximize safety and independence within the community  Target Date: 10/17/24  PT Goal 5  Goal Identifier: Strength  Goal Description: pt will be able to perform 5xSTS with no more than a 1 point increase in pain in 11 seconds or less in order to demo improved fucntional strength  Rationale: to maximize safety and independence with self cares  Goal Progress: 111:94 sec  Target Date: 10/17/24  PT Goal 6  Goal Identifier: 6MWT  Goal Description: Pt able to perform 6MWT with appropriate supports with no more than 5/10 pain and an increase of no more than 1 point during or after.  Goal Progress: not  performed today. will get next session  Target Date: 10/17/24      Frequency of Treatment: 1x/week  Duration of Treatment: 90 days    Recommended Referrals to Other Professionals: Occupational Therapy, Also would recommend custom Orthotics to control ankle and foot position.  Pt is considering this.   Education Assessment:   Learner/Method: Patient    Risks and benefits of evaluation/treatment have been explained.   Patient/Family/caregiver agrees with Plan of Care.     Evaluation Time:     PT Eval, Moderate Complexity Minutes (13958): 30       Signing Clinician: Aubrie Avalos, BRIGIDO ALVES [facility]                                                                                   OUTPATIENT PHYSICAL THERAPY      PLAN OF TREATMENT FOR OUTPATIENT REHABILITATION   Patient's Last Name, First Name, Kelsy Mccarty YOB: 1960   Provider's Name   [facility]   Medical Record No.  [id number]     Onset Date: 04/15/24  Start of Care Date: 07/19/24     Medical Diagnosis:  Lumbar radiculopathy  Age-related osteoporosis with current pathological fracture, initial encounter  Mast cell activation syndrome (H24)  Omayra-Danlos, hypermobile type  Degenerative spondylolisthesis  S/P spinal fusion      PT Treatment Diagnosis:  force production deficit Plan of Treatment  Frequency/Duration: 1x/week/ 90 days    Certification date from 07/19/24 to 10/17/24         See note for plan of treatment details and functional goals     Aubrie Avalos, PT                         I CERTIFY THE NEED FOR THESE SERVICES FURNISHED UNDER        THIS PLAN OF TREATMENT AND WHILE UNDER MY CARE     (Physician attestation of this document indicates review and certification of the therapy plan).              Referring Provider:  Fausto Borges    Initial Assessment  See Epic Evaluation- Start of Care Date: 07/19/24

## 2024-07-22 DIAGNOSIS — Z98.1 S/P SPINAL FUSION: Primary | ICD-10-CM

## 2024-07-24 ENCOUNTER — ANCILLARY PROCEDURE (OUTPATIENT)
Dept: GENERAL RADIOLOGY | Facility: CLINIC | Age: 64
End: 2024-07-24
Attending: ORTHOPAEDIC SURGERY
Payer: MEDICARE

## 2024-07-24 ENCOUNTER — OFFICE VISIT (OUTPATIENT)
Dept: ORTHOPEDICS | Facility: CLINIC | Age: 64
End: 2024-07-24
Payer: MEDICARE

## 2024-07-24 DIAGNOSIS — M80.00XA AGE-RELATED OSTEOPOROSIS WITH CURRENT PATHOLOGICAL FRACTURE, INITIAL ENCOUNTER: ICD-10-CM

## 2024-07-24 DIAGNOSIS — Z98.1 S/P SPINAL FUSION: Primary | ICD-10-CM

## 2024-07-24 DIAGNOSIS — D89.40 MAST CELL ACTIVATION SYNDROME (H): ICD-10-CM

## 2024-07-24 DIAGNOSIS — Z98.1 S/P SPINAL FUSION: ICD-10-CM

## 2024-07-24 DIAGNOSIS — Q79.62 EHLERS-DANLOS SYNDROME TYPE III: ICD-10-CM

## 2024-07-24 DIAGNOSIS — M54.9 BACK PAIN: ICD-10-CM

## 2024-07-24 PROCEDURE — 99214 OFFICE O/P EST MOD 30 MIN: CPT | Performed by: ORTHOPAEDIC SURGERY

## 2024-07-24 PROCEDURE — 72170 X-RAY EXAM OF PELVIS: CPT | Performed by: RADIOLOGY

## 2024-07-24 PROCEDURE — 72082 X-RAY EXAM ENTIRE SPI 2/3 VW: CPT | Performed by: STUDENT IN AN ORGANIZED HEALTH CARE EDUCATION/TRAINING PROGRAM

## 2024-07-24 PROCEDURE — 77073 BONE LENGTH STUDIES: CPT | Performed by: STUDENT IN AN ORGANIZED HEALTH CARE EDUCATION/TRAINING PROGRAM
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
## 2024-07-24 NOTE — NURSING NOTE
Reason For Visit:   Chief Complaint   Patient presents with    RECHECK     DOS 4/15/24 Decompression and transforaminal lumbar interbody fusion with Smith Galeas Osteotomy Lumbar 4-5, device insertion       Primary MD: Pj Dillon  Ref. MD: EST    ?  No  Occupation Disability.     Date of injury: No  Type of injury: No.     Date of surgery: 4/15/24  Type of surgery: Decompression and transforaminal lumbar interbody fusion with Smith Galeas Osteotomy Lumbar 4-5, device insertion, image guided      Smoker: No  Request smoking cessation information: No    There were no vitals taken for this visit.    Pain Assessment  Patient Currently in Pain: Yes  0-10 Pain Scale: 7  Primary Pain Location: Back    Oswestry (LISA) Questionnaire        7/8/2024     9:34 PM   OSWESTRY DISABILITY INDEX   Count 10   Sum 25   Oswestry Score (%) 50 %        Visual Analog Pain Scale  Back Pain Scale 0-10: 6.5  Right leg pain: 0 (feels like leg dragging)  Left leg pain: 0  Neck Pain Scale 0-10: 0  Right arm pain: 0  Left arm pain: 0    Promis 10 Assessment        7/24/2024     9:42 AM   PROMIS 10   In general, would you say your health is: Very good   In general, would you say your quality of life is: Very good   In general, how would you rate your physical health? Good   In general, how would you rate your mental health, including your mood and your ability to think? Very good   In general, how would you rate your satisfaction with your social activities and relationships? Good   In general, please rate how well you carry out your usual social activities and roles Good   To what extent are you able to carry out your everyday physical activities such as walking, climbing stairs, carrying groceries, or moving a chair? A little   In the past 7 days, how often have you been bothered by emotional problems such as feeling anxious, depressed, or irritable? Rarely   In the past 7 days, how would you rate your fatigue on average?  Moderate   In the past 7 days, how would you rate your pain on average, where 0 means no pain, and 10 means worst imaginable pain? 7   In general, would you say your health is: 4   In general, would you say your quality of life is: 4   In general, how would you rate your physical health? 3   In general, how would you rate your mental health, including your mood and your ability to think? 4   In general, how would you rate your satisfaction with your social activities and relationships? 3   In general, please rate how well you carry out your usual social activities and roles. (This includes activities at home, at work and in your community, and responsibilities as a parent, child, spouse, employee, friend, etc.) 3   To what extent are you able to carry out your everyday physical activities such as walking, climbing stairs, carrying groceries, or moving a chair? 2   In the past 7 days, how often have you been bothered by emotional problems such as feeling anxious, depressed, or irritable? 2   In the past 7 days, how would you rate your fatigue on average? 3   In the past 7 days, how would you rate your pain on average, where 0 means no pain, and 10 means worst imaginable pain? 7   Global Mental Health Score 15   Global Physical Health Score 10   PROMIS TOTAL - SUBSCORES 25                Jessica Abraham LPN
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
## 2024-07-31 ENCOUNTER — INFUSION THERAPY VISIT (OUTPATIENT)
Dept: INFUSION THERAPY | Facility: HOSPITAL | Age: 64
End: 2024-07-31
Attending: ALLERGY & IMMUNOLOGY
Payer: MEDICARE

## 2024-07-31 DIAGNOSIS — L50.8 CHRONIC URTICARIA: Primary | ICD-10-CM

## 2024-07-31 DIAGNOSIS — L50.1 IDIOPATHIC URTICARIA: ICD-10-CM

## 2024-07-31 PROCEDURE — 250N000011 HC RX IP 250 OP 636: Performed by: REGISTERED NURSE

## 2024-07-31 PROCEDURE — 96372 THER/PROPH/DIAG INJ SC/IM: CPT | Performed by: REGISTERED NURSE

## 2024-07-31 RX ORDER — METHYLPREDNISOLONE SODIUM SUCCINATE 125 MG/2ML
125 INJECTION, POWDER, LYOPHILIZED, FOR SOLUTION INTRAMUSCULAR; INTRAVENOUS
Status: CANCELLED
Start: 2024-08-20

## 2024-07-31 RX ORDER — ALBUTEROL SULFATE 90 UG/1
1-2 AEROSOL, METERED RESPIRATORY (INHALATION)
Status: DISCONTINUED | OUTPATIENT
Start: 2024-07-31 | End: 2024-07-31 | Stop reason: HOSPADM

## 2024-07-31 RX ORDER — EPINEPHRINE 1 MG/ML
0.3 INJECTION, SOLUTION INTRAMUSCULAR; SUBCUTANEOUS EVERY 5 MIN PRN
Status: DISCONTINUED | OUTPATIENT
Start: 2024-07-31 | End: 2024-07-31 | Stop reason: HOSPADM

## 2024-07-31 RX ORDER — EPINEPHRINE 1 MG/ML
0.3 INJECTION, SOLUTION INTRAMUSCULAR; SUBCUTANEOUS EVERY 5 MIN PRN
Status: CANCELLED | OUTPATIENT
Start: 2024-08-20

## 2024-07-31 RX ORDER — MEPERIDINE HYDROCHLORIDE 50 MG/ML
25 INJECTION INTRAMUSCULAR; INTRAVENOUS; SUBCUTANEOUS EVERY 30 MIN PRN
Status: CANCELLED | OUTPATIENT
Start: 2024-08-20

## 2024-07-31 RX ORDER — MEPERIDINE HYDROCHLORIDE 50 MG/ML
25 INJECTION INTRAMUSCULAR; INTRAVENOUS; SUBCUTANEOUS EVERY 30 MIN PRN
Status: DISCONTINUED | OUTPATIENT
Start: 2024-07-31 | End: 2024-07-31 | Stop reason: HOSPADM

## 2024-07-31 RX ORDER — ALBUTEROL SULFATE 90 UG/1
1-2 AEROSOL, METERED RESPIRATORY (INHALATION)
Status: CANCELLED
Start: 2024-08-20

## 2024-07-31 RX ORDER — DIPHENHYDRAMINE HYDROCHLORIDE 50 MG/ML
50 INJECTION INTRAMUSCULAR; INTRAVENOUS
Status: DISCONTINUED | OUTPATIENT
Start: 2024-07-31 | End: 2024-07-31 | Stop reason: HOSPADM

## 2024-07-31 RX ORDER — METHYLPREDNISOLONE SODIUM SUCCINATE 125 MG/2ML
125 INJECTION, POWDER, LYOPHILIZED, FOR SOLUTION INTRAMUSCULAR; INTRAVENOUS
Status: DISCONTINUED | OUTPATIENT
Start: 2024-07-31 | End: 2024-07-31 | Stop reason: HOSPADM

## 2024-07-31 RX ORDER — ALBUTEROL SULFATE 0.83 MG/ML
2.5 SOLUTION RESPIRATORY (INHALATION)
Status: DISCONTINUED | OUTPATIENT
Start: 2024-07-31 | End: 2024-07-31 | Stop reason: HOSPADM

## 2024-07-31 RX ORDER — DIPHENHYDRAMINE HYDROCHLORIDE 50 MG/ML
50 INJECTION INTRAMUSCULAR; INTRAVENOUS
Status: CANCELLED
Start: 2024-08-20

## 2024-07-31 RX ORDER — ALBUTEROL SULFATE 0.83 MG/ML
2.5 SOLUTION RESPIRATORY (INHALATION)
Status: CANCELLED | OUTPATIENT
Start: 2024-08-20

## 2024-07-31 RX ADMIN — OMALIZUMAB 300 MG: 150 INJECTION, SOLUTION SUBCUTANEOUS at 14:26

## 2024-07-31 NOTE — PROGRESS NOTES
Infusion Nursing Note:  Kelsy Morley presents today for subcutaneous xolair.    Patient seen by provider today: No   present during visit today: Not Applicable.    Note: Tolerated injections well, offers no complaints.      Intravenous Access:  No Intravenous access/labs at this visit.    Treatment Conditions:  Not Applicable.      Post Infusion Assessment:  Patient tolerated injection without incident.       Discharge Plan:   Patient and/or family verbalized understanding of discharge instructions and all questions answered.      Tala Liriano RN

## 2024-08-03 ENCOUNTER — HEALTH MAINTENANCE LETTER (OUTPATIENT)
Age: 64
End: 2024-08-03

## 2024-08-05 ENCOUNTER — THERAPY VISIT (OUTPATIENT)
Dept: PHYSICAL THERAPY | Facility: CLINIC | Age: 64
End: 2024-08-05
Attending: ORTHOPAEDIC SURGERY
Payer: MEDICARE

## 2024-08-05 DIAGNOSIS — M54.16 LUMBAR RADICULOPATHY: Primary | ICD-10-CM

## 2024-08-05 DIAGNOSIS — Q79.62 EHLERS-DANLOS, HYPERMOBILE TYPE: ICD-10-CM

## 2024-08-05 DIAGNOSIS — M80.00XA AGE-RELATED OSTEOPOROSIS WITH CURRENT PATHOLOGICAL FRACTURE, INITIAL ENCOUNTER: ICD-10-CM

## 2024-08-05 DIAGNOSIS — D89.40 MAST CELL ACTIVATION SYNDROME (H): ICD-10-CM

## 2024-08-05 PROCEDURE — 97535 SELF CARE MNGMENT TRAINING: CPT | Mod: GP | Performed by: PHYSICAL THERAPIST

## 2024-08-05 PROCEDURE — 97110 THERAPEUTIC EXERCISES: CPT | Mod: GP | Performed by: PHYSICAL THERAPIST

## 2024-08-19 DIAGNOSIS — M47.816 LUMBAR FACET ARTHROPATHY: ICD-10-CM

## 2024-08-19 RX ORDER — LAMOTRIGINE 25 MG/1
50 TABLET ORAL AT BEDTIME
Qty: 180 TABLET | Refills: 1 | Status: SHIPPED | OUTPATIENT
Start: 2024-08-19

## 2024-08-19 NOTE — TELEPHONE ENCOUNTER
Received fax request from pharmacy requesting refill(s) for lamoTRIgine (LAMICTAL) 25 MG tablet    Last refilled on 5/20/2024 (90 Day supply)     Pt last seen on 5/15/2024.  Next appt scheduled for 8/28/2024.    Will facilitate refill.

## 2024-08-20 ENCOUNTER — OFFICE VISIT (OUTPATIENT)
Dept: CARDIOLOGY | Facility: CLINIC | Age: 64
End: 2024-08-20
Attending: INTERNAL MEDICINE
Payer: MEDICARE

## 2024-08-20 VITALS
HEART RATE: 72 BPM | BODY MASS INDEX: 22.81 KG/M2 | DIASTOLIC BLOOD PRESSURE: 83 MMHG | WEIGHT: 137.1 LBS | SYSTOLIC BLOOD PRESSURE: 125 MMHG | OXYGEN SATURATION: 100 %

## 2024-08-20 DIAGNOSIS — G90.9 AUTONOMIC DYSFUNCTION: ICD-10-CM

## 2024-08-20 PROCEDURE — 99214 OFFICE O/P EST MOD 30 MIN: CPT | Performed by: INTERNAL MEDICINE

## 2024-08-20 PROCEDURE — G0463 HOSPITAL OUTPT CLINIC VISIT: HCPCS | Performed by: INTERNAL MEDICINE

## 2024-08-20 ASSESSMENT — PAIN SCALES - GENERAL: PAINLEVEL: NO PAIN (0)
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
## 2024-08-20 NOTE — PATIENT INSTRUCTIONS
You were seen in the Electrophysiology Clinic today by: Dr Lanier    Plan:     Follow up Visit:  1 year      If you have further questions, please utilize [employer] to contact us.     Your Care Team:    EP Cardiology   Telephone Number     Nurse Line  Mindy Santacruz, RN   Paulette Henry, JULIETA Sanon, JULIETA   [phone]     For scheduling appointments:   Joan   [phone]   For procedure scheduling:    Pricilla Antonio     [phone]   For the Device Clinic (Pacemakers, ICDs, Loop Recorders)    During business hours: [phone]  After business hours:   [phone]- select option 4 and ask for job code [id number].       On-call cardiologist for after hours or on weekends:   [phone], option #4, and ask to speak to the on-call cardiologist.     Cardiovascular Clinic:   [address] [place] [address]. [place] [address]      As always, Thank you for trusting us with your health care needs!
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
## 2024-08-20 NOTE — NURSING NOTE
Chief Complaint   Patient presents with    Follow Up     1 yr follow up for OH  Have not heard from pt since last visit  Recent echo from february  No cardiac meds          Vitals were taken, medications reconciled.    Gurjit Moran, Clinic Assistant   3:26 PM

## 2024-08-22 ENCOUNTER — THERAPY VISIT (OUTPATIENT)
Dept: PHYSICAL THERAPY | Facility: CLINIC | Age: 64
End: 2024-08-22
Attending: ORTHOPAEDIC SURGERY
Payer: MEDICARE

## 2024-08-22 DIAGNOSIS — M80.00XA AGE-RELATED OSTEOPOROSIS WITH CURRENT PATHOLOGICAL FRACTURE, INITIAL ENCOUNTER: ICD-10-CM

## 2024-08-22 DIAGNOSIS — M54.16 LUMBAR RADICULOPATHY: Primary | ICD-10-CM

## 2024-08-22 DIAGNOSIS — Q79.62 EHLERS-DANLOS, HYPERMOBILE TYPE: ICD-10-CM

## 2024-08-22 PROCEDURE — 97535 SELF CARE MNGMENT TRAINING: CPT | Mod: GP | Performed by: PHYSICAL THERAPIST

## 2024-08-22 PROCEDURE — 97110 THERAPEUTIC EXERCISES: CPT | Mod: GP | Performed by: PHYSICAL THERAPIST

## 2024-08-26 ENCOUNTER — THERAPY VISIT (OUTPATIENT)
Dept: PHYSICAL THERAPY | Facility: CLINIC | Age: 64
End: 2024-08-26
Attending: ORTHOPAEDIC SURGERY
Payer: MEDICARE

## 2024-08-26 DIAGNOSIS — M54.16 LUMBAR RADICULOPATHY: Primary | ICD-10-CM

## 2024-08-26 DIAGNOSIS — M80.00XA AGE-RELATED OSTEOPOROSIS WITH CURRENT PATHOLOGICAL FRACTURE, INITIAL ENCOUNTER: ICD-10-CM

## 2024-08-26 DIAGNOSIS — Q79.62 EHLERS-DANLOS, HYPERMOBILE TYPE: ICD-10-CM

## 2024-08-26 PROCEDURE — 97110 THERAPEUTIC EXERCISES: CPT | Mod: GP | Performed by: PHYSICAL THERAPIST

## 2024-08-26 ASSESSMENT — PAIN SCALES - PAIN ENJOYMENT GENERAL ACTIVITY SCALE (PEG)
PEG_TOTALSCORE: 7.33
INTERFERED_ENJOYMENT_LIFE: 7
AVG_PAIN_PASTWEEK: 7
INTERFERED_GENERAL_ACTIVITY: 8

## 2024-08-28 ENCOUNTER — OFFICE VISIT (OUTPATIENT)
Dept: PALLIATIVE MEDICINE | Facility: OTHER | Age: 64
End: 2024-08-28
Attending: ANESTHESIOLOGY
Payer: MEDICARE

## 2024-08-28 VITALS
SYSTOLIC BLOOD PRESSURE: 118 MMHG | HEART RATE: 76 BPM | OXYGEN SATURATION: 99 % | DIASTOLIC BLOOD PRESSURE: 77 MMHG | BODY MASS INDEX: 22.47 KG/M2 | WEIGHT: 135 LBS

## 2024-08-28 DIAGNOSIS — G90.9 AUTONOMIC DYSFUNCTION: Primary | ICD-10-CM

## 2024-08-28 DIAGNOSIS — G89.29 CHRONIC BILATERAL THORACIC BACK PAIN: ICD-10-CM

## 2024-08-28 DIAGNOSIS — Q79.62 EHLERS-DANLOS SYNDROME TYPE III: ICD-10-CM

## 2024-08-28 DIAGNOSIS — M80.00XA AGE-RELATED OSTEOPOROSIS WITH CURRENT PATHOLOGICAL FRACTURE, INITIAL ENCOUNTER: ICD-10-CM

## 2024-08-28 DIAGNOSIS — M47.816 LUMBAR FACET ARTHROPATHY: ICD-10-CM

## 2024-08-28 DIAGNOSIS — M54.6 CHRONIC BILATERAL THORACIC BACK PAIN: ICD-10-CM

## 2024-08-28 DIAGNOSIS — D89.40 MAST CELL ACTIVATION SYNDROME (H): ICD-10-CM

## 2024-08-28 DIAGNOSIS — Z98.1 S/P SPINAL FUSION: ICD-10-CM

## 2024-08-28 PROCEDURE — 99213 OFFICE O/P EST LOW 20 MIN: CPT | Performed by: ANESTHESIOLOGY

## 2024-08-28 PROCEDURE — G0463 HOSPITAL OUTPT CLINIC VISIT: HCPCS | Performed by: ANESTHESIOLOGY

## 2024-08-28 ASSESSMENT — PAIN SCALES - GENERAL: PAINLEVEL: SEVERE PAIN (7)

## 2024-08-28 NOTE — PROGRESS NOTES
"[facility] Follow-up    Date of visit: 8/28/2024    Chief complaint:   Chief Complaint   Patient presents with    Pain     Seen for history of lumbar surgery, chronic regional pain syndrome, mood symptoms  Interval history:    Reviewing the record followed in orthopedics after her fusion.    Also seen in orthopedics for total knee arthroplasty.    She reviews today not doing well.    Describes 10 days after her lumbar fusion went to the emergency room feeling her pain was a different, having pain now on her left side of her back which was new.  She was told that the follow-ups were postoperative changes, then more recently looking into her CT concern that the cage and \"push through and endplate\".  There is discussion of osteoporosis, she reviews being told she had osteopenia and had been assessed before the surgeries.    Notes pain is on left and right side of her back, hard to stand with muscle spasms.    Has started some injections to help metabolically with bone growth.  No mention of a bone stimulator.    Is taking some vitamin D 2000 units, level in November was 67.    It is hard for her to sit or stand or lay a new position for more than 10 or 15 minutes, frustrated.    She has resumed some low-dose naltrexone which she had taken in the past, last in 2020 at 4.5 mg twice a day.  Discussed this could be useful.    Has been using the methylene blue 20 mg daily which has been very helpful for her mood until this flareup.  We discussed some people do increase to 30 mg daily.    She did go to the [place] pain clinic yesterday where they discussed resuming Cymbalta.  She would like to avoid that as she had a very long discontinuation syndrome process of months.            Medications:  Current Outpatient Medications   Medication Sig Dispense Refill    acetaminophen (TYLENOL) 325 MG tablet Take 1-2 tablets (325-650 mg) by mouth every 6 hours as needed for mild " pain Use a Maximum of 4,000mg of acetaminophen from all sources      cetirizine HCl 10 MG CAPS Take 20 mg by mouth at bedtime      EMGALITY 120 MG/ML injection Inject 120 mg Subcutaneous every 28 days Last dose 2/23/24      estradiol (ESTRACE) 0.1 MG/GM vaginal cream Place 1 g vaginally three times a week       famotidine (PEPCID) 40 MG tablet Take 40 mg by mouth as needed      fexofenadine (ALLEGRA) 60 MG tablet Take 180 mg by mouth every morning      ipratropium (ATROVENT) 0.06 % nasal spray Spray 1 spray into both nostrils every morning      lamoTRIgine (LAMICTAL) 25 MG tablet Take 2 tablets (50 mg) by mouth at bedtime 180 tablet 1    LEVOTHYROXINE SODIUM PO Take 75 mcg by mouth every morning      lidocaine (LIDODERM) 5 % patch daily as needed for moderate pain      lisdexamfetamine (VYVANSE) 30 MG capsule Take 30 mg by mouth every morning      Magic Mouthwash (FV std formula) lidocaine visc 2% 2.5mL/5mL & maalox/mylanta w/ simeth 2.5mL/5mL & diphenhydrAMINE 5mg/5mL Swish and swallow 10 mLs in mouth every 6 hours as needed for mouth sores      magnesium oxide 400 MG CAPS Take by mouth as needed 2-3 a day depending on ability to have bowel movement      Methylene Blue POWD Take 20 mg by mouth every morning #120 120 g 2    montelukast (SINGULAIR) 10 MG tablet Take 10 mg by mouth every morning      mupirocin (BACTROBAN) 2 % external ointment Apply 1 inch topically daily as needed      Naltrexone HCl POWD 4.5 mg capsule twice a day 60 g 3    omalizumab (XOLAIR) 150 MG injection Inject 150 mg Subcutaneous every 28 days Next dose 4/10/24      romosozumab-aqqg (EVENITY) 105 MG/1.17ML injection Inject 210 mg subcutaneously every 28 days      SUMAtriptan Succinate (IMITREX PO) Take 100 mg by mouth every 8 hours as needed for migraine      tiZANidine (ZANAFLEX) 4 MG tablet Take 4 mg by mouth 3 times daily as needed for muscle spasms      traMADol-acetaminophen (ULTRACET) 37.5-325 MG tablet Take 1 tablet by mouth at bedtime  Take a maximum of 4,000mg of acetaminophen from all sources      TRAZODONE HCL PO Take 100 mg by mouth at bedtime      vitamin B-12 (CYANOCOBALAMIN) 1000 MCG tablet 1,000 mcg daily      vitamin D3 (CHOLECALCIFEROL) 50 mcg (2000 units) tablet Take 1 tablet by mouth daily      zinc gluconate 50 MG tablet Take 50 mg by mouth every other day      zolpidem (AMBIEN) 10 MG tablet Take 5 mg by mouth at bedtime             Physical Exam:  Blood pressure 118/77, pulse 76, weight 61.2 kg (135 lb), SpO2 99%, not currently breastfeeding.    Alert, clear sensorium, no respiratory distress, no pain behavior.    Affect congruent.  Constricted          Assessment:   History of chronic regional pain syndrome, more recently having a lumbar fusion, concern for new symptoms, attributed to hardware in the endplate.    We discussed vitamin K2 that might help with the vitamin D3 for bone metabolism.    Discussed increasing the methylene blue if she noted some initial response to 30 mg daily.    Reviewed some other augmenting approaches.    Total time 28 minutes       minutes spent on the date of encounter doing chart review, history, and exam documentation and further activities as noted above.     Chaitanya Ortega MD  [facility]

## 2024-08-28 NOTE — PATIENT INSTRUCTIONS
"[facility] Number:  [phone]  Call with any questions about your care and for scheduling assistance.   Calls are returned Monday through Friday between 8 AM and 4:30 PM. We usually get back to you within 2 business days depending on the issue/request.    If we are prescribing your medications:  For opioid medication refills, call the clinic or send a [place] message 7 days in advance.  Please include:  Name of requested medication  Name of the pharmacy.  For non-opioid medications, call your pharmacy directly to request a refill. Please allow 3-4 days to be processed.   Per [place] State Law:  All controlled substance prescriptions must be filled within 30 days of being written.    For those controlled substances allowing refills, pickup must occur within 30 days of last fill.      We believe regular attendance is key to your success in our program!    Any time you are unable to keep your appointment we ask that you call us at least 24 hours in advance to cancel.This will allow us to offer the appointment time to another patient.   Multiple missed appointments may lead to dismissal from the clinic.     PLAN:    Low-dose naltrexone 4.5 mg capsule 1 twice a day sent to [facility].    Discussed methylene blue, you may obtain on-line capsules that are 7 or 8 mg, take two  morning and 2 at noon.    Vitamin K2 100 mg twice a day to take with your vitamin D3 to help with bone metabolism.    Discussed the use of \"C-15\" or \"fatty 15\" as an agent that may help with mast cell activation stability.  100 mg capsule twice a day.    Follow-up with Dr. Ortega in 2 to 3 months  "

## 2024-08-28 NOTE — NURSING NOTE
Patient presents to the clinic today for a follow up with NICOLE TUTTLE MD  regarding Pain Management.           2/19/2024     9:16 AM 5/15/2024     8:56 AM 8/28/2024    11:29 AM   PEG Score   PEG Total Score 7 3.67 9      CSA/UDT 10/4/23    Betina Veronica MA  [facility] [place]

## 2024-08-30 ENCOUNTER — MYC MEDICAL ADVICE (OUTPATIENT)
Dept: PALLIATIVE MEDICINE | Facility: OTHER | Age: 64
End: 2024-08-30
Payer: MEDICARE

## 2024-08-30 DIAGNOSIS — G90.9 AUTONOMIC DYSFUNCTION: ICD-10-CM

## 2024-08-30 DIAGNOSIS — G71.3 MITOCHONDRIAL MYOPATHY: ICD-10-CM

## 2024-09-03 RX ORDER — METHYLENE BLUE
30 POWDER (GRAM) MISCELLANEOUS EVERY MORNING
Qty: 120 G | Refills: 2 | Status: SHIPPED | OUTPATIENT
Start: 2024-09-03 | End: 2024-09-05

## 2024-09-03 NOTE — TELEPHONE ENCOUNTER
Pending Prescriptions:                       Disp   Refills    Methylene Blue POWD                       120 g  2            Sig: Take 20 mg by mouth every morning. #120    Signed Prescriptions:                        Disp   Refills    Naltrexone HCl POWD                        60 g   3        Si.5 mg capsule twice a day  Authorizing Provider: NICOLE TUTTLE

## 2024-09-03 NOTE — TELEPHONE ENCOUNTER
Redirecting this prescription per patient request  Pending Prescriptions:                       Disp   Refills    Naltrexone HCl POWD                       60 g   3            Si.5 mg capsule twice a day    Signed Prescriptions:                        Disp   Refills    Naltrexone HCl POWD                        60 g   3        Si.5 mg capsule twice a day  Authorizing Provider: NICOLE TUTTLE

## 2024-09-04 NOTE — TELEPHONE ENCOUNTER
[facility] Call Center    Phone Message    May a detailed message be left on voicemail: yes     Reason for Call: Other: Pt is asking for a call back from a nurse to discuss medication. Please call Pt back to discuss.      Action Taken: Message routed to:  Other: [place] [facility]    Travel Screening: Not Applicable     Date of Service:

## 2024-09-04 NOTE — TELEPHONE ENCOUNTER
Call placed to patient. Patient states that it only costs $25 extra to dispense 180 capsules of the methylene blue instead of the 120 capsules that were ordered. She stated that the pharmacy told her they limit dispensing only a 4 month supply at a time on the methylene blue. Patient asking if the prescription could be changed to take the methylene blue twice per day but she would only take it once daily so she can dispense the 180 tablets for 6 months.     Patient will call her pharmacy and confirm that they will dispense up to a 4 month supply of the naltrexone and send a MyChart back with the pharmacy's response.

## 2024-09-05 ENCOUNTER — INFUSION THERAPY VISIT (OUTPATIENT)
Dept: INFUSION THERAPY | Facility: HOSPITAL | Age: 64
End: 2024-09-05
Attending: FAMILY MEDICINE
Payer: MEDICARE

## 2024-09-05 ENCOUNTER — THERAPY VISIT (OUTPATIENT)
Dept: PHYSICAL THERAPY | Facility: CLINIC | Age: 64
End: 2024-09-05
Attending: ORTHOPAEDIC SURGERY
Payer: MEDICARE

## 2024-09-05 VITALS
DIASTOLIC BLOOD PRESSURE: 79 MMHG | SYSTOLIC BLOOD PRESSURE: 135 MMHG | OXYGEN SATURATION: 99 % | HEART RATE: 73 BPM | RESPIRATION RATE: 18 BRPM | TEMPERATURE: 99.1 F

## 2024-09-05 DIAGNOSIS — Z98.1 S/P SPINAL FUSION: ICD-10-CM

## 2024-09-05 DIAGNOSIS — M43.10 DEGENERATIVE SPONDYLOLISTHESIS: ICD-10-CM

## 2024-09-05 DIAGNOSIS — M54.16 LUMBAR RADICULOPATHY: Primary | ICD-10-CM

## 2024-09-05 DIAGNOSIS — M80.00XA AGE-RELATED OSTEOPOROSIS WITH CURRENT PATHOLOGICAL FRACTURE, INITIAL ENCOUNTER: ICD-10-CM

## 2024-09-05 DIAGNOSIS — L50.8 CHRONIC URTICARIA: Primary | ICD-10-CM

## 2024-09-05 DIAGNOSIS — L50.1 IDIOPATHIC URTICARIA: ICD-10-CM

## 2024-09-05 DIAGNOSIS — D89.40 MAST CELL ACTIVATION SYNDROME (H): ICD-10-CM

## 2024-09-05 DIAGNOSIS — Q79.62 EHLERS-DANLOS, HYPERMOBILE TYPE: ICD-10-CM

## 2024-09-05 PROCEDURE — 96372 THER/PROPH/DIAG INJ SC/IM: CPT | Performed by: REGISTERED NURSE

## 2024-09-05 PROCEDURE — 97535 SELF CARE MNGMENT TRAINING: CPT | Mod: GP | Performed by: PHYSICAL THERAPIST

## 2024-09-05 PROCEDURE — 250N000011 HC RX IP 250 OP 636: Performed by: REGISTERED NURSE

## 2024-09-05 PROCEDURE — 97140 MANUAL THERAPY 1/> REGIONS: CPT | Mod: GP | Performed by: PHYSICAL THERAPIST

## 2024-09-05 PROCEDURE — 97110 THERAPEUTIC EXERCISES: CPT | Mod: GP | Performed by: PHYSICAL THERAPIST

## 2024-09-05 RX ORDER — DIPHENHYDRAMINE HYDROCHLORIDE 50 MG/ML
50 INJECTION INTRAMUSCULAR; INTRAVENOUS
Status: DISCONTINUED | OUTPATIENT
Start: 2024-09-05 | End: 2024-09-05 | Stop reason: HOSPADM

## 2024-09-05 RX ORDER — METHYLENE BLUE
30 POWDER (GRAM) MISCELLANEOUS 2 TIMES DAILY
Qty: 240 G | Refills: 0 | Status: SHIPPED | OUTPATIENT
Start: 2024-09-05

## 2024-09-05 RX ORDER — METHYLPREDNISOLONE SODIUM SUCCINATE 125 MG/2ML
125 INJECTION, POWDER, LYOPHILIZED, FOR SOLUTION INTRAMUSCULAR; INTRAVENOUS
Status: DISCONTINUED | OUTPATIENT
Start: 2024-09-05 | End: 2024-09-05 | Stop reason: HOSPADM

## 2024-09-05 RX ORDER — MEPERIDINE HYDROCHLORIDE 50 MG/ML
25 INJECTION INTRAMUSCULAR; INTRAVENOUS; SUBCUTANEOUS EVERY 30 MIN PRN
Status: CANCELLED | OUTPATIENT
Start: 2024-09-25

## 2024-09-05 RX ORDER — ALBUTEROL SULFATE 0.83 MG/ML
2.5 SOLUTION RESPIRATORY (INHALATION)
Status: CANCELLED | OUTPATIENT
Start: 2024-09-25

## 2024-09-05 RX ORDER — METHYLPREDNISOLONE SODIUM SUCCINATE 125 MG/2ML
125 INJECTION, POWDER, LYOPHILIZED, FOR SOLUTION INTRAMUSCULAR; INTRAVENOUS
Status: CANCELLED
Start: 2024-09-25

## 2024-09-05 RX ORDER — ALBUTEROL SULFATE 90 UG/1
1-2 AEROSOL, METERED RESPIRATORY (INHALATION)
Status: DISCONTINUED | OUTPATIENT
Start: 2024-09-05 | End: 2024-09-05 | Stop reason: HOSPADM

## 2024-09-05 RX ORDER — ALBUTEROL SULFATE 0.83 MG/ML
2.5 SOLUTION RESPIRATORY (INHALATION)
Status: DISCONTINUED | OUTPATIENT
Start: 2024-09-05 | End: 2024-09-05 | Stop reason: HOSPADM

## 2024-09-05 RX ORDER — DIPHENHYDRAMINE HYDROCHLORIDE 50 MG/ML
50 INJECTION INTRAMUSCULAR; INTRAVENOUS
Status: CANCELLED
Start: 2024-09-25

## 2024-09-05 RX ORDER — EPINEPHRINE 1 MG/ML
0.3 INJECTION, SOLUTION INTRAMUSCULAR; SUBCUTANEOUS EVERY 5 MIN PRN
Status: CANCELLED | OUTPATIENT
Start: 2024-09-25

## 2024-09-05 RX ORDER — EPINEPHRINE 1 MG/ML
0.3 INJECTION, SOLUTION INTRAMUSCULAR; SUBCUTANEOUS EVERY 5 MIN PRN
Status: DISCONTINUED | OUTPATIENT
Start: 2024-09-05 | End: 2024-09-05 | Stop reason: HOSPADM

## 2024-09-05 RX ORDER — ALBUTEROL SULFATE 90 UG/1
1-2 AEROSOL, METERED RESPIRATORY (INHALATION)
Status: CANCELLED
Start: 2024-09-25

## 2024-09-05 RX ORDER — MEPERIDINE HYDROCHLORIDE 50 MG/ML
25 INJECTION INTRAMUSCULAR; INTRAVENOUS; SUBCUTANEOUS EVERY 30 MIN PRN
Status: DISCONTINUED | OUTPATIENT
Start: 2024-09-05 | End: 2024-09-05 | Stop reason: HOSPADM

## 2024-09-05 RX ADMIN — OMALIZUMAB 300 MG: 300 INJECTION, SOLUTION SUBCUTANEOUS at 14:04

## 2024-09-05 NOTE — PATIENT INSTRUCTIONS
Dr Rosales shoes - try to find a pair that will fit your current foot orthotics.     Can always try adding an over the counter gel pad to your shoes to soften them up.        Consider sleeping on the air mattress at home to see if that goes better sleep wise. If so, then you might need a new mattress and/or topper.

## 2024-09-05 NOTE — TELEPHONE ENCOUNTER
Pending Prescriptions:                       Disp   Refills    Methylene Blue POWD                       240 g  0            Sig: Take 30 mg by mouth 2 times daily. #240    Naltrexone HCl POWD                       240 g  0            Si.5 mg capsule twice a day      [facility] - [place], [place] - [address], [address]

## 2024-09-05 NOTE — PROGRESS NOTES
Kayla was here today for injection x 1 to left lower abdomen.  Pt tolerated injection well and without incident.  Pt refused to stay for 30 observation.      Danni LU, CMA

## 2024-09-13 ENCOUNTER — THERAPY VISIT (OUTPATIENT)
Dept: PHYSICAL THERAPY | Facility: CLINIC | Age: 64
End: 2024-09-13
Attending: ORTHOPAEDIC SURGERY
Payer: MEDICARE

## 2024-09-13 DIAGNOSIS — M54.16 LUMBAR RADICULOPATHY: Primary | ICD-10-CM

## 2024-09-13 DIAGNOSIS — M43.10 DEGENERATIVE SPONDYLOLISTHESIS: ICD-10-CM

## 2024-09-13 DIAGNOSIS — Q79.62 EHLERS-DANLOS, HYPERMOBILE TYPE: ICD-10-CM

## 2024-09-13 PROCEDURE — 97140 MANUAL THERAPY 1/> REGIONS: CPT | Mod: GP | Performed by: PHYSICAL THERAPIST

## 2024-09-13 PROCEDURE — 97110 THERAPEUTIC EXERCISES: CPT | Mod: GP | Performed by: PHYSICAL THERAPIST

## 2024-09-20 ENCOUNTER — THERAPY VISIT (OUTPATIENT)
Dept: PHYSICAL THERAPY | Facility: CLINIC | Age: 64
End: 2024-09-20
Attending: ORTHOPAEDIC SURGERY
Payer: MEDICARE

## 2024-09-20 DIAGNOSIS — M54.16 LUMBAR RADICULOPATHY: Primary | ICD-10-CM

## 2024-09-20 DIAGNOSIS — Q79.62 EHLERS-DANLOS, HYPERMOBILE TYPE: ICD-10-CM

## 2024-09-20 DIAGNOSIS — M43.10 DEGENERATIVE SPONDYLOLISTHESIS: ICD-10-CM

## 2024-09-20 PROCEDURE — 97140 MANUAL THERAPY 1/> REGIONS: CPT | Mod: GP | Performed by: PHYSICAL THERAPIST

## 2024-09-20 PROCEDURE — 97110 THERAPEUTIC EXERCISES: CPT | Mod: GP | Performed by: PHYSICAL THERAPIST

## 2024-09-26 DIAGNOSIS — Z98.1 S/P SPINAL FUSION: Primary | ICD-10-CM

## 2024-10-03 ENCOUNTER — INFUSION THERAPY VISIT (OUTPATIENT)
Dept: INFUSION THERAPY | Facility: HOSPITAL | Age: 64
End: 2024-10-03
Attending: REGISTERED NURSE
Payer: MEDICARE

## 2024-10-03 VITALS
OXYGEN SATURATION: 97 % | TEMPERATURE: 98.7 F | SYSTOLIC BLOOD PRESSURE: 121 MMHG | DIASTOLIC BLOOD PRESSURE: 82 MMHG | HEART RATE: 80 BPM | RESPIRATION RATE: 18 BRPM

## 2024-10-03 DIAGNOSIS — L50.8 CHRONIC URTICARIA: Primary | ICD-10-CM

## 2024-10-03 DIAGNOSIS — L50.1 IDIOPATHIC URTICARIA: ICD-10-CM

## 2024-10-03 PROCEDURE — 96372 THER/PROPH/DIAG INJ SC/IM: CPT | Performed by: REGISTERED NURSE

## 2024-10-03 PROCEDURE — 250N000011 HC RX IP 250 OP 636: Mod: JZ | Performed by: REGISTERED NURSE

## 2024-10-03 RX ORDER — EPINEPHRINE 1 MG/ML
0.3 INJECTION, SOLUTION INTRAMUSCULAR; SUBCUTANEOUS EVERY 5 MIN PRN
Status: CANCELLED | OUTPATIENT
Start: 2024-10-31

## 2024-10-03 RX ORDER — MEPERIDINE HYDROCHLORIDE 50 MG/ML
25 INJECTION INTRAMUSCULAR; INTRAVENOUS; SUBCUTANEOUS EVERY 30 MIN PRN
Status: CANCELLED | OUTPATIENT
Start: 2024-10-31

## 2024-10-03 RX ORDER — ALBUTEROL SULFATE 0.83 MG/ML
2.5 SOLUTION RESPIRATORY (INHALATION)
Status: CANCELLED | OUTPATIENT
Start: 2024-10-31

## 2024-10-03 RX ORDER — METHYLPREDNISOLONE SODIUM SUCCINATE 125 MG/2ML
125 INJECTION INTRAMUSCULAR; INTRAVENOUS
Status: CANCELLED
Start: 2024-10-31

## 2024-10-03 RX ORDER — ALBUTEROL SULFATE 90 UG/1
1-2 INHALANT RESPIRATORY (INHALATION)
Status: CANCELLED
Start: 2024-10-31

## 2024-10-03 RX ORDER — DIPHENHYDRAMINE HYDROCHLORIDE 50 MG/ML
50 INJECTION INTRAMUSCULAR; INTRAVENOUS
Status: CANCELLED
Start: 2024-10-31

## 2024-10-03 RX ADMIN — OMALIZUMAB 300 MG: 300 INJECTION, SOLUTION SUBCUTANEOUS at 12:41

## 2024-10-03 NOTE — PROGRESS NOTES
Kayla was here today for injection x 1 to left lower abdomen.  Pt tolerated injection well and without incident.  Pt refused to stay for 30 minute observation.      Danni LU, CMA

## 2024-10-09 ENCOUNTER — ANCILLARY PROCEDURE (OUTPATIENT)
Dept: GENERAL RADIOLOGY | Facility: CLINIC | Age: 64
End: 2024-10-09
Attending: ORTHOPAEDIC SURGERY
Payer: MEDICARE

## 2024-10-09 ENCOUNTER — MEDICAL CORRESPONDENCE (OUTPATIENT)
Dept: HEALTH INFORMATION MANAGEMENT | Facility: CLINIC | Age: 64
End: 2024-10-09

## 2024-10-09 ENCOUNTER — OFFICE VISIT (OUTPATIENT)
Dept: ORTHOPEDICS | Facility: CLINIC | Age: 64
End: 2024-10-09
Payer: MEDICARE

## 2024-10-09 VITALS — WEIGHT: 135 LBS | HEIGHT: 65 IN | BODY MASS INDEX: 22.49 KG/M2

## 2024-10-09 DIAGNOSIS — M54.50 CHRONIC BILATERAL LOW BACK PAIN WITHOUT SCIATICA: ICD-10-CM

## 2024-10-09 DIAGNOSIS — Z98.1 S/P SPINAL FUSION: ICD-10-CM

## 2024-10-09 DIAGNOSIS — M62.830 MUSCLE SPASM OF BACK: Primary | ICD-10-CM

## 2024-10-09 DIAGNOSIS — M43.10 DEGENERATIVE SPONDYLOLISTHESIS: ICD-10-CM

## 2024-10-09 DIAGNOSIS — G89.29 CHRONIC SACROILIAC JOINT PAIN: ICD-10-CM

## 2024-10-09 DIAGNOSIS — D89.40 MAST CELL ACTIVATION SYNDROME (H): ICD-10-CM

## 2024-10-09 DIAGNOSIS — M53.3 CHRONIC SACROILIAC JOINT PAIN: ICD-10-CM

## 2024-10-09 DIAGNOSIS — G89.29 CHRONIC BILATERAL LOW BACK PAIN WITHOUT SCIATICA: ICD-10-CM

## 2024-10-09 DIAGNOSIS — Q79.62 EHLERS-DANLOS SYNDROME TYPE III: ICD-10-CM

## 2024-10-09 PROCEDURE — 99214 OFFICE O/P EST MOD 30 MIN: CPT | Performed by: ORTHOPAEDIC SURGERY

## 2024-10-09 PROCEDURE — 72082 X-RAY EXAM ENTIRE SPI 2/3 VW: CPT | Performed by: STUDENT IN AN ORGANIZED HEALTH CARE EDUCATION/TRAINING PROGRAM

## 2024-10-09 PROCEDURE — 77073 BONE LENGTH STUDIES: CPT | Performed by: STUDENT IN AN ORGANIZED HEALTH CARE EDUCATION/TRAINING PROGRAM

## 2024-10-09 NOTE — LETTER
"10/9/2024      Kelsy Morley  [address]  [facility] [address]      Dear Colleague,    Thank you for referring your patient, Kelsy Morley, to the [facility] [place]. Please see a copy of my visit note below.      Spine Surgery Post-Op Visit    Subjective:  Kelsy Morley is a 64 year old female s/p L4-5 TLIF with SPO on 4/15/2024. She had early cage subsidence. She reports that she is having continued pain. She has spasm across the lower thoracic spine, left paraspinal area, and right and left buttock. She doesn't think that symptoms improved significantly after surgery.  She has difficulty getting comfortable in any position and notes increased pain with standing for long periods of time. She has chronic numbness in her right thigh and calf which was present before surgery and has not changed significantly.  She denies radicular pain..  She    Doing PT now, working on core strengthening and alignment.  Per the patient, this is not significantly helping with her symptoms.  She has done water aerobics which felt good while she was doing it, but then had increased pain after.    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 Calderon at [facility], having 3rd injection today.    Physical Exam:  Vitals: Ht 1.651 m (5' 5\")   Wt 61.2 kg (135 lb)   BMI 22.47 kg/m    Constitutional: Patient is healthy, well-nourished and appears stated age.  Respiratory: Patient is breathing normally and in no respiratory distress.  Skin: " Incision well healed, no evidence of wound dehiscence or erythema.   Gait: Non-antalgic gait without use of assistive devices.   Musculoskeletal: Strength: 4+/5 iliopsoas with giveway weakness, 5/5 quadriceps, 5/5 hamstrings, 5/5 anterior tibialis, 5/5 extensor hallucis longus, 5/5 gastrocnemius.    Neuro: decreased sensation around R knee, right posterior thigh and foot (chronic pre-op). DTRs 0 patella, +1 ankle.  Spine: significant QL spasm, pain over BL PSIS.  SI joint exam:   Right Left   Jaron Finger Test  + +   JOSE CARLOS  + -   Thigh Thrust: + -   Pelvic Compression Test  - -   Palpation  + -   Pelvic Gapping  - -   Gaenslen s Test  + -        Imaging:  We independently reviewed and interpreted the following imaging at this clinic visit which were also reviewed with patient  Xrays EOS Scoliosis/AP lateral 10/9/2024  Postoperative changes of L4-L5 TLIF and pedicle screw fusion.  No evidence of hardware failure or loosening.  Stable appearance of cage compared to prior postoperative imaging.  Stable L5-S1 disc height loss.    Assessment:  64 year old female 6 months s/p L4-L5 TLIF  Osteoporosis  Significant quadratus lumborum spasm  R>L SI joint pain  PMH EDS, mast cell activation    Plan:  CT lumbar spine and pelvis  PT referral for QL stretching. If fails to respond to this, can send her for trigger point injections with Dr. Perales.    May require diagnostic injections to determine pain generators.     Follow up in 4-6 weeks after the above with Dr. Borges.     The patient was shown their own imaging and all questions were answered. Plan was reviewed with Dr. Borges who also saw and examined patient today. .  Thank you for allowing me to be a part of this patient's care.     Zoe Ladd PA-C   Orthopedic Spine Surgery     I had the privilege of seeing Kayla lujan in clinic today.  She and I are both disappointed with how she is currently doing.  On examination she has rockhard quadratus lumborum spasm bilaterally.   This is the most severe that I have seen in the entirety of my practice.  In reviewing her imaging studies it does not appear that there is been any change in position or alignment.  She did have unilateral cage subsidence that appears to be stable at this point.  We had an extended discussion about what happened and that she was under the perception that maybe the screws were not optimally placed and they were.  I explained the procedure utilizing a model and explained that the cage settling occurred sometime after the OR as we have intraoperative images showing that it had not settled yet and in the hospital before she had her discharge x-rays.  Since that time it appears that it is been stable.  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  I explained to her at this point I would like to pursue a 2 pronged approach.  I would like to get a CT scan to confirm that there has in fact been healing.  If there has not been healing of the bone at the fusion level then that would cause us to have a particular set of discussions.  However if this shows that the healing is good then the next pathway would be to really work on the quadratus lumborum stretching which can begin in parallel.  If this is inadequate then she could potentially be referred to Dr. Perales for bilateral quadratus lumborum blocks.  After explaining this all to her she understood the pathway going forward and is hopeful that maybe we can help improve her symptoms.  I used the analogy that the quadratus lumborum spasms are like having a charley horse and not being able to stretch them out.  It is my hope that with physical therapy guiding her that we can get her spasms decreased at least somewhat while we are going through this workup process.    My total contact time on the day of visit including image ordering, independent image  interpretation, face-to-face evaluation, documentation was greater than 30 minutes.  Fausto Borges MD      Again, thank you for allowing me to participate in the care of your patient.        Sincerely,        Fausto Borges MD

## 2024-10-09 NOTE — PROGRESS NOTES
"  Spine Surgery Post-Op Visit    Subjective:  Kelsy Morley is a 64 year old female s/p L4-5 TLIF with SPO on 4/15/2024. She had early cage subsidence. She reports that she is having continued pain. She has spasm across the lower thoracic spine, left paraspinal area, and right and left buttock. She doesn't think that symptoms improved significantly after surgery.  She has difficulty getting comfortable in any position and notes increased pain with standing for long periods of time. She has chronic numbness in her right thigh and calf which was present before surgery and has not changed significantly.  She denies radicular pain..  She    Doing PT now, working on core strengthening and alignment.  Per the patient, this is not significantly helping with her symptoms.  She has done water aerobics which felt good while she was doing it, but then had increased pain after.    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 Calderon at [facility], having 3rd injection today.    Physical Exam:  Vitals: Ht 1.651 m (5' 5\")   Wt 61.2 kg (135 lb)   BMI 22.47 kg/m    Constitutional: Patient is healthy, well-nourished and appears stated age.  Respiratory: Patient is breathing normally and in no respiratory distress.  Skin: Incision well healed, no evidence of wound dehiscence or erythema.   Gait: Non-antalgic gait without use of assistive devices.   Musculoskeletal: Strength: 4+/5 iliopsoas with giveway weakness, 5/5 quadriceps, 5/5 hamstrings, 5/5 anterior tibialis, 5/5 " extensor hallucis longus, 5/5 gastrocnemius.    Neuro: decreased sensation around R knee, right posterior thigh and foot (chronic pre-op). DTRs 0 patella, +1 ankle.  Spine: significant QL spasm, pain over BL PSIS.  SI joint exam:   Right Left   Jaron Finger Test  + +   JOSE CARLOS  + -   Thigh Thrust: + -   Pelvic Compression Test  - -   Palpation  + -   Pelvic Gapping  - -   Gaenslen s Test  + -        Imaging:  We independently reviewed and interpreted the following imaging at this clinic visit which were also reviewed with patient  Xrays EOS Scoliosis/AP lateral 10/9/2024  Postoperative changes of L4-L5 TLIF and pedicle screw fusion.  No evidence of hardware failure or loosening.  Stable appearance of cage compared to prior postoperative imaging.  Stable L5-S1 disc height loss.    Assessment:  64 year old female 6 months s/p L4-L5 TLIF  Osteoporosis  Significant quadratus lumborum spasm  R>L SI joint pain  PMH EDS, mast cell activation    Plan:  CT lumbar spine and pelvis  PT referral for QL stretching. If fails to respond to this, can send her for trigger point injections with Dr. Perales.    May require diagnostic injections to determine pain generators.     Follow up in 4-6 weeks after the above with Dr. Borges.     The patient was shown their own imaging and all questions were answered. Plan was reviewed with Dr. Borges who also saw and examined patient today. .  Thank you for allowing me to be a part of this patient's care.     Zoe Ladd PA-C   Orthopedic Spine Surgery     I had the privilege of seeing Kayla lujan in clinic today.  She and I are both disappointed with how she is currently doing.  On examination she has rockhard quadratus lumborum spasm bilaterally.  This is the most severe that I have seen in the entirety of my practice.  In reviewing her imaging studies it does not appear that there is been any change in position or alignment.  She did have unilateral cage subsidence that appears to be stable at  this point.  We had an extended discussion about what happened and that she was under the perception that maybe the screws were not optimally placed and they were.  I explained the procedure utilizing a model and explained that the cage settling occurred sometime after the OR as we have intraoperative images showing that it had not settled yet and in the hospital before she had her discharge x-rays.  Since that time it appears that it is been stable.  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  I explained to her at this point I would like to pursue a 2 pronged approach.  I would like to get a CT scan to confirm that there has in fact been healing.  If there has not been healing of the bone at the fusion level then that would cause us to have a particular set of discussions.  However if this shows that the healing is good then the next pathway would be to really work on the quadratus lumborum stretching which can begin in parallel.  If this is inadequate then she could potentially be referred to Dr. Perales for bilateral quadratus lumborum blocks.  After explaining this all to her she understood the pathway going forward and is hopeful that maybe we can help improve her symptoms.  I used the analogy that the quadratus lumborum spasms are like having a charley horse and not being able to stretch them out.  It is my hope that with physical therapy guiding her that we can get her spasms decreased at least somewhat while we are going through this workup process.    My total contact time on the day of visit including image ordering, independent image interpretation, face-to-face evaluation, documentation was greater than 30 minutes.  Fausto Borges MD

## 2024-10-09 NOTE — NURSING NOTE
"Reason For Visit:   Chief Complaint   Patient presents with    RECHECK     DOS 4-15-24 TLIF for Degen. Spondy. & Lumbar Radiculopathy       Primary MD: Pj Dillon  Ref. MD: EST     ?  No  Occupation Disability.     Date of injury: No  Type of injury: No.     Date of surgery: 4/15/24  Type of surgery: Decompression and transforaminal lumbar interbody fusion with Smith Galeas Osteotomy Lumbar 4-5, device insertion, image guided      Smoker: No  Request smoking cessation information: No     There were no vitals taken for this visit.     Pain Assessment  Patient Currently in Pain: Yes  0-10 Pain Scale: 7  Primary Pain Location: Back    Ht 1.651 m (5' 5\")   Wt 61.2 kg (135 lb)   BMI 22.47 kg/m      Pain Assessment  Patient Currently in Pain: Yes  0-10 Pain Scale: 7 (low back pain)  Primary Pain Location: Back    Oswestry (LISA) Questionnaire        10/6/2024     6:29 PM   OSWESTRY DISABILITY INDEX   Count 10   Sum 28   Oswestry Score (%) 56 %            Neck Disability Index (NDI) Questionnaire         No data to display                       Visual Analog Pain Scale  Back Pain Scale 0-10: 7 (low back pain)  Right leg pain: 0  Left leg pain: 0  Neck Pain Scale 0-10: 5  Right arm pain: 4 (shoulder pain)  Left arm pain: 5 (shoulder pain)    Promis 10 Assessment        10/6/2024     6:32 PM   PROMIS 10   In general, would you say your health is: Good   In general, would you say your quality of life is: Very good   In general, how would you rate your physical health? Fair   In general, how would you rate your mental health, including your mood and your ability to think? Very good   In general, how would you rate your satisfaction with your social activities and relationships? Very good   In general, please rate how well you carry out your usual social activities and roles Fair   To what extent are you able to carry out your everyday physical activities such as walking, climbing stairs, carrying groceries, " or moving a chair? Moderately   In the past 7 days, how often have you been bothered by emotional problems such as feeling anxious, depressed, or irritable? Rarely   In the past 7 days, how would you rate your fatigue on average? Moderate   In the past 7 days, how would you rate your pain on average, where 0 means no pain, and 10 means worst imaginable pain? 7   In general, would you say your health is: 3   In general, would you say your quality of life is: 4   In general, how would you rate your physical health? 2   In general, how would you rate your mental health, including your mood and your ability to think? 4   In general, how would you rate your satisfaction with your social activities and relationships? 4   In general, please rate how well you carry out your usual social activities and roles. (This includes activities at home, at work and in your community, and responsibilities as a parent, child, spouse, employee, friend, etc.) 2   To what extent are you able to carry out your everyday physical activities such as walking, climbing stairs, carrying groceries, or moving a chair? 3   In the past 7 days, how often have you been bothered by emotional problems such as feeling anxious, depressed, or irritable? 2   In the past 7 days, how would you rate your fatigue on average? 3   In the past 7 days, how would you rate your pain on average, where 0 means no pain, and 10 means worst imaginable pain? 7   Global Mental Health Score 16   Global Physical Health Score 10   PROMIS TOTAL - SUBSCORES 26                James Walker MA

## 2024-10-17 ENCOUNTER — ANCILLARY PROCEDURE (OUTPATIENT)
Dept: CT IMAGING | Facility: CLINIC | Age: 64
End: 2024-10-17
Attending: PHYSICIAN ASSISTANT
Payer: MEDICARE

## 2024-10-17 DIAGNOSIS — M62.830 MUSCLE SPASM OF BACK: ICD-10-CM

## 2024-10-17 DIAGNOSIS — M54.50 CHRONIC BILATERAL LOW BACK PAIN WITHOUT SCIATICA: ICD-10-CM

## 2024-10-17 DIAGNOSIS — G89.29 CHRONIC SACROILIAC JOINT PAIN: ICD-10-CM

## 2024-10-17 DIAGNOSIS — G89.29 CHRONIC BILATERAL LOW BACK PAIN WITHOUT SCIATICA: ICD-10-CM

## 2024-10-17 DIAGNOSIS — M53.3 CHRONIC SACROILIAC JOINT PAIN: ICD-10-CM

## 2024-10-17 PROCEDURE — G1010 CDSM STANSON: HCPCS | Performed by: STUDENT IN AN ORGANIZED HEALTH CARE EDUCATION/TRAINING PROGRAM

## 2024-10-17 PROCEDURE — 72192 CT PELVIS W/O DYE: CPT | Mod: MG | Performed by: RADIOLOGY

## 2024-10-17 PROCEDURE — G1010 CDSM STANSON: HCPCS | Performed by: RADIOLOGY

## 2024-10-17 PROCEDURE — 72131 CT LUMBAR SPINE W/O DYE: CPT | Mod: MF | Performed by: STUDENT IN AN ORGANIZED HEALTH CARE EDUCATION/TRAINING PROGRAM

## 2024-10-18 ENCOUNTER — TELEPHONE (OUTPATIENT)
Dept: ORTHOPEDICS | Facility: CLINIC | Age: 64
End: 2024-10-18
Payer: MEDICARE

## 2024-10-18 LAB
RADIOLOGIST FLAGS: NORMAL
RADIOLOGIST FLAGS: NORMAL

## 2024-10-18 NOTE — TELEPHONE ENCOUNTER
Per CT pelvis:  Borderline distended partially visualized appendix, nonspecific,  early/developing inflammation cannot be entirely excluded. Please  correlate clinically.    Please call patient and let her know if she is having any abdominal pain, she should go to the ED. Otherwise, if no significant abdominal pain, she can follow up with PCP.    Zoe Ladd PA-C

## 2024-10-18 NOTE — TELEPHONE ENCOUNTER
Imaging called in to report incidental findings for this patient on her two CT scans that were completed on 10/17/24.      [Consider Follow Up: Worsening L4-5 subsidence]     Message was routed to the team.       musculoskeletal

## 2024-10-18 NOTE — TELEPHONE ENCOUNTER
RN called patient to relay message below about appendix. Patient has no complaints of RLQ pain. She is more concerned about the CT results of her lumbar spine. Patient wanting a call from the care team about follow up plan as she isn't scheduled to see Dr. Borges until near Thanksgiving. She would like a call.     Trina Bell RN

## 2024-10-18 NOTE — TELEPHONE ENCOUNTER
This patient was scheduled for a follow up with Dr. Borges on 10/23 due to incidental findings.     Trina Bell RN

## 2024-10-23 ENCOUNTER — OFFICE VISIT (OUTPATIENT)
Dept: ORTHOPEDICS | Facility: CLINIC | Age: 64
End: 2024-10-23
Payer: MEDICARE

## 2024-10-23 DIAGNOSIS — D89.40 MAST CELL ACTIVATION SYNDROME (H): ICD-10-CM

## 2024-10-23 DIAGNOSIS — Q79.62 EHLERS-DANLOS SYNDROME TYPE III: ICD-10-CM

## 2024-10-23 DIAGNOSIS — M43.10 DEGENERATIVE SPONDYLOLISTHESIS: ICD-10-CM

## 2024-10-23 DIAGNOSIS — Z98.1 S/P SPINAL FUSION: ICD-10-CM

## 2024-10-23 DIAGNOSIS — G89.29 CHRONIC BILATERAL LOW BACK PAIN WITHOUT SCIATICA: Primary | ICD-10-CM

## 2024-10-23 DIAGNOSIS — M54.50 CHRONIC BILATERAL LOW BACK PAIN WITHOUT SCIATICA: Primary | ICD-10-CM

## 2024-10-23 PROCEDURE — 99215 OFFICE O/P EST HI 40 MIN: CPT | Performed by: ORTHOPAEDIC SURGERY

## 2024-10-23 NOTE — NURSING NOTE
Reason For Visit:   Chief Complaint   Patient presents with    RECHECK     F/U 2 CT SCANS & PT. DOS 4-15-24 TLIF for Degen. Spondy. & Lumbar Radiculopathy       Primary MD: Pj Dillon  Ref. MD: EST    ?  No  Occupation Disability.     Date of injury: No  Type of injury: No.     Date of surgery: 4/15/24  Type of surgery: Decompression and transforaminal lumbar interbody fusion with Smith Galeas Osteotomy Lumbar 4-5, device insertion, image guided      Smoker: No  Request smoking cessation information: No       There were no vitals taken for this visit.    Pain Assessment  Patient Currently in Pain: Yes  0-10 Pain Scale: 8  Primary Pain Location: Back    Oswestry (LISA) Questionnaire        10/6/2024     6:29 PM   OSWESTRY DISABILITY INDEX   Count 10   Sum 28   Oswestry Score (%) 56 %        Visual Analog Pain Scale  Back Pain Scale 0-10: 7.5  Right leg pain: 0  Left leg pain: 0  Neck Pain Scale 0-10: 0  Right arm pain: 0  Left arm pain: 0    Promis 10 Assessment        10/20/2024     7:32 PM   PROMIS 10   In general, would you say your health is: Very good   In general, would you say your quality of life is: Very good   In general, how would you rate your physical health? Good   In general, how would you rate your mental health, including your mood and your ability to think? Very good   In general, how would you rate your satisfaction with your social activities and relationships? Very good   In general, please rate how well you carry out your usual social activities and roles Fair   To what extent are you able to carry out your everyday physical activities such as walking, climbing stairs, carrying groceries, or moving a chair? Moderately   In the past 7 days, how often have you been bothered by emotional problems such as feeling anxious, depressed, or irritable? Never   In the past 7 days, how would you rate your fatigue on average? Moderate   In the past 7 days, how would you rate your pain on  average, where 0 means no pain, and 10 means worst imaginable pain? 7   In general, would you say your health is: 4    In general, would you say your quality of life is: 4    In general, how would you rate your physical health? 3    In general, how would you rate your mental health, including your mood and your ability to think? 4    In general, how would you rate your satisfaction with your social activities and relationships? 4    In general, please rate how well you carry out your usual social activities and roles. (This includes activities at home, at work and in your community, and responsibilities as a parent, child, spouse, employee, friend, etc.) 2    To what extent are you able to carry out your everyday physical activities such as walking, climbing stairs, carrying groceries, or moving a chair? 3    In the past 7 days, how often have you been bothered by emotional problems such as feeling anxious, depressed, or irritable? 1    In the past 7 days, how would you rate your fatigue on average? 3    In the past 7 days, how would you rate your pain on average, where 0 means no pain, and 10 means worst imaginable pain? 7    Global Mental Health Score 17   Global Physical Health Score 11   PROMIS TOTAL - SUBSCORES 28       Patient-reported                Jessica Abraham LPN

## 2024-10-23 NOTE — PROGRESS NOTES
Spine Surgery Follow Up Visit    Subjective  Kelsy Morley is a 64 year old female who presents today for continued back and SI joint pain,  s/p L4-5 TLIF with SPO on 4/15/2024. She had early cage subsidence and is on Evenity (C/o Dr. Calderon at [facility]).  When she was last seen, she had positive exam findings for right SI joint pain.  She also has low back pain with worsening of symptoms with axial loading such as at the end of the day, and relief with lying down.  She has also been referred to PT for quadratus lumborum stretching.      Conservative management  Doing PT now, working on core strengthening and alignment.  Per the patient, this is not significantly helping with her symptoms.  She has done water aerobics which felt good while she was doing it, but then had increased pain after.     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  Constitutional: Patient is healthy, well-nourished and appears stated age.  Respiratory: Patient is breathing normally and in no respiratory distress.  No detailed neurological exam, please see the exam on the 10/9/2024 note    Imaging  We independently reviewed and interpreted the following imaging at this clinic visit which were also reviewed with patient  CT lumbar spine and pelvis 10/17/2024  Cage subsidence at L4 and L5.  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  There are some bony fusion and the displaced but not completely bridged.  There is no evidence of hardware loosening.  Vacuum disc changes at L5-S1.  There is vacuum changes in bilateral SI joints.  Evaluation  Opportunistic bone mineral density shows Hounsfield units 91 at L1    Assessment:  64 year old female 6 months s/p L4-L5 TLIF with  subsidence of L4-L5 cage but some evidence of anterior bony bridging  Osteoporosis, treated on Evenity  Quadratus lumborum spasm  R>L SI joint pain  PMH EDS, mast cell activation      Plan:  Orthofix bone growth stimulator  Follow up in 2 mo with repeat CT of the lumbar spine to reevaluate bony fusion and reevaluate opportunistic bone mineral density.  We are hopeful that her fusion will go on to heal.  If it does heal, the hope would be that her back pain would decrease.  We did discuss that  her symptoms could be from another pain generator, such as the L5-S1 disc degeneration or the SI joint. If she goes on to develop a pseudoarthrosis, she may require revision fusion.  We discussed that there are not specific guidelines on how long patient should be on osteo anabolic medication prior to fusion revision, but some papers recommend 3 to 6 months.      All questions and concerns were answered to the patient's apparent satisfaction before leaving the clinic. We used the patient's imaging, diagrams, models to explain the pathophysiology of their disease as well as surgical and non-surgical treatment options. The patient was also seen by Dr. Borges who formulated and is in agreement with the above plan.    Zoe Ladd PA-C   Orthopedic Spine Surgery     Kayla returns today with significant concerns about her cage settling.  She had a CT scan performed.  She read the report.  We reviewed this in detail together today.  She does have bridging anterior bone growth at least in 1 spot.  Has been cage settling.  I think she has a chance to go on and have a solid arthrodesis given that she has bridging bone at least in a couple of spots.  Her opportunistic bone mineral density is very low.  An extended conversation about bone health.  She is on Evenity and has had several doses but has not completed a full year course yet.  We talked about what it would take to determine if the bone healing was the a factor for her pain.  We  did talk about the opportunity to use a bone growth stimulator that this could potentially help her to go on to heal.  We also then talked about potential salvage strategies including an anterior lumbar interbody fusion.  This would be difficult as it would require a partial corpectomy in order to get the cages out.  If a revision were to be done my recommendation would be to complete the course of Evenity and then undergo a posterior fusion utilizing bone morphogenetic protein.  We will see her back for reevaluation in a couple months with a CT scan just prior.    My total contact time on day of visit including image ordering, independent image interpretation, face-to-face evaluation was greater than 40 minutes.    Fausto Borges MD

## 2024-10-23 NOTE — LETTER
10/23/2024      Kelsy Morley  [address]  [facility] [address]      Dear Colleague,    Thank you for referring your patient, Kelsy Morley, to the [facility] [place]. Please see a copy of my visit note below.        Spine Surgery Follow Up Visit    Subjective  Kelsy Morley is a 64 year old female who presents today for continued back and SI joint pain,  s/p L4-5 TLIF with SPO on 4/15/2024. She had early cage subsidence and is on Evenity (C/o Dr. Calderon at [facility]).  When she was last seen, she had positive exam findings for right SI joint pain.  She also has low back pain with worsening of symptoms with axial loading such as at the end of the day, and relief with lying down.  She has also been referred to PT for quadratus lumborum stretching.      Conservative management  Doing PT now, working on core strengthening and alignment.  Per the patient, this is not significantly helping with her symptoms.  She has done water aerobics which felt good while she was doing it, but then had increased pain after.     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  Constitutional: Patient is healthy, well-nourished and appears stated age.  Respiratory: Patient is breathing normally and in no respiratory distress.  No detailed neurological exam, please see the exam on the 10/9/2024 note    Imaging  We independently reviewed and interpreted the following imaging at this clinic visit which were also reviewed with patient  CT lumbar spine and pelvis 10/17/2024  Cage subsidence at L4 and L5.  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  There are some bony fusion and the displaced but not completely bridged.  There is no evidence of  hardware loosening.  Vacuum disc changes at L5-S1.  There is vacuum changes in bilateral SI joints.  Evaluation  Opportunistic bone mineral density shows Hounsfield units 91 at L1    Assessment:  64 year old female 6 months s/p L4-L5 TLIF with subsidence of L4-L5 cage but some evidence of anterior bony bridging  Osteoporosis, treated on Evenity  Quadratus lumborum spasm  R>L SI joint pain  PMH EDS, mast cell activation      Plan:  Orthofix bone growth stimulator  Follow up in 2 mo with repeat CT of the lumbar spine to reevaluate bony fusion and reevaluate opportunistic bone mineral density.  We are hopeful that her fusion will go on to heal.  If it does heal, the hope would be that her back pain would decrease.  We did discuss that  her symptoms could be from another pain generator, such as the L5-S1 disc degeneration or the SI joint. If she goes on to develop a pseudoarthrosis, she may require revision fusion.  We discussed that there are not specific guidelines on how long patient should be on osteo anabolic medication prior to fusion revision, but some papers recommend 3 to 6 months.      All questions and concerns were answered to the patient's apparent satisfaction before leaving the clinic. We used the patient's imaging, diagrams, models to explain the pathophysiology of their disease as well as surgical and non-surgical treatment options. The patient was also seen by Dr. Borges who formulated and is in agreement with the above plan.    Zoe Ladd PA-C   Orthopedic Spine Surgery     Kayla returns today with significant concerns about her cage settling.  She had a CT scan performed.  She read the report.  We reviewed this in detail together today.  She does have bridging anterior bone growth at least in 1 spot.  Has been cage settling.  I think she has a chance to go on and have a solid arthrodesis given that she has bridging bone at least in a couple of spots.  Her opportunistic bone mineral density is very  low.  An extended conversation about bone health.  She is on Evenity and has had several doses but has not completed a full year course yet.  We talked about what it would take to determine if the bone healing was the a factor for her pain.  We did talk about the opportunity to use a bone growth stimulator that this could potentially help her to go on to heal.  We also then talked about potential salvage strategies including an anterior lumbar interbody fusion.  This would be difficult as it would require a partial corpectomy in order to get the cages out.  If a revision were to be done my recommendation would be to complete the course of Evenity and then undergo a posterior fusion utilizing bone morphogenetic protein.  We will see her back for reevaluation in a couple months with a CT scan just prior.    My total contact time on day of visit including image ordering, independent image interpretation, face-to-face evaluation was greater than 40 minutes.    Fausto Borges MD      Again, thank you for allowing me to participate in the care of your patient.        Sincerely,        Fausto Borges MD

## 2024-10-24 ENCOUNTER — TELEPHONE (OUTPATIENT)
Dept: ORTHOPEDICS | Facility: CLINIC | Age: 64
End: 2024-10-24
Payer: MEDICARE

## 2024-10-24 ENCOUNTER — MYC MEDICAL ADVICE (OUTPATIENT)
Dept: ORTHOPEDICS | Facility: CLINIC | Age: 64
End: 2024-10-24

## 2024-10-24 NOTE — TELEPHONE ENCOUNTER
Left Voicemail (1st Attempt) and Sent Mychart (1st Attempt) for the patient to call back and schedule the following:    Appointment type: CT and Return Surgical Spine  Provider: Dr. Borges  Return date: 12/23/24  Can be same day, return 30 min appt with SKYE

## 2024-10-24 NOTE — TELEPHONE ENCOUNTER
----- Message from Aide ROBLES sent at 10/23/2024  5:12 PM CDT -----  Regarding: CALL PATIENT  Pt left.  Needs rtn appt in 2 months with  with CT scan LS same day before appt.    Thanks.  Aide

## 2024-10-28 ENCOUNTER — TELEPHONE (OUTPATIENT)
Dept: ORTHOPEDICS | Facility: CLINIC | Age: 64
End: 2024-10-28

## 2024-10-28 NOTE — TELEPHONE ENCOUNTER
See phone message from pt & dictation 10-23-24.    I called pt back& provided [facility] ph# [phone] for pt to call & Follow-up about insurance approval & appt for bone stim.    Call back prn.  Pt agreed.  Aide Chavez RN.

## 2024-10-28 NOTE — TELEPHONE ENCOUNTER
Other: Pt is calling to see when Aide will be reaching out to her regarding the bone stimulator      Could we send this information to you in [place] or would you prefer to receive a phone call?:   Patient would prefer a phone call   Okay to leave a detailed message?: Yes at Cell number on file:    Telephone Information:   Mobile [phone]

## 2024-10-30 DIAGNOSIS — L50.1 IDIOPATHIC URTICARIA: ICD-10-CM

## 2024-10-30 DIAGNOSIS — L50.8 CHRONIC URTICARIA: Primary | ICD-10-CM

## 2024-10-30 RX ORDER — ALBUTEROL SULFATE 0.83 MG/ML
2.5 SOLUTION RESPIRATORY (INHALATION)
Status: CANCELLED | OUTPATIENT
Start: 2024-11-06

## 2024-10-30 RX ORDER — ALBUTEROL SULFATE 90 UG/1
1-2 INHALANT RESPIRATORY (INHALATION)
Status: CANCELLED
Start: 2024-11-06

## 2024-10-30 RX ORDER — MEPERIDINE HYDROCHLORIDE 25 MG/ML
25 INJECTION INTRAMUSCULAR; INTRAVENOUS; SUBCUTANEOUS
Status: CANCELLED | OUTPATIENT
Start: 2024-11-06

## 2024-10-30 RX ORDER — DIPHENHYDRAMINE HYDROCHLORIDE 50 MG/ML
25 INJECTION INTRAMUSCULAR; INTRAVENOUS
Status: CANCELLED
Start: 2024-11-06

## 2024-10-30 RX ORDER — EPINEPHRINE 1 MG/ML
0.3 INJECTION, SOLUTION, CONCENTRATE INTRAVENOUS EVERY 5 MIN PRN
Status: CANCELLED | OUTPATIENT
Start: 2024-11-06

## 2024-10-30 RX ORDER — METHYLPREDNISOLONE SODIUM SUCCINATE 40 MG/ML
40 INJECTION INTRAMUSCULAR; INTRAVENOUS
Status: CANCELLED
Start: 2024-11-06

## 2024-10-30 RX ORDER — DIPHENHYDRAMINE HYDROCHLORIDE 50 MG/ML
50 INJECTION INTRAMUSCULAR; INTRAVENOUS
Status: CANCELLED
Start: 2024-11-06

## 2024-11-01 ENCOUNTER — INFUSION THERAPY VISIT (OUTPATIENT)
Dept: INFUSION THERAPY | Facility: HOSPITAL | Age: 64
End: 2024-11-01
Attending: REGISTERED NURSE
Payer: MEDICARE

## 2024-11-01 VITALS
TEMPERATURE: 98.4 F | RESPIRATION RATE: 18 BRPM | OXYGEN SATURATION: 99 % | SYSTOLIC BLOOD PRESSURE: 114 MMHG | HEART RATE: 81 BPM | DIASTOLIC BLOOD PRESSURE: 69 MMHG

## 2024-11-01 DIAGNOSIS — L50.8 CHRONIC URTICARIA: ICD-10-CM

## 2024-11-01 DIAGNOSIS — L50.1 IDIOPATHIC URTICARIA: Primary | ICD-10-CM

## 2024-11-01 PROCEDURE — 96372 THER/PROPH/DIAG INJ SC/IM: CPT | Performed by: ALLERGY & IMMUNOLOGY

## 2024-11-01 PROCEDURE — 250N000011 HC RX IP 250 OP 636: Mod: JZ | Performed by: ALLERGY & IMMUNOLOGY

## 2024-11-01 RX ORDER — METHYLPREDNISOLONE SODIUM SUCCINATE 40 MG/ML
40 INJECTION INTRAMUSCULAR; INTRAVENOUS
Status: DISCONTINUED | OUTPATIENT
Start: 2024-11-01 | End: 2024-11-01 | Stop reason: HOSPADM

## 2024-11-01 RX ORDER — EPINEPHRINE 1 MG/ML
0.3 INJECTION, SOLUTION INTRAMUSCULAR; SUBCUTANEOUS EVERY 5 MIN PRN
OUTPATIENT
Start: 2024-11-29

## 2024-11-01 RX ORDER — MEPERIDINE HYDROCHLORIDE 50 MG/ML
25 INJECTION INTRAMUSCULAR; INTRAVENOUS; SUBCUTANEOUS
Status: DISCONTINUED | OUTPATIENT
Start: 2024-11-01 | End: 2024-11-01 | Stop reason: HOSPADM

## 2024-11-01 RX ORDER — ALBUTEROL SULFATE 90 UG/1
1-2 INHALANT RESPIRATORY (INHALATION)
Start: 2024-11-29

## 2024-11-01 RX ORDER — ALBUTEROL SULFATE 0.83 MG/ML
2.5 SOLUTION RESPIRATORY (INHALATION)
Status: DISCONTINUED | OUTPATIENT
Start: 2024-11-01 | End: 2024-11-01 | Stop reason: HOSPADM

## 2024-11-01 RX ORDER — MEPERIDINE HYDROCHLORIDE 50 MG/ML
25 INJECTION INTRAMUSCULAR; INTRAVENOUS; SUBCUTANEOUS
OUTPATIENT
Start: 2024-11-29

## 2024-11-01 RX ORDER — DIPHENHYDRAMINE HYDROCHLORIDE 50 MG/ML
50 INJECTION INTRAMUSCULAR; INTRAVENOUS
Status: DISCONTINUED | OUTPATIENT
Start: 2024-11-01 | End: 2024-11-01 | Stop reason: HOSPADM

## 2024-11-01 RX ORDER — ALBUTEROL SULFATE 90 UG/1
1-2 INHALANT RESPIRATORY (INHALATION)
Status: DISCONTINUED | OUTPATIENT
Start: 2024-11-01 | End: 2024-11-01 | Stop reason: HOSPADM

## 2024-11-01 RX ORDER — METHYLPREDNISOLONE SODIUM SUCCINATE 40 MG/ML
40 INJECTION INTRAMUSCULAR; INTRAVENOUS
Start: 2024-11-29

## 2024-11-01 RX ORDER — EPINEPHRINE 1 MG/ML
0.3 INJECTION, SOLUTION INTRAMUSCULAR; SUBCUTANEOUS EVERY 5 MIN PRN
Status: DISCONTINUED | OUTPATIENT
Start: 2024-11-01 | End: 2024-11-01 | Stop reason: HOSPADM

## 2024-11-01 RX ORDER — DIPHENHYDRAMINE HYDROCHLORIDE 50 MG/ML
50 INJECTION INTRAMUSCULAR; INTRAVENOUS
Start: 2024-11-29

## 2024-11-01 RX ORDER — DIPHENHYDRAMINE HYDROCHLORIDE 50 MG/ML
25 INJECTION INTRAMUSCULAR; INTRAVENOUS
Start: 2024-11-29

## 2024-11-01 RX ORDER — ALBUTEROL SULFATE 0.83 MG/ML
2.5 SOLUTION RESPIRATORY (INHALATION)
OUTPATIENT
Start: 2024-11-29

## 2024-11-01 RX ORDER — DIPHENHYDRAMINE HYDROCHLORIDE 50 MG/ML
25 INJECTION INTRAMUSCULAR; INTRAVENOUS
Status: DISCONTINUED | OUTPATIENT
Start: 2024-11-01 | End: 2024-11-01 | Stop reason: HOSPADM

## 2024-11-01 RX ADMIN — OMALIZUMAB 300 MG: 300 INJECTION, SOLUTION SUBCUTANEOUS at 10:26

## 2024-11-01 NOTE — PROGRESS NOTES
Kayla was here today for injection x 1 to right lower abdomen.  Pt tolerated injection well and without incident.  Pt refused to stay for 30 minute post observation.      Danni LU CMA

## 2024-11-04 ENCOUNTER — THERAPY VISIT (OUTPATIENT)
Dept: PHYSICAL THERAPY | Facility: REHABILITATION | Age: 64
End: 2024-11-04
Payer: MEDICARE

## 2024-11-04 DIAGNOSIS — M54.50 CHRONIC BILATERAL LOW BACK PAIN WITHOUT SCIATICA: ICD-10-CM

## 2024-11-04 DIAGNOSIS — Q79.60 EHLERS-DANLOS SYNDROME: Primary | ICD-10-CM

## 2024-11-04 DIAGNOSIS — Z96.651 CHRONIC KNEE PAIN AFTER TOTAL REPLACEMENT OF RIGHT KNEE JOINT: ICD-10-CM

## 2024-11-04 DIAGNOSIS — M25.561 CHRONIC KNEE PAIN AFTER TOTAL REPLACEMENT OF RIGHT KNEE JOINT: ICD-10-CM

## 2024-11-04 DIAGNOSIS — M25.511 CHRONIC PAIN OF BOTH SHOULDERS: ICD-10-CM

## 2024-11-04 DIAGNOSIS — M25.512 CHRONIC PAIN OF BOTH SHOULDERS: ICD-10-CM

## 2024-11-04 DIAGNOSIS — M54.2 NECK PAIN: ICD-10-CM

## 2024-11-04 DIAGNOSIS — G44.209 TENSION HEADACHE: ICD-10-CM

## 2024-11-04 DIAGNOSIS — M54.6 CHRONIC BILATERAL THORACIC BACK PAIN: ICD-10-CM

## 2024-11-04 DIAGNOSIS — G89.29 CHRONIC BILATERAL THORACIC BACK PAIN: ICD-10-CM

## 2024-11-04 DIAGNOSIS — G89.29 CHRONIC BILATERAL LOW BACK PAIN WITHOUT SCIATICA: ICD-10-CM

## 2024-11-04 DIAGNOSIS — G89.29 CHRONIC KNEE PAIN AFTER TOTAL REPLACEMENT OF RIGHT KNEE JOINT: ICD-10-CM

## 2024-11-04 DIAGNOSIS — G89.29 CHRONIC PAIN OF BOTH SHOULDERS: ICD-10-CM

## 2024-11-04 PROCEDURE — 97140 MANUAL THERAPY 1/> REGIONS: CPT | Mod: GP | Performed by: PHYSICAL THERAPIST

## 2024-11-06 NOTE — PROGRESS NOTES
[facility]                                                                                   OUTPATIENT PHYSICAL THERAPY    PLAN OF TREATMENT FOR OUTPATIENT REHABILITATION   Patient's Last Name, First Name, Kelsy Mccarty YOB: 1960   Provider's Name   [facility]   Medical Record No.  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              Referring Provider:  Chaitanya Ortega    Initial Assessment  See Epic Evaluation- Start of Care Date: 12/27/23        PLAN  Continue therapy per current plan of care.    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     Findings: 5 lumbar-type vertebra. Status post posterior fusion at L4-5   with pedicle screws and intervertebral disc cage placement at L4-5.  Worsening subsidence of the disc cage with increased erosion of the L4  lower endplate and L5 superior endplate. No bridging ossification at  the L4-5 disc space. No screw loosening. No lytic bone lesion. No  acute fracture. Disc bulge L3-4 with bilateral mild neural foraminal  narrowing and mild spinal canal narrowing. Remainder of the spinal  canal and neural foraminal levels are patent. No retroperitoneal  lymphadenopathy. Left renal cortical cyst. Findings of prior  cholecystectomy.   other: Right inguinal Hernia present.   Other pertinent information R shoulder is w/c   Progress Note/Certification   Start of Care Date 12/27/23   Onset of illness/injury or Date of Surgery 11/16/23  (Order date)   Therapy Frequency 1x/week to every other week   Predicted Duration 12 weeks   Certification date from 09/17/24   Certification date to 12/16/24   Progress Note Completed Date 12/16/24   GOALS   PT Goals 2;3;4;5   PT Goal 1   Goal Identifier Self-management/HEP   Goal Description Patient will be independent in self-management of condition and HEP to reach functional goals.   Rationale to maximize safety and independence with performance of ADLs and functional tasks;to maximize safety and independence within the home;to maximize safety and independence with self cares   Goal Progress Progressing- pt has HEP and is doing this as able.  She is pacing chores as able, and interupts prolonged or repetative activities of positions primarily due to pain,.   Target Date 12/16/24   PT Goal 2   Goal Identifier Walking   Goal Description Patient will be able to walk 2 miles ,1x/day with her dog with <4/10 pain in the knee and lower back in order to return to PLOF.   Rationale to maximize safety and independence with performance of ADLs and functional tasks;to maximize safety and independence within the home;to maximize safety and independence within the  community;to maximize safety and independence with self cares   Goal Progress Not met. Patient is walking 1 mile, 1x/day, and another short walk of 1/2 mile. When pt walks this distance, 7-8/10 LBP and R knee pain 6/10. LBP has increased as noted in subjective report.   Target Date 12/16/24   PT Goal 3   Goal Identifier Putting dishes away   Goal Description Patient will be able put dishes away in cupboard with <3/10 pain in the shoulders in order to perform daily house work.   Rationale to maximize safety and independence with performance of ADLs and functional tasks;to maximize safety and independence with transportation;to maximize safety and independence with self cares   Goal Progress DIfficult and not met with both shoulders. Putting dishes away with right arm is 3-4/10, with L arm 5-6/10 and uses neck. Pt reports right elbow tendonitis has returned.   Target Date 09/17/24   PT Goal 4   Goal Identifier Stairs   Goal Description Patient will be able to maneuver stairs with <4/10 pain in the knee and greater ease in order to improve functional mobility.   Rationale to maximize safety and independence with performance of ADLs and functional tasks;to maximize safety and independence within the home;to maximize safety and independence within the community;to maximize safety and independence with transportation;to maximize safety and independence with self cares   Goal Progress Not met. Pt must manuever right knee to avoid pain and achieve climb- right knee 5-7/10.  (Pt also doing PT at [facility] for body/EDS)   Target Date 12/16/24   PT Goal 5   Goal Identifier Headaches   Goal Description Patient will reduce HA frequency to <1x/week in order to improve QOL.   Rationale to maximize safety and independence with transportation;to maximize safety and independence within the home;to maximize safety and independence with performance of ADLs and functional tasks;to maximize safety and independence with self cares  "  Goal Progress Improved at last cert and not addressed this session. At 7/3/ vist she had less HA frequently - HA 1x/wk or less. Intensity of HA when present- 7-8/10 at base of right skull and wraps to right eye.   Target Date 12/16/24   Subjective Report   Subjective Report I saw my surgeon Dr. Borges 7/2024 and confirmed problems with the surgical procedure he stated I have cage subsidence particularly on the right side and he stated it is punching through the lower end plate of L4 and upper end plate of L5.  The pain has been marked- 7/10 and stated this was due to my bone loss and started me on injectable meds for bone growth- Evenity which started in July. I went to [place] last week for 2nd opinion of the surgical area. I am not comfortable in any position and can't even sleep, can't garden, can't do anything I enjoy. I can't sit still due to the pain- I keep moving as no position offers relief. I used to get breaks in the pain level prior surgery-I was always 5/10 and went up to 7/10. Occasionally prior to surgery I has times at 2-3/10. Now since surgery and PT the lowest is 7/10 and ranges u to 9/10. By evening it is difficult to even stand erect. I am seeing other PTs and am doing the exercises- I have been doing these for years. I am also seeing Chaitanya- a functional PT that I have seen for 6 years- 2x/mo. The area of most pain is R SI and gluteals and across L4-5 area. I also have midback pain as I lean forward and it spasms in the mid back. When I went to [place] it appears I have lost 3 inches total - I was 5 8 in college, 5'5\" before surgery. The entire front right knee is screaming in pain- 6/10. Both feet are numb, and shoulders also in pain, R elbow tendonitis has returned.   Objective Measures   Objective Measures Objective Measure 1;Objective Measure 2;Objective Measure 3;Objective Measure 4;Objective Measure 5   Objective Measure 1   Objective Measure Cervical ROM   Details 6/19/24 L rot 45 deg, R rot 58 " deg, 4/3/24 Mod limited flexion, extension is WNL, major limited R SB, mod limited L SB, mod limited bilateral rotation   Objective Measure 2   Objective Measure Shoulder ROM   Details 6/19/24 L shldr flexion 88 deg  5/10, R 170 deg 1/10, 1/2/24 Flexion to ~90 deg on the L, 110 deg R   Objective Measure 3   Objective Measure Lumbar ROM (not today)   Details 11/4/24 14 1/2 inches- 9/10 R bu ttock area. 6/19/24 flexion to 8 inches with mid arc pain level 3/10. Pain with lumbar extension, pain with R lumbar side bending on the R   Objective Measure 4   Objective Measure 11/4/24 + R FB test, R ASIS low, 7/3/24 R leg long and marked R PSIS high and + R FB test   Details 11/4/24 scan shows R pelvic arterial and R pelvic periosteal and neural fascia. 7/3/24 Cranial scan marked + PINT-N and neural of thoracic sympathetics and R middle abdominal viscera   Treatment Interventions (PT)   Interventions Manual Therapy   Therapeutic Procedure/Exercise   Ther Proc 1 NUSTEP   Ther Proc 1 - Details To promote axial trunk rotation and UE/LE strengthening and mobility: NUSTEP x 8 minutes WL 3.0 with 626 legs only steps on NUSTEP and subjective measures taken.   PTRx Ther Proc 1 Gluteal Myofascial Full Arc   PTRx Ther Proc 1 - Details Discontinue   PTRx Ther Proc 2 Gluteal Myofascial Upper Arc   PTRx Ther Proc 2 - Details Discontinue   PTRx Ther Proc 3 Gluteal Myofascial Lower Arc   PTRx Ther Proc 3 - Details Discontinue   PTRx Ther Proc 4 Shoulder Theraband Rows   PTRx Ther Proc 4 - Details Discontinue   PTRx Ther Proc 5 Pallof Press   PTRx Ther Proc 5 - Details Discontinue   PTRx Ther Proc 6 Supine Cervical Retraction   PTRx Ther Proc 6 - Details HEP   PTRx Ther Proc 7 Scapular Retraction/Depression   PTRx Ther Proc 7 - Details HEP   PTRx Ther Proc 8 Bilateral Rotation With Theraband   PTRx Ther Proc 8 - Details Discontinue   PTRx Ther Proc 9 Abdominal Brace Transverse Abdominis   PTRx Ther Proc 9 - Details PRN   PTRx Ther Proc 10  Supine Abdominal Exercise #3 (Marching)   PTRx Ther Proc 10 - Details Discontinue   PTRx Ther Proc 11 Pubic Shot Gun MET   PTRx Ther Proc 11 - Details PRN   PTRx Ther Proc 12 Supine MET For Anterior Posterior Innominate Rotation   PTRx Ther Proc 12 - Details PRN   PTRx Ther Proc 13 Bridging #1   PTRx Ther Proc 13 - Details Discontinue   PTRx Ther Proc 14 Treatment for Anterior/Posterior Ilium Standing- Muscle Energy Technique (MET)   PTRx Ther Proc 14 - Details PRN   PTRx Ther Proc 15 Treatment for Anterior/Posterior Ilium MET Prone   PTRx Ther Proc 15 - Details PRN   PTRx Ther Proc 16 Hip Flexion Straight Leg Raise   PTRx Ther Proc 16 - Details HEP   PTRx Ther Proc 17 Supine Abdominal Exercise #3B (Two Leg Marching)   PTRx Ther Proc 17 - Details HEP   PTRx Ther Proc 18 Shoulder Theraband IR Step Out   PTRx Ther Proc 18 - Details Discontinue   PTRx Ther Proc 19 Shoulder Theraband External Rotation Step Out   PTRx Ther Proc 19 - Details Discontinue   PTRx Ther Proc 20 Roll Outs Hooklying   PTRx Ther Proc 20 - Details R KTC and pubic  squeeze- 1x/day   Skilled Intervention Discussed HEP and focusing on isometric strengthening, especially of the quadricep on the R to improve knee pain. Patient to work on HEP as able.   Patient Response/Progress Good understanding of HEP.   Neuromuscular Re-education   PTRx Neuro Re-ed 1 Wand Shoulder Flexion Supine   PTRx Neuro Re-ed 1 - Details HEP   PTRx Neuro Re-ed 2 Isometric Quad   PTRx Neuro Re-ed 2 - Details Discussed performing for home   Manual Therapy   Manual Therapy: Mobilization, MFR, MLD, friction massage minutes ([id number]) 55   Manual Therapy 1 Counterstrain   Manual Therapy 1 - Details MFR/MT/SCS to SI and pelvis   Skilled Intervention Mm energy to correct pelvis, R PELV-N supine, R SV-N , R SGGLS-A   Patient Response/Progress Pelvis level after PT   Education   Learner/Method Patient;Listening;Demonstration   Plan   Home program R KTC and pubic squeeze, shldr and neck ex,  lumbar extension   Updates to plan of care Treat sacrum and right LE neural/arterial fascial dusfynction   Plan for next session Continue MT/Counterstrain to fascial dysfunctions-emphasize fascial dysunction causing pain and R LE burning/compresion/sciatic distribution to bottom of foot: SMED of spine R Lumbar ALL/LF, PS2-3 48,PELV 62, IMP 69   Comments   Comments R gluteal area LBP worse and flexibility worse since last visit 7/3/24. Pt has been seen in PT for EDS and strengthening, and has not been able to attend manual PT due to limited access.  Pt is now on bone biologics for the next 9 mo. to help to help with bone density and lumbar fusion. Pt  has been seeing an endochronologist who endorses this plan- but bone density he feels is not threatening fusion. Seeing [place] [facility] 11/5/24 in consult for this issue. Pt feels part of her R knee pain and PN issue is an L3-4 nerve root injury which occurred during anesthesia injection for TKA also Right quad weakness. Pt is sore and still having difficulty sleeping- states she wakes every 20 min all night which is due to pain, She cannot sleep on her stomach or shoulders, and likely is compressing tender spots at night causing her to wake. She is appropriate to continue PT to work on fascial restrictions present to decrease pain and improve sleeping and ADL pain levels.   Total Session Time   Timed Code Treatment Minutes 55   Total Treatment Time (sum of timed and untimed services) 55

## 2024-11-11 ENCOUNTER — THERAPY VISIT (OUTPATIENT)
Dept: PHYSICAL THERAPY | Facility: REHABILITATION | Age: 64
End: 2024-11-11
Payer: MEDICARE

## 2024-11-11 DIAGNOSIS — M43.10 DEGENERATIVE SPONDYLOLISTHESIS: ICD-10-CM

## 2024-11-11 DIAGNOSIS — M25.561 CHRONIC KNEE PAIN AFTER TOTAL REPLACEMENT OF RIGHT KNEE JOINT: ICD-10-CM

## 2024-11-11 DIAGNOSIS — M54.6 CHRONIC BILATERAL THORACIC BACK PAIN: ICD-10-CM

## 2024-11-11 DIAGNOSIS — G89.29 CHRONIC BILATERAL THORACIC BACK PAIN: ICD-10-CM

## 2024-11-11 DIAGNOSIS — M25.512 CHRONIC PAIN OF BOTH SHOULDERS: ICD-10-CM

## 2024-11-11 DIAGNOSIS — M54.50 CHRONIC BILATERAL LOW BACK PAIN WITHOUT SCIATICA: ICD-10-CM

## 2024-11-11 DIAGNOSIS — G44.209 TENSION HEADACHE: ICD-10-CM

## 2024-11-11 DIAGNOSIS — G89.29 CHRONIC KNEE PAIN AFTER TOTAL REPLACEMENT OF RIGHT KNEE JOINT: ICD-10-CM

## 2024-11-11 DIAGNOSIS — G89.29 CHRONIC BILATERAL LOW BACK PAIN WITHOUT SCIATICA: ICD-10-CM

## 2024-11-11 DIAGNOSIS — Z96.651 CHRONIC KNEE PAIN AFTER TOTAL REPLACEMENT OF RIGHT KNEE JOINT: ICD-10-CM

## 2024-11-11 DIAGNOSIS — G89.29 CHRONIC PAIN OF BOTH SHOULDERS: ICD-10-CM

## 2024-11-11 DIAGNOSIS — M54.16 LUMBAR RADICULOPATHY: ICD-10-CM

## 2024-11-11 DIAGNOSIS — M54.2 NECK PAIN: ICD-10-CM

## 2024-11-11 DIAGNOSIS — Q79.62 EHLERS-DANLOS, HYPERMOBILE TYPE: ICD-10-CM

## 2024-11-11 DIAGNOSIS — M25.511 CHRONIC PAIN OF BOTH SHOULDERS: ICD-10-CM

## 2024-11-11 DIAGNOSIS — Q79.60 EHLERS-DANLOS SYNDROME: Primary | ICD-10-CM

## 2024-11-11 DIAGNOSIS — M80.00XA AGE-RELATED OSTEOPOROSIS WITH CURRENT PATHOLOGICAL FRACTURE, INITIAL ENCOUNTER: ICD-10-CM

## 2024-11-11 PROCEDURE — 97140 MANUAL THERAPY 1/> REGIONS: CPT | Mod: GP | Performed by: PHYSICAL THERAPIST

## 2024-11-13 ASSESSMENT — PAIN SCALES - PAIN ENJOYMENT GENERAL ACTIVITY SCALE (PEG)
INTERFERED_GENERAL_ACTIVITY: 8
INTERFERED_ENJOYMENT_LIFE: 7
AVG_PAIN_PASTWEEK: 7
PEG_TOTALSCORE: 7.33

## 2024-11-15 ENCOUNTER — OFFICE VISIT (OUTPATIENT)
Dept: PALLIATIVE MEDICINE | Facility: OTHER | Age: 64
End: 2024-11-15
Attending: ANESTHESIOLOGY
Payer: MEDICARE

## 2024-11-15 ENCOUNTER — MYC MEDICAL ADVICE (OUTPATIENT)
Dept: PALLIATIVE MEDICINE | Facility: OTHER | Age: 64
End: 2024-11-15

## 2024-11-15 VITALS
WEIGHT: 123 LBS | DIASTOLIC BLOOD PRESSURE: 68 MMHG | BODY MASS INDEX: 20.47 KG/M2 | SYSTOLIC BLOOD PRESSURE: 118 MMHG | OXYGEN SATURATION: 100 %

## 2024-11-15 DIAGNOSIS — F43.10 PTSD (POST-TRAUMATIC STRESS DISORDER): ICD-10-CM

## 2024-11-15 DIAGNOSIS — Z79.891 LONG TERM (CURRENT) USE OF OPIATE ANALGESIC: Primary | ICD-10-CM

## 2024-11-15 LAB
CANNABINOIDS UR QL SCN: NORMAL
CREAT UR-MCNC: 16 MG/DL
ETHANOL UR QL SCN: NORMAL

## 2024-11-15 PROCEDURE — G2211 COMPLEX E/M VISIT ADD ON: HCPCS | Performed by: ANESTHESIOLOGY

## 2024-11-15 PROCEDURE — 99215 OFFICE O/P EST HI 40 MIN: CPT | Performed by: ANESTHESIOLOGY

## 2024-11-15 PROCEDURE — 80307 DRUG TEST PRSMV CHEM ANLYZR: CPT | Performed by: ANESTHESIOLOGY

## 2024-11-15 PROCEDURE — 83992 ASSAY FOR PHENCYCLIDINE: CPT | Performed by: ANESTHESIOLOGY

## 2024-11-15 PROCEDURE — 80360 METHYLPHENIDATE: CPT | Performed by: ANESTHESIOLOGY

## 2024-11-15 PROCEDURE — G0463 HOSPITAL OUTPT CLINIC VISIT: HCPCS | Performed by: ANESTHESIOLOGY

## 2024-11-15 RX ORDER — LAMOTRIGINE 25 MG/1
TABLET ORAL
Qty: 60 TABLET | Refills: 2 | Status: SHIPPED | OUTPATIENT
Start: 2024-11-15 | End: 2024-11-18

## 2024-11-15 NOTE — LETTER

## 2024-11-15 NOTE — PROGRESS NOTES
Patient presents to the clinic today for a visit  with NICOLE TUTTLE MD            5/15/2024     8:56 AM 8/28/2024    11:29 AM 11/15/2024    10:32 AM   PEG Score   PEG Total Score 3.67 9 7       UDS/CSA-10/4/23     Medications-tramadol     QUESTIONS:    Lori Dee MA  [facility]

## 2024-11-15 NOTE — LETTER

## 2024-11-15 NOTE — PATIENT INSTRUCTIONS
"[facility] Number:  [phone]  Call with any questions about your care and for scheduling assistance.   Calls are returned Monday through Friday between 8 AM and 4:30 PM. We usually get back to you within 2 business days depending on the issue/request.    If we are prescribing your medications:  For opioid medication refills, call the clinic or send a [employer] message 7 days in advance.  Please include:  Name of requested medication  Name of the pharmacy.  For non-opioid medications, call your pharmacy directly to request a refill. Please allow 3-4 days to be processed.   Per [place] State Law:  All controlled substance prescriptions must be filled within 30 days of being written.    For those controlled substances allowing refills, pickup must occur within 30 days of last fill.      We believe regular attendance is key to your success in our program!    Any time you are unable to keep your appointment we ask that you call us at least 24 hours in advance to cancel.This will allow us to offer the appointment time to another patient.   Multiple missed appointments may lead to dismissal from the clinic.       PLAN:    You will be enrolled in the medical cannabis program.  You will be contacted by the state to continue your program.    Discussed that methylene blue now comes in capsules available online and strengths of 7, 8, and 12 mg capsules.  They may have other supplements added to them.  Discussed the goal of below 30 mg a day taken morning and noon.    Discussed the supplement \"C-15\" to help with inflammation, may obtain online through \"fatty 15\".    Discussed a topical cream with CBD, Arnica, menthol, may obtain online through \"[employer]\".    Discussed compounded formulations of topical cream which could use ketamine 10%, ketoprofen 10%, bupivacaine 2%.  If you are sensitive to the bupivacaine we could substitute lidocaine.  You may contact your compounding " pharmacy to see about the cost.    Follow-up with Dr. Ortega for return visit in 2 to 3 months

## 2024-11-16 NOTE — PROGRESS NOTES
[facility] Follow-up    Date of visit: 11/15/2024    Chief complaint:   Chief Complaint   Patient presents with    Pain    Pain Management   Follow-up for history of lumbar surgery, chronic regional pain syndrome, mood disorder.    Review of the record has been seen in orthopedic surgery, following her surgery, initially thought to have significant QL spasm, and discussed concern with changes in her cage for the fusion and a bone stimulator.    She did see a surgeon 11/5 in neurosurgery, discussed they could watch for her response with present healing, consider some other approaches.    Concern for nonunion.    She reviews today in April did have her fusion, and follow-up concerned that her cage may have punctured the L4-5 and later L5, attributed to bone density concerns.    Describes pain as pretty consistently around a 7.  Is very hard to get comfortable.    Given concern that there was not fusion in July she started Evenity, last imaging October showed some bridging.    She reviews of the evaluation the [facility] could watch for the longer, discussed an option to remove some of the hardware do an anterior fusion.    There is some discussion of bone growth stimulator but thought not be particularly supportive.    She has been using tramadol at bedtime.  Describes with most other agents has had adverse reactions.  Described with some some of the injections have a reaction to bupivacaine and others.    Does not feel that the QL was particularly an issue, working actively with her physical therapist.    Recalls having some ketamine after a knee procedure even very low-dose poorly tolerated.    Uses a lidocaine patch with limited benefit.    Is noting some anxiety with this process.    She notes with the increased anxiety exacerbation of her eating disorder or concerns about that.  Reports Vyvanse had been helpful in the past and asks about can fill it.   Reviewed that I am not supportive and doing primary psychiatry work here and she could look into others.    Reviewed again the role of medical cannabis, she had been concerned of the cost, discussed that it changed.  Reviewed some patients have found significant benefit.        She is alert with a clear sensorium good eye contact.  Thought process logical.  Affect  anxious, congruent.            Medications:  Current Outpatient Medications   Medication Sig Dispense Refill    acetaminophen (TYLENOL) 325 MG tablet Take 1-2 tablets (325-650 mg) by mouth every 6 hours as needed for mild pain Use a Maximum of 4,000mg of acetaminophen from all sources      cetirizine HCl 10 MG CAPS Take 20 mg by mouth at bedtime      EMGALITY 120 MG/ML injection Inject 120 mg Subcutaneous every 28 days Last dose 2/23/24      estradiol (ESTRACE) 0.1 MG/GM vaginal cream Place 1 g vaginally three times a week       famotidine (PEPCID) 40 MG tablet Take 40 mg by mouth as needed      fexofenadine (ALLEGRA) 60 MG tablet Take 180 mg by mouth every morning      ipratropium (ATROVENT) 0.06 % nasal spray Spray 1 spray into both nostrils every morning      lamoTRIgine (LAMICTAL) 25 MG tablet One daily for 2 weeks, then two daily 60 tablet 2    LEVOTHYROXINE SODIUM PO Take 75 mcg by mouth every morning      lidocaine (LIDODERM) 5 % patch daily as needed for moderate pain      Magic Mouthwash (FV std formula) lidocaine visc 2% 2.5mL/5mL & maalox/mylanta w/ simeth 2.5mL/5mL & diphenhydrAMINE 5mg/5mL Swish and swallow 10 mLs in mouth every 6 hours as needed for mouth sores      magnesium oxide 400 MG CAPS Take by mouth as needed 2-3 a day depending on ability to have bowel movement      Methylene Blue POWD Take 30 mg by mouth 2 times daily. #240 240 g 0    montelukast (SINGULAIR) 10 MG tablet Take 10 mg by mouth every morning      mupirocin (BACTROBAN) 2 % external ointment Apply 1 inch topically daily as needed      Naltrexone HCl POWD 4.5 mg capsule  twice a day 240 g 0    omalizumab (XOLAIR) 150 MG injection Inject 150 mg Subcutaneous every 28 days Next dose 4/10/24      romosozumab-aqqg (EVENITY) 105 MG/1.17ML injection Inject 210 mg subcutaneously every 28 days      SUMAtriptan Succinate (IMITREX PO) Take 100 mg by mouth every 8 hours as needed for migraine      tiZANidine (ZANAFLEX) 4 MG tablet Take 4 mg by mouth 3 times daily as needed for muscle spasms      traMADol-acetaminophen (ULTRACET) 37.5-325 MG tablet Take 1 tablet by mouth at bedtime Take a maximum of 4,000mg of acetaminophen from all sources      vitamin B-12 (CYANOCOBALAMIN) 1000 MCG tablet 1,000 mcg daily      vitamin D3 (CHOLECALCIFEROL) 50 mcg (2000 units) tablet Take 1 tablet by mouth daily      zinc gluconate 50 MG tablet Take 50 mg by mouth every other day      zolpidem (AMBIEN) 10 MG tablet Take 5 mg by mouth at bedtime             Physical Exam:  Blood pressure 118/68, weight 55.8 kg (123 lb), SpO2 100%, not currently breastfeeding.    Assessment: Persistent spinal pain, recent lumbar fusion, concerned there is not good union, started on Evenity.    Describes significant pain through the day, poor sleep, has not tolerated many agents.    She is reluctant to use other opioids as she is concerned about any cognitive distress.    Reviewed the role of medical cannabis, using high CBD products.  She would be interested in pursuing.    Discussed the use of oxytocin which have used for pain and anxiety and she will consider.    Discussed topical CBD creams.    Reviewed off label use of methylene blue which she will consider.    Discussed other supplements helping with inflammation.    She will follow-up in several weeks.    Total time 45 minutes.        Chaitanya Ortega MD  [facility]

## 2024-11-18 RX ORDER — LAMOTRIGINE 25 MG/1
TABLET ORAL
Qty: 60 TABLET | Refills: 2 | Status: SHIPPED | OUTPATIENT
Start: 2024-11-18

## 2024-11-18 NOTE — TELEPHONE ENCOUNTER
Patient requesting lamotrigine to be sent to [facility] instead of [facility].     Pending Prescriptions:                       Disp   Refills    lamoTRIgine (LAMICTAL) 25 MG tablet       60 tab*2            Sig: One daily for 2 weeks, then two daily    [facility] - [address]

## 2024-11-20 LAB
GABAPENTIN UR QL CFM: PRESENT
N-NORTRAMADOL/CREAT UR CFM: 9625 NG/MG {CREAT}
NALTREXONE UR CFM-MCNC: 101 NG/ML
NALTREXONE/CREAT UR: 631 NG/MG {CREAT}
O-NORTRAMADOL UR CFM-MCNC: 1540 NG/ML
TRAMADOL CTO UR CFM-MCNC: 960 NG/ML
TRAMADOL/CREAT UR: 6000 NG/MG {CREAT}

## 2024-12-02 ENCOUNTER — THERAPY VISIT (OUTPATIENT)
Dept: PHYSICAL THERAPY | Facility: REHABILITATION | Age: 64
End: 2024-12-02
Payer: MEDICARE

## 2024-12-02 DIAGNOSIS — Q79.60 EHLERS-DANLOS SYNDROME: Primary | ICD-10-CM

## 2024-12-02 DIAGNOSIS — M54.16 LUMBAR RADICULOPATHY: ICD-10-CM

## 2024-12-02 DIAGNOSIS — Z96.651 CHRONIC KNEE PAIN AFTER TOTAL REPLACEMENT OF RIGHT KNEE JOINT: ICD-10-CM

## 2024-12-02 DIAGNOSIS — M43.10 DEGENERATIVE SPONDYLOLISTHESIS: ICD-10-CM

## 2024-12-02 DIAGNOSIS — G89.29 CHRONIC KNEE PAIN AFTER TOTAL REPLACEMENT OF RIGHT KNEE JOINT: ICD-10-CM

## 2024-12-02 DIAGNOSIS — G89.29 CHRONIC PAIN OF BOTH SHOULDERS: ICD-10-CM

## 2024-12-02 DIAGNOSIS — M80.00XA AGE-RELATED OSTEOPOROSIS WITH CURRENT PATHOLOGICAL FRACTURE, INITIAL ENCOUNTER: ICD-10-CM

## 2024-12-02 DIAGNOSIS — M54.50 CHRONIC BILATERAL LOW BACK PAIN WITHOUT SCIATICA: ICD-10-CM

## 2024-12-02 DIAGNOSIS — M25.561 CHRONIC KNEE PAIN AFTER TOTAL REPLACEMENT OF RIGHT KNEE JOINT: ICD-10-CM

## 2024-12-02 DIAGNOSIS — G89.29 CHRONIC BILATERAL THORACIC BACK PAIN: ICD-10-CM

## 2024-12-02 DIAGNOSIS — Q79.62 EHLERS-DANLOS, HYPERMOBILE TYPE: ICD-10-CM

## 2024-12-02 DIAGNOSIS — M54.2 NECK PAIN: ICD-10-CM

## 2024-12-02 DIAGNOSIS — G89.29 CHRONIC BILATERAL LOW BACK PAIN WITHOUT SCIATICA: ICD-10-CM

## 2024-12-02 DIAGNOSIS — M54.6 CHRONIC BILATERAL THORACIC BACK PAIN: ICD-10-CM

## 2024-12-02 DIAGNOSIS — M25.512 CHRONIC PAIN OF BOTH SHOULDERS: ICD-10-CM

## 2024-12-02 DIAGNOSIS — M25.511 CHRONIC PAIN OF BOTH SHOULDERS: ICD-10-CM

## 2024-12-02 DIAGNOSIS — G44.209 TENSION HEADACHE: ICD-10-CM

## 2024-12-02 PROCEDURE — 97140 MANUAL THERAPY 1/> REGIONS: CPT | Mod: GP | Performed by: PHYSICAL THERAPIST

## 2024-12-16 ENCOUNTER — ANCILLARY PROCEDURE (OUTPATIENT)
Dept: CT IMAGING | Facility: CLINIC | Age: 64
End: 2024-12-16
Attending: PHYSICIAN ASSISTANT
Payer: MEDICARE

## 2024-12-16 ENCOUNTER — TELEPHONE (OUTPATIENT)
Dept: PHYSICAL THERAPY | Facility: REHABILITATION | Age: 64
End: 2024-12-16

## 2024-12-16 DIAGNOSIS — G89.29 CHRONIC BILATERAL LOW BACK PAIN WITHOUT SCIATICA: ICD-10-CM

## 2024-12-16 DIAGNOSIS — Z98.1 S/P SPINAL FUSION: ICD-10-CM

## 2024-12-16 DIAGNOSIS — M54.50 CHRONIC BILATERAL LOW BACK PAIN WITHOUT SCIATICA: ICD-10-CM

## 2024-12-16 PROCEDURE — G1010 CDSM STANSON: HCPCS | Performed by: RADIOLOGY

## 2024-12-16 PROCEDURE — 72131 CT LUMBAR SPINE W/O DYE: CPT | Mod: MF | Performed by: RADIOLOGY

## 2024-12-18 NOTE — TELEPHONE ENCOUNTER
Called pt on 12/18/24 about missed PT appt 2:30 pm on 12/16/24. Pt reports she cancelled this appt through [facility] either Saturday or Sunday. Reminded her of 12/23/24 PT appt.

## 2024-12-19 ENCOUNTER — OFFICE VISIT (OUTPATIENT)
Dept: ORTHOPEDICS | Facility: CLINIC | Age: 64
End: 2024-12-19
Payer: MEDICARE

## 2024-12-19 ENCOUNTER — DOCUMENTATION ONLY (OUTPATIENT)
Dept: ORTHOPEDICS | Facility: CLINIC | Age: 64
End: 2024-12-19

## 2024-12-19 ENCOUNTER — TELEPHONE (OUTPATIENT)
Dept: ORTHOPEDICS | Facility: CLINIC | Age: 64
End: 2024-12-19

## 2024-12-19 DIAGNOSIS — Z98.1 S/P SPINAL FUSION: ICD-10-CM

## 2024-12-19 DIAGNOSIS — D89.40 MAST CELL ACTIVATION SYNDROME (H): ICD-10-CM

## 2024-12-19 DIAGNOSIS — Q79.62 EHLERS-DANLOS SYNDROME TYPE III: ICD-10-CM

## 2024-12-19 DIAGNOSIS — M62.830 MUSCLE SPASM OF BACK: ICD-10-CM

## 2024-12-19 DIAGNOSIS — M43.10 DEGENERATIVE SPONDYLOLISTHESIS: ICD-10-CM

## 2024-12-19 DIAGNOSIS — Z98.1 S/P SPINAL FUSION: Primary | ICD-10-CM

## 2024-12-19 DIAGNOSIS — F43.10 PTSD (POST-TRAUMATIC STRESS DISORDER): ICD-10-CM

## 2024-12-19 DIAGNOSIS — M43.10 DEGENERATIVE SPONDYLOLISTHESIS: Primary | ICD-10-CM

## 2024-12-19 DIAGNOSIS — M40.36 FLATBACK SYNDROME, LUMBAR REGION: ICD-10-CM

## 2024-12-19 RX ORDER — LAMOTRIGINE 25 MG/1
50 TABLET ORAL DAILY
Qty: 180 TABLET | Refills: 3 | Status: SHIPPED | OUTPATIENT
Start: 2024-12-19

## 2024-12-19 RX ORDER — LIDOCAINE 50 MG/G
PATCH TOPICAL DAILY PRN
Qty: 30 PATCH | Refills: 5 | Status: SHIPPED | OUTPATIENT
Start: 2024-12-19
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
## 2024-12-19 NOTE — PROGRESS NOTES
Writer faxed Physician's report to [facility] attn: Falguni Mcguire at [phone].     Jessica Abraham LPN

## 2024-12-19 NOTE — TELEPHONE ENCOUNTER
Pending Prescriptions:                       Disp   Refills    lamoTRIgine (LAMICTAL) 25 MG tablet       180 ta*             Sig: Take 2 tablets (50 mg) by mouth daily.    [facility] - [facility], [place]  [address] [facility]

## 2024-12-19 NOTE — TELEPHONE ENCOUNTER
Health Call Center    Phone Message    May a detailed message be left on voicemail: yes     Reason for Call: Medication Question or concern regarding medication   Prescription Clarification  Name of Medication: lamoTRIgine (LAMICTAL) 25 MG tablet   Prescribing Provider: Jordan    Pharmacy: [facility] - [facility], [place] - [address] [place] [address]    What on the order needs clarification? Patient called and was wondering if she could get the prescription re-written for a 90 day supply. Patient prefers to do it that way as she fills her pill boxes for 8 weeks at a time, and all her other prescriptions are on 90 day supply as well.       Action Taken: Message routed to:  Other: Pain    Travel Screening: Not Applicable     Date of Service:

## 2024-12-20 NOTE — TELEPHONE ENCOUNTER
Central Prior Authorization Team - Phone: [phone]     PA Initiation    Medication: LIDOCAINE 5 % EX PTCH  Insurance Company: [employer] - Phone [phone] Fax [phone]  Pharmacy Filling the Rx: [id number] [place], [place] - [address] [place] [address]  Filling Pharmacy Phone: [phone]  Filling Pharmacy Fax:    Start Date: 12/20/2024     Arava Counseling:  Patient counseled regarding adverse effects of Arava including but not limited to nausea, vomiting, abnormalities in liver function tests. Patients may develop mouth sores, rash, diarrhea, and abnormalities in blood counts. The patient understands that monitoring is required including LFTs and blood counts.  There is a rare possibility of scarring of the liver and lung problems that can occur when taking methotrexate. Persistent nausea, loss of appetite, pale stools, dark urine, cough, and shortness of breath should be reported immediately. Patient advised to discontinue Arava treatment and consult with a physician prior to attempting conception. The patient will have to undergo a treatment to eliminate Arava from the body prior to conception.

## 2024-12-26 NOTE — TELEPHONE ENCOUNTER
Central Prior Authorization Team - Phone: [phone]     Prior Authorization Approval    Medication: LIDOCAINE 5 % EX PTCH  Authorization Effective Date: 12/19/2024  Authorization Expiration Date:    Approved Dose/Quantity: 30  Reference #:     Insurance Company: [employer] - Phone [phone] Fax [phone]  Expected CoPay: $    CoPay Card Available:      Financial Assistance Needed:   Which Pharmacy is filling the prescription: [facility] [address] [address]  Pharmacy Notified: YES  Patient Notified: YES Instructed pharmacy to notify patient once order is ready.

## 2024-12-30 ENCOUNTER — THERAPY VISIT (OUTPATIENT)
Dept: PHYSICAL THERAPY | Facility: REHABILITATION | Age: 64
End: 2024-12-30
Payer: MEDICARE

## 2024-12-30 DIAGNOSIS — M54.2 NECK PAIN: ICD-10-CM

## 2024-12-30 DIAGNOSIS — G89.29 CHRONIC BILATERAL LOW BACK PAIN WITHOUT SCIATICA: ICD-10-CM

## 2024-12-30 DIAGNOSIS — G89.29 CHRONIC PAIN OF BOTH SHOULDERS: ICD-10-CM

## 2024-12-30 DIAGNOSIS — M25.512 CHRONIC PAIN OF BOTH SHOULDERS: ICD-10-CM

## 2024-12-30 DIAGNOSIS — G89.29 CHRONIC BILATERAL THORACIC BACK PAIN: ICD-10-CM

## 2024-12-30 DIAGNOSIS — M25.511 CHRONIC PAIN OF BOTH SHOULDERS: ICD-10-CM

## 2024-12-30 DIAGNOSIS — M54.50 CHRONIC BILATERAL LOW BACK PAIN WITHOUT SCIATICA: ICD-10-CM

## 2024-12-30 DIAGNOSIS — G44.209 TENSION HEADACHE: ICD-10-CM

## 2024-12-30 DIAGNOSIS — Z96.651 CHRONIC KNEE PAIN AFTER TOTAL REPLACEMENT OF RIGHT KNEE JOINT: ICD-10-CM

## 2024-12-30 DIAGNOSIS — G89.29 CHRONIC KNEE PAIN AFTER TOTAL REPLACEMENT OF RIGHT KNEE JOINT: ICD-10-CM

## 2024-12-30 DIAGNOSIS — Q79.60 EHLERS-DANLOS SYNDROME: Primary | ICD-10-CM

## 2024-12-30 DIAGNOSIS — M25.561 CHRONIC KNEE PAIN AFTER TOTAL REPLACEMENT OF RIGHT KNEE JOINT: ICD-10-CM

## 2024-12-30 DIAGNOSIS — M54.6 CHRONIC BILATERAL THORACIC BACK PAIN: ICD-10-CM

## 2024-12-30 PROCEDURE — 97140 MANUAL THERAPY 1/> REGIONS: CPT | Mod: KX | Performed by: PHYSICAL THERAPIST
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
## 2025-01-20 DIAGNOSIS — M43.10 DEGENERATIVE SPONDYLOLISTHESIS: ICD-10-CM

## 2025-01-20 DIAGNOSIS — M80.00XA AGE-RELATED OSTEOPOROSIS WITH CURRENT PATHOLOGICAL FRACTURE, INITIAL ENCOUNTER: ICD-10-CM

## 2025-01-20 DIAGNOSIS — S32.009K PSEUDOARTHROSIS OF LUMBAR SPINE: ICD-10-CM

## 2025-01-20 DIAGNOSIS — M40.36 FLATBACK SYNDROME, LUMBAR REGION: ICD-10-CM

## 2025-01-20 DIAGNOSIS — Z98.1 S/P SPINAL FUSION: Primary | ICD-10-CM

## 2025-01-20 DIAGNOSIS — Q79.62 EHLERS-DANLOS SYNDROME TYPE III: ICD-10-CM

## 2025-01-26 ASSESSMENT — PAIN SCALES - PAIN ENJOYMENT GENERAL ACTIVITY SCALE (PEG)
INTERFERED_ENJOYMENT_LIFE: 6
AVG_PAIN_PASTWEEK: 7
PEG_TOTALSCORE: 6.33
INTERFERED_GENERAL_ACTIVITY: 6

## 2025-01-28 ENCOUNTER — OFFICE VISIT (OUTPATIENT)
Dept: PALLIATIVE MEDICINE | Facility: OTHER | Age: 65
End: 2025-01-28
Attending: ANESTHESIOLOGY
Payer: MEDICARE

## 2025-01-28 VITALS — DIASTOLIC BLOOD PRESSURE: 76 MMHG | OXYGEN SATURATION: 99 % | SYSTOLIC BLOOD PRESSURE: 119 MMHG | HEART RATE: 76 BPM

## 2025-01-28 DIAGNOSIS — G90.9 AUTONOMIC DYSFUNCTION: ICD-10-CM

## 2025-01-28 DIAGNOSIS — G71.3 MITOCHONDRIAL MYOPATHY: ICD-10-CM

## 2025-01-28 PROCEDURE — 99213 OFFICE O/P EST LOW 20 MIN: CPT | Performed by: ANESTHESIOLOGY

## 2025-01-28 PROCEDURE — G0463 HOSPITAL OUTPT CLINIC VISIT: HCPCS | Performed by: ANESTHESIOLOGY

## 2025-01-28 PROCEDURE — G2211 COMPLEX E/M VISIT ADD ON: HCPCS | Performed by: ANESTHESIOLOGY

## 2025-01-28 RX ORDER — METHYLENE BLUE
30 POWDER (GRAM) MISCELLANEOUS 2 TIMES DAILY
Qty: 240 G | Refills: 2 | Status: SHIPPED | OUTPATIENT
Start: 2025-01-28 | End: 2025-01-28

## 2025-01-28 RX ORDER — METHYLENE BLUE
30 POWDER (GRAM) MISCELLANEOUS 2 TIMES DAILY
Qty: 240 G | Refills: 2 | Status: SHIPPED | OUTPATIENT
Start: 2025-01-28

## 2025-01-28 ASSESSMENT — PAIN SCALES - GENERAL: PAINLEVEL_OUTOF10: MODERATE PAIN (5)
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
## 2025-01-28 NOTE — PATIENT INSTRUCTIONS
PLAN:    Continue the methylene blue 30 mg daily.    Continue the low-dose naltrexone 4.5 mg daily.  You may consider holding to determine how helpful it has been.    You may contact the [facility] pain clinic for acupuncture.    Discussed the role of frequency specific microcurrent and acupuncture to help with myofascial pain.    You are working up with your physical therapist.    Continue the vitamin D, K2, and other injections for bone growth.    Contact Dr. Ortega if he would like to be enrolled in the [place] medical cannabis program.  You may try different CBD products in [place]    Follow-up with Dr. Ortega for return visit in 4 to 6 months

## 2025-01-28 NOTE — TELEPHONE ENCOUNTER
[facility] Call Center    Phone Message    May a detailed message be left on voicemail: yes     Reason for Call: Other: Pt is calling and stating that her Rx were sent to wrong pharmacy for medications    Methylene Blue POWD     Naltrexone HCl POWD     Please send to:  [facility] [address]        Action Taken: Message routed to:  Other: [facility]    Travel Screening: Not Applicable     Date of Service:

## 2025-01-28 NOTE — PROGRESS NOTES
Patient presents to the clinic today for a visit with NICOLE TUTTLE MD regarding Pain Management.          8/28/2024    11:29 AM 11/15/2024    10:32 AM 1/28/2025    10:19 AM   PEG Score   PEG Total Score 9 7 5       UDS/CSA- 11.15.2024    Medications- Ultracet last night    Notes    Aide ALVES [facility] Clinical Assistant

## 2025-03-18 DIAGNOSIS — Z98.1 S/P SPINAL FUSION: Primary | ICD-10-CM
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
## 2025-03-26 ENCOUNTER — OFFICE VISIT (OUTPATIENT)
Dept: ORTHOPEDICS | Facility: CLINIC | Age: 65
End: 2025-03-26
Payer: MEDICARE

## 2025-03-26 ENCOUNTER — ANCILLARY PROCEDURE (OUTPATIENT)
Dept: GENERAL RADIOLOGY | Facility: CLINIC | Age: 65
End: 2025-03-26
Attending: ORTHOPAEDIC SURGERY
Payer: MEDICARE

## 2025-03-26 DIAGNOSIS — Q79.62 EHLERS-DANLOS SYNDROME TYPE III: ICD-10-CM

## 2025-03-26 DIAGNOSIS — Z98.1 S/P SPINAL FUSION: ICD-10-CM

## 2025-03-26 DIAGNOSIS — G57.01 PIRIFORMIS SYNDROME OF RIGHT SIDE: Primary | ICD-10-CM

## 2025-03-26 DIAGNOSIS — D89.40 MAST CELL ACTIVATION SYNDROME: ICD-10-CM

## 2025-03-26 PROCEDURE — 72100 X-RAY EXAM L-S SPINE 2/3 VWS: CPT | Performed by: STUDENT IN AN ORGANIZED HEALTH CARE EDUCATION/TRAINING PROGRAM

## 2025-03-26 PROCEDURE — 99214 OFFICE O/P EST MOD 30 MIN: CPT | Performed by: STUDENT IN AN ORGANIZED HEALTH CARE EDUCATION/TRAINING PROGRAM

## 2025-03-26 PROCEDURE — 1125F AMNT PAIN NOTED PAIN PRSNT: CPT | Performed by: STUDENT IN AN ORGANIZED HEALTH CARE EDUCATION/TRAINING PROGRAM

## 2025-03-26 RX ORDER — LIDOCAINE 50 MG/G
1 PATCH TOPICAL EVERY 24 HOURS
Qty: 30 PATCH | Refills: 2 | Status: SHIPPED | OUTPATIENT
Start: 2025-03-26
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
## 2025-03-26 NOTE — NURSING NOTE
Reason For Visit:   Chief Complaint   Patient presents with    RECHECK     DOS 4/2024 Decompression and transforaminal lumbar interbody fusion       Primary MD: Pj Dillon  Ref. MD: EST    ?  No  Occupation Disability.     Date of injury: No  Type of injury: No.     Date of surgery: 4/15/24  Type of surgery: Decompression and transforaminal lumbar interbody fusion with Smith Galeas Osteotomy Lumbar 4-5, device insertion, image guided      Smoker: No  Request smoking cessation information: No    There were no vitals taken for this visit.    Pain Assessment  Patient Currently in Pain: Yes  0-10 Pain Scale: 6  Primary Pain Location: Back    Oswestry (LISA) Questionnaire        3/23/2025     8:10 AM   OSWESTRY DISABILITY INDEX   Count 10    Sum 25    Oswestry Score (%) 50 %        Patient-reported        Visual Analog Pain Scale  Back Pain Scale 0-10: 6  Right leg pain: 0  Left leg pain: 0  Neck Pain Scale 0-10: 0  Right arm pain: 0  Left arm pain: 0    Promis 10 Assessment        3/23/2025     8:12 AM   PROMIS 10   In general, would you say your health is: Good   In general, would you say your quality of life is: Very good   In general, how would you rate your physical health? Fair   In general, how would you rate your mental health, including your mood and your ability to think? Very good   In general, how would you rate your satisfaction with your social activities and relationships? Very good   In general, please rate how well you carry out your usual social activities and roles Good   To what extent are you able to carry out your everyday physical activities such as walking, climbing stairs, carrying groceries, or moving a chair? Moderately   In the past 7 days, how often have you been bothered by emotional problems such as feeling anxious, depressed, or irritable? Rarely   In the past 7 days, how would you rate your fatigue on average? Moderate   In the past 7 days, how would you rate your pain  on average, where 0 means no pain, and 10 means worst imaginable pain? 6   In general, would you say your health is: 3   In general, would you say your quality of life is: 4   In general, how would you rate your physical health? 2   In general, how would you rate your mental health, including your mood and your ability to think? 4   In general, how would you rate your satisfaction with your social activities and relationships? 4   In general, please rate how well you carry out your usual social activities and roles. (This includes activities at home, at work and in your community, and responsibilities as a parent, child, spouse, employee, friend, etc.) 3   To what extent are you able to carry out your everyday physical activities such as walking, climbing stairs, carrying groceries, or moving a chair? 3   In the past 7 days, how often have you been bothered by emotional problems such as feeling anxious, depressed, or irritable? 2   In the past 7 days, how would you rate your fatigue on average? 3   In the past 7 days, how would you rate your pain on average, where 0 means no pain, and 10 means worst imaginable pain? 6   Global Mental Health Score 16    Global Physical Health Score 11    PROMIS TOTAL - SUBSCORES 27        Patient-reported                Jessica Abraham LPN
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
## 2025-04-01 ENCOUNTER — PATIENT OUTREACH (OUTPATIENT)
Dept: CARE COORDINATION | Facility: CLINIC | Age: 65
End: 2025-04-01
Payer: MEDICARE

## 2025-04-03 ENCOUNTER — PATIENT OUTREACH (OUTPATIENT)
Dept: CARE COORDINATION | Facility: CLINIC | Age: 65
End: 2025-04-03
Payer: MEDICARE

## 2025-04-16 ENCOUNTER — INFUSION THERAPY VISIT (OUTPATIENT)
Dept: INFUSION THERAPY | Facility: HOSPITAL | Age: 65
End: 2025-04-16
Attending: ALLERGY & IMMUNOLOGY
Payer: MEDICARE

## 2025-04-16 VITALS
SYSTOLIC BLOOD PRESSURE: 125 MMHG | OXYGEN SATURATION: 98 % | TEMPERATURE: 98.7 F | HEART RATE: 77 BPM | RESPIRATION RATE: 16 BRPM | DIASTOLIC BLOOD PRESSURE: 80 MMHG

## 2025-04-16 DIAGNOSIS — L50.1 IDIOPATHIC URTICARIA: Primary | ICD-10-CM

## 2025-04-16 DIAGNOSIS — L50.8 CHRONIC URTICARIA: ICD-10-CM

## 2025-04-16 PROCEDURE — 250N000011 HC RX IP 250 OP 636: Mod: JZ | Performed by: ALLERGY & IMMUNOLOGY

## 2025-04-16 PROCEDURE — 96372 THER/PROPH/DIAG INJ SC/IM: CPT | Performed by: ALLERGY & IMMUNOLOGY

## 2025-04-16 RX ORDER — MEPERIDINE HYDROCHLORIDE 25 MG/ML
25 INJECTION INTRAMUSCULAR; INTRAVENOUS; SUBCUTANEOUS
OUTPATIENT
Start: 2025-05-09

## 2025-04-16 RX ORDER — DIPHENHYDRAMINE HYDROCHLORIDE 50 MG/ML
50 INJECTION, SOLUTION INTRAMUSCULAR; INTRAVENOUS
Start: 2025-05-09

## 2025-04-16 RX ORDER — EPINEPHRINE 1 MG/ML
0.3 INJECTION, SOLUTION INTRAMUSCULAR; SUBCUTANEOUS EVERY 5 MIN PRN
OUTPATIENT
Start: 2025-05-09

## 2025-04-16 RX ORDER — EPINEPHRINE 1 MG/ML
0.3 INJECTION, SOLUTION INTRAMUSCULAR; SUBCUTANEOUS EVERY 5 MIN PRN
Status: DISCONTINUED | OUTPATIENT
Start: 2025-04-16 | End: 2025-04-16 | Stop reason: HOSPADM

## 2025-04-16 RX ORDER — MEPERIDINE HYDROCHLORIDE 25 MG/ML
25 INJECTION INTRAMUSCULAR; INTRAVENOUS; SUBCUTANEOUS
Status: DISCONTINUED | OUTPATIENT
Start: 2025-04-16 | End: 2025-04-16 | Stop reason: HOSPADM

## 2025-04-16 RX ORDER — DIPHENHYDRAMINE HYDROCHLORIDE 50 MG/ML
50 INJECTION, SOLUTION INTRAMUSCULAR; INTRAVENOUS
Status: DISCONTINUED | OUTPATIENT
Start: 2025-04-16 | End: 2025-04-16 | Stop reason: HOSPADM

## 2025-04-16 RX ORDER — ALBUTEROL SULFATE 0.83 MG/ML
2.5 SOLUTION RESPIRATORY (INHALATION)
OUTPATIENT
Start: 2025-05-09

## 2025-04-16 RX ORDER — METHYLPREDNISOLONE SODIUM SUCCINATE 40 MG/ML
40 INJECTION INTRAMUSCULAR; INTRAVENOUS
Start: 2025-05-09

## 2025-04-16 RX ORDER — METHYLPREDNISOLONE SODIUM SUCCINATE 40 MG/ML
40 INJECTION INTRAMUSCULAR; INTRAVENOUS
Status: DISCONTINUED | OUTPATIENT
Start: 2025-04-16 | End: 2025-04-16 | Stop reason: HOSPADM

## 2025-04-16 RX ORDER — ALBUTEROL SULFATE 90 UG/1
1-2 INHALANT RESPIRATORY (INHALATION)
Start: 2025-05-09

## 2025-04-16 RX ORDER — DIPHENHYDRAMINE HYDROCHLORIDE 50 MG/ML
25 INJECTION, SOLUTION INTRAMUSCULAR; INTRAVENOUS
Start: 2025-05-09

## 2025-04-16 RX ORDER — DIPHENHYDRAMINE HYDROCHLORIDE 50 MG/ML
25 INJECTION, SOLUTION INTRAMUSCULAR; INTRAVENOUS
Status: DISCONTINUED | OUTPATIENT
Start: 2025-04-16 | End: 2025-04-16 | Stop reason: HOSPADM

## 2025-04-16 RX ORDER — ALBUTEROL SULFATE 90 UG/1
1-2 INHALANT RESPIRATORY (INHALATION)
Status: DISCONTINUED | OUTPATIENT
Start: 2025-04-16 | End: 2025-04-16 | Stop reason: HOSPADM

## 2025-04-16 RX ORDER — ALBUTEROL SULFATE 0.83 MG/ML
2.5 SOLUTION RESPIRATORY (INHALATION)
Status: DISCONTINUED | OUTPATIENT
Start: 2025-04-16 | End: 2025-04-16 | Stop reason: HOSPADM

## 2025-04-16 RX ADMIN — OMALIZUMAB 300 MG: 300 INJECTION, SOLUTION SUBCUTANEOUS at 09:44

## 2025-05-07 NOTE — NURSING NOTE
Physical Therapy Evaluation    Visit Type: Initial Evaluation- Daily Treatment Note  Visit: 1  Referring Provider: Carissa Kaur NP  Medical Diagnosis (from order): R39.9 - UTI symptoms  R30.0 - Dysuria   Treatment Diagnosis: pelvic health - increased pain/symptoms, impaired range of motion, impaired strength, impaired muscle length/flexibility, impaired tissue mobility, impaired motor function/performance/coordination, impaired bladder health, impaired sexual health and impaired bowel health.  Onset  - Date of onset: 2022  Chart reviewed at time of initial evaluation (relevant co-morbidities, allergies, tests and medication listed):   unremarkable    SUBJECTIVE                                                                                                               Patient complains of a sensation of \"obstruction\" in his urethra for several years, starting after a chlamydia infection in 2022. This was treated. His symptoms are post void dribbling, sensation of obstruction, penile discharge, slow/sluggish ejaculation, urinary discomfort. He also has problems with losing his erection during intercourse. He was seen by urology in 2022 and tried flomax. He stopped this shortly after he was seen. He had a negative cystoscopy. He also notes occasional discomfort with erection and ejaculation, this will feel like a sharp pain in the penis. He also will sometimes notice this during his day. No notable discomfort at the testicles.     Pain / Symptoms  - Location: Pelvic region  - Quality / Description: discomfort, cramping, ache        Bladder: difficulty with voiding, difficulty emptying bladder, post-void dribbling and sense of incomplete emptying  - Alleviating Factors: avoiding movement in involved area, change in position, avoids aggravating activity    Function:   Limitations / Exacerbation Factors:   - Patient reports difficulty and pain with function reported below.  - , during sex, after sex, insertive  Reason For Visit:   Chief Complaint   Patient presents with     RECHECK     Rediscuss surgery          Pain Assessment  Patient Currently in Pain: Yes  0-10 Pain Scale: 5  Primary Pain Location: Back  Pain Descriptors: Discomfort    Oswestry (LISA) Questionnaire    OSWESTRY DISABILITY INDEX 5/20/2020   Count 9   Sum 25   Oswestry Score (%) 55.56   Some recent data might be hidden          Visual Analog Pain Scale  Back Pain Scale 0-10: 5  Right leg pain: 0  Left leg pain: 0    Promis 10 Assessment    PROMIS 10 5/20/2020   In general, would you say your health is: Fair   In general, would you say your quality of life is: Fair   In general, how would you rate your physical health? Fair   In general, how would you rate your mental health, including your mood and your ability to think? Good   In general, how would you rate your satisfaction with your social activities and relationships? Good   In general, please rate how well you carry out your usual social activities and roles Fair   To what extent are you able to carry out your everyday physical activities such as walking, climbing stairs, carrying groceries, or moving a chair? Moderately   How often have you been bothered by emotional problems such as feeling anxious, depressed or irritable? Rarely   How would you rate your fatigue on average? Moderate   How would you rate your pain on average?   0 = No Pain  to  10 = Worst Imaginable Pain 5   In general, would you say your health is: 2   In general, would you say your quality of life is: 2   In general, how would you rate your physical health? 2   In general, how would you rate your mental health, including your mood and your ability to think? 3   In general, how would you rate your satisfaction with your social activities and relationships? 3   In general, please rate how well you carry out your usual social activities and roles. (This includes activities at home, at work and in your community, and responsibilities  as a parent, child, spouse, employee, friend, etc.) 2   To what extent are you able to carry out your everyday physical activities such as walking, climbing stairs, carrying groceries, or moving a chair? 3   In the past 7 days, how often have you been bothered by emotional problems such as feeling anxious, depressed, or irritable? 2   In the past 7 days, how would you rate your fatigue on average? 3   In the past 7 days, how would you rate your pain on average, where 0 means no pain, and 10 means worst imaginable pain? 5   Global Mental Health Score 12   Global Physical Health Score 11   PROMIS TOTAL - SUBSCORES 23   Some recent data might be hidden                Kelly Dutton LPN   intercourse and orgasm, disruption in ADLs/IADLs due to restroom use and mental health impact  Prior Level of Function: change in symptoms intensity/frequency. no limitation in involved area,    Patient Goals: decrease symptoms, increased motion, tolerate pelvic exams, decreased pain, return to sport/leisure activities, return to age approriate activities and improved evacuation.    Prior treatment  - no therapies  - Discharged from hospital, home health, or skilled nursing facility in last 30 days: no  Home Environment   - Patient lives with: alone  - Assistance available: as needed  - Denies 2 or more falls or an unexplained fall with injury in the last year.  - Feel safe at home / work / school: yes      OBJECTIVE                                                                                                                               Pelvic Health  Internal Rectal Exam  - Pubococcygeus:         Left: tenderness        Right: tenderness  - Iliococcygeus:         Left: tenderness and trigger point        Right: tenderness and trigger point  - Ischiococcygeus:         Left: tenderness and trigger point        Right: tenderness and trigger point  - Obturator internus:         Left: tenderness        Right: tenderness  Patient demonstrates tenderness to palpation throughout pelvic floor musculature with reproduction of \"sharp\" pain.   Pelvic Floor  - Strength: 4-good squeeze, good lift, able to hold against resistance  - Elevation present.  - Elongation and relaxation: able to partially elongate, delayed relaxation and impaired motor control and/or kinesthesia  - Contract response: isolated    Outcome/Assessments  Patient Specific Functional Scale:   Activity: Feel as if he can void efficiently. , Score: 0  Average Score: 0  Each activity is scored: 0=unable to perform activity to 10=able to perform activity at the same level as before injury or problem      Treatment     Manual Therapy   - Completion of internal  examination of pelvic floor via rectal approach to determine resting state and coordination of involved musculature.   - Ischemic pressure provided throughout bilateral levator ani and obturator internus musculature via internal rectal approach to promote increased blood flow and decreased resting state of musculature.  - Sinan's massage approach utilized throughout bilateral pelvic floor musculature via internal rectal approach.    Neuromuscular Re-Education  Instructed patient in completion of \"pelvic drop\" exercise for home exercise program with therapist internal palpation for confirmation as outlined below. Handout provided.  - Patient educated on role of pelvic floor tension on bladder, bowels and pain.  Educated on concept of elevated tone, resting tone and dropped tone.  - Completion of 10 \"pelvic drops\" 3 times throughout the day with patient self-palpation.  - Patient to complete in supine and sitting.  - Patient to drop tone to resting throughout the day      Skilled input: verbal instruction/cues, tactile instruction/cues and posture correction    Writer verbally educated and received verbal consent for hand placement, positioning of patient, and techniques to be performed today from patient for clothing adjustments for techniques, hand placement and palpation for techniques and therapist position for techniques as described above and how they are pertinent to the patient's plan of care.  Verbal consent received today for internal pelvic floor muscle assessment and treatment.   Patient provided continued consent during evaluation and treatment.  Benefits and drawbacks were explained.  Patient was given alternative options.  Third person was offered to be in room during session, patient declined.  Home Exercise Program  - Completion of 10 \"pelvic drops\" 3 times throughout the day with patient self-palpation.      ASSESSMENT                                                                                                           24 year old patient has reported functional limitations listed above impacted by signs and symptoms consistent with treatment diagnosis below.  Treatment Diagnosis:   - Involved: pelvic health.  - Symptoms/impairments: increased pain/symptoms, impaired range of motion, impaired strength, impaired muscle length/flexibility, impaired tissue mobility, impaired motor function/performance/coordination, impaired bladder health, impaired sexual health and impaired bowel health.    Findings include significant guarding and hypertonicity of musculature, difficulty with full relaxation of the pelvic floor, and tenderness to palpation at deep musculature to which patient reports reproduction of pain. These impairments likely contribute to discomfort with age appropriate activities due to decreased functional range of motion secondary to chronic holding pattern, and difficulty voiding related to poor elongation of the pelvic floor muscles for complete bladder emptying. Patient responded well to manual techniques, and therefore was provided \"pelvic drops\" to promote carryover. Patient left feeling well.    Prognosis: Patient will benefit from skilled therapy.  Rehabilitative potential is: good.  Predicted patient presentation: Low (stable) - Patient comorbidities and complexities, as defined above, will have little effect on progress for prescribed plan of care.  Education:   - Present and ready to learn: patient  - Results of above outlined education: Verbalizes understanding and Demonstrates understanding    PLAN                                                                                                                         The following skilled interventions to be implemented to achieve goals listed below:  Neuromuscular Re-Education (51086)  Therapeutic Activity (23045)  Therapeutic Exercise (73600)  Manual Therapy (50619)  Activities of Daily Living/Self Care (56507)  Dry Needling    Frequency  / Duration  1 times per week tapering as patient progresses for 12 weeks for an estimated total of 8 visits    Patient involved in and agreed to plan of care and goals.  Attendance policy reviewed with patient.    Suggestions for next session as indicated: Progress per plan of care, continue with internal manual techniques, discuss dilator vs. wand utilization for independent management of symptoms at home, continued behavioral strategies as appropriate.    Goals  Long Term Goals: to be met by end of plan of care  1. Patient will obtain medical pelvic exam without reported difficulty.  2. Patient will demonstrate normal pelvic floor elongation mechanics for ease of bladder function.  3. Patient will demonstrate ability to negotiate level and unlevel surfaces at variable velocities, including change of direction without increased pain or instability to return to age appropriate and community activities at prior level of function.  4. Patient will be independent with progressed and modified home exercise program.  5. Patient Specific Functional Scale (PSFS) will improve an average score 3/10 (minimal detectable change (90%CI) for average score is 2 points, for single activity score is 3 points)      Therapy procedure time and total treatment time can be found documented on the Time Entry flowsheet

## 2025-05-14 ENCOUNTER — INFUSION THERAPY VISIT (OUTPATIENT)
Dept: INFUSION THERAPY | Facility: HOSPITAL | Age: 65
End: 2025-05-14
Attending: ALLERGY & IMMUNOLOGY
Payer: MEDICARE

## 2025-05-14 VITALS
HEART RATE: 88 BPM | TEMPERATURE: 97.1 F | DIASTOLIC BLOOD PRESSURE: 81 MMHG | RESPIRATION RATE: 16 BRPM | OXYGEN SATURATION: 98 % | SYSTOLIC BLOOD PRESSURE: 128 MMHG

## 2025-05-14 DIAGNOSIS — L50.8 CHRONIC URTICARIA: ICD-10-CM

## 2025-05-14 DIAGNOSIS — L50.1 IDIOPATHIC URTICARIA: Primary | ICD-10-CM

## 2025-05-14 PROCEDURE — 96372 THER/PROPH/DIAG INJ SC/IM: CPT | Performed by: ALLERGY & IMMUNOLOGY

## 2025-05-14 PROCEDURE — 250N000011 HC RX IP 250 OP 636: Mod: JZ | Performed by: ALLERGY & IMMUNOLOGY

## 2025-05-14 RX ORDER — DIPHENHYDRAMINE HYDROCHLORIDE 50 MG/ML
50 INJECTION, SOLUTION INTRAMUSCULAR; INTRAVENOUS
Start: 2025-06-11

## 2025-05-14 RX ORDER — MEPERIDINE HYDROCHLORIDE 25 MG/ML
25 INJECTION INTRAMUSCULAR; INTRAVENOUS; SUBCUTANEOUS
OUTPATIENT
Start: 2025-06-11

## 2025-05-14 RX ORDER — DIPHENHYDRAMINE HYDROCHLORIDE 50 MG/ML
25 INJECTION, SOLUTION INTRAMUSCULAR; INTRAVENOUS
Start: 2025-06-11

## 2025-05-14 RX ORDER — DIPHENHYDRAMINE HYDROCHLORIDE 50 MG/ML
50 INJECTION, SOLUTION INTRAMUSCULAR; INTRAVENOUS
Status: DISCONTINUED | OUTPATIENT
Start: 2025-05-14 | End: 2025-05-14 | Stop reason: HOSPADM

## 2025-05-14 RX ORDER — EPINEPHRINE 1 MG/ML
0.3 INJECTION, SOLUTION INTRAMUSCULAR; SUBCUTANEOUS EVERY 5 MIN PRN
Status: DISCONTINUED | OUTPATIENT
Start: 2025-05-14 | End: 2025-05-14 | Stop reason: HOSPADM

## 2025-05-14 RX ORDER — ALBUTEROL SULFATE 0.83 MG/ML
2.5 SOLUTION RESPIRATORY (INHALATION)
Status: DISCONTINUED | OUTPATIENT
Start: 2025-05-14 | End: 2025-05-14 | Stop reason: HOSPADM

## 2025-05-14 RX ORDER — METHYLPREDNISOLONE SODIUM SUCCINATE 40 MG/ML
40 INJECTION INTRAMUSCULAR; INTRAVENOUS
Start: 2025-06-11

## 2025-05-14 RX ORDER — MEPERIDINE HYDROCHLORIDE 25 MG/ML
25 INJECTION INTRAMUSCULAR; INTRAVENOUS; SUBCUTANEOUS
Status: DISCONTINUED | OUTPATIENT
Start: 2025-05-14 | End: 2025-05-14 | Stop reason: HOSPADM

## 2025-05-14 RX ORDER — ALBUTEROL SULFATE 90 UG/1
1-2 INHALANT RESPIRATORY (INHALATION)
Start: 2025-06-11

## 2025-05-14 RX ORDER — DIPHENHYDRAMINE HYDROCHLORIDE 50 MG/ML
25 INJECTION, SOLUTION INTRAMUSCULAR; INTRAVENOUS
Status: DISCONTINUED | OUTPATIENT
Start: 2025-05-14 | End: 2025-05-14 | Stop reason: HOSPADM

## 2025-05-14 RX ORDER — ALBUTEROL SULFATE 90 UG/1
1-2 INHALANT RESPIRATORY (INHALATION)
Status: DISCONTINUED | OUTPATIENT
Start: 2025-05-14 | End: 2025-05-14 | Stop reason: HOSPADM

## 2025-05-14 RX ORDER — EPINEPHRINE 1 MG/ML
0.3 INJECTION, SOLUTION INTRAMUSCULAR; SUBCUTANEOUS EVERY 5 MIN PRN
OUTPATIENT
Start: 2025-06-11

## 2025-05-14 RX ORDER — ALBUTEROL SULFATE 0.83 MG/ML
2.5 SOLUTION RESPIRATORY (INHALATION)
OUTPATIENT
Start: 2025-06-11

## 2025-05-14 RX ORDER — METHYLPREDNISOLONE SODIUM SUCCINATE 40 MG/ML
40 INJECTION INTRAMUSCULAR; INTRAVENOUS
Status: DISCONTINUED | OUTPATIENT
Start: 2025-05-14 | End: 2025-05-14 | Stop reason: HOSPADM

## 2025-05-14 RX ADMIN — OMALIZUMAB 300 MG: 300 INJECTION, SOLUTION SUBCUTANEOUS at 09:45

## 2025-05-14 NOTE — PROGRESS NOTES
Infusion Nursing   Kelsy Morley presents today for Injection. Xolair  Patient seen by provider today: No   present during visit today: Not Applicable.      Intravenous Access:  No Intravenous access/labs at this visit.        Post Infusion Assessment:  Patient tolerated injection without incident.       Discharge Plan:   Patient discharged in stable condition accompanied by: self.  Departure Mode: Ambulatory.      Marce Olsen LPN

## 2025-06-11 ENCOUNTER — INFUSION THERAPY VISIT (OUTPATIENT)
Dept: INFUSION THERAPY | Facility: HOSPITAL | Age: 65
End: 2025-06-11
Attending: FAMILY MEDICINE
Payer: MEDICARE

## 2025-06-11 VITALS
OXYGEN SATURATION: 99 % | DIASTOLIC BLOOD PRESSURE: 79 MMHG | TEMPERATURE: 98.6 F | SYSTOLIC BLOOD PRESSURE: 124 MMHG | RESPIRATION RATE: 16 BRPM | HEART RATE: 78 BPM

## 2025-06-11 DIAGNOSIS — L50.8 CHRONIC URTICARIA: ICD-10-CM

## 2025-06-11 DIAGNOSIS — L50.1 IDIOPATHIC URTICARIA: Primary | ICD-10-CM

## 2025-06-11 PROCEDURE — 250N000011 HC RX IP 250 OP 636: Mod: JZ | Performed by: ALLERGY & IMMUNOLOGY

## 2025-06-11 PROCEDURE — 96372 THER/PROPH/DIAG INJ SC/IM: CPT | Performed by: ALLERGY & IMMUNOLOGY

## 2025-06-11 RX ORDER — EPINEPHRINE 1 MG/ML
0.3 INJECTION, SOLUTION INTRAMUSCULAR; SUBCUTANEOUS EVERY 5 MIN PRN
OUTPATIENT
Start: 2025-07-09

## 2025-06-11 RX ORDER — MEPERIDINE HYDROCHLORIDE 50 MG/ML
25 INJECTION INTRAMUSCULAR; INTRAVENOUS; SUBCUTANEOUS
Status: DISCONTINUED | OUTPATIENT
Start: 2025-06-11 | End: 2025-06-11 | Stop reason: HOSPADM

## 2025-06-11 RX ORDER — METHYLPREDNISOLONE SODIUM SUCCINATE 40 MG/ML
40 INJECTION INTRAMUSCULAR; INTRAVENOUS
Start: 2025-07-09

## 2025-06-11 RX ORDER — EPINEPHRINE 1 MG/ML
0.3 INJECTION, SOLUTION INTRAMUSCULAR; SUBCUTANEOUS EVERY 5 MIN PRN
Status: DISCONTINUED | OUTPATIENT
Start: 2025-06-11 | End: 2025-06-11 | Stop reason: HOSPADM

## 2025-06-11 RX ORDER — ALBUTEROL SULFATE 90 UG/1
1-2 INHALANT RESPIRATORY (INHALATION)
Start: 2025-07-09

## 2025-06-11 RX ORDER — ALBUTEROL SULFATE 90 UG/1
1-2 INHALANT RESPIRATORY (INHALATION)
Status: DISCONTINUED | OUTPATIENT
Start: 2025-06-11 | End: 2025-06-11 | Stop reason: HOSPADM

## 2025-06-11 RX ORDER — DIPHENHYDRAMINE HYDROCHLORIDE 50 MG/ML
50 INJECTION, SOLUTION INTRAMUSCULAR; INTRAVENOUS
Status: DISCONTINUED | OUTPATIENT
Start: 2025-06-11 | End: 2025-06-11 | Stop reason: HOSPADM

## 2025-06-11 RX ORDER — DIPHENHYDRAMINE HYDROCHLORIDE 50 MG/ML
50 INJECTION, SOLUTION INTRAMUSCULAR; INTRAVENOUS
Start: 2025-07-09

## 2025-06-11 RX ORDER — ALBUTEROL SULFATE 0.83 MG/ML
2.5 SOLUTION RESPIRATORY (INHALATION)
Status: DISCONTINUED | OUTPATIENT
Start: 2025-06-11 | End: 2025-06-11 | Stop reason: HOSPADM

## 2025-06-11 RX ORDER — MEPERIDINE HYDROCHLORIDE 50 MG/ML
25 INJECTION INTRAMUSCULAR; INTRAVENOUS; SUBCUTANEOUS
OUTPATIENT
Start: 2025-07-09

## 2025-06-11 RX ORDER — METHYLPREDNISOLONE SODIUM SUCCINATE 40 MG/ML
40 INJECTION INTRAMUSCULAR; INTRAVENOUS
Status: DISCONTINUED | OUTPATIENT
Start: 2025-06-11 | End: 2025-06-11 | Stop reason: HOSPADM

## 2025-06-11 RX ORDER — DIPHENHYDRAMINE HYDROCHLORIDE 50 MG/ML
25 INJECTION, SOLUTION INTRAMUSCULAR; INTRAVENOUS
Status: DISCONTINUED | OUTPATIENT
Start: 2025-06-11 | End: 2025-06-11 | Stop reason: HOSPADM

## 2025-06-11 RX ORDER — DIPHENHYDRAMINE HYDROCHLORIDE 50 MG/ML
25 INJECTION, SOLUTION INTRAMUSCULAR; INTRAVENOUS
Start: 2025-07-09

## 2025-06-11 RX ORDER — ALBUTEROL SULFATE 0.83 MG/ML
2.5 SOLUTION RESPIRATORY (INHALATION)
OUTPATIENT
Start: 2025-07-09

## 2025-06-11 RX ADMIN — OMALIZUMAB 300 MG: 300 INJECTION, SOLUTION SUBCUTANEOUS at 09:16

## 2025-06-12 DIAGNOSIS — M47.816 LUMBAR FACET ARTHROPATHY: ICD-10-CM

## 2025-06-12 DIAGNOSIS — M40.36 FLATBACK SYNDROME, LUMBAR REGION: ICD-10-CM

## 2025-06-12 DIAGNOSIS — M54.50 CHRONIC BILATERAL LOW BACK PAIN WITHOUT SCIATICA: ICD-10-CM

## 2025-06-12 DIAGNOSIS — G89.29 CHRONIC BILATERAL LOW BACK PAIN WITHOUT SCIATICA: ICD-10-CM

## 2025-06-12 DIAGNOSIS — S32.009K PSEUDOARTHROSIS OF LUMBAR SPINE: ICD-10-CM

## 2025-06-12 DIAGNOSIS — M54.9 BACK PAIN, UNSPECIFIED BACK LOCATION, UNSPECIFIED BACK PAIN LATERALITY, UNSPECIFIED CHRONICITY: ICD-10-CM

## 2025-06-12 DIAGNOSIS — M80.00XA AGE-RELATED OSTEOPOROSIS WITH CURRENT PATHOLOGICAL FRACTURE, INITIAL ENCOUNTER: ICD-10-CM

## 2025-06-12 DIAGNOSIS — M48.062 LUMBAR STENOSIS WITH NEUROGENIC CLAUDICATION: ICD-10-CM

## 2025-06-12 DIAGNOSIS — G89.29 CHRONIC SACROILIAC JOINT PAIN: ICD-10-CM

## 2025-06-12 DIAGNOSIS — M43.10 DEGENERATIVE SPONDYLOLISTHESIS: ICD-10-CM

## 2025-06-12 DIAGNOSIS — Z98.1 S/P SPINAL FUSION: ICD-10-CM

## 2025-06-12 DIAGNOSIS — G57.01 PIRIFORMIS SYNDROME OF RIGHT SIDE: Primary | ICD-10-CM

## 2025-06-12 DIAGNOSIS — M53.3 CHRONIC SACROILIAC JOINT PAIN: ICD-10-CM

## 2025-06-12 DIAGNOSIS — M62.830 MUSCLE SPASM OF BACK: ICD-10-CM

## 2025-06-12 DIAGNOSIS — M54.16 LUMBAR RADICULOPATHY: ICD-10-CM
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
## 2025-06-13 ASSESSMENT — PAIN SCALES - PAIN ENJOYMENT GENERAL ACTIVITY SCALE (PEG)
INTERFERED_ENJOYMENT_LIFE: 6
PEG_TOTALSCORE: 6.67
INTERFERED_GENERAL_ACTIVITY: 6
AVG_PAIN_PASTWEEK: 8

## 2025-06-18 ENCOUNTER — OFFICE VISIT (OUTPATIENT)
Dept: PALLIATIVE MEDICINE | Facility: OTHER | Age: 65
End: 2025-06-18
Attending: ANESTHESIOLOGY
Payer: MEDICARE

## 2025-06-18 VITALS — SYSTOLIC BLOOD PRESSURE: 133 MMHG | DIASTOLIC BLOOD PRESSURE: 77 MMHG | HEART RATE: 107 BPM | OXYGEN SATURATION: 100 %

## 2025-06-18 DIAGNOSIS — F43.10 PTSD (POST-TRAUMATIC STRESS DISORDER): ICD-10-CM

## 2025-06-18 DIAGNOSIS — M54.2 NECK PAIN: Primary | ICD-10-CM

## 2025-06-18 PROCEDURE — 99213 OFFICE O/P EST LOW 20 MIN: CPT | Performed by: ANESTHESIOLOGY

## 2025-06-18 PROCEDURE — 1125F AMNT PAIN NOTED PAIN PRSNT: CPT | Performed by: ANESTHESIOLOGY

## 2025-06-18 PROCEDURE — 3075F SYST BP GE 130 - 139MM HG: CPT | Performed by: ANESTHESIOLOGY

## 2025-06-18 PROCEDURE — 3078F DIAST BP <80 MM HG: CPT | Performed by: ANESTHESIOLOGY

## 2025-06-18 PROCEDURE — G2211 COMPLEX E/M VISIT ADD ON: HCPCS | Performed by: ANESTHESIOLOGY

## 2025-06-18 PROCEDURE — G0463 HOSPITAL OUTPT CLINIC VISIT: HCPCS | Performed by: ANESTHESIOLOGY

## 2025-06-18 RX ORDER — LAMOTRIGINE 25 MG/1
50 TABLET ORAL DAILY
Qty: 180 TABLET | Refills: 3 | Status: SHIPPED | OUTPATIENT
Start: 2025-06-18

## 2025-06-18 RX ORDER — LISDEXAMFETAMINE DIMESYLATE 30 MG/1
30 CAPSULE ORAL DAILY
COMMUNITY
Start: 2023-12-10 | End: 2025-07-10

## 2025-06-18 ASSESSMENT — PAIN SCALES - GENERAL: PAINLEVEL_OUTOF10: SEVERE PAIN (7)

## 2025-06-18 NOTE — PROGRESS NOTES
Patient presents to the clinic today for a visit with NICOLE TUTTLE MD regarding Pain Management.      UDS/CSA- 11.15.2024    Medications- Tramadol    Notes    Aide Valenzuela  [facility] Clinical Assistant

## 2025-06-18 NOTE — PATIENT INSTRUCTIONS
PLAN:    Checkout may schedule with Dr. Montana for an interventional evaluation to consider injections for neck pain.    Continue the methylene blue 30 mg daily.    Continue lamotrigine 50 mg daily.    You may contact the [facility] compounding pharmacy for nasal sprays that may help with mast cell activation symptoms.    Follow-up with Dr. Ortega for return visit in 4 to 6 months
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
## 2025-06-26 ENCOUNTER — OFFICE VISIT (OUTPATIENT)
Dept: ORTHOPEDICS | Facility: CLINIC | Age: 65
End: 2025-06-26
Payer: MEDICARE

## 2025-06-26 DIAGNOSIS — Z98.1 S/P SPINAL FUSION: ICD-10-CM

## 2025-06-26 DIAGNOSIS — M46.1 SACROILIITIS, NOT ELSEWHERE CLASSIFIED: ICD-10-CM

## 2025-06-26 DIAGNOSIS — G57.01 PIRIFORMIS SYNDROME OF RIGHT SIDE: ICD-10-CM

## 2025-06-26 PROCEDURE — 99215 OFFICE O/P EST HI 40 MIN: CPT | Mod: GC | Performed by: ORTHOPAEDIC SURGERY

## 2025-06-26 RX ORDER — LIDOCAINE 50 MG/G
1 PATCH TOPICAL EVERY 24 HOURS
Qty: 30 PATCH | Refills: 2 | Status: SHIPPED | OUTPATIENT
Start: 2025-06-26
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
## 2025-07-03 ENCOUNTER — TELEPHONE (OUTPATIENT)
Dept: ORTHOPEDICS | Facility: CLINIC | Age: 65
End: 2025-07-03
Payer: MEDICARE

## 2025-07-03 ENCOUNTER — TELEPHONE (OUTPATIENT)
Dept: PALLIATIVE MEDICINE | Facility: OTHER | Age: 65
End: 2025-07-03
Payer: MEDICARE

## 2025-07-03 NOTE — TELEPHONE ENCOUNTER
See phone message details from pt.  Before MRI & CT scan scheduled & SI Joint injection scheduled with pain clinic for 8-7-25.    I called & told pt I will forward message to our radiology Prior Auth dept about the MRI & CT scan approval & pt needs to call the pain clinic about the injection coverage because they have separate PA dept.    I told pt I will forward message to  about her request for documentation.  Call back prn. Pt agreed.    Aide Chavez RN.
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
## 2025-07-03 NOTE — TELEPHONE ENCOUNTER
I called & informed pt that  addended dictation of 6-26-25 & I sent message to our Radiology PA team to submit addendum to Medicare.  Call back prn. Pt agreed. W.O./Aide Chavez RN.

## 2025-07-05 ENCOUNTER — HEALTH MAINTENANCE LETTER (OUTPATIENT)
Age: 65
End: 2025-07-05

## 2025-07-08 NOTE — TELEPHONE ENCOUNTER
See previous triage  notes.  I called pt back again today 7-8-25 & relayed this reply from Zac Bess team.:::::  Shahriar Smith to Me  Gabriel Galvan MD Ronning, Adam J PL      7/8/25  8:45 AM  Hello,     Medicare does not require auth for the CT Lumbar spine or the MRI cervical spine. Medicare also does not have a medical policy for the services. We would not be able to submit the request as Medicare would not take it. This would need to be submitted by the denials team if the claim comes back as denied.      Hope this helps,  Shahriar Smith.    I told pt I postal mailed copy of addended dictation to pt. In case pt needs to submit that to insurance in future & pt agreed. Pt will F/U with Insurance.   Call back prn. Aide Chavez RN.

## 2025-07-09 ENCOUNTER — INFUSION THERAPY VISIT (OUTPATIENT)
Dept: INFUSION THERAPY | Facility: HOSPITAL | Age: 65
End: 2025-07-09
Attending: FAMILY MEDICINE
Payer: MEDICARE

## 2025-07-09 VITALS
SYSTOLIC BLOOD PRESSURE: 118 MMHG | RESPIRATION RATE: 16 BRPM | TEMPERATURE: 98.4 F | DIASTOLIC BLOOD PRESSURE: 71 MMHG | OXYGEN SATURATION: 98 % | HEART RATE: 84 BPM

## 2025-07-09 DIAGNOSIS — L50.1 IDIOPATHIC URTICARIA: Primary | ICD-10-CM

## 2025-07-09 DIAGNOSIS — L50.8 CHRONIC URTICARIA: ICD-10-CM

## 2025-07-09 PROCEDURE — 96372 THER/PROPH/DIAG INJ SC/IM: CPT | Performed by: ALLERGY & IMMUNOLOGY

## 2025-07-09 PROCEDURE — 250N000011 HC RX IP 250 OP 636: Mod: JZ | Performed by: ALLERGY & IMMUNOLOGY

## 2025-07-09 RX ORDER — ALBUTEROL SULFATE 90 UG/1
1-2 INHALANT RESPIRATORY (INHALATION)
Start: 2025-08-06

## 2025-07-09 RX ORDER — ALBUTEROL SULFATE 0.83 MG/ML
2.5 SOLUTION RESPIRATORY (INHALATION)
OUTPATIENT
Start: 2025-08-06

## 2025-07-09 RX ORDER — MEPERIDINE HYDROCHLORIDE 50 MG/ML
25 INJECTION INTRAMUSCULAR; INTRAVENOUS; SUBCUTANEOUS
OUTPATIENT
Start: 2025-08-06

## 2025-07-09 RX ORDER — DIPHENHYDRAMINE HYDROCHLORIDE 50 MG/ML
25 INJECTION, SOLUTION INTRAMUSCULAR; INTRAVENOUS
Start: 2025-08-06

## 2025-07-09 RX ORDER — METHYLPREDNISOLONE SODIUM SUCCINATE 40 MG/ML
40 INJECTION INTRAMUSCULAR; INTRAVENOUS
Start: 2025-08-06

## 2025-07-09 RX ORDER — EPINEPHRINE 1 MG/ML
0.3 INJECTION, SOLUTION INTRAMUSCULAR; SUBCUTANEOUS EVERY 5 MIN PRN
OUTPATIENT
Start: 2025-08-06

## 2025-07-09 RX ORDER — DIPHENHYDRAMINE HYDROCHLORIDE 50 MG/ML
50 INJECTION, SOLUTION INTRAMUSCULAR; INTRAVENOUS
Start: 2025-08-06

## 2025-07-09 RX ADMIN — OMALIZUMAB 300 MG: 300 INJECTION, SOLUTION SUBCUTANEOUS at 09:19

## 2025-07-13 ENCOUNTER — MYC MEDICAL ADVICE (OUTPATIENT)
Dept: ORTHOPEDICS | Facility: CLINIC | Age: 65
End: 2025-07-13
Payer: MEDICARE

## 2025-07-16 ENCOUNTER — TELEPHONE (OUTPATIENT)
Dept: ORTHOPEDICS | Facility: CLINIC | Age: 65
End: 2025-07-16

## 2025-07-16 NOTE — TELEPHONE ENCOUNTER
Re faxed order for cervical MRI to fax #[phone], called Deb and spoke with scheduling, they are waiting for fax. Left clinic phone number if further issues arise.  James MAYORGA

## 2025-07-16 NOTE — TELEPHONE ENCOUNTER
Other: Raegan from [facility] needs to have Kelsy's referral for a cervical spine MRI refaxed due to computer issues and she is scheduled for today 07/16/25 at 12:00 pm. The fax number is [phone]. If questions please call Raegan at [phone]     Could we send this information to you in [place] or would you prefer to receive a phone call?:   Patient would prefer a phone call   Okay to leave a detailed message?: Yes at Other phone number:  [phone] Raegan

## 2025-07-28 ENCOUNTER — TELEPHONE (OUTPATIENT)
Dept: CARDIOLOGY | Facility: CLINIC | Age: 65
End: 2025-07-28
Payer: MEDICARE

## 2025-07-28 NOTE — TELEPHONE ENCOUNTER
Health Call Center    Phone Message    May a detailed message be left on voicemail: yes     Reason for Call: Symptoms or Concerns     If patient has red-flag symptoms, warm transfer to triage line    Current symptom or concern: Decreased heart rate, blood pressure and dizziness     Patient stated they have been experiencing more decreased heart rate in the 40's, low blood pressure and some dizziness. Patient is feeling ok as of now, but will like to know what they should do as symptoms are happening more often. Please call patient back to further discuss.    Action Taken: Other: Cardiology    Travel Screening: Not Applicable     Thank you!  [facility]
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
## 2025-08-06 ENCOUNTER — INFUSION THERAPY VISIT (OUTPATIENT)
Dept: INFUSION THERAPY | Facility: HOSPITAL | Age: 65
End: 2025-08-06
Attending: FAMILY MEDICINE
Payer: MEDICARE

## 2025-08-06 VITALS
TEMPERATURE: 98.3 F | RESPIRATION RATE: 16 BRPM | OXYGEN SATURATION: 100 % | DIASTOLIC BLOOD PRESSURE: 71 MMHG | SYSTOLIC BLOOD PRESSURE: 109 MMHG | HEART RATE: 70 BPM

## 2025-08-06 DIAGNOSIS — L50.8 CHRONIC URTICARIA: ICD-10-CM

## 2025-08-06 DIAGNOSIS — L50.1 IDIOPATHIC URTICARIA: Primary | ICD-10-CM

## 2025-08-06 PROCEDURE — 250N000011 HC RX IP 250 OP 636: Mod: JZ | Performed by: ALLERGY & IMMUNOLOGY

## 2025-08-06 PROCEDURE — 96372 THER/PROPH/DIAG INJ SC/IM: CPT | Performed by: ALLERGY & IMMUNOLOGY

## 2025-08-06 RX ORDER — MEPERIDINE HYDROCHLORIDE 50 MG/ML
25 INJECTION INTRAMUSCULAR; INTRAVENOUS; SUBCUTANEOUS
OUTPATIENT
Start: 2025-09-03

## 2025-08-06 RX ORDER — EPINEPHRINE 1 MG/ML
0.3 INJECTION, SOLUTION INTRAMUSCULAR; SUBCUTANEOUS EVERY 5 MIN PRN
OUTPATIENT
Start: 2025-09-03

## 2025-08-06 RX ORDER — METHYLPREDNISOLONE SODIUM SUCCINATE 40 MG/ML
40 INJECTION INTRAMUSCULAR; INTRAVENOUS
Start: 2025-09-03

## 2025-08-06 RX ORDER — ALBUTEROL SULFATE 90 UG/1
1-2 INHALANT RESPIRATORY (INHALATION)
Start: 2025-09-03

## 2025-08-06 RX ORDER — METHYLPREDNISOLONE SODIUM SUCCINATE 40 MG/ML
40 INJECTION INTRAMUSCULAR; INTRAVENOUS
Status: DISCONTINUED | OUTPATIENT
Start: 2025-08-06 | End: 2025-08-06 | Stop reason: HOSPADM

## 2025-08-06 RX ORDER — ALBUTEROL SULFATE 0.83 MG/ML
2.5 SOLUTION RESPIRATORY (INHALATION)
Status: DISCONTINUED | OUTPATIENT
Start: 2025-08-06 | End: 2025-08-06 | Stop reason: HOSPADM

## 2025-08-06 RX ORDER — MEPERIDINE HYDROCHLORIDE 50 MG/ML
25 INJECTION INTRAMUSCULAR; INTRAVENOUS; SUBCUTANEOUS
Status: DISCONTINUED | OUTPATIENT
Start: 2025-08-06 | End: 2025-08-06 | Stop reason: HOSPADM

## 2025-08-06 RX ORDER — DIPHENHYDRAMINE HYDROCHLORIDE 50 MG/ML
25 INJECTION, SOLUTION INTRAMUSCULAR; INTRAVENOUS
Status: DISCONTINUED | OUTPATIENT
Start: 2025-08-06 | End: 2025-08-06 | Stop reason: HOSPADM

## 2025-08-06 RX ORDER — EPINEPHRINE 1 MG/ML
0.3 INJECTION, SOLUTION INTRAMUSCULAR; SUBCUTANEOUS EVERY 5 MIN PRN
Status: DISCONTINUED | OUTPATIENT
Start: 2025-08-06 | End: 2025-08-06 | Stop reason: HOSPADM

## 2025-08-06 RX ORDER — DIPHENHYDRAMINE HYDROCHLORIDE 50 MG/ML
50 INJECTION, SOLUTION INTRAMUSCULAR; INTRAVENOUS
Status: DISCONTINUED | OUTPATIENT
Start: 2025-08-06 | End: 2025-08-06 | Stop reason: HOSPADM

## 2025-08-06 RX ORDER — DIPHENHYDRAMINE HYDROCHLORIDE 50 MG/ML
25 INJECTION, SOLUTION INTRAMUSCULAR; INTRAVENOUS
Start: 2025-09-03

## 2025-08-06 RX ORDER — DIPHENHYDRAMINE HYDROCHLORIDE 50 MG/ML
50 INJECTION, SOLUTION INTRAMUSCULAR; INTRAVENOUS
Start: 2025-09-03

## 2025-08-06 RX ORDER — ALBUTEROL SULFATE 0.83 MG/ML
2.5 SOLUTION RESPIRATORY (INHALATION)
OUTPATIENT
Start: 2025-09-03

## 2025-08-06 RX ORDER — ALBUTEROL SULFATE 90 UG/1
1-2 INHALANT RESPIRATORY (INHALATION)
Status: DISCONTINUED | OUTPATIENT
Start: 2025-08-06 | End: 2025-08-06 | Stop reason: HOSPADM

## 2025-08-06 RX ADMIN — OMALIZUMAB 300 MG: 300 INJECTION, SOLUTION SUBCUTANEOUS at 09:30

## 2025-09-03 ENCOUNTER — MYC REFILL (OUTPATIENT)
Dept: ORTHOPEDICS | Facility: CLINIC | Age: 65
End: 2025-09-03

## 2025-09-03 DIAGNOSIS — Z98.1 S/P SPINAL FUSION: ICD-10-CM

## 2025-09-03 DIAGNOSIS — G57.01 PIRIFORMIS SYNDROME OF RIGHT SIDE: ICD-10-CM

## 2025-09-03 RX ORDER — LIDOCAINE 50 MG/G
1 PATCH TOPICAL EVERY 24 HOURS
Qty: 30 PATCH | Refills: 2 | Status: SHIPPED | OUTPATIENT
Start: 2025-09-03

## (undated) DEVICE — CELL SAVER

## (undated) DEVICE — GLOVE BIOGEL PI SZ 8.0 40880

## (undated) DEVICE — ESU ELEC BLADE 2.75" COATED/INSULATED E1455

## (undated) DEVICE — SOL WATER IRRIG 1000ML BOTTLE 2F7114

## (undated) DEVICE — Device

## (undated) DEVICE — TOOL DISSECT MIDAS MR8 14CM MATCH HEAD 3MM MR8-14MH30

## (undated) DEVICE — RX SURGIFLO HEMOSTATIC MATRIX W/THROMBIN 8ML 2994

## (undated) DEVICE — DRAPE O ARM TUBE 9732722

## (undated) DEVICE — DRSG TEGADERM 4X4 3/4" 1626W

## (undated) DEVICE — SU VICRYL 2-0 CT-2 27" UND J269H

## (undated) DEVICE — UNID PLAN

## (undated) DEVICE — STPL SKIN 35W ROTATING HEAD PRW35

## (undated) DEVICE — SUTURE VICRYL+ 1 CT-1 CR 8X18" VIO VCP741D

## (undated) DEVICE — DRSG AQUACEL AG HYDROFIBER 3.5X6" 422604

## (undated) DEVICE — SYR 03ML LL W/O NDL 309657

## (undated) DEVICE — LINEN TOWEL PACK X30 5481

## (undated) DEVICE — SUCTION MANIFOLD NEPTUNE 2 SYS 4 PORT 0702-020-000

## (undated) DEVICE — LINEN STRADDLE PACK

## (undated) DEVICE — SOL NACL 0.9% IRRIG 1000ML BOTTLE 2F7124

## (undated) DEVICE — KIT PATIENT CARE PROAXIS SYSTEM 6988-PV-ACP

## (undated) DEVICE — SU ETHILON 3-0 PS-1 18" 1663H

## (undated) DEVICE — SPONGE SURGIFOAM 100 1974

## (undated) DEVICE — DRSG TEGADERM 2 3/8X2 3/4" 1624W

## (undated) DEVICE — BLADE BONE MILL STRK MED 5420-MED-000

## (undated) DEVICE — MIDAS REX DIFFUSER LUBRICANT MR7 PA700

## (undated) DEVICE — CUFF FINGER CLEARSIGHT MED CSCM

## (undated) DEVICE — SU MONOCRYL 4-0 PS-2 18" UND Y496G

## (undated) DEVICE — GLOVE BIOGEL PI MICRO INDICATOR UNDERGLOVE SZ 8.0 48980

## (undated) DEVICE — SUTURE VICRYL+ 1 CT-1 36" VCP347H

## (undated) DEVICE — SURGICEL HEMOSTAT 4X8" 1952

## (undated) DEVICE — MARKER SPHERES PASSIVE MEDT PACK 5 8801075

## (undated) DEVICE — POSITIONER ARMBOARD FOAM 1PAIR LF FP-ARMB1

## (undated) DEVICE — DRAIN ROUND W/RESERV KIT JACKSON PRATT 10FR 400ML SU130-402D

## (undated) DEVICE — SUCTION TIP YANKAUER STR K87

## (undated) DEVICE — PACK SPINE INSTRUMENT DRAPE 76091-ACM7820

## (undated) RX ORDER — HYDROMORPHONE HYDROCHLORIDE 1 MG/ML
INJECTION, SOLUTION INTRAMUSCULAR; INTRAVENOUS; SUBCUTANEOUS
Status: DISPENSED
Start: 2024-04-15

## (undated) RX ORDER — FENTANYL CITRATE-0.9 % NACL/PF 10 MCG/ML
PLASTIC BAG, INJECTION (ML) INTRAVENOUS
Status: DISPENSED
Start: 2024-04-15

## (undated) RX ORDER — ONDANSETRON 2 MG/ML
INJECTION INTRAMUSCULAR; INTRAVENOUS
Status: DISPENSED
Start: 2024-04-15

## (undated) RX ORDER — ACETAMINOPHEN 325 MG/1
TABLET ORAL
Status: DISPENSED
Start: 2024-04-15

## (undated) RX ORDER — GABAPENTIN 300 MG/1
CAPSULE ORAL
Status: DISPENSED
Start: 2024-04-15

## (undated) RX ORDER — SODIUM CHLORIDE 9 MG/ML
INJECTION, SOLUTION INTRAVENOUS
Status: DISPENSED
Start: 2019-11-26

## (undated) RX ORDER — SODIUM CHLORIDE 9 MG/ML
INJECTION, SOLUTION INTRAVENOUS
Status: DISPENSED
Start: 2024-04-15

## (undated) RX ORDER — FENTANYL CITRATE 50 UG/ML
INJECTION, SOLUTION INTRAMUSCULAR; INTRAVENOUS
Status: DISPENSED
Start: 2024-04-15

## (undated) RX ORDER — DEXAMETHASONE SODIUM PHOSPHATE 4 MG/ML
INJECTION, SOLUTION INTRA-ARTICULAR; INTRALESIONAL; INTRAMUSCULAR; INTRAVENOUS; SOFT TISSUE
Status: DISPENSED
Start: 2024-04-15

## (undated) RX ORDER — LIDOCAINE HYDROCHLORIDE 10 MG/ML
INJECTION, SOLUTION INFILTRATION; PERINEURAL
Status: DISPENSED
Start: 2021-01-26

## (undated) RX ORDER — TRIAMCINOLONE ACETONIDE 40 MG/ML
INJECTION, SUSPENSION INTRA-ARTICULAR; INTRAMUSCULAR
Status: DISPENSED
Start: 2021-01-26

## (undated) RX ORDER — LORAZEPAM 2 MG/ML
INJECTION INTRAMUSCULAR
Status: DISPENSED
Start: 2024-04-15

## (undated) RX ORDER — PROPOFOL 10 MG/ML
INJECTION, EMULSION INTRAVENOUS
Status: DISPENSED
Start: 2024-04-15

## (undated) RX ORDER — VANCOMYCIN HYDROCHLORIDE 1 G/20ML
INJECTION, POWDER, LYOPHILIZED, FOR SOLUTION INTRAVENOUS
Status: DISPENSED
Start: 2024-04-15

## (undated) RX ORDER — CEFAZOLIN SODIUM/WATER 2 G/20 ML
SYRINGE (ML) INTRAVENOUS
Status: DISPENSED
Start: 2024-04-15